# Patient Record
Sex: MALE | Race: BLACK OR AFRICAN AMERICAN | NOT HISPANIC OR LATINO | Employment: OTHER | URBAN - METROPOLITAN AREA
[De-identification: names, ages, dates, MRNs, and addresses within clinical notes are randomized per-mention and may not be internally consistent; named-entity substitution may affect disease eponyms.]

---

## 2017-12-10 ENCOUNTER — HOSPITAL ENCOUNTER (INPATIENT)
Facility: HOSPITAL | Age: 47
LOS: 10 days | Discharge: HOME | DRG: 392 | End: 2017-12-20
Attending: SURGERY | Admitting: SURGERY
Payer: MEDICAID

## 2017-12-10 DIAGNOSIS — K57.20 DIVERTICULITIS OF LARGE INTESTINE WITH PERFORATION WITHOUT ABSCESS OR BLEEDING: Primary | ICD-10-CM

## 2017-12-10 DIAGNOSIS — E66.9 OBESITY (BMI 30-39.9): ICD-10-CM

## 2017-12-10 DIAGNOSIS — K57.92 DIVERTICULITIS: ICD-10-CM

## 2017-12-10 DIAGNOSIS — N17.9 ACUTE KIDNEY INJURY: ICD-10-CM

## 2017-12-10 DIAGNOSIS — K57.20 DIVERTICULITIS OF LARGE INTESTINE WITH ABSCESS WITHOUT BLEEDING: ICD-10-CM

## 2017-12-10 DIAGNOSIS — R65.10 SIRS (SYSTEMIC INFLAMMATORY RESPONSE SYNDROME): ICD-10-CM

## 2017-12-10 DIAGNOSIS — K57.92 DIVERTICULITIS OF INTESTINE, UNSPECIFIED BLEEDING STATUS, UNSPECIFIED COMPLICATION STATUS, UNSPECIFIED PART OF INTESTINAL TRACT: ICD-10-CM

## 2017-12-10 PROBLEM — I10 ESSENTIAL HYPERTENSION: Status: ACTIVE | Noted: 2017-12-10

## 2017-12-10 LAB
ALBUMIN SERPL BCP-MCNC: 2.8 G/DL
ALP SERPL-CCNC: 60 U/L
ALT SERPL W/O P-5'-P-CCNC: 12 U/L
ANION GAP SERPL CALC-SCNC: 11 MMOL/L
ANISOCYTOSIS BLD QL SMEAR: SLIGHT
AST SERPL-CCNC: 14 U/L
BASOPHILS # BLD AUTO: 0.01 K/UL
BASOPHILS NFR BLD: 0 %
BILIRUB SERPL-MCNC: 0.4 MG/DL
BUN SERPL-MCNC: 21 MG/DL
CALCIUM SERPL-MCNC: 8 MG/DL
CHLORIDE SERPL-SCNC: 109 MMOL/L
CO2 SERPL-SCNC: 20 MMOL/L
CREAT SERPL-MCNC: 1.2 MG/DL
DIFFERENTIAL METHOD: ABNORMAL
EOSINOPHIL # BLD AUTO: 0 K/UL
EOSINOPHIL NFR BLD: 0 %
ERYTHROCYTE [DISTWIDTH] IN BLOOD BY AUTOMATED COUNT: 14.2 %
EST. GFR  (AFRICAN AMERICAN): >60 ML/MIN/1.73 M^2
EST. GFR  (NON AFRICAN AMERICAN): >60 ML/MIN/1.73 M^2
GLUCOSE SERPL-MCNC: 120 MG/DL
HCT VFR BLD AUTO: 39.6 %
HGB BLD-MCNC: 13.2 G/DL
LYMPHOCYTES # BLD AUTO: 0.9 K/UL
LYMPHOCYTES NFR BLD: 3.8 %
MAGNESIUM SERPL-MCNC: 1.5 MG/DL
MCH RBC QN AUTO: 28.4 PG
MCHC RBC AUTO-ENTMCNC: 33.3 G/DL
MCV RBC AUTO: 85 FL
MONOCYTES # BLD AUTO: 0.5 K/UL
MONOCYTES NFR BLD: 2 %
NEUTROPHILS # BLD AUTO: 22 K/UL
NEUTROPHILS NFR BLD: 94.2 %
PHOSPHATE SERPL-MCNC: 2.1 MG/DL
PLATELET # BLD AUTO: 204 K/UL
PLATELET BLD QL SMEAR: ABNORMAL
PMV BLD AUTO: 11.4 FL
POCT GLUCOSE: 112 MG/DL (ref 70–110)
POTASSIUM SERPL-SCNC: 4.1 MMOL/L
PROT SERPL-MCNC: 7 G/DL
RBC # BLD AUTO: 4.65 M/UL
SODIUM SERPL-SCNC: 140 MMOL/L
WBC # BLD AUTO: 23.54 K/UL

## 2017-12-10 PROCEDURE — 80053 COMPREHEN METABOLIC PANEL: CPT

## 2017-12-10 PROCEDURE — 63600175 PHARM REV CODE 636 W HCPCS: Performed by: STUDENT IN AN ORGANIZED HEALTH CARE EDUCATION/TRAINING PROGRAM

## 2017-12-10 PROCEDURE — 85025 COMPLETE CBC W/AUTO DIFF WBC: CPT

## 2017-12-10 PROCEDURE — 36415 COLL VENOUS BLD VENIPUNCTURE: CPT

## 2017-12-10 PROCEDURE — 11000001 HC ACUTE MED/SURG PRIVATE ROOM

## 2017-12-10 PROCEDURE — 25000003 PHARM REV CODE 250: Performed by: STUDENT IN AN ORGANIZED HEALTH CARE EDUCATION/TRAINING PROGRAM

## 2017-12-10 PROCEDURE — 84100 ASSAY OF PHOSPHORUS: CPT

## 2017-12-10 PROCEDURE — 94761 N-INVAS EAR/PLS OXIMETRY MLT: CPT

## 2017-12-10 PROCEDURE — 83735 ASSAY OF MAGNESIUM: CPT

## 2017-12-10 RX ORDER — DIPHENHYDRAMINE HYDROCHLORIDE 50 MG/ML
25 INJECTION INTRAMUSCULAR; INTRAVENOUS EVERY 4 HOURS PRN
Status: DISCONTINUED | OUTPATIENT
Start: 2017-12-10 | End: 2017-12-20 | Stop reason: HOSPADM

## 2017-12-10 RX ORDER — ACETAMINOPHEN 325 MG/1
650 TABLET ORAL EVERY 4 HOURS PRN
Status: DISCONTINUED | OUTPATIENT
Start: 2017-12-10 | End: 2017-12-20 | Stop reason: HOSPADM

## 2017-12-10 RX ORDER — SODIUM CHLORIDE 0.9 % (FLUSH) 0.9 %
3 SYRINGE (ML) INJECTION
Status: DISCONTINUED | OUTPATIENT
Start: 2017-12-10 | End: 2017-12-20 | Stop reason: HOSPADM

## 2017-12-10 RX ORDER — SODIUM CHLORIDE, SODIUM LACTATE, POTASSIUM CHLORIDE, CALCIUM CHLORIDE 600; 310; 30; 20 MG/100ML; MG/100ML; MG/100ML; MG/100ML
INJECTION, SOLUTION INTRAVENOUS CONTINUOUS
Status: DISCONTINUED | OUTPATIENT
Start: 2017-12-10 | End: 2017-12-13

## 2017-12-10 RX ORDER — TRAMADOL HYDROCHLORIDE 50 MG/1
100 TABLET ORAL EVERY 6 HOURS PRN
Status: DISCONTINUED | OUTPATIENT
Start: 2017-12-10 | End: 2017-12-20 | Stop reason: HOSPADM

## 2017-12-10 RX ORDER — ONDANSETRON 8 MG/1
8 TABLET, ORALLY DISINTEGRATING ORAL EVERY 8 HOURS PRN
Status: DISCONTINUED | OUTPATIENT
Start: 2017-12-10 | End: 2017-12-20 | Stop reason: HOSPADM

## 2017-12-10 RX ORDER — ENOXAPARIN SODIUM 100 MG/ML
40 INJECTION SUBCUTANEOUS EVERY 24 HOURS
Status: DISCONTINUED | OUTPATIENT
Start: 2017-12-11 | End: 2017-12-20 | Stop reason: HOSPADM

## 2017-12-10 RX ORDER — RAMELTEON 8 MG/1
8 TABLET ORAL NIGHTLY PRN
Status: DISCONTINUED | OUTPATIENT
Start: 2017-12-10 | End: 2017-12-20 | Stop reason: HOSPADM

## 2017-12-10 RX ORDER — ACETAMINOPHEN 325 MG/1
650 TABLET ORAL EVERY 8 HOURS PRN
Status: DISCONTINUED | OUTPATIENT
Start: 2017-12-10 | End: 2017-12-20 | Stop reason: HOSPADM

## 2017-12-10 RX ADMIN — SODIUM CHLORIDE, SODIUM LACTATE, POTASSIUM CHLORIDE, AND CALCIUM CHLORIDE 1000 ML: 600; 310; 30; 20 INJECTION, SOLUTION INTRAVENOUS at 09:12

## 2017-12-10 RX ADMIN — PIPERACILLIN SODIUM AND TAZOBACTAM SODIUM 4.5 G: 4; .5 INJECTION, POWDER, LYOPHILIZED, FOR SOLUTION INTRAVENOUS at 10:12

## 2017-12-10 RX ADMIN — RAMELTEON 8 MG: 8 TABLET, FILM COATED ORAL at 09:12

## 2017-12-10 RX ADMIN — ACETAMINOPHEN 650 MG: 325 TABLET ORAL at 09:12

## 2017-12-11 PROBLEM — K57.20 DIVERTICULITIS OF LARGE INTESTINE WITH PERFORATION WITHOUT ABSCESS OR BLEEDING: Status: ACTIVE | Noted: 2017-12-11

## 2017-12-11 LAB
ALBUMIN SERPL BCP-MCNC: 2.4 G/DL
ALP SERPL-CCNC: 55 U/L
ALT SERPL W/O P-5'-P-CCNC: 10 U/L
ANION GAP SERPL CALC-SCNC: 9 MMOL/L
ANISOCYTOSIS BLD QL SMEAR: SLIGHT
AST SERPL-CCNC: 10 U/L
BASOPHILS # BLD AUTO: ABNORMAL K/UL
BASOPHILS NFR BLD: 0 %
BILIRUB SERPL-MCNC: 0.3 MG/DL
BUN SERPL-MCNC: 22 MG/DL
CALCIUM SERPL-MCNC: 7.9 MG/DL
CHLORIDE SERPL-SCNC: 109 MMOL/L
CO2 SERPL-SCNC: 23 MMOL/L
CREAT SERPL-MCNC: 1.3 MG/DL
DIFFERENTIAL METHOD: ABNORMAL
EOSINOPHIL # BLD AUTO: ABNORMAL K/UL
EOSINOPHIL NFR BLD: 0 %
ERYTHROCYTE [DISTWIDTH] IN BLOOD BY AUTOMATED COUNT: 14.4 %
EST. GFR  (AFRICAN AMERICAN): >60 ML/MIN/1.73 M^2
EST. GFR  (NON AFRICAN AMERICAN): >60 ML/MIN/1.73 M^2
GLUCOSE SERPL-MCNC: 108 MG/DL
HCT VFR BLD AUTO: 34.4 %
HGB BLD-MCNC: 11.2 G/DL
HYPOCHROMIA BLD QL SMEAR: ABNORMAL
INR PPP: 1.4
LYMPHOCYTES # BLD AUTO: ABNORMAL K/UL
LYMPHOCYTES NFR BLD: 3 %
MAGNESIUM SERPL-MCNC: 1.5 MG/DL
MCH RBC QN AUTO: 27.8 PG
MCHC RBC AUTO-ENTMCNC: 32.6 G/DL
MCV RBC AUTO: 85 FL
MONOCYTES # BLD AUTO: ABNORMAL K/UL
MONOCYTES NFR BLD: 2 %
NEUTROPHILS NFR BLD: 80 %
NEUTS BAND NFR BLD MANUAL: 15 %
OVALOCYTES BLD QL SMEAR: ABNORMAL
PHOSPHATE SERPL-MCNC: 2 MG/DL
PLATELET # BLD AUTO: 181 K/UL
PLATELET BLD QL SMEAR: ABNORMAL
PMV BLD AUTO: 11.4 FL
POCT GLUCOSE: 102 MG/DL (ref 70–110)
POCT GLUCOSE: 107 MG/DL (ref 70–110)
POCT GLUCOSE: 107 MG/DL (ref 70–110)
POCT GLUCOSE: 113 MG/DL (ref 70–110)
POIKILOCYTOSIS BLD QL SMEAR: SLIGHT
POLYCHROMASIA BLD QL SMEAR: ABNORMAL
POTASSIUM SERPL-SCNC: 3.9 MMOL/L
PROT SERPL-MCNC: 6.2 G/DL
PROTHROMBIN TIME: 14.5 SEC
RBC # BLD AUTO: 4.03 M/UL
SODIUM SERPL-SCNC: 141 MMOL/L
WBC # BLD AUTO: 20.13 K/UL

## 2017-12-11 PROCEDURE — 94761 N-INVAS EAR/PLS OXIMETRY MLT: CPT

## 2017-12-11 PROCEDURE — 80053 COMPREHEN METABOLIC PANEL: CPT

## 2017-12-11 PROCEDURE — 83735 ASSAY OF MAGNESIUM: CPT

## 2017-12-11 PROCEDURE — 25000003 PHARM REV CODE 250: Performed by: STUDENT IN AN ORGANIZED HEALTH CARE EDUCATION/TRAINING PROGRAM

## 2017-12-11 PROCEDURE — 85027 COMPLETE CBC AUTOMATED: CPT

## 2017-12-11 PROCEDURE — 85610 PROTHROMBIN TIME: CPT

## 2017-12-11 PROCEDURE — 11000001 HC ACUTE MED/SURG PRIVATE ROOM

## 2017-12-11 PROCEDURE — 85007 BL SMEAR W/DIFF WBC COUNT: CPT

## 2017-12-11 PROCEDURE — 84100 ASSAY OF PHOSPHORUS: CPT

## 2017-12-11 PROCEDURE — 63600175 PHARM REV CODE 636 W HCPCS: Performed by: STUDENT IN AN ORGANIZED HEALTH CARE EDUCATION/TRAINING PROGRAM

## 2017-12-11 PROCEDURE — 36415 COLL VENOUS BLD VENIPUNCTURE: CPT

## 2017-12-11 RX ORDER — ONDANSETRON 2 MG/ML
4 INJECTION INTRAMUSCULAR; INTRAVENOUS EVERY 6 HOURS PRN
Status: DISCONTINUED | OUTPATIENT
Start: 2017-12-11 | End: 2017-12-20 | Stop reason: HOSPADM

## 2017-12-11 RX ORDER — NAPROXEN SODIUM 220 MG
220 TABLET ORAL
Status: ON HOLD | COMMUNITY
End: 2017-12-28 | Stop reason: HOSPADM

## 2017-12-11 RX ORDER — ENALAPRIL MALEATE 10 MG/1
10 TABLET ORAL DAILY
COMMUNITY

## 2017-12-11 RX ADMIN — RAMELTEON 8 MG: 8 TABLET, FILM COATED ORAL at 10:12

## 2017-12-11 RX ADMIN — ENOXAPARIN SODIUM 40 MG: 100 INJECTION SUBCUTANEOUS at 05:12

## 2017-12-11 RX ADMIN — PIPERACILLIN SODIUM AND TAZOBACTAM SODIUM 4.5 G: 4; .5 INJECTION, POWDER, LYOPHILIZED, FOR SOLUTION INTRAVENOUS at 09:12

## 2017-12-11 RX ADMIN — PIPERACILLIN SODIUM AND TAZOBACTAM SODIUM 4.5 G: 4; .5 INJECTION, POWDER, LYOPHILIZED, FOR SOLUTION INTRAVENOUS at 05:12

## 2017-12-11 RX ADMIN — SODIUM CHLORIDE, SODIUM LACTATE, POTASSIUM CHLORIDE, AND CALCIUM CHLORIDE: 600; 310; 30; 20 INJECTION, SOLUTION INTRAVENOUS at 03:12

## 2017-12-11 RX ADMIN — ONDANSETRON 4 MG: 2 INJECTION INTRAMUSCULAR; INTRAVENOUS at 08:12

## 2017-12-11 RX ADMIN — DIPHENHYDRAMINE HYDROCHLORIDE 25 MG: 50 INJECTION, SOLUTION INTRAMUSCULAR; INTRAVENOUS at 02:12

## 2017-12-11 RX ADMIN — ONDANSETRON 8 MG: 8 TABLET, ORALLY DISINTEGRATING ORAL at 02:12

## 2017-12-11 RX ADMIN — PIPERACILLIN SODIUM AND TAZOBACTAM SODIUM 4.5 G: 4; .5 INJECTION, POWDER, LYOPHILIZED, FOR SOLUTION INTRAVENOUS at 02:12

## 2017-12-11 NOTE — H&P
"Surgery H&P     CC:  Diverticulitis     HPI: 47y obese male previously admitted to [facility] for diverticulitis transferred to [facility] for surgical evaluation. Pt states abd pain began Friday with assoc nausea, no diarrhea or BPR. Pain located in lower abdomen, has since improved since initial onset. Pt denies any sick contacts or new foods. States that was treated as outpatient with PO abx for similar problem in October 2017. Prior to October, patient cannot recall if had similar problem. No hx of colonoscopy. Denies hx of colon cancer or colon polyps in family. No recent weight loss. Denies fevers/chills. No dysuria or hematuria. Does report urine has been "dark" since Friday     ROS: 12 pt ROS conducted and negative other than above     PMHx: HTN, diverticulitis   PSHx: none   Social: denies T/A/D   Allergies: NKDA     Temp:  [100.8 °F (38.2 °C)] 100.8 °F (38.2 °C)  Pulse:  [118] 118  Resp:  [19] 19  SpO2:  [97 %] 97 %  BP: (130)/(87) 130/87      Physical Exam:   NAD   Obese male   RR  Non labored breathing   abd obese mild distension TTP LLQ no guarding. No rebound. Not peritoneal     Labs:   Recent Results (from the past 336 hour(s))   CBC auto differential    Collection Time: 12/10/17  8:19 PM   Result Value Ref Range    WBC 23.54 (H) 3.90 - 12.70 K/uL    Hemoglobin 13.2 (L) 14.0 - 18.0 g/dL    Hematocrit 39.6 (L) 40.0 - 54.0 %    Platelets 204 150 - 350 K/uL     BMP  Lab Results   Component Value Date     12/10/2017    K 4.1 12/10/2017     12/10/2017    CO2 20 (L) 12/10/2017    BUN 21 (H) 12/10/2017    CREATININE 1.2 12/10/2017    CALCIUM 8.0 (L) 12/10/2017    ANIONGAP 11 12/10/2017    ESTGFRAFRICA >60 12/10/2017    EGFRNONAA >60 12/10/2017     Lab Results   Component Value Date    ALT 12 12/10/2017    AST 14 12/10/2017    ALKPHOS 60 12/10/2017    BILITOT 0.4 12/10/2017     Imaging:    CT (outisde films) - **own interpretation as read was not sent w images** - diverticulitis of sigmoid " colon and associated stranding. Small area of free area adjacent to colon.       A/P: 47yM Diverticulitis, obesity, JAZZY   -admit to surgery   - bowel rest - NPO, okay ice chips, hard candy, sips w meds   - zosyn   - repeat labs in AM   - serial abdominal exams, no acute surgical intervention   - long discussion with patient and patients family present in room regarding diverticulitis and treatment options. All questions answered. Patient will required outpatient colonoscopy in 4-6 weeks after diverticulitis flare       Stephanie Rodriguez MD  9:08 PM  [employer] General Surgery PGY-IV

## 2017-12-11 NOTE — PROGRESS NOTES
Surgery Progress Note:     Overnight: no increase abd pain. No flatus/bm. No nausea.     Temp:  [99.1 °F (37.3 °C)-100.8 °F (38.2 °C)] 99.1 °F (37.3 °C)  Pulse:  [101-118] 101  Resp:  [18-20] 18  SpO2:  [97 %-98 %] 98 %  BP: (130-139)/(75-87) 139/79      Intake/Output Summary (Last 24 hours) at 17 0755  Last data filed at 17 0600   Gross per 24 hour   Intake             1575 ml   Output              200 ml   Net             1375 ml     Physical Exam:   NAD  AAO   abd soft mild distension. TTP LLQ no rebound. No guarding.     Labs:   Recent Results (from the past 336 hour(s))   CBC auto differential    Collection Time: 17  4:51 AM   Result Value Ref Range    WBC 20.13 (H) 3.90 - 12.70 K/uL    Hemoglobin 11.2 (L) 14.0 - 18.0 g/dL    Hematocrit 34.4 (L) 40.0 - 54.0 %    Platelets 181 150 - 350 K/uL   CBC auto differential    Collection Time: 12/10/17  8:19 PM   Result Value Ref Range    WBC 23.54 (H) 3.90 - 12.70 K/uL    Hemoglobin 13.2 (L) 14.0 - 18.0 g/dL    Hematocrit 39.6 (L) 40.0 - 54.0 %    Platelets 204 150 - 350 K/uL     BMP  Lab Results   Component Value Date     2017    K 3.9 2017     2017    CO2 23 2017    BUN 22 (H) 2017    CREATININE 1.3 2017    CALCIUM 7.9 (L) 2017    ANIONGAP 9 2017    ESTGFRAFRICA >60 2017    EGFRNONAA >60 2017     Lab Results   Component Value Date    ALT 10 2017    AST 10 2017    ALKPHOS 55 2017    BILITOT 0.3 2017     M.5  Phos: 2.0    A/P: 47yM diverticulitis, JAZZY   - keep npo, ivf  - iv abx   - serial abd exams   - cont hydration for JAZZY, improving     Stephanie Brito-MD Toño  7:55 AM  [employer] General Surgery PGY-IV

## 2017-12-11 NOTE — PLAN OF CARE
Pt lives with spouse and is independent with all ADLs. Pt voices no discharge needs at this time.    MICHELL Mcgrath notified of above and will follow.       12/11/17 1535   Discharge Assessment   Assessment Type Discharge Planning Assessment   Confirmed/corrected address and phone number on facesheet? Yes   Assessment information obtained from? Patient   Expected Length of Stay (days) 2   Communicated expected length of stay with patient/caregiver yes   Prior to hospitilization cognitive status: Alert/Oriented   Prior to hospitalization functional status: Independent   Current cognitive status: Alert/Oriented   Current Functional Status: Independent   Facility Arrived From: [facility]   Lives With spouse   Able to Return to Prior Arrangements yes   Is patient able to care for self after discharge? Yes   Who are your caregiver(s) and their phone number(s)? Rachel (spouse) [phone]   Patient's perception of discharge disposition home or selfcare   Readmission Within The Last 30 Days no previous admission in last 30 days   Patient currently being followed by outpatient case management? No   Patient currently receives any other outside agency services? No   Equipment Currently Used at Home none   Do you have any problems affording any of your prescribed medications? No   Is the patient taking medications as prescribed? yes   Does the patient have transportation home? Yes  (spouse)   Transportation Available car;family or friend will provide   Does the patient receive services at the [facility]? No   Discharge Plan A Home   Discharge Plan B Home;Home Health   Patient/Family In Agreement With Plan yes

## 2017-12-11 NOTE — PROGRESS NOTES
.Pharmacy New Medication Education    Patient accepted medication education.    Pharmacy educated patient on name and purpose of medications and possible side effects, using the teach-back method.     D/C Current Inpatient Medication Orders   D/C acetaminophen tablet 650 mg   D/C acetaminophen tablet 650 mg   D/C diphenhydrAMINE injection 25 mg   D/C enoxaparin injection 40 mg   D/C lactated ringers infusion   D/C ondansetron disintegrating tablet 8 mg   D/C piperacillin-tazobactam 4.5 g in dextrose 5 % 100 mL IVPB (ready to mix system)   D/C ramelteon tablet 8 mg   D/C sodium chloride 0.9% flush 3 mL   D/C traMADol tablet 100 mg       Learners of pharmacy medication education included:  Patient    Patient +/- learner response:  Verbalized Understanding, Teachback

## 2017-12-12 LAB
ALBUMIN SERPL BCP-MCNC: 2.4 G/DL
ALP SERPL-CCNC: 61 U/L
ALT SERPL W/O P-5'-P-CCNC: 9 U/L
ANION GAP SERPL CALC-SCNC: 8 MMOL/L
AST SERPL-CCNC: 12 U/L
BASOPHILS NFR BLD: 0 %
BILIRUB SERPL-MCNC: 0.3 MG/DL
BUN SERPL-MCNC: 17 MG/DL
CALCIUM SERPL-MCNC: 8.3 MG/DL
CHLORIDE SERPL-SCNC: 110 MMOL/L
CO2 SERPL-SCNC: 23 MMOL/L
CREAT SERPL-MCNC: 0.9 MG/DL
DIFFERENTIAL METHOD: ABNORMAL
EOSINOPHIL NFR BLD: 0 %
ERYTHROCYTE [DISTWIDTH] IN BLOOD BY AUTOMATED COUNT: 14.4 %
EST. GFR  (AFRICAN AMERICAN): >60 ML/MIN/1.73 M^2
EST. GFR  (NON AFRICAN AMERICAN): >60 ML/MIN/1.73 M^2
GLUCOSE SERPL-MCNC: 115 MG/DL
HCT VFR BLD AUTO: 35.3 %
HGB BLD-MCNC: 11.7 G/DL
LYMPHOCYTES NFR BLD: 5 %
MAGNESIUM SERPL-MCNC: 2 MG/DL
MCH RBC QN AUTO: 28.3 PG
MCHC RBC AUTO-ENTMCNC: 33.1 G/DL
MCV RBC AUTO: 85 FL
MONOCYTES NFR BLD: 3 %
NEUTROPHILS NFR BLD: 92 %
PHOSPHATE SERPL-MCNC: 2.1 MG/DL
PLATELET # BLD AUTO: 214 K/UL
PLATELET BLD QL SMEAR: ABNORMAL
PMV BLD AUTO: 11.4 FL
POCT GLUCOSE: 103 MG/DL (ref 70–110)
POCT GLUCOSE: 113 MG/DL (ref 70–110)
POCT GLUCOSE: 152 MG/DL (ref 70–110)
POTASSIUM SERPL-SCNC: 3.5 MMOL/L
PROT SERPL-MCNC: 6.5 G/DL
RBC # BLD AUTO: 4.14 M/UL
SODIUM SERPL-SCNC: 141 MMOL/L
WBC # BLD AUTO: 22.17 K/UL

## 2017-12-12 PROCEDURE — 80053 COMPREHEN METABOLIC PANEL: CPT

## 2017-12-12 PROCEDURE — 25000003 PHARM REV CODE 250: Performed by: STUDENT IN AN ORGANIZED HEALTH CARE EDUCATION/TRAINING PROGRAM

## 2017-12-12 PROCEDURE — 63600175 PHARM REV CODE 636 W HCPCS: Performed by: STUDENT IN AN ORGANIZED HEALTH CARE EDUCATION/TRAINING PROGRAM

## 2017-12-12 PROCEDURE — S0028 INJECTION, FAMOTIDINE, 20 MG: HCPCS | Performed by: STUDENT IN AN ORGANIZED HEALTH CARE EDUCATION/TRAINING PROGRAM

## 2017-12-12 PROCEDURE — 36415 COLL VENOUS BLD VENIPUNCTURE: CPT

## 2017-12-12 PROCEDURE — 84100 ASSAY OF PHOSPHORUS: CPT

## 2017-12-12 PROCEDURE — 11000001 HC ACUTE MED/SURG PRIVATE ROOM

## 2017-12-12 PROCEDURE — 83735 ASSAY OF MAGNESIUM: CPT

## 2017-12-12 PROCEDURE — 97802 MEDICAL NUTRITION INDIV IN: CPT

## 2017-12-12 PROCEDURE — 94761 N-INVAS EAR/PLS OXIMETRY MLT: CPT

## 2017-12-12 PROCEDURE — 85027 COMPLETE CBC AUTOMATED: CPT

## 2017-12-12 PROCEDURE — 85007 BL SMEAR W/DIFF WBC COUNT: CPT

## 2017-12-12 RX ORDER — FAMOTIDINE 10 MG/ML
20 INJECTION INTRAVENOUS 2 TIMES DAILY
Status: DISCONTINUED | OUTPATIENT
Start: 2017-12-12 | End: 2017-12-12

## 2017-12-12 RX ORDER — FAMOTIDINE 10 MG/ML
20 INJECTION INTRAVENOUS 2 TIMES DAILY
Status: DISCONTINUED | OUTPATIENT
Start: 2017-12-12 | End: 2017-12-20 | Stop reason: HOSPADM

## 2017-12-12 RX ADMIN — PIPERACILLIN SODIUM AND TAZOBACTAM SODIUM 4.5 G: 4; .5 INJECTION, POWDER, LYOPHILIZED, FOR SOLUTION INTRAVENOUS at 06:12

## 2017-12-12 RX ADMIN — POTASSIUM PHOSPHATE, MONOBASIC AND POTASSIUM PHOSPHATE, DIBASIC 15 MMOL: 224; 236 INJECTION, SOLUTION, CONCENTRATE INTRAVENOUS at 01:12

## 2017-12-12 RX ADMIN — ONDANSETRON 8 MG: 8 TABLET, ORALLY DISINTEGRATING ORAL at 06:12

## 2017-12-12 RX ADMIN — ENOXAPARIN SODIUM 40 MG: 100 INJECTION SUBCUTANEOUS at 06:12

## 2017-12-12 RX ADMIN — RAMELTEON 8 MG: 8 TABLET, FILM COATED ORAL at 09:12

## 2017-12-12 RX ADMIN — TRAMADOL HYDROCHLORIDE 100 MG: 50 TABLET, COATED ORAL at 06:12

## 2017-12-12 RX ADMIN — FAMOTIDINE 20 MG: 10 INJECTION, SOLUTION INTRAVENOUS at 09:12

## 2017-12-12 RX ADMIN — PIPERACILLIN SODIUM AND TAZOBACTAM SODIUM 4.5 G: 4; .5 INJECTION, POWDER, LYOPHILIZED, FOR SOLUTION INTRAVENOUS at 03:12

## 2017-12-12 RX ADMIN — FAMOTIDINE 20 MG: 10 INJECTION, SOLUTION INTRAVENOUS at 01:12

## 2017-12-12 NOTE — PLAN OF CARE
Problem: Patient Care Overview  Goal: Plan of Care Review  Outcome: Ongoing (interventions implemented as appropriate)  Recommendation/Intervention:   1. Initiate Clear Liquid diet when medically acceptable.     Goals:   Diet will be started within 24 hours  Nutrition Goal Status: new

## 2017-12-12 NOTE — ASSESSMENT & PLAN NOTE
Related to (etiology):   dx    Signs and Symptoms (as evidenced by):   NPO/nausea/vomiting    Interventions/Recommendations (treatment strategy):  See recs    Nutrition Diagnosis Status:   Improving

## 2017-12-12 NOTE — PROGRESS NOTES
"[facility]  Adult Nutrition  Progress Note    SUMMARY     Recommendations    Recommendation/Intervention:   1. Initiate Clear Liquid diet when medically acceptable.     Goals:   Diet will be started within 24 hours  Nutrition Goal Status: new     Continuum of Care Plan  Referral to Outpatient Services:  (d/c needs to be determined)    Reason for Assessment  Reason for Assessment: identified at risk by screening criteria (dysphagia)  Diagnosis:  (diverticulitis)  Relevent Medical History: DM. HTN      General Information Comments: Pt NPO. Pt with N/V. Noted possible NG placement if emesis continues.    Nutrition Prescription Ordered  Current Diet Order: NPO     Evaluation of Received Nutrients/Fluid Intake  % Intake of Estimated Energy Needs: Other: NPO  % Meal Intake: NPO     Nutrition Risk Screen  Nutrition Risk Screen: dysphagia or difficulty swallowing    Nutrition/Diet History  Food Preferences: no Orthodoxy or culturla food prefs identified  Factors Affecting Nutritional Intake: NPO    Labs/Tests/Procedures/Meds  Pertinent Labs Reviewed: reviewed  Pertinent Labs Comments: Glu 115H, Ca 8.3L, Phos 2.1L, Alb 2.4L  Pertinent Medications Reviewed: reviewed     Physical Findings  Overall Physical Appearance: obese  Tubes:  (none)  Oral/Mouth Cavity: WDL  Skin:  (Ramon 22-intact)    Anthropometrics  Temp: 97.8 °F (36.6 °C)  Height: 5' 8" (172.7 cm)  Weight Method: Bed Scale  Weight: 128 kg (282 lb 3 oz)  Ideal Body Weight (IBW), Male: 154 lb  % Ideal Body Weight, Male (lb): 183.24 lb  BMI (Calculated): 43  BMI Grade: greater than 40 - morbid obesity     Estimated/Assessed Needs  Weight Used For Calorie Calculations: 70 kg (154 lb 5.2 oz)   Energy Calorie Requirements (kcal): 1954-0642  Energy Need Method: Kcal/kg (30-35 kcal/kg)  RMR ([place]-St. Jeor Equation): 1559.5  Weight Used For Protein Calculations: 70 kg (154 lb 5.2 oz)  Protein Requirements: 70-84g (1.0-1.2g/kg)  Fluid Need Method: RDA " Method  RDA Method (mL): 2100    Assessment and Plan    * Diverticulitis of large intestine with perforation without abscess or bleeding    Related to (etiology):   dx    Signs and Symptoms (as evidenced by):   NPO/nausea/vomiting    Interventions/Recommendations (treatment strategy):  See recs    Nutrition Diagnosis Status:   New            Monitor and Evaluation  Food and Nutrient Intake: energy intake  Food and Nutrient Adminstration: diet order  Physical Activity and Function: nutrition-related ADLs and IADLs  Anthropometric Measurements: weight  Biochemical Data, Medical Tests and Procedures: electrolyte and renal panel  Nutrition-Focused Physical Findings: overall appearance    Nutrition Risk  Level of Risk:  (2xweekly)    Nutrition Follow-Up  RD Follow-up?: Yes

## 2017-12-12 NOTE — PROGRESS NOTES
NEUROENDOCRINE TUMOR SERVICE  Progress Note    No acute events.  Bilious emesis x2 last night.  No flatus or BM yet.    Temp:  [97.8 °F (36.6 °C)-99.7 °F (37.6 °C)] 97.8 °F (36.6 °C)  Pulse:  [] 93  Resp:  [18-20] 20  SpO2:  [95 %-96 %] 95 %  BP: (125-158)/(77-95) 137/91    I/O last 3 completed shifts:  In: 3172.9 [I.V.:2872.9; IV Piggyback:300]  Out: 875 [Urine:875]    EXAM  Awake & alert, NAD  Non-labored breathing on room air  Abd soft, distended, TTP in RLQ. No signs of generalized peritonitis    LABS  WBC 22  H/H 11/35      ASSESSMENT/PLAN  47 year old male with complicated diverticulitis, JAZZY  -Continue NPO, IVF while having vomiting and no bowel function.   -Plan to place NG tube if vomiting persists  -JAZZY - Cr improving, continue to hydrate and follow labs  -WBC count rising again - will consider repeat CT tomorrow       Anat Burks MD  [employer] General Surgery PGY-2

## 2017-12-12 NOTE — PROGRESS NOTES
.Pharmacy New Medication Education    Patient accepted medication education.    Pharmacy educated patient on name and purpose of medications and possible side effects, using the teach-back method.     Pepcid  ondansetron      Learners of pharmacy medication education included:  Patient    Patient +/- learner response:  Verbalized Understanding, Teachback

## 2017-12-13 LAB
ALBUMIN SERPL BCP-MCNC: 2.2 G/DL
ALP SERPL-CCNC: 56 U/L
ALT SERPL W/O P-5'-P-CCNC: 9 U/L
ANION GAP SERPL CALC-SCNC: 8 MMOL/L
AST SERPL-CCNC: 12 U/L
BASOPHILS # BLD AUTO: 0.04 K/UL
BASOPHILS NFR BLD: 0.2 %
BILIRUB SERPL-MCNC: 0.4 MG/DL
BUN SERPL-MCNC: 15 MG/DL
CALCIUM SERPL-MCNC: 8.3 MG/DL
CHLORIDE SERPL-SCNC: 108 MMOL/L
CO2 SERPL-SCNC: 24 MMOL/L
CREAT SERPL-MCNC: 1 MG/DL
DIFFERENTIAL METHOD: ABNORMAL
EOSINOPHIL # BLD AUTO: 0.1 K/UL
EOSINOPHIL NFR BLD: 0.6 %
ERYTHROCYTE [DISTWIDTH] IN BLOOD BY AUTOMATED COUNT: 14.6 %
EST. GFR  (AFRICAN AMERICAN): >60 ML/MIN/1.73 M^2
EST. GFR  (NON AFRICAN AMERICAN): >60 ML/MIN/1.73 M^2
GLUCOSE SERPL-MCNC: 111 MG/DL
HCT VFR BLD AUTO: 36.9 %
HGB BLD-MCNC: 12 G/DL
LYMPHOCYTES # BLD AUTO: 1.6 K/UL
LYMPHOCYTES NFR BLD: 9.4 %
MAGNESIUM SERPL-MCNC: 2.2 MG/DL
MCH RBC QN AUTO: 27.9 PG
MCHC RBC AUTO-ENTMCNC: 32.5 G/DL
MCV RBC AUTO: 86 FL
MONOCYTES # BLD AUTO: 1.3 K/UL
MONOCYTES NFR BLD: 7.3 %
NEUTROPHILS # BLD AUTO: 14.2 K/UL
NEUTROPHILS NFR BLD: 81.8 %
PHOSPHATE SERPL-MCNC: 2.8 MG/DL
PLATELET # BLD AUTO: 221 K/UL
PMV BLD AUTO: 10.8 FL
POTASSIUM SERPL-SCNC: 3.7 MMOL/L
PROT SERPL-MCNC: 6.3 G/DL
RBC # BLD AUTO: 4.3 M/UL
SODIUM SERPL-SCNC: 140 MMOL/L
WBC # BLD AUTO: 17.4 K/UL

## 2017-12-13 PROCEDURE — S0028 INJECTION, FAMOTIDINE, 20 MG: HCPCS | Performed by: STUDENT IN AN ORGANIZED HEALTH CARE EDUCATION/TRAINING PROGRAM

## 2017-12-13 PROCEDURE — 25000003 PHARM REV CODE 250: Performed by: STUDENT IN AN ORGANIZED HEALTH CARE EDUCATION/TRAINING PROGRAM

## 2017-12-13 PROCEDURE — 83735 ASSAY OF MAGNESIUM: CPT

## 2017-12-13 PROCEDURE — 63600175 PHARM REV CODE 636 W HCPCS: Performed by: STUDENT IN AN ORGANIZED HEALTH CARE EDUCATION/TRAINING PROGRAM

## 2017-12-13 PROCEDURE — 80053 COMPREHEN METABOLIC PANEL: CPT

## 2017-12-13 PROCEDURE — 84100 ASSAY OF PHOSPHORUS: CPT

## 2017-12-13 PROCEDURE — 25500020 PHARM REV CODE 255: Performed by: SURGERY

## 2017-12-13 PROCEDURE — 94761 N-INVAS EAR/PLS OXIMETRY MLT: CPT

## 2017-12-13 PROCEDURE — 85025 COMPLETE CBC W/AUTO DIFF WBC: CPT

## 2017-12-13 PROCEDURE — 36415 COLL VENOUS BLD VENIPUNCTURE: CPT

## 2017-12-13 PROCEDURE — 11000001 HC ACUTE MED/SURG PRIVATE ROOM

## 2017-12-13 RX ORDER — DEXTROSE MONOHYDRATE, SODIUM CHLORIDE, AND POTASSIUM CHLORIDE 50; 1.49; 4.5 G/1000ML; G/1000ML; G/1000ML
INJECTION, SOLUTION INTRAVENOUS CONTINUOUS
Status: DISCONTINUED | OUTPATIENT
Start: 2017-12-13 | End: 2017-12-15

## 2017-12-13 RX ADMIN — PIPERACILLIN SODIUM AND TAZOBACTAM SODIUM 4.5 G: 4; .5 INJECTION, POWDER, LYOPHILIZED, FOR SOLUTION INTRAVENOUS at 11:12

## 2017-12-13 RX ADMIN — DEXTROSE MONOHYDRATE, SODIUM CHLORIDE, AND POTASSIUM CHLORIDE: 50; 4.5; 1.49 INJECTION, SOLUTION INTRAVENOUS at 11:12

## 2017-12-13 RX ADMIN — ACETAMINOPHEN 650 MG: 325 TABLET ORAL at 01:12

## 2017-12-13 RX ADMIN — IOHEXOL 100 ML: 350 INJECTION, SOLUTION INTRAVENOUS at 12:12

## 2017-12-13 RX ADMIN — PIPERACILLIN SODIUM AND TAZOBACTAM SODIUM 4.5 G: 4; .5 INJECTION, POWDER, LYOPHILIZED, FOR SOLUTION INTRAVENOUS at 05:12

## 2017-12-13 RX ADMIN — PIPERACILLIN SODIUM AND TAZOBACTAM SODIUM 4.5 G: 4; .5 INJECTION, POWDER, LYOPHILIZED, FOR SOLUTION INTRAVENOUS at 08:12

## 2017-12-13 RX ADMIN — RAMELTEON 8 MG: 8 TABLET, FILM COATED ORAL at 08:12

## 2017-12-13 RX ADMIN — IOHEXOL 30 ML: 350 INJECTION, SOLUTION INTRAVENOUS at 10:12

## 2017-12-13 RX ADMIN — FAMOTIDINE 20 MG: 10 INJECTION, SOLUTION INTRAVENOUS at 08:12

## 2017-12-13 RX ADMIN — PIPERACILLIN SODIUM AND TAZOBACTAM SODIUM 4.5 G: 4; .5 INJECTION, POWDER, LYOPHILIZED, FOR SOLUTION INTRAVENOUS at 01:12

## 2017-12-13 NOTE — PROGRESS NOTES
GENERAL SURGERY  Progress Note    No acute events. Patient says LLQ improved slightly. No further emesis. No flatus or BM yet.    Temp:  [97.8 °F (36.6 °C)-99.5 °F (37.5 °C)] 99.2 °F (37.3 °C)  Pulse:  [88-94] 88  Resp:  [18-20] 18  SpO2:  [94 %-97 %] 96 %  BP: (134-153)/(77-91) 153/88    I/O last 3 completed shifts:  In: 100 [IV Piggyback:100]  Out: 725 [Urine:725]    EXAM  Awake & alert, NAD  Non-labored breathing on room air  Abd soft, distended, still TTP in LLQ but improved    LABS  WBC 17 (improved)  H/H 12/36    Cr 1.0    ASSESSMENT/PLAN  47 year old male with complicated diverticulitis, JAZZY (resolved)  -Repeat CT abdomen/pelvis today with IV and po contrast to evaluate status of diverticulitis  -Continue NPO and IVF hydration until CT scan  -Further plans pending CT results      Anat Burks MD  [employer] General Surgery PGY-2

## 2017-12-13 NOTE — PLAN OF CARE
Problem: Patient Care Overview  Goal: Plan of Care Review  Outcome: Ongoing (interventions implemented as appropriate)  Pt aaox4. No distress noted. No bm this shift. Safety maintained. Bed in lowest locked position. Will continue to monitor.

## 2017-12-13 NOTE — PLAN OF CARE
Problem: Patient Care Overview  Goal: Plan of Care Review  Outcome: Ongoing (interventions implemented as appropriate)  Pt is AAO x3, Vitals stable. Pt denies pain and nausea. Pt instructed not to drink water or have ice chips after midnight for IR procedure tomorrow. Dr. Burks states that physicians will come talk to pt about CT results and procedure. Call bell in reach, bed in lowest position and wife at bedside. Will continue to monitor.

## 2017-12-13 NOTE — PLAN OF CARE
Problem: Patient Care Overview  Goal: Plan of Care Review  Pt on RA SPO2 96%.  No apparent distress.  Will continue to monitor.

## 2017-12-13 NOTE — PROGRESS NOTES
.Pharmacy New Medication Education    Patient accepted medication education.    Pharmacy educated patient on name and purpose of medications and possible side effects, using the teach-back method.     pepcid    Learners of pharmacy medication education included:  Patient    Patient +/- learner response:  Verbalized Understanding, Teachback

## 2017-12-13 NOTE — NURSING
IR RN called floor RN Dieter to alert of drain placement for tomorrow morning. Rn notified to hold lovenox and make sure NPO at 0000.

## 2017-12-14 LAB
ALBUMIN SERPL BCP-MCNC: 2.3 G/DL
ALP SERPL-CCNC: 94 U/L
ALT SERPL W/O P-5'-P-CCNC: 9 U/L
ANION GAP SERPL CALC-SCNC: 8 MMOL/L
ANISOCYTOSIS BLD QL SMEAR: SLIGHT
AST SERPL-CCNC: 13 U/L
BASOPHILS # BLD AUTO: 0.05 K/UL
BASOPHILS NFR BLD: 0.3 %
BILIRUB SERPL-MCNC: 0.4 MG/DL
BUN SERPL-MCNC: 13 MG/DL
CALCIUM SERPL-MCNC: 8.3 MG/DL
CHLORIDE SERPL-SCNC: 105 MMOL/L
CO2 SERPL-SCNC: 27 MMOL/L
CREAT SERPL-MCNC: 0.9 MG/DL
DIFFERENTIAL METHOD: ABNORMAL
EOSINOPHIL # BLD AUTO: 0.2 K/UL
EOSINOPHIL NFR BLD: 1.3 %
ERYTHROCYTE [DISTWIDTH] IN BLOOD BY AUTOMATED COUNT: 14.6 %
EST. GFR  (AFRICAN AMERICAN): >60 ML/MIN/1.73 M^2
EST. GFR  (NON AFRICAN AMERICAN): >60 ML/MIN/1.73 M^2
GLUCOSE SERPL-MCNC: 132 MG/DL
GRAM STN SPEC: NORMAL
GRAM STN SPEC: NORMAL
HCT VFR BLD AUTO: 36.1 %
HGB BLD-MCNC: 11.8 G/DL
HYPOCHROMIA BLD QL SMEAR: ABNORMAL
LYMPHOCYTES # BLD AUTO: 1.4 K/UL
LYMPHOCYTES NFR BLD: 9 %
MAGNESIUM SERPL-MCNC: 2.1 MG/DL
MCH RBC QN AUTO: 28 PG
MCHC RBC AUTO-ENTMCNC: 32.7 G/DL
MCV RBC AUTO: 86 FL
MONOCYTES # BLD AUTO: 1.4 K/UL
MONOCYTES NFR BLD: 9.2 %
NEUTROPHILS # BLD AUTO: 11.5 K/UL
NEUTROPHILS NFR BLD: 80.2 %
OVALOCYTES BLD QL SMEAR: ABNORMAL
PHOSPHATE SERPL-MCNC: 2.9 MG/DL
PLATELET # BLD AUTO: 254 K/UL
PLATELET BLD QL SMEAR: ABNORMAL
PMV BLD AUTO: 10.5 FL
POIKILOCYTOSIS BLD QL SMEAR: SLIGHT
POLYCHROMASIA BLD QL SMEAR: ABNORMAL
POTASSIUM SERPL-SCNC: 3.4 MMOL/L
PROT SERPL-MCNC: 6.5 G/DL
RBC # BLD AUTO: 4.22 M/UL
SODIUM SERPL-SCNC: 140 MMOL/L
TARGETS BLD QL SMEAR: ABNORMAL
WBC # BLD AUTO: 15.21 K/UL

## 2017-12-14 PROCEDURE — 84100 ASSAY OF PHOSPHORUS: CPT

## 2017-12-14 PROCEDURE — 25000003 PHARM REV CODE 250: Performed by: STUDENT IN AN ORGANIZED HEALTH CARE EDUCATION/TRAINING PROGRAM

## 2017-12-14 PROCEDURE — 11000001 HC ACUTE MED/SURG PRIVATE ROOM

## 2017-12-14 PROCEDURE — 36415 COLL VENOUS BLD VENIPUNCTURE: CPT

## 2017-12-14 PROCEDURE — 87116 MYCOBACTERIA CULTURE: CPT

## 2017-12-14 PROCEDURE — 80053 COMPREHEN METABOLIC PANEL: CPT

## 2017-12-14 PROCEDURE — 87102 FUNGUS ISOLATION CULTURE: CPT

## 2017-12-14 PROCEDURE — 63600175 PHARM REV CODE 636 W HCPCS: Performed by: RADIOLOGY

## 2017-12-14 PROCEDURE — 87205 SMEAR GRAM STAIN: CPT

## 2017-12-14 PROCEDURE — S0028 INJECTION, FAMOTIDINE, 20 MG: HCPCS | Performed by: STUDENT IN AN ORGANIZED HEALTH CARE EDUCATION/TRAINING PROGRAM

## 2017-12-14 PROCEDURE — 94761 N-INVAS EAR/PLS OXIMETRY MLT: CPT

## 2017-12-14 PROCEDURE — 87077 CULTURE AEROBIC IDENTIFY: CPT

## 2017-12-14 PROCEDURE — 0W9G30Z DRAINAGE OF PERITONEAL CAVITY WITH DRAINAGE DEVICE, PERCUTANEOUS APPROACH: ICD-10-PCS | Performed by: RADIOLOGY

## 2017-12-14 PROCEDURE — 83735 ASSAY OF MAGNESIUM: CPT

## 2017-12-14 PROCEDURE — 63600175 PHARM REV CODE 636 W HCPCS: Performed by: STUDENT IN AN ORGANIZED HEALTH CARE EDUCATION/TRAINING PROGRAM

## 2017-12-14 PROCEDURE — 87070 CULTURE OTHR SPECIMN AEROBIC: CPT

## 2017-12-14 PROCEDURE — 87075 CULTR BACTERIA EXCEPT BLOOD: CPT

## 2017-12-14 PROCEDURE — 63600175 PHARM REV CODE 636 W HCPCS: Performed by: FAMILY MEDICINE

## 2017-12-14 PROCEDURE — 87186 SC STD MICRODIL/AGAR DIL: CPT

## 2017-12-14 PROCEDURE — 25000003 PHARM REV CODE 250: Performed by: FAMILY MEDICINE

## 2017-12-14 RX ORDER — FENTANYL CITRATE 50 UG/ML
INJECTION, SOLUTION INTRAMUSCULAR; INTRAVENOUS CODE/TRAUMA/SEDATION MEDICATION
Status: COMPLETED | OUTPATIENT
Start: 2017-12-14 | End: 2017-12-14

## 2017-12-14 RX ORDER — POTASSIUM CHLORIDE 7.45 MG/ML
10 INJECTION INTRAVENOUS
Status: COMPLETED | OUTPATIENT
Start: 2017-12-14 | End: 2017-12-14

## 2017-12-14 RX ORDER — ENALAPRIL MALEATE 5 MG/1
10 TABLET ORAL DAILY
Status: DISCONTINUED | OUTPATIENT
Start: 2017-12-14 | End: 2017-12-20 | Stop reason: HOSPADM

## 2017-12-14 RX ORDER — MIDAZOLAM HYDROCHLORIDE 1 MG/ML
INJECTION INTRAMUSCULAR; INTRAVENOUS CODE/TRAUMA/SEDATION MEDICATION
Status: COMPLETED | OUTPATIENT
Start: 2017-12-14 | End: 2017-12-14

## 2017-12-14 RX ADMIN — FENTANYL CITRATE 50 MCG: 50 INJECTION, SOLUTION INTRAMUSCULAR; INTRAVENOUS at 03:12

## 2017-12-14 RX ADMIN — POTASSIUM CHLORIDE 10 MEQ: 10 INJECTION, SOLUTION INTRAVENOUS at 10:12

## 2017-12-14 RX ADMIN — POTASSIUM CHLORIDE 10 MEQ: 10 INJECTION, SOLUTION INTRAVENOUS at 12:12

## 2017-12-14 RX ADMIN — PIPERACILLIN SODIUM AND TAZOBACTAM SODIUM 4.5 G: 4; .5 INJECTION, POWDER, LYOPHILIZED, FOR SOLUTION INTRAVENOUS at 05:12

## 2017-12-14 RX ADMIN — POTASSIUM CHLORIDE 10 MEQ: 10 INJECTION, SOLUTION INTRAVENOUS at 01:12

## 2017-12-14 RX ADMIN — MIDAZOLAM HYDROCHLORIDE 1 MG: 1 INJECTION, SOLUTION INTRAMUSCULAR; INTRAVENOUS at 03:12

## 2017-12-14 RX ADMIN — POTASSIUM CHLORIDE 10 MEQ: 10 INJECTION, SOLUTION INTRAVENOUS at 09:12

## 2017-12-14 RX ADMIN — PIPERACILLIN SODIUM AND TAZOBACTAM SODIUM 4.5 G: 4; .5 INJECTION, POWDER, LYOPHILIZED, FOR SOLUTION INTRAVENOUS at 10:12

## 2017-12-14 RX ADMIN — TRAMADOL HYDROCHLORIDE 100 MG: 50 TABLET, COATED ORAL at 08:12

## 2017-12-14 RX ADMIN — ONDANSETRON 4 MG: 2 INJECTION INTRAMUSCULAR; INTRAVENOUS at 09:12

## 2017-12-14 RX ADMIN — ENOXAPARIN SODIUM 40 MG: 100 INJECTION SUBCUTANEOUS at 05:12

## 2017-12-14 RX ADMIN — ACETAMINOPHEN 650 MG: 325 TABLET ORAL at 12:12

## 2017-12-14 RX ADMIN — RAMELTEON 8 MG: 8 TABLET, FILM COATED ORAL at 10:12

## 2017-12-14 RX ADMIN — PIPERACILLIN SODIUM AND TAZOBACTAM SODIUM 4.5 G: 4; .5 INJECTION, POWDER, LYOPHILIZED, FOR SOLUTION INTRAVENOUS at 08:12

## 2017-12-14 RX ADMIN — DEXTROSE MONOHYDRATE, SODIUM CHLORIDE, AND POTASSIUM CHLORIDE: 50; 4.5; 1.49 INJECTION, SOLUTION INTRAVENOUS at 06:12

## 2017-12-14 RX ADMIN — ENALAPRIL MALEATE 10 MG: 5 TABLET ORAL at 01:12

## 2017-12-14 RX ADMIN — DIPHENHYDRAMINE HYDROCHLORIDE 25 MG: 50 INJECTION, SOLUTION INTRAMUSCULAR; INTRAVENOUS at 01:12

## 2017-12-14 RX ADMIN — FAMOTIDINE 20 MG: 10 INJECTION, SOLUTION INTRAVENOUS at 08:12

## 2017-12-14 NOTE — ASSESSMENT & PLAN NOTE
Related to (etiology):   Excessuve kcal intake/Non-compliance with ADA diet    Signs and Symptoms (as evidenced by):   BMI 43    Interventions/Recommendations (treatment strategy):  Provided DM education with handouts and f/u resources.  Encouraged compliance to promote weight loss  Encouraged increasing physical activity    Nutrition Diagnosis Status:   Continues

## 2017-12-14 NOTE — PROCEDURES
Radiology Post-Procedure Note    Pre Op Diagnosis: left lower quadrant abscess    Post Op Diagnosis: left lower quadrant abscess    Procedure:left lower abscess drainage    Procedure performed by:  Evelyn    Written Informed Consent Obtained: Yes    Specimen Removed:  cc purulent material    Estimated Blood Loss: Minimal    Findings: CT was used for localization of abnormal fluid collection. A needle was inserted into the fluid collection and purulent fluid was aspirated.  A wire was inserted into the collection and the tract was dilated.  A 8.0 Palestinian all-purpose drainage catheter was inserted and a pigtail loop of the distal end was formed.  The catheter was sutured into place and approximately  120 mL fluid was removed.     A specimen was sent to the lab for further analysis and culture.    The patient tolerated procedure well and there were no complications. Please see procedure report under Imaging for further details.    .Carlito VAZQUEZ M.D. personally reviewed and agree with the above dictated note.

## 2017-12-14 NOTE — PROGRESS NOTES
.Pharmacy New Medication Education    Patient accepted medication education.    Pharmacy educated patient on name and purpose of medications and possible side effects, using the teach-back method.     KCL    Learners of pharmacy medication education included:  Patient    Patient +/- learner response:  Verbalized Understanding, Teachback

## 2017-12-14 NOTE — PROGRESS NOTES
GENERAL SURGERY  Progress Note    No acute events. Patient with complaints of LLQ pain this morning. No further emesis. No flatus or BM overnight.      Temp:  [99 °F (37.2 °C)-100.1 °F (37.8 °C)] 99.8 °F (37.7 °C)  Pulse:  [81-92] 81  Resp:  [18-19] 19  SpO2:  [95 %-97 %] 97 %  BP: (132-158)/(79-90) 142/80    I/O last 3 completed shifts:  In: 2525 [I.V.:2325; IV Piggyback:200]  Out: 1875 [Urine:1875]    EXAM  Awake & alert, NAD  Non-labored breathing on room air  Abd soft, distended, still TTP in LLQ but improved    LABS  CMP  Sodium   Date Value Ref Range Status   12/14/2017 140 136 - 145 mmol/L Final     Potassium   Date Value Ref Range Status   12/14/2017 3.4 (L) 3.5 - 5.1 mmol/L Final     Chloride   Date Value Ref Range Status   12/14/2017 105 95 - 110 mmol/L Final     CO2   Date Value Ref Range Status   12/14/2017 27 23 - 29 mmol/L Final     Glucose   Date Value Ref Range Status   12/14/2017 132 (H) 70 - 110 mg/dL Final     BUN, Bld   Date Value Ref Range Status   12/14/2017 13 6 - 20 mg/dL Final     Creatinine   Date Value Ref Range Status   12/14/2017 0.9 0.5 - 1.4 mg/dL Final     Calcium   Date Value Ref Range Status   12/14/2017 8.3 (L) 8.7 - 10.5 mg/dL Final     Total Protein   Date Value Ref Range Status   12/14/2017 6.5 6.0 - 8.4 g/dL Final     Albumin   Date Value Ref Range Status   12/14/2017 2.3 (L) 3.5 - 5.2 g/dL Final     Total Bilirubin   Date Value Ref Range Status   12/14/2017 0.4 0.1 - 1.0 mg/dL Final     Comment:     For infants and newborns, interpretation of results should be based  on gestational age, weight and in agreement with clinical  observations.  Premature Infant recommended reference ranges:  Up to 24 hours.............<8.0 mg/dL  Up to 48 hours............<12.0 mg/dL  3-5 days..................<15.0 mg/dL  6-29 days.................<15.0 mg/dL       Alkaline Phosphatase   Date Value Ref Range Status   12/14/2017 94 55 - 135 U/L Final     AST   Date Value Ref Range Status    12/14/2017 13 10 - 40 U/L Final     ALT   Date Value Ref Range Status   12/14/2017 9 (L) 10 - 44 U/L Final     Anion Gap   Date Value Ref Range Status   12/14/2017 8 8 - 16 mmol/L Final     eGFR if    Date Value Ref Range Status   12/14/2017 >60 >60 mL/min/1.73 m^2 Final     eGFR if non    Date Value Ref Range Status   12/14/2017 >60 >60 mL/min/1.73 m^2 Final     Comment:     Calculation used to obtain the estimated glomerular filtration  rate (eGFR) is the CKD-EPI equation.        Imaging Results          CT Abdomen Pelvis With Contrast (Final result)  Result time 12/13/17 13:38:05    Final result by Elzbieta Medina MD (12/13/17 13:38:05)                 Impression:     There are CT findings of acute diverticulitis with free fluid seen throughout the abdomen and small regions of intra-abdominal free air.  At this point no discrete, rim-enhancing fluid collection is noted.    There dilated loops of small bowel suggesting evolving small bowel obstruction.  There is a small amount of extraluminal contrast seen adjacent to the descending colon in the left lower quadrant abdomen possibly representing the perforation point.    There low-attenuation lesions in the kidneys likely representing cysts but they cannot be technically classified as such on the study.  Nonemergent renal ultrasound is recommended for further evaluation.      Electronically signed by: ELZBIETA MEDINA MD  Date:     12/13/17  Time:    13:38              Narrative:    CT abdomen and pelvis with contrast    There is a tiny bit of what may represent extraluminal contrast versus contrast within diverticula seen adjacent to the colon in the left lower quadrant of the abdomen.  Otherwise all contrast is intraluminal.    There is fluid seen adjacent to the descending colon with a small amount of air seen within the fluid.  No discrete, rim-enhancing collection is seen.    Dilated loops of small bowel are seen within the  left upper quadrant of the abdomen.  There are regions of fluid interspersed within loops of bowel throughout the abdomen.    There is right basilar consolidation and small bilateral pleural effusions.  The liver, spleen, adrenal glands, pancreas are unremarkable.  There are low attenuation lesions within the kidneys are not able to be classified as cysts on this postcontrast study..  The osseous structures are unremarkable.                                ASSESSMENT/PLAN  47 year old male with complicated diverticulitis, JAZZY (resolved)  -IR drainage of intraabdominal abscess this morning  -Will replete K this morning    Johnny Culver MD  [employer] [facility] PGY-2

## 2017-12-14 NOTE — CONSULTS
Inpatient Radiology Pre-procedure Note    History of Present Illness:  Mario Castro Sr. is a 47 y.o. male who presents for CT Guided Drainage   .  Admission H&P reviewed.  Past Medical History:   Diagnosis Date    Diabetes mellitus     Hypertension      History reviewed. No pertinent surgical history.    Review of Systems:   As documented in primary team H&P    Home Meds:   Prior to Admission medications    Medication Sig Start Date End Date Taking? Authorizing Provider   enalapril (VASOTEC) 10 MG tablet Take 10 mg by mouth once daily.   Yes Historical Provider, MD   naproxen sodium (ANAPROX) 220 MG tablet Take 220 mg by mouth every 12 (twelve) hours.   Yes Historical Provider, MD     Scheduled Meds:    enalapril  10 mg Oral Daily    enoxaparin  40 mg Subcutaneous Daily    famotidine (PF)  20 mg Intravenous BID    piperacillin-tazobactam 4.5 g in dextrose 5 % 100 mL IVPB (ready to mix system)  4.5 g Intravenous Q8H     Continuous Infusions:    dextrose 5 % and 0.45 % NaCl with KCl 20 mEq 125 mL/hr at 12/14/17 0615     PRN Meds:acetaminophen, acetaminophen, diphenhydrAMINE, ondansetron, ondansetron, ramelteon, sodium chloride 0.9%, traMADol  Anticoagulants/Antiplatelets: no anticoagulation    Allergies: Review of patient's allergies indicates:  No Known Allergies  Sedation Hx: have not been any systemic reactions    Labs:    Recent Labs  Lab 12/11/17  0450   INR 1.4*       Recent Labs  Lab 12/14/17  0433   WBC 15.21*   HGB 11.8*   HCT 36.1*   MCV 86         Recent Labs  Lab 12/14/17  0433   *      K 3.4*      CO2 27   BUN 13   CREATININE 0.9   CALCIUM 8.3*   MG 2.1   ALT 9*   AST 13   ALBUMIN 2.3*   BILITOT 0.4         Vitals:  Temp: 98.9 °F (37.2 °C) (12/14/17 1315)  Pulse: 88 (12/14/17 1158)  Resp: 18 (12/14/17 1158)  BP: (!) 170/84 (12/14/17 1218)  SpO2: 98 % (12/14/17 1139)     Physical Exam:  ASA: 2  Mallampati: 2    General: no acute distress  Mental Status: alert and oriented  to person, place and time  HEENT: normocephalic, atraumatic  Chest: unlabored breathing  Heart: regular heart rate  Abdomen: nondistended  Extremity: moves all extremities    Plan: Attempted CT drainage of abdominal collection    Sedation Plan: moderate    .Carlito VAZQUEZ M.D. personally reviewed and agree with the above dictated note.

## 2017-12-14 NOTE — PLAN OF CARE
Problem: Patient Care Overview  Goal: Plan of Care Review  Plan of care discussed with patient and spouse. Pt is to remain NPO for IR procedure. Pt voiced understanding. IV fluids and antibiotics continued. Pt tolerating well. Pt given PRN sleep aid per request. Denies pain this shift. Spouse at bedside at all times and is attentive to care. Side rails up x3, call light within reach. Pt encouraged to voice needs by use of call light.

## 2017-12-14 NOTE — PROGRESS NOTES
[facility]  Adult Nutrition  Consult Note    SUMMARY     Recommendations    Recommendation/Intervention:   1. If unable to advance po in 24 hrs rec PPN: Clinimex or custom of  2.75/10 @ 100 ml/hr + IVFE Daily   - To provide: 1580 kcal, 66 g pro, 2400 ml fluid; GIR: 1.3  2. Advance diet as able to 2000 ADA  - add GI soft restriction with abd discomfort  3. RD to monitor     Goals: Diet will be started within 24 hours  Nutrition Goal Status: goal not met  Communication of RD Recs: reviewed with RN (sticky note)    Continuum of Care Plan    Referral to Outpatient Services:  (d/c needs to be determined)    Reason for Assessment    Reason for Assessment: RD follow-up  Diagnosis:  (diverticulitis)  Relevent Medical History: DM. HTN       General Information Comments: Patient remains NPO for IR procedure. Patient reports last po intake on 4/8.  Denies issues with chewing/swallowingn/v. Patient stated he would like information on DM. Provided DM education to wife with handouts and f/u resources.        Nutrition Prescription Ordered    Current Diet Order: NPO      Evaluation of Received Nutrients/Fluid Intake    Other Calories (kcal): 510 (dextrose with IVF)      Energy Calories Required: not meeting needs   Protein Required: not meeting needs   IV Fluid (mL): 3000      I/O: 2425/1300       Fluid Required: meeting needs  Comments: LBM: 12/10     Nutrition Risk Screen     Nutrition Risk Screen: dysphagia or difficulty swallowing    Nutrition/Diet History    Food Preferences: no Caodaism or culturla food prefs identified   Factors Affecting Nutritional Intake: NPO     Labs/Tests/Procedures/Meds     Pertinent Labs Reviewed: reviewed  Pertinent Labs Comments: WBC elevated; Phos  2.1  Pertinent Medications Reviewed: reviewed     Physical Findings    Overall Physical Appearance: obese  Tubes:  (none)  Oral/Mouth Cavity: WDL  Skin:  (Ramon 22-intact)    Anthropometrics    Temp: (!) 101.6 °F (38.7 °C)    "  Height: 5' 8" (172.7 cm)  Weight Method: Bed Scale  Weight: 128.8 kg (283 lb 15.2 oz)  Ideal Body Weight (IBW), Male: 154 lb     % Ideal Body Weight, Male (lb): 183.24 lb     BMI (Calculated): 43  BMI Grade: greater than 40 - morbid obesity     Estimated/Assessed Needs    Weight Used For Calorie Calculations: 70 kg (154 lb 5.2 oz)      Energy Calorie Requirements (kcal): 1685-2128 kcal  Energy Need Method: Hunt-St Jeor     RMR (Hunt-St. Jeor Equation): 1559.5      Weight Used For Protein Calculations: 70 kg (154 lb 5.2 oz)  Protein Requirements: 70-84g (1.0-1.2g/kg)     Fluid Need Method: RDA Method      RDA Method (mL): 1900         CHO Requirement: 225g     Assessment and Plan    * Diverticulitis of large intestine with perforation without abscess or bleeding    Related to (etiology):   dx    Signs and Symptoms (as evidenced by):   NPO/nausea/vomiting    Interventions/Recommendations (treatment strategy):  See recs    Nutrition Diagnosis Status:   New              Monitor and Evaluation    Food and Nutrient Intake: energy intake  Food and Nutrient Adminstration: diet order     Physical Activity and Function: nutrition-related ADLs and IADLs  Anthropometric Measurements: weight  Biochemical Data, Medical Tests and Procedures: electrolyte and renal panel  Nutrition-Focused Physical Findings: overall appearance    Nutrition Risk    Level of Risk:  (2xweekly)    Nutrition Follow-Up    RD Follow-up?: Yes        "

## 2017-12-15 LAB
ALBUMIN SERPL BCP-MCNC: 2.3 G/DL
ALP SERPL-CCNC: 67 U/L
ALT SERPL W/O P-5'-P-CCNC: 8 U/L
ANION GAP SERPL CALC-SCNC: 7 MMOL/L
ANISOCYTOSIS BLD QL SMEAR: SLIGHT
AST SERPL-CCNC: 11 U/L
BASOPHILS # BLD AUTO: ABNORMAL K/UL
BASOPHILS NFR BLD: 0 %
BILIRUB SERPL-MCNC: 0.3 MG/DL
BUN SERPL-MCNC: 12 MG/DL
CALCIUM SERPL-MCNC: 8.2 MG/DL
CHLORIDE SERPL-SCNC: 103 MMOL/L
CO2 SERPL-SCNC: 29 MMOL/L
CREAT SERPL-MCNC: 0.9 MG/DL
DIFFERENTIAL METHOD: ABNORMAL
EOSINOPHIL # BLD AUTO: ABNORMAL K/UL
EOSINOPHIL NFR BLD: 3 %
ERYTHROCYTE [DISTWIDTH] IN BLOOD BY AUTOMATED COUNT: 14.2 %
EST. GFR  (AFRICAN AMERICAN): >60 ML/MIN/1.73 M^2
EST. GFR  (NON AFRICAN AMERICAN): >60 ML/MIN/1.73 M^2
GLUCOSE SERPL-MCNC: 138 MG/DL
HCT VFR BLD AUTO: 38 %
HGB BLD-MCNC: 12.4 G/DL
LYMPHOCYTES # BLD AUTO: ABNORMAL K/UL
LYMPHOCYTES NFR BLD: 20 %
MAGNESIUM SERPL-MCNC: 2.1 MG/DL
MCH RBC QN AUTO: 27.8 PG
MCHC RBC AUTO-ENTMCNC: 32.6 G/DL
MCV RBC AUTO: 85 FL
MONOCYTES # BLD AUTO: ABNORMAL K/UL
MONOCYTES NFR BLD: 4 %
MYELOCYTES NFR BLD MANUAL: 1 %
NEUTROPHILS NFR BLD: 66 %
NEUTS BAND NFR BLD MANUAL: 6 %
PHOSPHATE SERPL-MCNC: 2.9 MG/DL
PLATELET # BLD AUTO: 290 K/UL
PLATELET BLD QL SMEAR: ABNORMAL
PMV BLD AUTO: 10.3 FL
POCT GLUCOSE: 107 MG/DL (ref 70–110)
POCT GLUCOSE: 111 MG/DL (ref 70–110)
POCT GLUCOSE: 117 MG/DL (ref 70–110)
POTASSIUM SERPL-SCNC: 3.8 MMOL/L
PROT SERPL-MCNC: 6.5 G/DL
RBC # BLD AUTO: 4.46 M/UL
SODIUM SERPL-SCNC: 139 MMOL/L
WBC # BLD AUTO: 18.76 K/UL

## 2017-12-15 PROCEDURE — 25000003 PHARM REV CODE 250: Performed by: FAMILY MEDICINE

## 2017-12-15 PROCEDURE — 80053 COMPREHEN METABOLIC PANEL: CPT

## 2017-12-15 PROCEDURE — 25000003 PHARM REV CODE 250: Performed by: STUDENT IN AN ORGANIZED HEALTH CARE EDUCATION/TRAINING PROGRAM

## 2017-12-15 PROCEDURE — 85027 COMPLETE CBC AUTOMATED: CPT

## 2017-12-15 PROCEDURE — 84100 ASSAY OF PHOSPHORUS: CPT

## 2017-12-15 PROCEDURE — 85007 BL SMEAR W/DIFF WBC COUNT: CPT

## 2017-12-15 PROCEDURE — 94761 N-INVAS EAR/PLS OXIMETRY MLT: CPT

## 2017-12-15 PROCEDURE — S0028 INJECTION, FAMOTIDINE, 20 MG: HCPCS | Performed by: STUDENT IN AN ORGANIZED HEALTH CARE EDUCATION/TRAINING PROGRAM

## 2017-12-15 PROCEDURE — 83735 ASSAY OF MAGNESIUM: CPT

## 2017-12-15 PROCEDURE — 11000001 HC ACUTE MED/SURG PRIVATE ROOM

## 2017-12-15 PROCEDURE — 63600175 PHARM REV CODE 636 W HCPCS: Performed by: STUDENT IN AN ORGANIZED HEALTH CARE EDUCATION/TRAINING PROGRAM

## 2017-12-15 PROCEDURE — 36415 COLL VENOUS BLD VENIPUNCTURE: CPT

## 2017-12-15 RX ORDER — AMOXICILLIN 250 MG
1 CAPSULE ORAL 2 TIMES DAILY
Status: DISCONTINUED | OUTPATIENT
Start: 2017-12-15 | End: 2017-12-18

## 2017-12-15 RX ADMIN — ONDANSETRON 8 MG: 8 TABLET, ORALLY DISINTEGRATING ORAL at 04:12

## 2017-12-15 RX ADMIN — FAMOTIDINE 20 MG: 10 INJECTION, SOLUTION INTRAVENOUS at 10:12

## 2017-12-15 RX ADMIN — ENOXAPARIN SODIUM 40 MG: 100 INJECTION SUBCUTANEOUS at 07:12

## 2017-12-15 RX ADMIN — STANDARDIZED SENNA CONCENTRATE AND DOCUSATE SODIUM 1 TABLET: 8.6; 5 TABLET, FILM COATED ORAL at 08:12

## 2017-12-15 RX ADMIN — DEXTROSE MONOHYDRATE, SODIUM CHLORIDE, AND POTASSIUM CHLORIDE: 50; 4.5; 1.49 INJECTION, SOLUTION INTRAVENOUS at 04:12

## 2017-12-15 RX ADMIN — PIPERACILLIN SODIUM AND TAZOBACTAM SODIUM 4.5 G: 4; .5 INJECTION, POWDER, LYOPHILIZED, FOR SOLUTION INTRAVENOUS at 04:12

## 2017-12-15 RX ADMIN — PIPERACILLIN SODIUM AND TAZOBACTAM SODIUM 4.5 G: 4; .5 INJECTION, POWDER, LYOPHILIZED, FOR SOLUTION INTRAVENOUS at 11:12

## 2017-12-15 RX ADMIN — FAMOTIDINE 20 MG: 10 INJECTION, SOLUTION INTRAVENOUS at 08:12

## 2017-12-15 RX ADMIN — STANDARDIZED SENNA CONCENTRATE AND DOCUSATE SODIUM 1 TABLET: 8.6; 5 TABLET, FILM COATED ORAL at 10:12

## 2017-12-15 RX ADMIN — TRAMADOL HYDROCHLORIDE 100 MG: 50 TABLET, COATED ORAL at 06:12

## 2017-12-15 RX ADMIN — PIPERACILLIN SODIUM AND TAZOBACTAM SODIUM 4.5 G: 4; .5 INJECTION, POWDER, LYOPHILIZED, FOR SOLUTION INTRAVENOUS at 08:12

## 2017-12-15 RX ADMIN — ENALAPRIL MALEATE 10 MG: 5 TABLET ORAL at 10:12

## 2017-12-15 NOTE — PROGRESS NOTES
GENERAL SURGERY  Progress Note    No acute events.  S/p IR drainage of intra-abdominal fluid collection yesterday. Patient feeling much better since drainage, less abdominal pain.  Tolerating some po but decreased appetite  Passing flatus, no BM  Ambulating in halls    Temp:  [98.4 °F (36.9 °C)-101.6 °F (38.7 °C)] 98.8 °F (37.1 °C)  Pulse:  [74-98] 84  Resp:  [15-18] 18  SpO2:  [97 %-100 %] 97 %  BP: (122-170)/() 122/72    I/O last 3 completed shifts:  In: 2625 [I.V.:2325; IV Piggyback:300]  Out: 2125 [Urine:1725; Drains:400]    EXAM  Awake & alert, NAD  Non-labored breathing on room air  Abd distended but soft, less TTP in LLQ  IR drain on L side with seropurulent drainage, 400 cc since placement    LABS  WBC 18  H/H 12/38      ASSESSMENT/PLAN  47 year old male with perforated diverticulitis, s/p IR drainage of fluid collection  -Slowly advance diet as tolerated  -Nutrition consult  -Continue abx  -Continue IR drain  -Bowel regimen, encourage ambulation      Anat Burks MD  [employer] General Surgery PGY-2

## 2017-12-15 NOTE — CONSULTS
Consult received for prolonged NPO. Full assessment was completed yesterday (see nutrition note for full details). Will continue to follow.    Recommendation/Intervention:   1. If unable to advance po in 24 hrs rec PPN: Clinimex or custom of  2.75/10 @ 100 ml/hr + IVFE Daily   - To provide: 1580 kcal, 66 g pro, 2400 ml fluid; GIR: 1.3  2. Advance diet as able to 2000 ADA  - add GI soft restriction with abd discomfort  3. RD to monitor

## 2017-12-16 LAB
ALBUMIN SERPL BCP-MCNC: 2.3 G/DL
ALP SERPL-CCNC: 53 U/L
ALT SERPL W/O P-5'-P-CCNC: 11 U/L
ANION GAP SERPL CALC-SCNC: 8 MMOL/L
AST SERPL-CCNC: 15 U/L
BASOPHILS NFR BLD: 0 %
BILIRUB SERPL-MCNC: 0.3 MG/DL
BUN SERPL-MCNC: 11 MG/DL
CALCIUM SERPL-MCNC: 8.4 MG/DL
CHLORIDE SERPL-SCNC: 102 MMOL/L
CO2 SERPL-SCNC: 26 MMOL/L
CREAT SERPL-MCNC: 0.9 MG/DL
DIFFERENTIAL METHOD: ABNORMAL
EOSINOPHIL NFR BLD: 3 %
ERYTHROCYTE [DISTWIDTH] IN BLOOD BY AUTOMATED COUNT: 14.6 %
EST. GFR  (AFRICAN AMERICAN): >60 ML/MIN/1.73 M^2
EST. GFR  (NON AFRICAN AMERICAN): >60 ML/MIN/1.73 M^2
GLUCOSE SERPL-MCNC: 91 MG/DL
HCT VFR BLD AUTO: 40 %
HGB BLD-MCNC: 12.9 G/DL
LYMPHOCYTES NFR BLD: 22 %
MAGNESIUM SERPL-MCNC: 2.2 MG/DL
MCH RBC QN AUTO: 28.2 PG
MCHC RBC AUTO-ENTMCNC: 32.3 G/DL
MCV RBC AUTO: 87 FL
MONOCYTES NFR BLD: 2 %
MYELOCYTES NFR BLD MANUAL: 2 %
NEUTROPHILS NFR BLD: 66 %
NEUTS BAND NFR BLD MANUAL: 5 %
PHOSPHATE SERPL-MCNC: 3.4 MG/DL
PLATELET # BLD AUTO: 337 K/UL
PMV BLD AUTO: 11.1 FL
POTASSIUM SERPL-SCNC: 4.3 MMOL/L
PROT SERPL-MCNC: 6.6 G/DL
RBC # BLD AUTO: 4.58 M/UL
SODIUM SERPL-SCNC: 136 MMOL/L
WBC # BLD AUTO: 19.61 K/UL

## 2017-12-16 PROCEDURE — 84100 ASSAY OF PHOSPHORUS: CPT

## 2017-12-16 PROCEDURE — 80053 COMPREHEN METABOLIC PANEL: CPT

## 2017-12-16 PROCEDURE — 83735 ASSAY OF MAGNESIUM: CPT

## 2017-12-16 PROCEDURE — 36415 COLL VENOUS BLD VENIPUNCTURE: CPT

## 2017-12-16 PROCEDURE — 63600175 PHARM REV CODE 636 W HCPCS: Performed by: STUDENT IN AN ORGANIZED HEALTH CARE EDUCATION/TRAINING PROGRAM

## 2017-12-16 PROCEDURE — 25000003 PHARM REV CODE 250: Performed by: FAMILY MEDICINE

## 2017-12-16 PROCEDURE — 85027 COMPLETE CBC AUTOMATED: CPT

## 2017-12-16 PROCEDURE — 85007 BL SMEAR W/DIFF WBC COUNT: CPT

## 2017-12-16 PROCEDURE — 25000003 PHARM REV CODE 250: Performed by: STUDENT IN AN ORGANIZED HEALTH CARE EDUCATION/TRAINING PROGRAM

## 2017-12-16 PROCEDURE — 11000001 HC ACUTE MED/SURG PRIVATE ROOM

## 2017-12-16 PROCEDURE — S0028 INJECTION, FAMOTIDINE, 20 MG: HCPCS | Performed by: STUDENT IN AN ORGANIZED HEALTH CARE EDUCATION/TRAINING PROGRAM

## 2017-12-16 PROCEDURE — 94761 N-INVAS EAR/PLS OXIMETRY MLT: CPT

## 2017-12-16 RX ORDER — FUROSEMIDE 10 MG/ML
20 INJECTION INTRAMUSCULAR; INTRAVENOUS ONCE
Status: COMPLETED | OUTPATIENT
Start: 2017-12-16 | End: 2017-12-16

## 2017-12-16 RX ADMIN — FUROSEMIDE 20 MG: 10 INJECTION, SOLUTION INTRAMUSCULAR; INTRAVENOUS at 06:12

## 2017-12-16 RX ADMIN — PIPERACILLIN SODIUM AND TAZOBACTAM SODIUM 4.5 G: 4; .5 INJECTION, POWDER, LYOPHILIZED, FOR SOLUTION INTRAVENOUS at 04:12

## 2017-12-16 RX ADMIN — ENOXAPARIN SODIUM 40 MG: 100 INJECTION SUBCUTANEOUS at 04:12

## 2017-12-16 RX ADMIN — PIPERACILLIN SODIUM AND TAZOBACTAM SODIUM 4.5 G: 4; .5 INJECTION, POWDER, LYOPHILIZED, FOR SOLUTION INTRAVENOUS at 09:12

## 2017-12-16 RX ADMIN — ENALAPRIL MALEATE 10 MG: 5 TABLET ORAL at 09:12

## 2017-12-16 RX ADMIN — STANDARDIZED SENNA CONCENTRATE AND DOCUSATE SODIUM 1 TABLET: 8.6; 5 TABLET, FILM COATED ORAL at 09:12

## 2017-12-16 RX ADMIN — FAMOTIDINE 20 MG: 10 INJECTION, SOLUTION INTRAVENOUS at 09:12

## 2017-12-16 RX ADMIN — ONDANSETRON 4 MG: 2 INJECTION INTRAMUSCULAR; INTRAVENOUS at 04:12

## 2017-12-16 RX ADMIN — TRAMADOL HYDROCHLORIDE 100 MG: 50 TABLET, COATED ORAL at 05:12

## 2017-12-16 NOTE — PROGRESS NOTES
GENERAL SURGERY  Progress Note    No acute events. Overall drain improved since drain placement.   Tolerating liquids, no nausea/vomiting.  Minimal bowel function, small amount of flatus.  Ambulating.    Temp:  [98 °F (36.7 °C)-98.8 °F (37.1 °C)] 98 °F (36.7 °C)  Pulse:  [80-86] 86  Resp:  [16-18] 17  SpO2:  [95 %-97 %] 97 %  BP: (129-142)/(75-82) 142/75    I/O last 3 completed shifts:  In: 500 [IV Piggyback:500]  Out: 2040 [Urine:1825; Drains:215]    EXAM  Awake & alert, NAD  Non-labored breathing on room air  Abd soft, NT, distended  IR drain with seropurulent fluid, 65 cc in 24h    LABS  CBC pending  Electrolytes ok    ASSESSMENT/PLAN  47 year old male with perforated diverticulitis, s/p IR drainage of fluid collection  -Slowly advance diet as tolerated  -Continue IV abx, will plan to transition to po prior to discharge  -Continue IR drain  -Bowel regimen, encourage ambulation  -F/u CBC      Anat Burks MD  [employer] General Surgery PGY-2

## 2017-12-16 NOTE — PLAN OF CARE
Problem: Patient Care Overview  Goal: Plan of Care Review  Outcome: Ongoing (interventions implemented as appropriate)  Pt in NAD. V/s stable. AAOx4. Scheduled med given per MAR. PIV saline locked accessed for antibiotics dressing CDI. Pt denies pain and n/v. Accordion drain to LUQ draining tan/yellow fluid transparent dressing in place CDI small blister located under the bandage. Wife at bedside. Pt ambulatory. Encouraged to call for assistance verbalized understanding. Safety maintained bed in low locked position, SR X2, and call bell in reach. Will continue to monitor.

## 2017-12-16 NOTE — PLAN OF CARE
Problem: Patient Care Overview  Goal: Plan of Care Review  Outcome: Ongoing (interventions implemented as appropriate)  Up ad talisha on unit   Not willing to try an advance in diet   Tolerating liquids well    Safety maintained    Instructed and demonstrated drain care   No complaints of pain this shift

## 2017-12-16 NOTE — PLAN OF CARE
Pt to IR today for left lower abscess drainage   Treatment to continue.      12/14/17 1626   Discharge Reassessment   Assessment Type Discharge Planning Reassessment   Provided patient/caregiver education on the expected discharge date and the discharge plan Yes   Do you have any problems affording any of your prescribed medications? TBD   Discharge Plan A Home with family   Discharge Plan B Home with family;Home Health   Patient choice form signed by patient/caregiver N/A   Can the patient answer the patient profile reliably? Yes, cognitively intact   How does the patient rate their overall health at the present time? Fair   Describe the patient's ability to walk at the present time. Minor restrictions or changes   How often would a person be available to care for the patient? Occasionally   Number of comorbid conditions (as recorded on the chart) Two   During the past month, has the patient often been bothered by feeling down, depressed or hopeless? No   During the past month, has the patient often been bothered by little interest or pleasure in doing things? No

## 2017-12-16 NOTE — PLAN OF CARE
Problem: Patient Care Overview  Goal: Plan of Care Review  Outcome: Ongoing (interventions implemented as appropriate)  AAOx3. Bed remains low, locked and call bell within reach. Patient verbalized understanding to call for any needs or assistance. IV antibiotics administered per Md orders. Patient ambulated around unit without complications. Diet advanced to adult regular diet, patient tolerated well. Will continue to monitor patient.

## 2017-12-17 LAB
ALBUMIN SERPL BCP-MCNC: 2.4 G/DL
ALP SERPL-CCNC: 54 U/L
ALT SERPL W/O P-5'-P-CCNC: 16 U/L
ANION GAP SERPL CALC-SCNC: 9 MMOL/L
ANISOCYTOSIS BLD QL SMEAR: SLIGHT
AST SERPL-CCNC: 19 U/L
BACTERIA SPEC AEROBE CULT: NORMAL
BASOPHILS # BLD AUTO: ABNORMAL K/UL
BASOPHILS NFR BLD: 0 %
BILIRUB SERPL-MCNC: 0.3 MG/DL
BUN SERPL-MCNC: 11 MG/DL
CALCIUM SERPL-MCNC: 8.5 MG/DL
CHLORIDE SERPL-SCNC: 100 MMOL/L
CO2 SERPL-SCNC: 29 MMOL/L
CREAT SERPL-MCNC: 1 MG/DL
DIFFERENTIAL METHOD: ABNORMAL
EOSINOPHIL # BLD AUTO: ABNORMAL K/UL
EOSINOPHIL NFR BLD: 0 %
ERYTHROCYTE [DISTWIDTH] IN BLOOD BY AUTOMATED COUNT: 14 %
EST. GFR  (AFRICAN AMERICAN): >60 ML/MIN/1.73 M^2
EST. GFR  (NON AFRICAN AMERICAN): >60 ML/MIN/1.73 M^2
GLUCOSE SERPL-MCNC: 101 MG/DL
HCT VFR BLD AUTO: 38.2 %
HGB BLD-MCNC: 12.5 G/DL
HYPOCHROMIA BLD QL SMEAR: ABNORMAL
LYMPHOCYTES # BLD AUTO: ABNORMAL K/UL
LYMPHOCYTES NFR BLD: 12 %
MAGNESIUM SERPL-MCNC: 2 MG/DL
MCH RBC QN AUTO: 27.9 PG
MCHC RBC AUTO-ENTMCNC: 32.7 G/DL
MCV RBC AUTO: 85 FL
MONOCYTES # BLD AUTO: ABNORMAL K/UL
MONOCYTES NFR BLD: 9 %
NEUTROPHILS NFR BLD: 75 %
NEUTS BAND NFR BLD MANUAL: 4 %
PHOSPHATE SERPL-MCNC: 3.5 MG/DL
PLATELET # BLD AUTO: 337 K/UL
PLATELET BLD QL SMEAR: ABNORMAL
PMV BLD AUTO: 10.4 FL
POTASSIUM SERPL-SCNC: 3.6 MMOL/L
PROT SERPL-MCNC: 6.5 G/DL
RBC # BLD AUTO: 4.48 M/UL
SODIUM SERPL-SCNC: 138 MMOL/L
WBC # BLD AUTO: 19.22 K/UL

## 2017-12-17 PROCEDURE — 84100 ASSAY OF PHOSPHORUS: CPT

## 2017-12-17 PROCEDURE — 85007 BL SMEAR W/DIFF WBC COUNT: CPT

## 2017-12-17 PROCEDURE — 25000003 PHARM REV CODE 250: Performed by: FAMILY MEDICINE

## 2017-12-17 PROCEDURE — 25000003 PHARM REV CODE 250: Performed by: STUDENT IN AN ORGANIZED HEALTH CARE EDUCATION/TRAINING PROGRAM

## 2017-12-17 PROCEDURE — 25500020 PHARM REV CODE 255: Performed by: SURGERY

## 2017-12-17 PROCEDURE — 83735 ASSAY OF MAGNESIUM: CPT

## 2017-12-17 PROCEDURE — 11000001 HC ACUTE MED/SURG PRIVATE ROOM

## 2017-12-17 PROCEDURE — S0028 INJECTION, FAMOTIDINE, 20 MG: HCPCS | Performed by: STUDENT IN AN ORGANIZED HEALTH CARE EDUCATION/TRAINING PROGRAM

## 2017-12-17 PROCEDURE — 85027 COMPLETE CBC AUTOMATED: CPT

## 2017-12-17 PROCEDURE — 63600175 PHARM REV CODE 636 W HCPCS: Performed by: STUDENT IN AN ORGANIZED HEALTH CARE EDUCATION/TRAINING PROGRAM

## 2017-12-17 PROCEDURE — 36415 COLL VENOUS BLD VENIPUNCTURE: CPT

## 2017-12-17 PROCEDURE — 94761 N-INVAS EAR/PLS OXIMETRY MLT: CPT

## 2017-12-17 PROCEDURE — 80053 COMPREHEN METABOLIC PANEL: CPT

## 2017-12-17 RX ORDER — POTASSIUM CHLORIDE 7.45 MG/ML
10 INJECTION INTRAVENOUS EVERY 4 HOURS
Status: COMPLETED | OUTPATIENT
Start: 2017-12-17 | End: 2017-12-17

## 2017-12-17 RX ADMIN — PIPERACILLIN SODIUM AND TAZOBACTAM SODIUM 4.5 G: 4; .5 INJECTION, POWDER, LYOPHILIZED, FOR SOLUTION INTRAVENOUS at 08:12

## 2017-12-17 RX ADMIN — ENOXAPARIN SODIUM 40 MG: 100 INJECTION SUBCUTANEOUS at 04:12

## 2017-12-17 RX ADMIN — ONDANSETRON 4 MG: 2 INJECTION INTRAMUSCULAR; INTRAVENOUS at 08:12

## 2017-12-17 RX ADMIN — PIPERACILLIN SODIUM AND TAZOBACTAM SODIUM 4.5 G: 4; .5 INJECTION, POWDER, LYOPHILIZED, FOR SOLUTION INTRAVENOUS at 01:12

## 2017-12-17 RX ADMIN — ENALAPRIL MALEATE 10 MG: 5 TABLET ORAL at 08:12

## 2017-12-17 RX ADMIN — PIPERACILLIN SODIUM AND TAZOBACTAM SODIUM 4.5 G: 4; .5 INJECTION, POWDER, LYOPHILIZED, FOR SOLUTION INTRAVENOUS at 04:12

## 2017-12-17 RX ADMIN — POTASSIUM CHLORIDE 10 MEQ: 10 INJECTION, SOLUTION INTRAVENOUS at 01:12

## 2017-12-17 RX ADMIN — POTASSIUM CHLORIDE 10 MEQ: 10 INJECTION, SOLUTION INTRAVENOUS at 10:12

## 2017-12-17 RX ADMIN — STANDARDIZED SENNA CONCENTRATE AND DOCUSATE SODIUM 1 TABLET: 8.6; 5 TABLET, FILM COATED ORAL at 08:12

## 2017-12-17 RX ADMIN — FAMOTIDINE 20 MG: 10 INJECTION, SOLUTION INTRAVENOUS at 08:12

## 2017-12-17 RX ADMIN — IOHEXOL 30 ML: 350 INJECTION, SOLUTION INTRAVENOUS at 10:12

## 2017-12-17 RX ADMIN — IOHEXOL 100 ML: 350 INJECTION, SOLUTION INTRAVENOUS at 01:12

## 2017-12-17 NOTE — PLAN OF CARE
Problem: Patient Care Overview  Goal: Plan of Care Review  Outcome: Revised  Patient is awake, alert, and oriented.  Patient ambulates independently in room.  Wife at bedside.  Denies pain.  Instructed on nothing by mouth at midnight for an IR procedure tomorrow.  Verbalized understanding.  TruClose Drain is intact and draining very small amount of yellow drainage.  Denies pain.  Patient continues to receive Zosyn.  Safety maintained.  Will continue to monitor.

## 2017-12-17 NOTE — PROGRESS NOTES
GENERAL SURGERY  Progress Note    No acute events. Pain improved overall.  Tolerated small amount of food this AM.  Passing flatus, no BM yet.  Ambulating.    Temp:  [96.1 °F (35.6 °C)-100.3 °F (37.9 °C)] 99 °F (37.2 °C)  Pulse:  [] 79  Resp:  [16-19] 17  SpO2:  [96 %-99 %] 99 %  BP: (140-159)/(82-88) 140/82    I/O last 3 completed shifts:  In: 200 [IV Piggyback:200]  Out: 3753 [Urine:3735; Drains:18]    EXAM  Awake & alert, NAD  Non-labored breathing on room air  Abd distended but soft, minimally TTP  IR drain with minimal output in 24h    LABS  WBC 19 (increasing over the past several days)  H/H 12/38    K 3.6  Cr 0.9    ASSESSMENT/PLAN  47 year old male with perforated diverticulitis, s/p IR drainage of fluid collection  -Repeat CT scan today due to IR drain having minimal output and patient with worsening leukocytosis over the past several days  -Continue IV Zosyn  -Will f/u CT scan and determine need for additional drainage procedure, possibly tomorrow AM      Anat Burks MD  [employer] General Surgery PGY-2      Wbc increasing  eatomg still dowm  No tenderness  Drain not thing out    Will epeat ct , may need drain repositioning

## 2017-12-17 NOTE — PLAN OF CARE
Problem: Patient Care Overview  Goal: Plan of Care Review  Pt received on RA , no respiratory distress noted . Will cont to monitor.

## 2017-12-18 LAB
ALBUMIN SERPL BCP-MCNC: 2.5 G/DL
ALP SERPL-CCNC: 52 U/L
ALT SERPL W/O P-5'-P-CCNC: 25 U/L
ANION GAP SERPL CALC-SCNC: 10 MMOL/L
AST SERPL-CCNC: 25 U/L
BACTERIA SPEC ANAEROBE CULT: NORMAL
BASOPHILS # BLD AUTO: 0.04 K/UL
BASOPHILS NFR BLD: 0.2 %
BILIRUB SERPL-MCNC: 0.4 MG/DL
BUN SERPL-MCNC: 10 MG/DL
CALCIUM SERPL-MCNC: 8.4 MG/DL
CHLORIDE SERPL-SCNC: 101 MMOL/L
CO2 SERPL-SCNC: 27 MMOL/L
CREAT SERPL-MCNC: 1 MG/DL
DIFFERENTIAL METHOD: ABNORMAL
EOSINOPHIL # BLD AUTO: 0.1 K/UL
EOSINOPHIL NFR BLD: 0.5 %
ERYTHROCYTE [DISTWIDTH] IN BLOOD BY AUTOMATED COUNT: 14.1 %
EST. GFR  (AFRICAN AMERICAN): >60 ML/MIN/1.73 M^2
EST. GFR  (NON AFRICAN AMERICAN): >60 ML/MIN/1.73 M^2
GLUCOSE SERPL-MCNC: 102 MG/DL
HCT VFR BLD AUTO: 38.5 %
HGB BLD-MCNC: 12.5 G/DL
LYMPHOCYTES # BLD AUTO: 1.4 K/UL
LYMPHOCYTES NFR BLD: 8.2 %
MAGNESIUM SERPL-MCNC: 1.9 MG/DL
MCH RBC QN AUTO: 27.8 PG
MCHC RBC AUTO-ENTMCNC: 32.5 G/DL
MCV RBC AUTO: 86 FL
MONOCYTES # BLD AUTO: 1.2 K/UL
MONOCYTES NFR BLD: 6.9 %
NEUTROPHILS # BLD AUTO: 13.3 K/UL
NEUTROPHILS NFR BLD: 80.2 %
PHOSPHATE SERPL-MCNC: 3.2 MG/DL
PLATELET # BLD AUTO: 376 K/UL
PMV BLD AUTO: 10.2 FL
POTASSIUM SERPL-SCNC: 4.2 MMOL/L
PROT SERPL-MCNC: 6.6 G/DL
RBC # BLD AUTO: 4.49 M/UL
SODIUM SERPL-SCNC: 138 MMOL/L
WBC # BLD AUTO: 16.63 K/UL

## 2017-12-18 PROCEDURE — S0028 INJECTION, FAMOTIDINE, 20 MG: HCPCS | Performed by: STUDENT IN AN ORGANIZED HEALTH CARE EDUCATION/TRAINING PROGRAM

## 2017-12-18 PROCEDURE — 63600175 PHARM REV CODE 636 W HCPCS: Performed by: STUDENT IN AN ORGANIZED HEALTH CARE EDUCATION/TRAINING PROGRAM

## 2017-12-18 PROCEDURE — 84100 ASSAY OF PHOSPHORUS: CPT

## 2017-12-18 PROCEDURE — 0W2GX0Z CHANGE DRAINAGE DEVICE IN PERITONEAL CAVITY, EXTERNAL APPROACH: ICD-10-PCS | Performed by: RADIOLOGY

## 2017-12-18 PROCEDURE — 94761 N-INVAS EAR/PLS OXIMETRY MLT: CPT

## 2017-12-18 PROCEDURE — 11000001 HC ACUTE MED/SURG PRIVATE ROOM

## 2017-12-18 PROCEDURE — 85025 COMPLETE CBC W/AUTO DIFF WBC: CPT

## 2017-12-18 PROCEDURE — 83735 ASSAY OF MAGNESIUM: CPT

## 2017-12-18 PROCEDURE — 25000003 PHARM REV CODE 250: Performed by: FAMILY MEDICINE

## 2017-12-18 PROCEDURE — 25000003 PHARM REV CODE 250: Performed by: STUDENT IN AN ORGANIZED HEALTH CARE EDUCATION/TRAINING PROGRAM

## 2017-12-18 PROCEDURE — 25000003 PHARM REV CODE 250: Performed by: RADIOLOGY

## 2017-12-18 PROCEDURE — S0030 INJECTION, METRONIDAZOLE: HCPCS | Performed by: STUDENT IN AN ORGANIZED HEALTH CARE EDUCATION/TRAINING PROGRAM

## 2017-12-18 PROCEDURE — 80053 COMPREHEN METABOLIC PANEL: CPT

## 2017-12-18 PROCEDURE — 36415 COLL VENOUS BLD VENIPUNCTURE: CPT

## 2017-12-18 PROCEDURE — 63600175 PHARM REV CODE 636 W HCPCS: Performed by: RADIOLOGY

## 2017-12-18 PROCEDURE — 97803 MED NUTRITION INDIV SUBSEQ: CPT

## 2017-12-18 RX ORDER — LIDOCAINE HYDROCHLORIDE 10 MG/ML
INJECTION INFILTRATION; PERINEURAL CODE/TRAUMA/SEDATION MEDICATION
Status: COMPLETED | OUTPATIENT
Start: 2017-12-18 | End: 2017-12-18

## 2017-12-18 RX ORDER — MIDAZOLAM HYDROCHLORIDE 1 MG/ML
INJECTION INTRAMUSCULAR; INTRAVENOUS CODE/TRAUMA/SEDATION MEDICATION
Status: COMPLETED | OUTPATIENT
Start: 2017-12-18 | End: 2017-12-18

## 2017-12-18 RX ORDER — METRONIDAZOLE 500 MG/100ML
500 INJECTION, SOLUTION INTRAVENOUS
Status: DISCONTINUED | OUTPATIENT
Start: 2017-12-18 | End: 2017-12-20 | Stop reason: HOSPADM

## 2017-12-18 RX ORDER — FENTANYL CITRATE 50 UG/ML
INJECTION, SOLUTION INTRAMUSCULAR; INTRAVENOUS CODE/TRAUMA/SEDATION MEDICATION
Status: COMPLETED | OUTPATIENT
Start: 2017-12-18 | End: 2017-12-18

## 2017-12-18 RX ADMIN — MIDAZOLAM HYDROCHLORIDE 0.5 MG: 1 INJECTION, SOLUTION INTRAMUSCULAR; INTRAVENOUS at 03:12

## 2017-12-18 RX ADMIN — FENTANYL CITRATE 25 MCG: 50 INJECTION, SOLUTION INTRAMUSCULAR; INTRAVENOUS at 03:12

## 2017-12-18 RX ADMIN — MIDAZOLAM HYDROCHLORIDE 1 MG: 1 INJECTION, SOLUTION INTRAMUSCULAR; INTRAVENOUS at 03:12

## 2017-12-18 RX ADMIN — LIDOCAINE HYDROCHLORIDE 5 ML: 10 INJECTION, SOLUTION INFILTRATION; PERINEURAL at 03:12

## 2017-12-18 RX ADMIN — METRONIDAZOLE 500 MG: 500 INJECTION, SOLUTION INTRAVENOUS at 05:12

## 2017-12-18 RX ADMIN — ENOXAPARIN SODIUM 40 MG: 100 INJECTION SUBCUTANEOUS at 06:12

## 2017-12-18 RX ADMIN — FAMOTIDINE 20 MG: 10 INJECTION, SOLUTION INTRAVENOUS at 08:12

## 2017-12-18 RX ADMIN — METRONIDAZOLE 500 MG: 500 INJECTION, SOLUTION INTRAVENOUS at 11:12

## 2017-12-18 RX ADMIN — PIPERACILLIN SODIUM AND TAZOBACTAM SODIUM 4.5 G: 4; .5 INJECTION, POWDER, LYOPHILIZED, FOR SOLUTION INTRAVENOUS at 06:12

## 2017-12-18 RX ADMIN — ENALAPRIL MALEATE 10 MG: 5 TABLET ORAL at 08:12

## 2017-12-18 RX ADMIN — PIPERACILLIN SODIUM AND TAZOBACTAM SODIUM 4.5 G: 4; .5 INJECTION, POWDER, LYOPHILIZED, FOR SOLUTION INTRAVENOUS at 08:12

## 2017-12-18 RX ADMIN — FENTANYL CITRATE 50 MCG: 50 INJECTION, SOLUTION INTRAMUSCULAR; INTRAVENOUS at 03:12

## 2017-12-18 RX ADMIN — PIPERACILLIN SODIUM AND TAZOBACTAM SODIUM 4.5 G: 4; .5 INJECTION, POWDER, LYOPHILIZED, FOR SOLUTION INTRAVENOUS at 01:12

## 2017-12-18 NOTE — CONSULTS
Inpatient Radiology Pre-procedure Note    History of Present Illness:  Mario Castro Sr. is a 47 y.o. male who presents for Possible reposition of percutaneous drain vs. Placement of new drains.  Admission H&P reviewed.  Past Medical History:   Diagnosis Date    Diabetes mellitus     Hypertension      History reviewed. No pertinent surgical history.    Review of Systems:   As documented in primary team H&P    Home Meds:   Prior to Admission medications    Medication Sig Start Date End Date Taking? Authorizing Provider   enalapril (VASOTEC) 10 MG tablet Take 10 mg by mouth once daily.   Yes Historical Provider, MD   naproxen sodium (ANAPROX) 220 MG tablet Take 220 mg by mouth every 12 (twelve) hours.   Yes Historical Provider, MD     Scheduled Meds:    enalapril  10 mg Oral Daily    enoxaparin  40 mg Subcutaneous Daily    famotidine (PF)  20 mg Intravenous BID    metronidazole  500 mg Intravenous Q8H    piperacillin-tazobactam 4.5 g in dextrose 5 % 100 mL IVPB (ready to mix system)  4.5 g Intravenous Q8H     Continuous Infusions:   PRN Meds:acetaminophen, acetaminophen, diphenhydrAMINE, ondansetron, ondansetron, ramelteon, sodium chloride 0.9%, traMADol  Anticoagulants/Antiplatelets: no anticoagulation    Allergies: Review of patient's allergies indicates:  No Known Allergies  Sedation Hx: have not been any systemic reactions    Labs:  No results for input(s): INR in the last 168 hours.    Invalid input(s):  PT,  PTT    Recent Labs  Lab 12/18/17  0403   WBC 16.63*   HGB 12.5*   HCT 38.5*   MCV 86   *      Recent Labs  Lab 12/18/17  0402         K 4.2      CO2 27   BUN 10   CREATININE 1.0   CALCIUM 8.4*   MG 1.9   ALT 25   AST 25   ALBUMIN 2.5*   BILITOT 0.4         Vitals:  Temp: 99.4 °F (37.4 °C) (12/18/17 0808)  Pulse: 101 (12/18/17 0808)  Resp: 19 (12/18/17 0808)  BP: 139/83 (12/18/17 0808)  SpO2: 97 % (12/18/17 1206)     Physical Exam:  ASA: 3  Mallampati: 2    General: no acute  distress  Mental Status: alert and oriented to person, place and time  HEENT: normocephalic, atraumatic  Chest: unlabored breathing  Heart: regular heart rate  Abdomen: nondistended  Extremity: moves all extremities    Plan: CT guided exchange of existing drain vs. Placement of new percutaneous drains.  Sedation Plan: Moderate.    Walker Means MD  Department of Radiology  Pager: [phone]

## 2017-12-18 NOTE — PROGRESS NOTES
"[facility]  Adult Nutrition  Progress Note    SUMMARY     Recommendations    Recommendation/Intervention:   1. When diet resumed, change diet to 2000 ADA.   2. Encourage good intake at meals.    Goals:   Pt will consume at least 50-75% intake at meals  Nutrition Goal Status: new  Communication of RD Recs: reviewed with RN (sticky note)    Continuum of Care Plan  Referral to Outpatient Services:  (pt to d/c on ADA diet)    Reason for Assessment  Reason for Assessment: RD follow-up  Diagnosis:  (diverticulitis)  Relevent Medical History: DM. HTN      General Information Comments: Pt NPO today for IR procedure. Wife at bedside reports pt with fair intake at meals. ADA diet education was provided last visit.    Nutrition Prescription Ordered  Current Diet Order: Regular  Nutrition Order Comments: NPO today for IR procedure  Oral Nutrition Supplement: Boost Plus     Evaluation of Received Nutrients/Fluid Intake  Energy Calories Required: not meeting needs  Protein Required: not meeting needs  Fluid Required: meeting needs  % Intake of Estimated Energy Needs: 25 - 50 %  % Meal Intake: 50%     Nutrition Risk Screen   Nutrition Risk Screen: dysphagia or difficulty swallowing    Nutrition/Diet History  Food Preferences: no Tenriism or culturla food prefs identified  Factors Affecting Nutritional Intake: decreased appetite     Labs/Tests/Procedures/Meds  Pertinent Labs Reviewed: reviewed  Pertinent Labs Comments: Ca 8.5L, Alb 2.4L  Pertinent Medications Reviewed: reviewed     Physical Findings  Overall Physical Appearance: obese  Tubes:  (none)  Oral/Mouth Cavity: WDL  Skin:  (Ramon 20-abd drain)    Anthropometrics  Temp: 99.4 °F (37.4 °C)  Height: 5' 8" (172.7 cm)  Weight Method: Bed Scale  Weight: 128.8 kg (283 lb 15.2 oz)  Ideal Body Weight (IBW), Male: 154 lb  % Ideal Body Weight, Male (lb): 184.39 lb  BMI (Calculated): 43.3  BMI Grade: greater than 40 - morbid obesity     Estimated/Assessed " Needs  Weight Used For Calorie Calculations: 70 kg (154 lb 5.2 oz)   Energy Calorie Requirements (kcal): 9866-9583 kcal  Energy Need Method: Kerr-St Jeor  RMR (Kerr-St. Jeor Equation): 1559.5  Weight Used For Protein Calculations: 70 kg (154 lb 5.2 oz)  Protein Requirements: 70-84g (1.0-1.2g/kg)  Fluid Need Method: RDA Method  RDA Method (mL): 1900  CHO Requirement: 225g     Assessment and Plan    * Diverticulitis of large intestine with perforation without abscess or bleeding    Related to (etiology):   dx    Signs and Symptoms (as evidenced by):   NPO/nausea/vomiting    Interventions/Recommendations (treatment strategy):  See recs    Nutrition Diagnosis Status:   Improving          Obesity (BMI 30-39.9)    Related to (etiology):   Excessuve kcal intake/Non-compliance with ADA diet    Signs and Symptoms (as evidenced by):   BMI 43    Interventions/Recommendations (treatment strategy):  Provided DM education with handouts and f/u resources.  Encouraged compliance to promote weight loss  Encouraged increasing physical activity    Nutrition Diagnosis Status:   Continues            Monitor and Evaluation  Food and Nutrient Intake: energy intake  Food and Nutrient Adminstration: diet order  Physical Activity and Function: nutrition-related ADLs and IADLs  Anthropometric Measurements: weight  Biochemical Data, Medical Tests and Procedures: electrolyte and renal panel  Nutrition-Focused Physical Findings: overall appearance    Nutrition Risk  Level of Risk:  (2xweekly)    Nutrition Follow-Up  RD Follow-up?: Yes

## 2017-12-18 NOTE — PROGRESS NOTES
Brief note;  Symptoms improved.  BM +  Persistent WBC elevation    Case d/W IR to deann;uate drain position, possivble upsize or reposition for diverticular abscess

## 2017-12-18 NOTE — PLAN OF CARE
Problem: Nutrition, Imbalanced: Inadequate Oral Intake (Adult)  Goal: Improved Oral Intake  Patient will demonstrate the desired outcomes by discharge/transition of care.  Outcome: Ongoing (interventions implemented as appropriate)  Recommendation/Intervention:   1. When diet resumed, change diet to 2000 ADA.   2. Encourage good intake at meals.    Goals:   Pt will consume at least 50-75% intake at meals  Nutrition Goal Status: new  Communication of RD Recs: reviewed with RN (sticky note)

## 2017-12-18 NOTE — PROGRESS NOTES
.Pharmacy New Medication Education    Patient accepted medication education.    Pharmacy educated patient on name and purpose of medications and possible side effects, using the teach-back method.     pericolace    Learners of pharmacy medication education included:  Patient    Patient +/- learner response:  Verbalized Understanding, Teachback

## 2017-12-18 NOTE — PROCEDURES
Radiology Post-Procedure Note    Pre Op Diagnosis: Diverticular Abscess  Post Op Diagnosis: Same    Procedure: CT guided upsize of existing drain    Procedure performed by: Walker Means MD    Written Informed Consent Obtained: Yes  Specimen Removed: NO  Estimated Blood Loss: Minimal    Findings:   The anterior abdominal collection could not be differentiated from bowel on non contrast CT, therefore drain was not placed. Dilute contrast was injected into the existing LLQ drain demonstrating communication with the deeper LLQ collections. The existing tube was removed over a wire and upsized to 10 Fr. Recommend flushing the tube with 10 cc sterile saline q 12 hrs. Recommend having the patient lie left side down to facilitate drainage.    Patient tolerated procedure well.    Walker Means MD  Department of Radiology  Pager: [phone]

## 2017-12-18 NOTE — PROGRESS NOTES
NEUROENDOCRINE TUMOR SERVICE  Progress Note    No acute events.  Clinically doing much better.  Pain improved, tolerating diet, having bowel movements.  NPO since midnight in preparation for IR procedure.    Temp:  [98.6 °F (37 °C)-99.8 °F (37.7 °C)] 99.4 °F (37.4 °C)  Pulse:  [] 101  Resp:  [17-19] 19  SpO2:  [95 %-99 %] 97 %  BP: (139-174)/(83-84) 139/83    I/O last 3 completed shifts:  In: 600 [I.V.:200; IV Piggyback:400]  Out: 4312 [Urine:4310; Drains:2]    EXAM  Awake & alert, NAD  Non-labored breathing on room air  Abd soft, NT, still mildly distended but improving  LLQ drain with minimal output    LABS  WBC 16  H/H 12/38      ASSESSMENT/PLAN  47 year old male with perforated diverticulitis, s/p IR drainage of fluid collection  -Clinically improving but still with undrained fluid collections on CT yesterday  -Will consult IR for possible repositioning of drain for more adequate drainage  -WBC count still elevated, continue IV abx and add Flagyl for anaerobic coverage      Anat Burks MD  [employer] General Surgery PGY-2

## 2017-12-19 LAB
ALBUMIN SERPL BCP-MCNC: 2.4 G/DL
ALP SERPL-CCNC: 50 U/L
ALT SERPL W/O P-5'-P-CCNC: 32 U/L
ANION GAP SERPL CALC-SCNC: 9 MMOL/L
AST SERPL-CCNC: 30 U/L
BASOPHILS # BLD AUTO: 0.02 K/UL
BASOPHILS NFR BLD: 0.1 %
BILIRUB SERPL-MCNC: 0.4 MG/DL
BUN SERPL-MCNC: 8 MG/DL
CALCIUM SERPL-MCNC: 8 MG/DL
CHLORIDE SERPL-SCNC: 103 MMOL/L
CO2 SERPL-SCNC: 25 MMOL/L
CREAT SERPL-MCNC: 1 MG/DL
DIFFERENTIAL METHOD: ABNORMAL
EOSINOPHIL # BLD AUTO: 0.1 K/UL
EOSINOPHIL NFR BLD: 0.6 %
ERYTHROCYTE [DISTWIDTH] IN BLOOD BY AUTOMATED COUNT: 13.9 %
EST. GFR  (AFRICAN AMERICAN): >60 ML/MIN/1.73 M^2
EST. GFR  (NON AFRICAN AMERICAN): >60 ML/MIN/1.73 M^2
GLUCOSE SERPL-MCNC: 113 MG/DL
HCT VFR BLD AUTO: 35.7 %
HGB BLD-MCNC: 11.7 G/DL
LYMPHOCYTES # BLD AUTO: 1.3 K/UL
LYMPHOCYTES NFR BLD: 9 %
MAGNESIUM SERPL-MCNC: 2 MG/DL
MCH RBC QN AUTO: 27.8 PG
MCHC RBC AUTO-ENTMCNC: 32.8 G/DL
MCV RBC AUTO: 85 FL
MONOCYTES # BLD AUTO: 1.2 K/UL
MONOCYTES NFR BLD: 8.4 %
NEUTROPHILS # BLD AUTO: 11.7 K/UL
NEUTROPHILS NFR BLD: 80.3 %
PHOSPHATE SERPL-MCNC: 2.9 MG/DL
PLATELET # BLD AUTO: 364 K/UL
PMV BLD AUTO: 10 FL
POTASSIUM SERPL-SCNC: 3.9 MMOL/L
PROT SERPL-MCNC: 6.3 G/DL
RBC # BLD AUTO: 4.21 M/UL
SODIUM SERPL-SCNC: 137 MMOL/L
WBC # BLD AUTO: 14.59 K/UL

## 2017-12-19 PROCEDURE — 97803 MED NUTRITION INDIV SUBSEQ: CPT

## 2017-12-19 PROCEDURE — 25000003 PHARM REV CODE 250: Performed by: STUDENT IN AN ORGANIZED HEALTH CARE EDUCATION/TRAINING PROGRAM

## 2017-12-19 PROCEDURE — 25000003 PHARM REV CODE 250: Performed by: FAMILY MEDICINE

## 2017-12-19 PROCEDURE — S0028 INJECTION, FAMOTIDINE, 20 MG: HCPCS | Performed by: STUDENT IN AN ORGANIZED HEALTH CARE EDUCATION/TRAINING PROGRAM

## 2017-12-19 PROCEDURE — 84100 ASSAY OF PHOSPHORUS: CPT

## 2017-12-19 PROCEDURE — 94761 N-INVAS EAR/PLS OXIMETRY MLT: CPT

## 2017-12-19 PROCEDURE — 80053 COMPREHEN METABOLIC PANEL: CPT

## 2017-12-19 PROCEDURE — 83735 ASSAY OF MAGNESIUM: CPT

## 2017-12-19 PROCEDURE — 85025 COMPLETE CBC W/AUTO DIFF WBC: CPT

## 2017-12-19 PROCEDURE — 63600175 PHARM REV CODE 636 W HCPCS: Performed by: STUDENT IN AN ORGANIZED HEALTH CARE EDUCATION/TRAINING PROGRAM

## 2017-12-19 PROCEDURE — 11000001 HC ACUTE MED/SURG PRIVATE ROOM

## 2017-12-19 PROCEDURE — S0030 INJECTION, METRONIDAZOLE: HCPCS | Performed by: STUDENT IN AN ORGANIZED HEALTH CARE EDUCATION/TRAINING PROGRAM

## 2017-12-19 PROCEDURE — 36415 COLL VENOUS BLD VENIPUNCTURE: CPT

## 2017-12-19 RX ORDER — L. ACIDOPHILUS/L.BULGARICUS 100MM CELL
1 GRANULES IN PACKET (EA) ORAL 2 TIMES DAILY
Status: DISCONTINUED | OUTPATIENT
Start: 2017-12-19 | End: 2017-12-20 | Stop reason: HOSPADM

## 2017-12-19 RX ADMIN — PIPERACILLIN SODIUM AND TAZOBACTAM SODIUM 4.5 G: 4; .5 INJECTION, POWDER, LYOPHILIZED, FOR SOLUTION INTRAVENOUS at 01:12

## 2017-12-19 RX ADMIN — METRONIDAZOLE 500 MG: 500 INJECTION, SOLUTION INTRAVENOUS at 08:12

## 2017-12-19 RX ADMIN — PIPERACILLIN SODIUM AND TAZOBACTAM SODIUM 4.5 G: 4; .5 INJECTION, POWDER, LYOPHILIZED, FOR SOLUTION INTRAVENOUS at 05:12

## 2017-12-19 RX ADMIN — PIPERACILLIN SODIUM AND TAZOBACTAM SODIUM 4.5 G: 4; .5 INJECTION, POWDER, LYOPHILIZED, FOR SOLUTION INTRAVENOUS at 10:12

## 2017-12-19 RX ADMIN — FAMOTIDINE 20 MG: 10 INJECTION, SOLUTION INTRAVENOUS at 09:12

## 2017-12-19 RX ADMIN — METRONIDAZOLE 500 MG: 500 INJECTION, SOLUTION INTRAVENOUS at 11:12

## 2017-12-19 RX ADMIN — TRAMADOL HYDROCHLORIDE 100 MG: 50 TABLET, COATED ORAL at 05:12

## 2017-12-19 RX ADMIN — ENOXAPARIN SODIUM 40 MG: 100 INJECTION SUBCUTANEOUS at 05:12

## 2017-12-19 RX ADMIN — LACTOBACILLUS ACIDOPHILUS / LACTOBACILLUS BULGARICUS 1 EACH: 100 MILLION CFU STRENGTH GRANULES at 09:12

## 2017-12-19 RX ADMIN — METRONIDAZOLE 500 MG: 500 INJECTION, SOLUTION INTRAVENOUS at 03:12

## 2017-12-19 RX ADMIN — FAMOTIDINE 20 MG: 10 INJECTION, SOLUTION INTRAVENOUS at 08:12

## 2017-12-19 RX ADMIN — ENALAPRIL MALEATE 10 MG: 5 TABLET ORAL at 08:12

## 2017-12-19 NOTE — PROGRESS NOTES
.Pharmacy New Medication Education    Patient accepted medication education.    Pharmacy educated patient on name and purpose of medications and possible side effects, using the teach-back method.     probiotic    Learners of pharmacy medication education included:  Patient    Patient +/- learner response:  Verbalized Understanding, Teachback

## 2017-12-19 NOTE — CONSULTS
Food & Nutrition  Education    Diet Education: diverticulitis  Time Spent: 15 minutes  Learners: pt & wife      Nutrition Education provided with handouts: low fiber    Comments: Discussed low fiber diet with pt. Also discussing avoiding foods with nuts or seeds. Discussed with pt that he can slowly add fiber back into his diet. Pt & wife voiced understanding.       All questions and concerns answered. Dietitian's contact information provided.       Follow-Up: 12/24/17    Please Re-consult as needed        Thanks!

## 2017-12-19 NOTE — PLAN OF CARE
Problem: Patient Care Overview  Goal: Plan of Care Review  Outcome: Ongoing (interventions implemented as appropriate)  Pt is AAOx3. No complaints of nausea or vomiting. Pt states having diarrhea, but unable to observe. Pt complains of slight pain to the truclose drain site. Low fiber/residue diet. Ambulated with assist. Safety precautions maintained. Tolerated all medications well.

## 2017-12-19 NOTE — NURSING TRANSFER
Returned to unit   Care resumed  New evan close drain to left abd   Drainage sero sang to drain system  No complaints of pain

## 2017-12-19 NOTE — PROGRESS NOTES
Surgery Progress Note:     Overnight: IR drain upsized with improved drainage. Pt tolerating low residue diet. No n/v. Having BM. Improved abdominal pain.     Temp:  [98.6 °F (37 °C)-99.9 °F (37.7 °C)] 98.6 °F (37 °C)  Pulse:  [] 88  Resp:  [17-24] 18  SpO2:  [97 %-100 %] 97 %  BP: (133-153)/(74-85) 133/85      Intake/Output Summary (Last 24 hours) at 12/19/17 0713  Last data filed at 12/19/17 0649   Gross per 24 hour   Intake              500 ml   Output             1695 ml   Net            -1195 ml     Uop: 1675 cc   Drains: 20 cc serous fluid     Physical Exam:   NAD   RR   Non labored breathing   abd soft obese. Distended. Not tender.  OSMANY in place       Labs:   Recent Results (from the past 336 hour(s))   CBC auto differential    Collection Time: 12/19/17  6:07 AM   Result Value Ref Range    WBC 14.59 (H) 3.90 - 12.70 K/uL    Hemoglobin 11.7 (L) 14.0 - 18.0 g/dL    Hematocrit 35.7 (L) 40.0 - 54.0 %    Platelets 364 (H) 150 - 350 K/uL   CBC auto differential    Collection Time: 12/18/17  4:03 AM   Result Value Ref Range    WBC 16.63 (H) 3.90 - 12.70 K/uL    Hemoglobin 12.5 (L) 14.0 - 18.0 g/dL    Hematocrit 38.5 (L) 40.0 - 54.0 %    Platelets 376 (H) 150 - 350 K/uL   CBC auto differential    Collection Time: 12/17/17  4:11 AM   Result Value Ref Range    WBC 19.22 (H) 3.90 - 12.70 K/uL    Hemoglobin 12.5 (L) 14.0 - 18.0 g/dL    Hematocrit 38.2 (L) 40.0 - 54.0 %    Platelets 337 150 - 350 K/uL     BMP  Lab Results   Component Value Date     12/18/2017    K 4.2 12/18/2017     12/18/2017    CO2 27 12/18/2017    BUN 10 12/18/2017    CREATININE 1.0 12/18/2017    CALCIUM 8.4 (L) 12/18/2017    ANIONGAP 10 12/18/2017    ESTGFRAFRICA >60 12/18/2017    EGFRNONAA >60 12/18/2017     Lab Results   Component Value Date    ALT 25 12/18/2017    AST 25 12/18/2017    ALKPHOS 52 (L) 12/18/2017    BILITOT 0.4 12/18/2017     Results:   Microbiology Results (last 7 days)     Procedure Component Value Units Date/Time     Fungus culture [[id number]] Collected:  12/14/17 1600    Order Status:  Completed Specimen:  Abscess from Abdomen Updated:  12/18/17 0939     Fungus (Mycology) Culture Culture in progress    Culture, Anaerobic [[id number]] Collected:  12/14/17 1600    Order Status:  Completed Specimen:  Abscess from Abdomen Updated:  12/18/17 0852     Anaerobic Culture No anaerobes isolated    Aerobic culture [[id number]]  (Susceptibility) Collected:  12/14/17 1600    Order Status:  Completed Specimen:  Abscess from Abdomen Updated:  12/17/17 1452     Aerobic Bacterial Culture --     ESCHERICHIA COLI  Few      AFB Culture & Smear [[id number]] Collected:  12/14/17 1600    Order Status:  Completed Specimen:  Abscess from Abdomen Updated:  12/15/17 2127     AFB Culture & Smear Culture in progress     AFB CULTURE STAIN No acid fast bacilli seen.    Gram stain [[id number]] Collected:  12/14/17 1600    Order Status:  Completed Specimen:  Abscess from Abdomen Updated:  12/14/17 2325     Gram Stain Result Few WBC's      No organisms seen        A/P: 47yF complicated diverticulitis with abscess s/p IR drainage and drain upsizing   -low residue diet. Nutrition to educate on acute diverticulitis diet   - ambulate  - drain care teaching   - cont abx   - IS use     Stephanie Rodriguez MD  7:13 AM  [employer] General Surgery PGY-IV

## 2017-12-19 NOTE — PLAN OF CARE
Problem: Patient Care Overview  Goal: Plan of Care Review  Outcome: Ongoing (interventions implemented as appropriate)  New evan close drain draining only small volume of pink to clear fluid  Demonstration of irrigation give to pt and wife  Output negative value of -2.5ml following irrigation   Dressing to left abd insertion site dry and intact   Pt report he has had diarrhea episode with bm of today  Reported 3 episodes   Low residue diet initiated this pm  Appetite fair   Safety maintained

## 2017-12-20 ENCOUNTER — TELEPHONE (OUTPATIENT)
Dept: PHARMACY | Facility: CLINIC | Age: 47
End: 2017-12-20

## 2017-12-20 VITALS
RESPIRATION RATE: 18 BRPM | DIASTOLIC BLOOD PRESSURE: 83 MMHG | HEART RATE: 89 BPM | HEIGHT: 68 IN | WEIGHT: 283.94 LBS | BODY MASS INDEX: 43.03 KG/M2 | TEMPERATURE: 100 F | SYSTOLIC BLOOD PRESSURE: 140 MMHG | OXYGEN SATURATION: 97 %

## 2017-12-20 PROBLEM — I10 ESSENTIAL HYPERTENSION: Status: RESOLVED | Noted: 2017-12-10 | Resolved: 2017-12-20

## 2017-12-20 PROBLEM — N17.9 ACUTE KIDNEY INJURY: Status: RESOLVED | Noted: 2017-12-10 | Resolved: 2017-12-20

## 2017-12-20 PROBLEM — R65.10 SIRS (SYSTEMIC INFLAMMATORY RESPONSE SYNDROME): Status: RESOLVED | Noted: 2017-12-10 | Resolved: 2017-12-20

## 2017-12-20 LAB
ALBUMIN SERPL BCP-MCNC: 2.5 G/DL
ALP SERPL-CCNC: 55 U/L
ALT SERPL W/O P-5'-P-CCNC: 32 U/L
ANION GAP SERPL CALC-SCNC: 10 MMOL/L
AST SERPL-CCNC: 25 U/L
BASOPHILS # BLD AUTO: 0.03 K/UL
BASOPHILS NFR BLD: 0.2 %
BILIRUB SERPL-MCNC: 0.4 MG/DL
BUN SERPL-MCNC: 8 MG/DL
CALCIUM SERPL-MCNC: 8.3 MG/DL
CHLORIDE SERPL-SCNC: 101 MMOL/L
CO2 SERPL-SCNC: 25 MMOL/L
CREAT SERPL-MCNC: 1 MG/DL
DIFFERENTIAL METHOD: ABNORMAL
EOSINOPHIL # BLD AUTO: 0.1 K/UL
EOSINOPHIL NFR BLD: 0.5 %
ERYTHROCYTE [DISTWIDTH] IN BLOOD BY AUTOMATED COUNT: 14 %
EST. GFR  (AFRICAN AMERICAN): >60 ML/MIN/1.73 M^2
EST. GFR  (NON AFRICAN AMERICAN): >60 ML/MIN/1.73 M^2
GLUCOSE SERPL-MCNC: 90 MG/DL
HCT VFR BLD AUTO: 36.3 %
HGB BLD-MCNC: 11.7 G/DL
LYMPHOCYTES # BLD AUTO: 1.5 K/UL
LYMPHOCYTES NFR BLD: 10.5 %
MAGNESIUM SERPL-MCNC: 1.9 MG/DL
MCH RBC QN AUTO: 27.7 PG
MCHC RBC AUTO-ENTMCNC: 32.2 G/DL
MCV RBC AUTO: 86 FL
MONOCYTES # BLD AUTO: 1.2 K/UL
MONOCYTES NFR BLD: 8.4 %
NEUTROPHILS # BLD AUTO: 11.5 K/UL
NEUTROPHILS NFR BLD: 79.2 %
PHOSPHATE SERPL-MCNC: 2.9 MG/DL
PLATELET # BLD AUTO: 389 K/UL
PMV BLD AUTO: 10.2 FL
POTASSIUM SERPL-SCNC: 4.4 MMOL/L
PROT SERPL-MCNC: 6.6 G/DL
RBC # BLD AUTO: 4.23 M/UL
SODIUM SERPL-SCNC: 136 MMOL/L
WBC # BLD AUTO: 14.56 K/UL

## 2017-12-20 PROCEDURE — 85025 COMPLETE CBC W/AUTO DIFF WBC: CPT

## 2017-12-20 PROCEDURE — 84100 ASSAY OF PHOSPHORUS: CPT

## 2017-12-20 PROCEDURE — 80053 COMPREHEN METABOLIC PANEL: CPT

## 2017-12-20 PROCEDURE — 25000003 PHARM REV CODE 250: Performed by: STUDENT IN AN ORGANIZED HEALTH CARE EDUCATION/TRAINING PROGRAM

## 2017-12-20 PROCEDURE — 63600175 PHARM REV CODE 636 W HCPCS: Performed by: STUDENT IN AN ORGANIZED HEALTH CARE EDUCATION/TRAINING PROGRAM

## 2017-12-20 PROCEDURE — 25000003 PHARM REV CODE 250: Performed by: FAMILY MEDICINE

## 2017-12-20 PROCEDURE — S0028 INJECTION, FAMOTIDINE, 20 MG: HCPCS | Performed by: STUDENT IN AN ORGANIZED HEALTH CARE EDUCATION/TRAINING PROGRAM

## 2017-12-20 PROCEDURE — 36415 COLL VENOUS BLD VENIPUNCTURE: CPT

## 2017-12-20 PROCEDURE — 83735 ASSAY OF MAGNESIUM: CPT

## 2017-12-20 PROCEDURE — 94761 N-INVAS EAR/PLS OXIMETRY MLT: CPT

## 2017-12-20 PROCEDURE — S0030 INJECTION, METRONIDAZOLE: HCPCS | Performed by: STUDENT IN AN ORGANIZED HEALTH CARE EDUCATION/TRAINING PROGRAM

## 2017-12-20 RX ORDER — AMOXICILLIN AND CLAVULANATE POTASSIUM 875; 125 MG/1; MG/1
1 TABLET, FILM COATED ORAL EVERY 12 HOURS
Qty: 20 TABLET | Refills: 0 | Status: ON HOLD | OUTPATIENT
Start: 2017-12-20 | End: 2017-12-28 | Stop reason: HOSPADM

## 2017-12-20 RX ORDER — TRAMADOL HYDROCHLORIDE 50 MG/1
100 TABLET ORAL EVERY 6 HOURS PRN
Qty: 30 TABLET | Refills: 0 | Status: SHIPPED | OUTPATIENT
Start: 2017-12-20 | End: 2017-12-30

## 2017-12-20 RX ORDER — ONDANSETRON 8 MG/1
8 TABLET, ORALLY DISINTEGRATING ORAL EVERY 6 HOURS PRN
Qty: 15 TABLET | Refills: 0 | Status: SHIPPED | OUTPATIENT
Start: 2017-12-20 | End: 2018-02-05

## 2017-12-20 RX ORDER — L. ACIDOPHILUS/L.BULGARICUS 100MM CELL
1 GRANULES IN PACKET (EA) ORAL 2 TIMES DAILY
Qty: 30 PACKET | COMMUNITY
Start: 2017-12-20 | End: 2018-02-05

## 2017-12-20 RX ADMIN — PIPERACILLIN SODIUM AND TAZOBACTAM SODIUM 4.5 G: 4; .5 INJECTION, POWDER, LYOPHILIZED, FOR SOLUTION INTRAVENOUS at 01:12

## 2017-12-20 RX ADMIN — METRONIDAZOLE 500 MG: 500 INJECTION, SOLUTION INTRAVENOUS at 08:12

## 2017-12-20 RX ADMIN — FAMOTIDINE 20 MG: 10 INJECTION, SOLUTION INTRAVENOUS at 08:12

## 2017-12-20 RX ADMIN — ENALAPRIL MALEATE 10 MG: 5 TABLET ORAL at 08:12

## 2017-12-20 RX ADMIN — LACTOBACILLUS ACIDOPHILUS / LACTOBACILLUS BULGARICUS 1 EACH: 100 MILLION CFU STRENGTH GRANULES at 09:12

## 2017-12-20 NOTE — PROGRESS NOTES
Surgery Progress Note:     Overnight: remains afebrile. Drain in place. Ambulating halls. tyler diet. Met with nutrition yesterday     Temp:  [97.4 °F (36.3 °C)-99.5 °F (37.5 °C)] 98.8 °F (37.1 °C)  Pulse:  [84-90] 90  Resp:  [14-18] 18  SpO2:  [97 %-100 %] 98 %  BP: (126-141)/(70-87) 129/76      Intake/Output Summary (Last 24 hours) at 12/20/17 0649  Last data filed at 12/20/17 0535   Gross per 24 hour   Intake              200 ml   Output             1100 ml   Net             -900 ml     Uop: 100 cc    Drains: serous output. About 25 in drain     Physical Exam:   NAD   abd soft less distended   Drain in place     Labs:   Recent Results (from the past 336 hour(s))   CBC auto differential    Collection Time: 12/20/17  4:45 AM   Result Value Ref Range    WBC 14.56 (H) 3.90 - 12.70 K/uL    Hemoglobin 11.7 (L) 14.0 - 18.0 g/dL    Hematocrit 36.3 (L) 40.0 - 54.0 %    Platelets 389 (H) 150 - 350 K/uL   CBC auto differential    Collection Time: 12/19/17  6:07 AM   Result Value Ref Range    WBC 14.59 (H) 3.90 - 12.70 K/uL    Hemoglobin 11.7 (L) 14.0 - 18.0 g/dL    Hematocrit 35.7 (L) 40.0 - 54.0 %    Platelets 364 (H) 150 - 350 K/uL   CBC auto differential    Collection Time: 12/18/17  4:03 AM   Result Value Ref Range    WBC 16.63 (H) 3.90 - 12.70 K/uL    Hemoglobin 12.5 (L) 14.0 - 18.0 g/dL    Hematocrit 38.5 (L) 40.0 - 54.0 %    Platelets 376 (H) 150 - 350 K/uL     BMP  Lab Results   Component Value Date     12/19/2017    K 3.9 12/19/2017     12/19/2017    CO2 25 12/19/2017    BUN 8 12/19/2017    CREATININE 1.0 12/19/2017    CALCIUM 8.0 (L) 12/19/2017    ANIONGAP 9 12/19/2017    ESTGFRAFRICA >60 12/19/2017    EGFRNONAA >60 12/19/2017     Lab Results   Component Value Date    ALT 32 12/19/2017    AST 30 12/19/2017    ALKPHOS 50 (L) 12/19/2017    BILITOT 0.4 12/19/2017     Results:   Microbiology Results (last 7 days)     Procedure Component Value Units Date/Time    Fungus culture [[id number]] Collected:  12/14/17  1600    Order Status:  Completed Specimen:  Abscess from Abdomen Updated:  12/18/17 0939     Fungus (Mycology) Culture Culture in progress    Culture, Anaerobic [[id number]] Collected:  12/14/17 1600    Order Status:  Completed Specimen:  Abscess from Abdomen Updated:  12/18/17 0852     Anaerobic Culture No anaerobes isolated    Aerobic culture [[id number]]  (Susceptibility) Collected:  12/14/17 1600    Order Status:  Completed Specimen:  Abscess from Abdomen Updated:  12/17/17 1452     Aerobic Bacterial Culture --     ESCHERICHIA COLI  Few      AFB Culture & Smear [[id number]] Collected:  12/14/17 1600    Order Status:  Completed Specimen:  Abscess from Abdomen Updated:  12/15/17 2127     AFB Culture & Smear Culture in progress     AFB CULTURE STAIN No acid fast bacilli seen.    Gram stain [[id number]] Collected:  12/14/17 1600    Order Status:  Completed Specimen:  Abscess from Abdomen Updated:  12/14/17 2325     Gram Stain Result Few WBC's      No organisms seen        A/P: 47yF complicated diverticulitis with abscess s/p IR drainage and drain upsizing   -low residue diet. Educated on diet   - ambulate  - drain care teaching   - transition to PO abx   - IS use   - home today with drain     Stephanie Rodriguez MD  6:48 AM  [employer] General Surgery PGY-IV

## 2017-12-20 NOTE — PLAN OF CARE
Problem: Patient Care Overview  Goal: Plan of Care Review  Outcome: Ongoing (interventions implemented as appropriate)  Pt is AAOx3. No complaints of nausea, vomiting, or diarrhea. Pt complains of discomfort to the truclose drain site. Low fiber/residue diet. Truclose drain is draining. Safety precautions maintained. Tolerated all medications well.

## 2017-12-20 NOTE — PROGRESS NOTES
Drain care teaching given. Pt and wife both present. Both verbalized understanding and used return demonstration of teaching.

## 2017-12-20 NOTE — PLAN OF CARE
Pt to d/c home today, wife at bedside. MD and Bedside RN have educated patient on drain care. Pt has no HH or DME needs at this time.      Future Appointments  Date Time Provider Department Center   12/28/2017 12:15 PM Annette Mathias MD [facility]        12/20/17 0951   Discharge Reassessment   Assessment Type Final Discharge Note   Provided patient/caregiver education on the expected discharge date and the discharge plan Yes   Do you have any problems affording any of your prescribed medications? TBD   Discharge Plan A Home;Home with family   Discharge Plan B Home with family   Patient choice form signed by patient/caregiver Yes   Can the patient answer the patient profile reliably? Yes, cognitively intact   How does the patient rate their overall health at the present time? Good   Describe the patient's ability to walk at the present time. Minor restrictions or changes   How often would a person be available to care for the patient? Whenever needed   Number of comorbid conditions (as recorded on the chart) Two   During the past month, has the patient often been bothered by feeling down, depressed or hopeless? Yes   During the past month, has the patient often been bothered by little interest or pleasure in doing things? Yes

## 2017-12-20 NOTE — DISCHARGE SUMMARY
[facility]  General Surgery  Discharge Summary      Patient Name: Mario Castro Sr.  MRN: [id number]  Admission Date: 12/10/2017  Hospital Length of Stay: 10 days  Discharge Date and Time:  12/20/2017 3:32 PM  Attending Physician: No att. providers found   Discharging Provider: Leo Rodriguez MD  Primary Care Provider: JANEL Sierra MD       Hospital Course:  47yM transferred from OSH with complicated diverticulitis with mircoperforation and leukocytosis and associated  JAZZY. This was patients second episode and first hospitalization for diverticulitis. No hx of colonoscopy, no FHx colon cancer colon polyps. Patient admitted and placed on broad spectrum IV abx and bowel rest. Patient continued with persistent leukocytosis, repeat CT scan performed, demonstrating fluid collection. IR consulted and drain place, during hospitalization drain required upsizing which improved the drainage. Cx - E.coli. He remained afebrile, tyler diet, improved abd exam. Discharged home with drain in place and on PO abx. He is to follow up in one week w Dr. Bryant for drain eval. Will need c-scope in 4-6 weeks.       Consults:   Consults         Status Ordering Provider     Inpatient consult to Interventional Radiology  Once     Provider:  (Not yet assigned)    Completed SHRUTHI LOERA     Inpatient consult to Interventional Radiology  Once     Provider:  (Not yet assigned)    Completed SHRUTHI LOERA     Inpatient consult to Registered Dietitian/Nutritionist  Once     Provider:  (Not yet assigned)    Completed SHRUTHI LOERA     Inpatient consult to Registered Dietitian/Nutritionist  Once     Provider:  (Not yet assigned)    LEO Garcia          Final Active Diagnoses:    Diagnosis Date Noted POA    PRINCIPAL PROBLEM:  Diverticulitis of large intestine with perforation without abscess or bleeding [K57.20] 12/11/2017 No    Diverticulitis of intestine [K57.92] 12/10/2017  Yes    Obesity (BMI 30-39.9) [E66.9] 12/10/2017 Yes      Problems Resolved During this Admission:    Diagnosis Date Noted Date Resolved POA    Acute kidney injury [N17.9] 12/10/2017 12/20/2017 Yes    Essential hypertension [I10] 12/10/2017 12/20/2017 Yes    SIRS (systemic inflammatory response syndrome) [R65.10] 12/10/2017 12/20/2017 Yes      Discharged Condition: stable    Disposition: Home or Self Care    Follow Up:  Follow-up Information     Annette Mathias MD On 12/28/2017.    Specialty:  General Surgery  Why:  @12:15pm  Contact information:  [address]  [address]  [place] [address]  [phone]                 Patient Instructions:     Diet general   Order Comments: Low residue diet     Activity as tolerated     Call MD for:  temperature >100.4     Call MD for:  persistent nausea and vomiting or diarrhea     Call MD for:  severe uncontrolled pain     Call MD for:  redness, tenderness, or signs of infection (pain, swelling, redness, odor or green/yellow discharge around incision site)     No dressing needed       Medications:  Reconciled Home Medications:   Discharge Medication List as of 12/20/2017  9:45 AM      START taking these medications    Details   amoxicillin-clavulanate 875-125mg (AUGMENTIN) 875-125 mg per tablet Take 1 tablet by mouth every 12 (twelve) hours., Starting Wed 12/20/2017, Until Sat 12/30/2017, Print      lactobacillus acidophilus & bulgar (LACTINEX) 100 million cell packet Take 1 packet (1 each total) by mouth 2 (two) times daily., Starting Wed 12/20/2017, OTC      ondansetron (ZOFRAN-ODT) 8 MG TbDL Take 1 tablet (8 mg total) by mouth every 6 (six) hours as needed (nausea)., Starting Wed 12/20/2017, Print      traMADol (ULTRAM) 50 mg tablet Take 2 tablets (100 mg total) by mouth every 6 (six) hours as needed., Starting Wed 12/20/2017, Until Sat 12/30/2017, Print         CONTINUE these medications which have NOT CHANGED    Details   enalapril (VASOTEC) 10 MG tablet Take  10 mg by mouth once daily., Historical Med      naproxen sodium (ANAPROX) 220 MG tablet Take 220 mg by mouth every 12 (twelve) hours., Historical Med             Stephanie Rodriguez MD  General Surgery  [facility]

## 2017-12-20 NOTE — TELEPHONE ENCOUNTER
Patient referred by retail pharmacy for assistgance with Zofran,Ultram and Augmentin. He is  approved in our [employer] Card plan for 30 days. But provide proof of Medicaid denial to continue in program enrollement.

## 2017-12-21 ENCOUNTER — NURSE TRIAGE (OUTPATIENT)
Dept: ADMINISTRATIVE | Facility: CLINIC | Age: 47
End: 2017-12-21

## 2017-12-22 ENCOUNTER — HOSPITAL ENCOUNTER (INPATIENT)
Facility: HOSPITAL | Age: 47
LOS: 6 days | Discharge: HOME | DRG: 872 | End: 2017-12-28
Attending: EMERGENCY MEDICINE | Admitting: SURGERY
Payer: MEDICAID

## 2017-12-22 ENCOUNTER — NURSE TRIAGE (OUTPATIENT)
Dept: ADMINISTRATIVE | Facility: CLINIC | Age: 47
End: 2017-12-22

## 2017-12-22 DIAGNOSIS — K57.00 DIVERTICULITIS OF SMALL INTESTINE WITH PERFORATION AND ABSCESS WITHOUT BLEEDING: ICD-10-CM

## 2017-12-22 DIAGNOSIS — R78.81 BACTEREMIA: ICD-10-CM

## 2017-12-22 DIAGNOSIS — K57.92 DIVERTICULITIS: ICD-10-CM

## 2017-12-22 DIAGNOSIS — K56.7 ILEUS: ICD-10-CM

## 2017-12-22 DIAGNOSIS — K57.20 DIVERTICULITIS OF LARGE INTESTINE WITH PERFORATION AND ABSCESS WITHOUT BLEEDING: ICD-10-CM

## 2017-12-22 DIAGNOSIS — K57.92 DIVERTICULITIS OF INTESTINE, UNSPECIFIED BLEEDING STATUS, UNSPECIFIED COMPLICATION STATUS, UNSPECIFIED PART OF INTESTINAL TRACT: Primary | ICD-10-CM

## 2017-12-22 LAB
ALBUMIN SERPL BCP-MCNC: 2.7 G/DL
ALP SERPL-CCNC: 56 U/L
ALT SERPL W/O P-5'-P-CCNC: 33 U/L
ANION GAP SERPL CALC-SCNC: 11 MMOL/L
AST SERPL-CCNC: 24 U/L
BASOPHILS # BLD AUTO: 0.02 K/UL
BASOPHILS NFR BLD: 0.1 %
BILIRUB SERPL-MCNC: 0.4 MG/DL
BUN SERPL-MCNC: 8 MG/DL
CALCIUM SERPL-MCNC: 8.5 MG/DL
CHLORIDE SERPL-SCNC: 100 MMOL/L
CO2 SERPL-SCNC: 24 MMOL/L
CREAT SERPL-MCNC: 1.1 MG/DL
DIFFERENTIAL METHOD: ABNORMAL
EOSINOPHIL # BLD AUTO: 0 K/UL
EOSINOPHIL NFR BLD: 0.1 %
ERYTHROCYTE [DISTWIDTH] IN BLOOD BY AUTOMATED COUNT: 14.3 %
EST. GFR  (AFRICAN AMERICAN): >60 ML/MIN/1.73 M^2
EST. GFR  (NON AFRICAN AMERICAN): >60 ML/MIN/1.73 M^2
GLUCOSE SERPL-MCNC: 99 MG/DL
HCT VFR BLD AUTO: 39.9 %
HGB BLD-MCNC: 12.9 G/DL
LACTATE SERPL-SCNC: 1 MMOL/L
LIPASE SERPL-CCNC: 167 U/L
LYMPHOCYTES # BLD AUTO: 1.3 K/UL
LYMPHOCYTES NFR BLD: 6.5 %
MCH RBC QN AUTO: 27.6 PG
MCHC RBC AUTO-ENTMCNC: 32.3 G/DL
MCV RBC AUTO: 85 FL
MONOCYTES # BLD AUTO: 2 K/UL
MONOCYTES NFR BLD: 9.7 %
NEUTROPHILS # BLD AUTO: 17.1 K/UL
NEUTROPHILS NFR BLD: 83.6 %
PLATELET # BLD AUTO: 414 K/UL
PLATELET BLD QL SMEAR: ABNORMAL
PMV BLD AUTO: 11 FL
POCT GLUCOSE: 104 MG/DL (ref 70–110)
POTASSIUM SERPL-SCNC: 4.7 MMOL/L
PROT SERPL-MCNC: 7.4 G/DL
RBC # BLD AUTO: 4.68 M/UL
SODIUM SERPL-SCNC: 135 MMOL/L
WBC # BLD AUTO: 20.43 K/UL

## 2017-12-22 PROCEDURE — 63600175 PHARM REV CODE 636 W HCPCS: Performed by: PHYSICIAN ASSISTANT

## 2017-12-22 PROCEDURE — 83605 ASSAY OF LACTIC ACID: CPT

## 2017-12-22 PROCEDURE — 87040 BLOOD CULTURE FOR BACTERIA: CPT

## 2017-12-22 PROCEDURE — 99285 EMERGENCY DEPT VISIT HI MDM: CPT | Mod: 25

## 2017-12-22 PROCEDURE — 83690 ASSAY OF LIPASE: CPT

## 2017-12-22 PROCEDURE — 25000003 PHARM REV CODE 250: Performed by: STUDENT IN AN ORGANIZED HEALTH CARE EDUCATION/TRAINING PROGRAM

## 2017-12-22 PROCEDURE — 96375 TX/PRO/DX INJ NEW DRUG ADDON: CPT

## 2017-12-22 PROCEDURE — 80053 COMPREHEN METABOLIC PANEL: CPT

## 2017-12-22 PROCEDURE — 96366 THER/PROPH/DIAG IV INF ADDON: CPT

## 2017-12-22 PROCEDURE — 25000003 PHARM REV CODE 250: Performed by: PHYSICIAN ASSISTANT

## 2017-12-22 PROCEDURE — 11000001 HC ACUTE MED/SURG PRIVATE ROOM

## 2017-12-22 PROCEDURE — 25500020 PHARM REV CODE 255: Performed by: SURGERY

## 2017-12-22 PROCEDURE — 96365 THER/PROPH/DIAG IV INF INIT: CPT

## 2017-12-22 PROCEDURE — 85025 COMPLETE CBC W/AUTO DIFF WBC: CPT

## 2017-12-22 PROCEDURE — 82962 GLUCOSE BLOOD TEST: CPT

## 2017-12-22 PROCEDURE — 96361 HYDRATE IV INFUSION ADD-ON: CPT

## 2017-12-22 RX ORDER — DIPHENHYDRAMINE HYDROCHLORIDE 50 MG/ML
25 INJECTION INTRAMUSCULAR; INTRAVENOUS EVERY 4 HOURS PRN
Status: DISCONTINUED | OUTPATIENT
Start: 2017-12-22 | End: 2017-12-28 | Stop reason: HOSPADM

## 2017-12-22 RX ORDER — TRAMADOL HYDROCHLORIDE 50 MG/1
50 TABLET ORAL EVERY 6 HOURS PRN
Status: DISCONTINUED | OUTPATIENT
Start: 2017-12-22 | End: 2017-12-28 | Stop reason: HOSPADM

## 2017-12-22 RX ORDER — SODIUM CHLORIDE, SODIUM LACTATE, POTASSIUM CHLORIDE, CALCIUM CHLORIDE 600; 310; 30; 20 MG/100ML; MG/100ML; MG/100ML; MG/100ML
INJECTION, SOLUTION INTRAVENOUS CONTINUOUS
Status: DISCONTINUED | OUTPATIENT
Start: 2017-12-22 | End: 2017-12-24

## 2017-12-22 RX ORDER — ACETAMINOPHEN 325 MG/1
650 TABLET ORAL EVERY 8 HOURS PRN
Status: DISCONTINUED | OUTPATIENT
Start: 2017-12-22 | End: 2017-12-28 | Stop reason: HOSPADM

## 2017-12-22 RX ORDER — ONDANSETRON 2 MG/ML
4 INJECTION INTRAMUSCULAR; INTRAVENOUS EVERY 6 HOURS PRN
Status: DISCONTINUED | OUTPATIENT
Start: 2017-12-22 | End: 2017-12-28 | Stop reason: HOSPADM

## 2017-12-22 RX ORDER — ONDANSETRON 2 MG/ML
4 INJECTION INTRAMUSCULAR; INTRAVENOUS
Status: COMPLETED | OUTPATIENT
Start: 2017-12-22 | End: 2017-12-22

## 2017-12-22 RX ORDER — ENOXAPARIN SODIUM 100 MG/ML
40 INJECTION SUBCUTANEOUS EVERY 24 HOURS
Status: DISCONTINUED | OUTPATIENT
Start: 2017-12-23 | End: 2017-12-28 | Stop reason: HOSPADM

## 2017-12-22 RX ORDER — SODIUM CHLORIDE 0.9 % (FLUSH) 0.9 %
3 SYRINGE (ML) INJECTION
Status: DISCONTINUED | OUTPATIENT
Start: 2017-12-22 | End: 2017-12-28 | Stop reason: HOSPADM

## 2017-12-22 RX ORDER — ONDANSETRON 8 MG/1
8 TABLET, ORALLY DISINTEGRATING ORAL EVERY 8 HOURS PRN
Status: DISCONTINUED | OUTPATIENT
Start: 2017-12-22 | End: 2017-12-28 | Stop reason: HOSPADM

## 2017-12-22 RX ADMIN — SODIUM CHLORIDE 1000 ML: 0.9 INJECTION, SOLUTION INTRAVENOUS at 01:12

## 2017-12-22 RX ADMIN — SODIUM CHLORIDE, SODIUM LACTATE, POTASSIUM CHLORIDE, AND CALCIUM CHLORIDE: .6; .31; .03; .02 INJECTION, SOLUTION INTRAVENOUS at 06:12

## 2017-12-22 RX ADMIN — IOHEXOL 100 ML: 350 INJECTION, SOLUTION INTRAVENOUS at 04:12

## 2017-12-22 RX ADMIN — PIPERACILLIN SODIUM AND TAZOBACTAM SODIUM 4.5 G: 4; .5 INJECTION, POWDER, LYOPHILIZED, FOR SOLUTION INTRAVENOUS at 03:12

## 2017-12-22 RX ADMIN — IOHEXOL 30 ML: 350 INJECTION, SOLUTION INTRAVENOUS at 03:12

## 2017-12-22 RX ADMIN — ONDANSETRON 4 MG: 2 INJECTION INTRAMUSCULAR; INTRAVENOUS at 01:12

## 2017-12-22 RX ADMIN — ACETAMINOPHEN 650 MG: 325 TABLET ORAL at 07:12

## 2017-12-22 NOTE — ED NOTES
Patient presents to ed with c/o fever and nausea following bowel surgery on Monday. Pt had surgical drain placed following bowel perforation, pt denies any changes in drainage characteristics. Denies abdominal pain at this time except with deep palpation. Reports continuous nausea.

## 2017-12-22 NOTE — TELEPHONE ENCOUNTER
Called pt, instructed pt to proceed to ER for immediate eval, per Dr. Mcmanus. Pt and wife verbalize understanding.

## 2017-12-22 NOTE — ED NOTES
Bed: [facility]  Expected date:   Expected time:   Means of arrival:   Comments:  Pt still in rm

## 2017-12-22 NOTE — TELEPHONE ENCOUNTER
"    Reason for Disposition   Fever > 100.5 F (38.1 C)    Answer Assessment - Initial Assessment Questions  1. SYMPTOM: "What's the main symptom you're concerned about?" (e.g., pain, fever, vomiting)      fever  2. ONSET: "When did ________  start?"      today  3. SURGERY: "What surgery was performed?"      Drain placement  4. DATE of SURGERY: "When was surgery performed?"       12/18/17  5. ANESTHESIA: " What type of anesthesia did you have?" (e.g., general, spinal, epidural, local)      -  6. PAIN: "Is there any pain?" If so, ask: "How bad is it?"  (Scale 1-10; or mild, moderate, severe)      no  7. FEVER: "Do you have a fever?" If so, ask: "What is your temperature, how was it measured, and when did it start?"      101.2  8. VOMITING: "Is there any vomiting?" If yes, ask: "How many times?"      Vomited x 1  9. BLEEDING: "Is there any bleeding?" If so, ask: "How much?" and "Where?"      no  10. OTHER SYMPTOMS: "Do you have any other symptoms?" (e.g., drainage from wound, painful urination, constipation)        no    Protocols used: ST POST-OP SYMPTOMS AND [id number]      "

## 2017-12-22 NOTE — ED PROVIDER NOTES
Encounter Date: 12/22/2017       History     Chief Complaint   Patient presents with    Fever     Pt discharged from Hospital on 12/20/17 after having Diverticulitis, started having fever this morning 101.2, pt vomited x 1     Afebrile 47-year-old male with DM and HTN and recent hospital admission presents to the ED with complaints of fever.  He was discharged 2 days ago after being admitted to this hospital for complicated diverticulitis with microperforation and abscess requiring IR intervention with drain placed.  He states upon discharge he was doing well but that yesterday he began with some chills and generalized body aches.  He states that this has persisted and that this morning he had fever with MAXIMUM TEMPERATURE of 101.  He does report one episode of vomiting last night and then he has multiple malodorous belches today.  He states that he continues with loose stool and denies any blood in either emesis or stool.  He does report some generalized abdominal discomfort.  He has not tried any medications for the symptoms.  He did take his recommended dose of Augmentin this morning shows that the ability to recent aerobic culture growing out Escherichia coli.      The history is provided by the patient and the spouse.     Review of patient's allergies indicates:  No Known Allergies  Past Medical History:   Diagnosis Date    Diabetes mellitus     Hypertension      History reviewed. No pertinent surgical history.  History reviewed. No pertinent family history.  Social History   Substance Use Topics    Smoking status: Never Smoker    Smokeless tobacco: Never Used    Alcohol use No     Review of Systems   Constitutional: Positive for chills and fever.   HENT: Negative for sore throat.    Respiratory: Negative for shortness of breath.    Cardiovascular: Negative for chest pain.   Gastrointestinal: Positive for abdominal distention, abdominal pain, diarrhea, nausea and vomiting. Negative for blood in stool and  constipation.        Drain in place in the left abdomen with moderate purulent drainage   Genitourinary: Negative for dysuria.   Musculoskeletal: Positive for arthralgias (body aches). Negative for back pain.   Skin: Negative for rash.   Neurological: Positive for weakness (generalized).   Hematological: Does not bruise/bleed easily.       Physical Exam     Initial Vitals [12/22/17 1241]   BP Pulse Resp Temp SpO2   121/65 (!) 127 20 99.5 °F (37.5 °C) 98 %      MAP       83.67         Physical Exam    Nursing note and vitals reviewed.  Constitutional: He appears well-developed and well-nourished. He is diaphoretic. He is cooperative. He appears ill. He appears distressed.   HENT:   Head: Normocephalic and atraumatic.   Eyes: Conjunctivae and lids are normal.   Neck: Trachea normal and normal range of motion. Neck supple. No stridor present.   Cardiovascular: Normal rate and regular rhythm.   Pulmonary/Chest: Breath sounds normal. No respiratory distress. He has no wheezes. He has no rhonchi.   Abdominal: Normal appearance. He exhibits distension. Bowel sounds are decreased. There is generalized tenderness. There is guarding. There is no rigidity.   Drain in place to the left abdomen; with no TTPR erythema of incision point.  Bag reveals moderate purulent drainage.   Musculoskeletal:   Fair range of motion of all examination.   Neurological: He is alert and oriented to person, place, and time. GCS eye subscore is 4. GCS verbal subscore is 5. GCS motor subscore is 6.   Skin: Skin is warm and intact. No rash noted.   Psychiatric: He has a normal mood and affect. His speech is normal and behavior is normal. Thought content normal.         ED Course   Procedures  Labs Reviewed   CBC W/ AUTO DIFFERENTIAL - Abnormal; Notable for the following:        Result Value    WBC 20.43 (*)     Hemoglobin 12.9 (*)     Hematocrit 39.9 (*)     Platelets 414 (*)     All other components within normal limits   COMPREHENSIVE METABOLIC  PANEL - Abnormal; Notable for the following:     Sodium 135 (*)     Calcium 8.5 (*)     Albumin 2.7 (*)     All other components within normal limits   LIPASE - Abnormal; Notable for the following:     Lipase 167 (*)     All other components within normal limits   CULTURE, BLOOD   CULTURE, BLOOD   LACTIC ACID, PLASMA   URINALYSIS   POCT GLUCOSE   POCT GLUCOSE MONITORING CONTINUOUS         Imaging Results          CT Abdomen Pelvis With Contrast (In process)                X-Ray Abdomen Flat And Erect (Final result)  Result time 12/22/17 13:48:25    Final result by Paulette Nava MD (12/22/17 13:48:25)                 Impression:        Pigtail catheter in the left mid abdomen, which has been upsized since prior CT examination. Several prominent loops of small bowel in the left midabdomen with air-fluid levels on the upright radiograph. Findings consistent with localized ileus in light of known intra-abdominal infection. No free air identified on today's study. Abscesses/fluid collections are not well assessed with radiography.    Visualized lungs are clear.Osseous structures appear intact. Advanced degenerative change of the right hip.      Electronically signed by: PAULETTE NAVA MD  Date:     12/22/17  Time:    13:48              Narrative:    XR Abdomen    12/22/17 13:07:55    Accession# [id number]    CLINICAL INDICATION: 47 year old M with Abdominal Pain     TECHNIQUE: Supine and upright radiographs of the abdomen.    COMPARISON:  CT 12/17/2017    FINDINGS                                   Medical Decision Making:   Initial Assessment:   Ill appearing 47-year-old male with recent admission returns to the ED for evaluation of continued GI complaints and fever.  States that he was feeling somewhat better since discharge from the hospital but that he returned to a generalized fatigue, chills, body aches and fever with MAXIMUM TEMPERATURE of 101 this morning.  Did report 1 episode of nonbloody emesis and continues  with nonbloody loose stool.  He is currently on Augmentin and has a percutaneous drain to the abdomen.  Differential Diagnosis:   Diverticulitis simple versus complicated, abscess, bacteremia, sepsis, electrolyte abnormality  Clinical Tests:   Lab Tests: Ordered and Reviewed  Radiological Study: Ordered and Reviewed  Medical Tests: Ordered and Reviewed  ED Management:  Labs including lactic and blood cultures and this patient with complicated history of diverticulitis with abscess reported fever with noted tachycardia on vitals here.  No signs of hypotension or organ failure and will not implement sepsis protocol.  CBC reveals white count increased to 20.  Chemistry with no significant abnormalities.  Lipase is elevated at 167.  Lactic is normal at 1.0.  Patient continues to be afebrile.  IV fluids ordered for tachycardia.  Blood cultures are pending.  Flat and erect of the abdomen reveals findings consistent with ileus and pigtail catheter noted.  Discussed these findings with Dr. Mcmanus with recommendations for CT of abdomen and pelvis with oral and IV contrast and admission.  I will start empiric antibiotics with Zosyn.  Patient continues to remain stable throughout ED course with ports of mild abdominal discomfort.  Findings and treatment plan were discussed with patient and family member and they are amenable to this.                    ED Course as of Dec 22 1644   Fri Dec 22, 2017   1526 Attestation.  The patient was seen and evaluated by me independently of the mid-level.  The patient resents the emergency department with fever and abdominal pain.  He was recently discharged from the hospital with perforated diverticulitis and abscess formation.  He was discharged with a drain that is draining the abscess.  The patient has had a fever and increased in the drainage of exudate from the abscess.  The patient has an ileus on the x-ray, his white count is 20,000.  Lipase is 167.  Dr. Monte, general surgery  was consulted and will evaluate and admit the patient for IV antibiotics.  [ST]      ED Course User Index  [ST] Hemalatha Dang MD     Clinical Impression:   The primary encounter diagnosis was Ileus. Diagnoses of Diverticulitis, Bacteremia, and Diverticulitis of intestine, unspecified bleeding status, unspecified complication status, unspecified part of intestinal tract were also pertinent to this visit.                           SAWYER Bose  12/22/17 4485

## 2017-12-23 LAB
ALBUMIN SERPL BCP-MCNC: 2.3 G/DL
ALP SERPL-CCNC: 50 U/L
ALT SERPL W/O P-5'-P-CCNC: 23 U/L
ANION GAP SERPL CALC-SCNC: 10 MMOL/L
AST SERPL-CCNC: 13 U/L
BASOPHILS # BLD AUTO: 0.03 K/UL
BASOPHILS NFR BLD: 0.2 %
BILIRUB SERPL-MCNC: 0.3 MG/DL
BUN SERPL-MCNC: 6 MG/DL
CALCIUM SERPL-MCNC: 8.1 MG/DL
CHLORIDE SERPL-SCNC: 103 MMOL/L
CO2 SERPL-SCNC: 23 MMOL/L
CREAT SERPL-MCNC: 0.8 MG/DL
DIFFERENTIAL METHOD: ABNORMAL
EOSINOPHIL # BLD AUTO: 0.1 K/UL
EOSINOPHIL NFR BLD: 0.4 %
ERYTHROCYTE [DISTWIDTH] IN BLOOD BY AUTOMATED COUNT: 14 %
EST. GFR  (AFRICAN AMERICAN): >60 ML/MIN/1.73 M^2
EST. GFR  (NON AFRICAN AMERICAN): >60 ML/MIN/1.73 M^2
GLUCOSE SERPL-MCNC: 83 MG/DL
HCT VFR BLD AUTO: 33.4 %
HGB BLD-MCNC: 10.8 G/DL
LYMPHOCYTES # BLD AUTO: 1.6 K/UL
LYMPHOCYTES NFR BLD: 10.9 %
MAGNESIUM SERPL-MCNC: 1.8 MG/DL
MCH RBC QN AUTO: 27.6 PG
MCHC RBC AUTO-ENTMCNC: 32.3 G/DL
MCV RBC AUTO: 85 FL
MONOCYTES # BLD AUTO: 1.5 K/UL
MONOCYTES NFR BLD: 10.4 %
NEUTROPHILS # BLD AUTO: 11 K/UL
NEUTROPHILS NFR BLD: 77.5 %
PHOSPHATE SERPL-MCNC: 2.9 MG/DL
PLATELET # BLD AUTO: 377 K/UL
PMV BLD AUTO: 10.3 FL
POTASSIUM SERPL-SCNC: 4 MMOL/L
PROT SERPL-MCNC: 6.1 G/DL
RBC # BLD AUTO: 3.92 M/UL
SODIUM SERPL-SCNC: 136 MMOL/L
WBC # BLD AUTO: 14.17 K/UL

## 2017-12-23 PROCEDURE — 11000001 HC ACUTE MED/SURG PRIVATE ROOM

## 2017-12-23 PROCEDURE — 87077 CULTURE AEROBIC IDENTIFY: CPT

## 2017-12-23 PROCEDURE — C9113 INJ PANTOPRAZOLE SODIUM, VIA: HCPCS | Performed by: STUDENT IN AN ORGANIZED HEALTH CARE EDUCATION/TRAINING PROGRAM

## 2017-12-23 PROCEDURE — 87075 CULTR BACTERIA EXCEPT BLOOD: CPT

## 2017-12-23 PROCEDURE — 25000003 PHARM REV CODE 250: Performed by: STUDENT IN AN ORGANIZED HEALTH CARE EDUCATION/TRAINING PROGRAM

## 2017-12-23 PROCEDURE — 94761 N-INVAS EAR/PLS OXIMETRY MLT: CPT

## 2017-12-23 PROCEDURE — 87116 MYCOBACTERIA CULTURE: CPT | Mod: 59

## 2017-12-23 PROCEDURE — 94799 UNLISTED PULMONARY SVC/PX: CPT

## 2017-12-23 PROCEDURE — 87076 CULTURE ANAEROBE IDENT EACH: CPT | Mod: 59

## 2017-12-23 PROCEDURE — 84100 ASSAY OF PHOSPHORUS: CPT

## 2017-12-23 PROCEDURE — 63600175 PHARM REV CODE 636 W HCPCS: Performed by: STUDENT IN AN ORGANIZED HEALTH CARE EDUCATION/TRAINING PROGRAM

## 2017-12-23 PROCEDURE — 87070 CULTURE OTHR SPECIMN AEROBIC: CPT

## 2017-12-23 PROCEDURE — 85025 COMPLETE CBC W/AUTO DIFF WBC: CPT

## 2017-12-23 PROCEDURE — 80053 COMPREHEN METABOLIC PANEL: CPT

## 2017-12-23 PROCEDURE — 83735 ASSAY OF MAGNESIUM: CPT

## 2017-12-23 PROCEDURE — 87186 SC STD MICRODIL/AGAR DIL: CPT | Mod: 59

## 2017-12-23 PROCEDURE — 36415 COLL VENOUS BLD VENIPUNCTURE: CPT

## 2017-12-23 PROCEDURE — 0W9G30Z DRAINAGE OF PERITONEAL CAVITY WITH DRAINAGE DEVICE, PERCUTANEOUS APPROACH: ICD-10-PCS | Performed by: RADIOLOGY

## 2017-12-23 PROCEDURE — 87015 SPECIMEN INFECT AGNT CONCNTJ: CPT

## 2017-12-23 PROCEDURE — 87205 SMEAR GRAM STAIN: CPT

## 2017-12-23 PROCEDURE — 63600175 PHARM REV CODE 636 W HCPCS: Performed by: RADIOLOGY

## 2017-12-23 PROCEDURE — 25500020 PHARM REV CODE 255: Performed by: SURGERY

## 2017-12-23 RX ORDER — AMOXICILLIN 250 MG
1 CAPSULE ORAL 2 TIMES DAILY
Status: DISCONTINUED | OUTPATIENT
Start: 2017-12-23 | End: 2017-12-28 | Stop reason: HOSPADM

## 2017-12-23 RX ORDER — PANTOPRAZOLE SODIUM 40 MG/10ML
40 INJECTION, POWDER, LYOPHILIZED, FOR SOLUTION INTRAVENOUS ONCE
Status: COMPLETED | OUTPATIENT
Start: 2017-12-23 | End: 2017-12-23

## 2017-12-23 RX ORDER — MIDAZOLAM HYDROCHLORIDE 1 MG/ML
INJECTION INTRAMUSCULAR; INTRAVENOUS CODE/TRAUMA/SEDATION MEDICATION
Status: COMPLETED | OUTPATIENT
Start: 2017-12-23 | End: 2017-12-23

## 2017-12-23 RX ORDER — PANTOPRAZOLE SODIUM 40 MG/10ML
40 INJECTION, POWDER, LYOPHILIZED, FOR SOLUTION INTRAVENOUS DAILY
Status: DISCONTINUED | OUTPATIENT
Start: 2017-12-23 | End: 2017-12-28 | Stop reason: HOSPADM

## 2017-12-23 RX ORDER — FENTANYL CITRATE 50 UG/ML
INJECTION, SOLUTION INTRAMUSCULAR; INTRAVENOUS CODE/TRAUMA/SEDATION MEDICATION
Status: COMPLETED | OUTPATIENT
Start: 2017-12-23 | End: 2017-12-23

## 2017-12-23 RX ADMIN — MIDAZOLAM HYDROCHLORIDE 2 MG: 1 INJECTION, SOLUTION INTRAMUSCULAR; INTRAVENOUS at 05:12

## 2017-12-23 RX ADMIN — PIPERACILLIN SODIUM AND TAZOBACTAM SODIUM 4.5 G: 4; .5 INJECTION, POWDER, LYOPHILIZED, FOR SOLUTION INTRAVENOUS at 07:12

## 2017-12-23 RX ADMIN — SODIUM CHLORIDE, SODIUM LACTATE, POTASSIUM CHLORIDE, AND CALCIUM CHLORIDE: .6; .31; .03; .02 INJECTION, SOLUTION INTRAVENOUS at 03:12

## 2017-12-23 RX ADMIN — SODIUM CHLORIDE, SODIUM LACTATE, POTASSIUM CHLORIDE, AND CALCIUM CHLORIDE: .6; .31; .03; .02 INJECTION, SOLUTION INTRAVENOUS at 11:12

## 2017-12-23 RX ADMIN — FENTANYL CITRATE 100 MCG: 50 INJECTION, SOLUTION INTRAMUSCULAR; INTRAVENOUS at 05:12

## 2017-12-23 RX ADMIN — IOHEXOL 75 ML: 350 INJECTION, SOLUTION INTRAVENOUS at 05:12

## 2017-12-23 RX ADMIN — TRAMADOL HYDROCHLORIDE 50 MG: 50 TABLET, COATED ORAL at 07:12

## 2017-12-23 RX ADMIN — PANTOPRAZOLE SODIUM 40 MG: 40 INJECTION, POWDER, FOR SOLUTION INTRAVENOUS at 09:12

## 2017-12-23 RX ADMIN — PIPERACILLIN SODIUM AND TAZOBACTAM SODIUM 4.5 G: 4; .5 INJECTION, POWDER, LYOPHILIZED, FOR SOLUTION INTRAVENOUS at 11:12

## 2017-12-23 RX ADMIN — PANTOPRAZOLE SODIUM 40 MG: 40 INJECTION, POWDER, FOR SOLUTION INTRAVENOUS at 03:12

## 2017-12-23 NOTE — PLAN OF CARE
Problem: Patient Care Overview  Goal: Plan of Care Review  Plan of care discussed with pt and spouse. IV fluids maintained at ordered rate. Pt c/o indigestion and presenting with malodorous belching. Order for Protonix IV obtained. Accordion style drain care performed and 150mls of tan colored malodorous fluid disposed. Pt denies pain or nausea. Safety maintained. Bed rails up x3. Pt encouraged to alert staff of needs.

## 2017-12-23 NOTE — PROCEDURES
Radiology Post Procedure Note:     Procedure: CT guided abscess drainage    (s): Evelyn    Blood Loss: Minimal    Specimen: 120 cc purulent material    Findings:   Patient came to IR and under imaging guidance had the midline abdominal collection accessed and an 8 F APDL placed. 120 cc purulent material drained.     Carlito VAZQUEZ M.D. personally reviewed and agree with the above dictated note.

## 2017-12-23 NOTE — PROGRESS NOTES
Surgery Progress Note:     Overnight: afebrile since admit. Improved nausea. Continues w purulent drainage from drain. +flatus no BM     Temp:  [98.9 °F (37.2 °C)-100.2 °F (37.9 °C)] 99.2 °F (37.3 °C)  Pulse:  [101-127] 101  Resp:  [18-20] 19  SpO2:  [97 %-98 %] 97 %  BP: (121-158)/(65-79) 158/74      Intake/Output Summary (Last 24 hours) at 12/23/17 0947  Last data filed at 12/23/17 0305   Gross per 24 hour   Intake            112.5 ml   Output              350 ml   Net           -237.5 ml     Drains: 350 cc purulent     Physical Exam:   NAD   abd soft obese NT some distension   OSMANY in place purulent drainage     Labs:   Recent Results (from the past 336 hour(s))   CBC auto differential    Collection Time: 12/23/17  5:32 AM   Result Value Ref Range    WBC 14.17 (H) 3.90 - 12.70 K/uL    Hemoglobin 10.8 (L) 14.0 - 18.0 g/dL    Hematocrit 33.4 (L) 40.0 - 54.0 %    Platelets 377 (H) 150 - 350 K/uL   CBC W/ AUTO DIFFERENTIAL    Collection Time: 12/22/17  1:33 PM   Result Value Ref Range    WBC 20.43 (H) 3.90 - 12.70 K/uL    Hemoglobin 12.9 (L) 14.0 - 18.0 g/dL    Hematocrit 39.9 (L) 40.0 - 54.0 %    Platelets 414 (H) 150 - 350 K/uL   CBC auto differential    Collection Time: 12/20/17  4:45 AM   Result Value Ref Range    WBC 14.56 (H) 3.90 - 12.70 K/uL    Hemoglobin 11.7 (L) 14.0 - 18.0 g/dL    Hematocrit 36.3 (L) 40.0 - 54.0 %    Platelets 389 (H) 150 - 350 K/uL     BMP  Lab Results   Component Value Date     12/23/2017    K 4.0 12/23/2017     12/23/2017    CO2 23 12/23/2017    BUN 6 12/23/2017    CREATININE 0.8 12/23/2017    CALCIUM 8.1 (L) 12/23/2017    ANIONGAP 10 12/23/2017    ESTGFRAFRICA >60 12/23/2017    EGFRNONAA >60 12/23/2017     Lab Results   Component Value Date    ALT 23 12/23/2017    AST 13 12/23/2017    ALKPHOS 50 (L) 12/23/2017    BILITOT 0.3 12/23/2017       A/P: complicated diverticulitis w continued abscess   IR drainage   IV abx   Ambulate   IS USE   NPO   SCDs     Stephanie Brito-Toño,  MD  9:47 AM  [employer] Surgery PGY-IV

## 2017-12-23 NOTE — H&P
Surgery H&P    HPI: 47yM known to the surgical service, complicated diverticulitis with abscess and drain placement. Recently discharged home with drain in place and PO abx. Returns to ED with fevers @ home, some nausea and increased drain output. No dysuria/pneumaturia. Having liquid BM, no formed stools. No increased abd pain     ROS: negative other than above     Past Medical History:   Diagnosis Date    Hypertension        Social History     Social History    Marital status:      Spouse name: N/A    Number of children: N/A    Years of education: N/A     Occupational History    Not on file.     Social History Main Topics    Smoking status: Never Smoker    Smokeless tobacco: Never Used    Alcohol use No    Drug use: No    Sexual activity: Yes     Other Topics Concern    Not on file     Social History Narrative    No narrative on file           Temp:  [98.9 °F (37.2 °C)-100.2 °F (37.9 °C)] 99.2 °F (37.3 °C)  Pulse:  [101-127] 101  Resp:  [18-20] 19  SpO2:  [97 %-98 %] 97 %  BP: (121-158)/(65-79) 158/74      Physical Exam:   NAD   AAO  RR   Non labored breathing   abd soft obese NT. Drain purulent drainage     Labs:   Recent Results (from the past 336 hour(s))   CBC auto differential    Collection Time: 12/23/17  5:32 AM   Result Value Ref Range    WBC 14.17 (H) 3.90 - 12.70 K/uL    Hemoglobin 10.8 (L) 14.0 - 18.0 g/dL    Hematocrit 33.4 (L) 40.0 - 54.0 %    Platelets 377 (H) 150 - 350 K/uL   CBC W/ AUTO DIFFERENTIAL    Collection Time: 12/22/17  1:33 PM   Result Value Ref Range    WBC 20.43 (H) 3.90 - 12.70 K/uL    Hemoglobin 12.9 (L) 14.0 - 18.0 g/dL    Hematocrit 39.9 (L) 40.0 - 54.0 %    Platelets 414 (H) 150 - 350 K/uL   CBC auto differential    Collection Time: 12/20/17  4:45 AM   Result Value Ref Range    WBC 14.56 (H) 3.90 - 12.70 K/uL    Hemoglobin 11.7 (L) 14.0 - 18.0 g/dL    Hematocrit 36.3 (L) 40.0 - 54.0 %    Platelets 389 (H) 150 - 350 K/uL     BMP  Lab Results   Component Value Date      12/23/2017    K 4.0 12/23/2017     12/23/2017    CO2 23 12/23/2017    BUN 6 12/23/2017    CREATININE 0.8 12/23/2017    CALCIUM 8.1 (L) 12/23/2017    ANIONGAP 10 12/23/2017    ESTGFRAFRICA >60 12/23/2017    EGFRNONAA >60 12/23/2017     Lab Results   Component Value Date    ALT 23 12/23/2017    AST 13 12/23/2017    ALKPHOS 50 (L) 12/23/2017    BILITOT 0.3 12/23/2017       Imaging:    CT abd pelvis: 1.  Multifocal abscesses visualized within the left midabdomen and left lower quadrant with significant inflammatory changes seen throughout the region.  Difficult to ascertain whether not these collections are confluent.  Largest collection within the midline lower abdomen has a bilobed configuration and appears enlarged from recent exam 12/17/17.    2.  Dilatation of proximal small bowel loops within the left upper quadrant could represent partial developing small bowel obstruction.  Similar findings were seen on recent CT exam.    A/P: 47yM complicated diverticulitis w abscess   Admit to surgery   IVF  NPO   IV abx   Antiemetics   Consult IR for additional drain placement       Stephanie Rodriguez MD  9:37 AM  [employer] General Surgery PGY-IV

## 2017-12-24 PROBLEM — A41.9: Status: ACTIVE | Noted: 2017-12-24

## 2017-12-24 PROBLEM — K65.8: Status: ACTIVE | Noted: 2017-12-24

## 2017-12-24 LAB
ANION GAP SERPL CALC-SCNC: 10 MMOL/L
BASOPHILS # BLD AUTO: 0.05 K/UL
BASOPHILS NFR BLD: 0.4 %
BUN SERPL-MCNC: 6 MG/DL
CALCIUM SERPL-MCNC: 8 MG/DL
CHLORIDE SERPL-SCNC: 101 MMOL/L
CO2 SERPL-SCNC: 23 MMOL/L
CREAT SERPL-MCNC: 0.8 MG/DL
DIFFERENTIAL METHOD: ABNORMAL
EOSINOPHIL # BLD AUTO: 0.1 K/UL
EOSINOPHIL NFR BLD: 0.6 %
ERYTHROCYTE [DISTWIDTH] IN BLOOD BY AUTOMATED COUNT: 14.1 %
EST. GFR  (AFRICAN AMERICAN): >60 ML/MIN/1.73 M^2
EST. GFR  (NON AFRICAN AMERICAN): >60 ML/MIN/1.73 M^2
GLUCOSE SERPL-MCNC: 92 MG/DL
GRAM STN SPEC: NORMAL
HCT VFR BLD AUTO: 36 %
HGB BLD-MCNC: 11.7 G/DL
LYMPHOCYTES # BLD AUTO: 1.5 K/UL
LYMPHOCYTES NFR BLD: 11.7 %
MAGNESIUM SERPL-MCNC: 1.7 MG/DL
MCH RBC QN AUTO: 27.8 PG
MCHC RBC AUTO-ENTMCNC: 32.5 G/DL
MCV RBC AUTO: 86 FL
MONOCYTES # BLD AUTO: 1.5 K/UL
MONOCYTES NFR BLD: 11.3 %
NEUTROPHILS # BLD AUTO: 9.6 K/UL
NEUTROPHILS NFR BLD: 75.5 %
PHOSPHATE SERPL-MCNC: 3.5 MG/DL
PLATELET # BLD AUTO: 397 K/UL
PMV BLD AUTO: 10.4 FL
POTASSIUM SERPL-SCNC: 4.3 MMOL/L
RBC # BLD AUTO: 4.21 M/UL
SODIUM SERPL-SCNC: 134 MMOL/L
WBC # BLD AUTO: 12.79 K/UL

## 2017-12-24 PROCEDURE — 25000003 PHARM REV CODE 250: Performed by: STUDENT IN AN ORGANIZED HEALTH CARE EDUCATION/TRAINING PROGRAM

## 2017-12-24 PROCEDURE — 80048 BASIC METABOLIC PNL TOTAL CA: CPT

## 2017-12-24 PROCEDURE — 84100 ASSAY OF PHOSPHORUS: CPT

## 2017-12-24 PROCEDURE — 11000001 HC ACUTE MED/SURG PRIVATE ROOM

## 2017-12-24 PROCEDURE — 36415 COLL VENOUS BLD VENIPUNCTURE: CPT

## 2017-12-24 PROCEDURE — 94761 N-INVAS EAR/PLS OXIMETRY MLT: CPT

## 2017-12-24 PROCEDURE — 85025 COMPLETE CBC W/AUTO DIFF WBC: CPT

## 2017-12-24 PROCEDURE — 83735 ASSAY OF MAGNESIUM: CPT

## 2017-12-24 PROCEDURE — C9113 INJ PANTOPRAZOLE SODIUM, VIA: HCPCS | Performed by: STUDENT IN AN ORGANIZED HEALTH CARE EDUCATION/TRAINING PROGRAM

## 2017-12-24 PROCEDURE — 63600175 PHARM REV CODE 636 W HCPCS: Performed by: STUDENT IN AN ORGANIZED HEALTH CARE EDUCATION/TRAINING PROGRAM

## 2017-12-24 RX ORDER — FLUCONAZOLE 2 MG/ML
400 INJECTION, SOLUTION INTRAVENOUS
Status: DISCONTINUED | OUTPATIENT
Start: 2017-12-24 | End: 2017-12-28 | Stop reason: HOSPADM

## 2017-12-24 RX ORDER — ENALAPRIL MALEATE 5 MG/1
10 TABLET ORAL DAILY
Status: DISCONTINUED | OUTPATIENT
Start: 2017-12-24 | End: 2017-12-28 | Stop reason: HOSPADM

## 2017-12-24 RX ORDER — FLUCONAZOLE 2 MG/ML
200 INJECTION, SOLUTION INTRAVENOUS
Status: DISCONTINUED | OUTPATIENT
Start: 2017-12-24 | End: 2017-12-24

## 2017-12-24 RX ORDER — DEXTROSE MONOHYDRATE, SODIUM CHLORIDE, AND POTASSIUM CHLORIDE 50; 1.49; 4.5 G/1000ML; G/1000ML; G/1000ML
INJECTION, SOLUTION INTRAVENOUS CONTINUOUS
Status: DISCONTINUED | OUTPATIENT
Start: 2017-12-24 | End: 2017-12-25

## 2017-12-24 RX ADMIN — ENOXAPARIN SODIUM 40 MG: 100 INJECTION SUBCUTANEOUS at 04:12

## 2017-12-24 RX ADMIN — STANDARDIZED SENNA CONCENTRATE AND DOCUSATE SODIUM 1 TABLET: 8.6; 5 TABLET, FILM COATED ORAL at 08:12

## 2017-12-24 RX ADMIN — PIPERACILLIN SODIUM AND TAZOBACTAM SODIUM 4.5 G: 4; .5 INJECTION, POWDER, LYOPHILIZED, FOR SOLUTION INTRAVENOUS at 03:12

## 2017-12-24 RX ADMIN — PANTOPRAZOLE SODIUM 40 MG: 40 INJECTION, POWDER, FOR SOLUTION INTRAVENOUS at 08:12

## 2017-12-24 RX ADMIN — ONDANSETRON 4 MG: 2 INJECTION INTRAMUSCULAR; INTRAVENOUS at 01:12

## 2017-12-24 RX ADMIN — DEXTROSE MONOHYDRATE, SODIUM CHLORIDE, AND POTASSIUM CHLORIDE: 50; 4.5; 1.49 INJECTION, SOLUTION INTRAVENOUS at 10:12

## 2017-12-24 RX ADMIN — PIPERACILLIN SODIUM AND TAZOBACTAM SODIUM 4.5 G: 4; .5 INJECTION, POWDER, LYOPHILIZED, FOR SOLUTION INTRAVENOUS at 12:12

## 2017-12-24 RX ADMIN — FLUCONAZOLE 400 MG: 2 INJECTION INTRAVENOUS at 10:12

## 2017-12-24 RX ADMIN — ENALAPRIL MALEATE 10 MG: 5 TABLET ORAL at 04:12

## 2017-12-24 RX ADMIN — TRAMADOL HYDROCHLORIDE 50 MG: 50 TABLET, COATED ORAL at 04:12

## 2017-12-24 RX ADMIN — PIPERACILLIN SODIUM AND TAZOBACTAM SODIUM 4.5 G: 4; .5 INJECTION, POWDER, LYOPHILIZED, FOR SOLUTION INTRAVENOUS at 07:12

## 2017-12-24 RX ADMIN — TRAMADOL HYDROCHLORIDE 50 MG: 50 TABLET, COATED ORAL at 10:12

## 2017-12-24 NOTE — PLAN OF CARE
Chief Complaint   Patient presents with    Fever       Pt discharged from Hospital on 12/20/17 after having Diverticulitis, started having fever this morning 101.2, pt vomited x 1     Pt independent with ADLs, no HH/HME, lives with wife who will provide transportation on discharge    Pt last admit to [facility] 12/10-12/20/17, diveticulitis, readmit with fever       12/24/17 1712   Discharge Assessment   Assessment Type Discharge Planning Assessment   Confirmed/corrected address and phone number on facesheet? Yes   Assessment information obtained from? Patient;Caregiver  (pt and wife, Stephenie)   Expected Length of Stay (days) 3   Communicated expected length of stay with patient/caregiver yes   Prior to hospitilization cognitive status: Alert/Oriented   Prior to hospitalization functional status: Independent   Current cognitive status: Alert/Oriented   Current Functional Status: Independent   Facility Arrived From: (home)   Lives With spouse   Able to Return to Prior Arrangements yes   Is patient able to care for self after discharge? Yes   Who are your caregiver(s) and their phone number(s)? WIfe: Stephenie Castro [phone]   Patient's perception of discharge disposition home or selfcare   Readmission Within The Last 30 Days previous discharge plan unsuccessful   If yes, [facility]   Patient currently being followed by outpatient case management? No   Patient currently receives any other outside agency services? No   Equipment Currently Used at Home none   Do you have any problems affording any of your prescribed medications? No   Is the patient taking medications as prescribed? yes   Does the patient have transportation home? Yes   Transportation Available family or friend will provide;car   Dialysis Name and Scheduled days N/A   Does the patient receive services at the [facility]? No   Discharge Plan A Home   Discharge Plan B Home with family   Patient/Family In Agreement With Plan yes      Mili Oropeza RN Transitional Navigator  [phone]

## 2017-12-24 NOTE — PLAN OF CARE
Problem: Patient Care Overview  Goal: Plan of Care Review  Outcome: Ongoing (interventions implemented as appropriate)  Pt is AAOx3 with complaints of abd pain with relief from tramadol. Pt advanced to low fiber/ dental soft diet. Pt receiving IV fluids and antibx. Pt with two truclose drains putting out tan drainage.

## 2017-12-24 NOTE — PLAN OF CARE
12/24/17 1710   Readmission Questionnaire   At the time of your discharge, did someone talk to you about what your health problems were? Yes   At the time of discharge, did someone talk to you about what to watch out for regarding worsening of your health problem? Yes   At the time of discharge, did someone talk to you about what to do if you experienced worsening of your health problem? Yes   At the time of discharge, did someone talk to you about which medication to take when you left the hospital and which ones to stop taking? Yes   At the time of discharge, did someone talk to you about when and where to follow up with a doctor after you left the hospital? Yes   What do you believe caused you to be sick enough to be re-admitted? dirverticulitis   How often do you need to have someone help you when you read instructions, pamphlets, or other written material from your doctor or pharmacy? Never   Do you have problems taking your medications as prescribed? No   Do you have any problems affording any of  your prescribed medications? No   Do you have problems obtaining/receiving your medications? No   Does the patient have transportation to healthcare appointments? Yes   Lives With spouse   Does the patient have family/friends to help with healtcare needs after discharge? yes   Who are your caregiver(s) and their phone number(s)? Wife Stephenie Castro  [phone]   Does your caregiver provide all the help you need? Yes   Are you currently feeling confused? No   Are you currently having problems thinking? No   Are you currently having memory problems? No   Have you felt down, depressed, or hopeless? 0   Have you felt little interest or pleasure in doing things? 0   In the last 7 days, my sleep quality was: good     Mili Oropeza RN Transitional Navigator  [phone]

## 2017-12-24 NOTE — PROGRESS NOTES
Surgery Progress Note:     Overnight: IR drain placed yesterday, pt tolerated procedure well. Reports improved abd discomfort since drain placement. On clears, tyler. No BM. No flatus. No n/v. Afebrile. Voiding well. Ambulating in room.     Temp:  [98.7 °F (37.1 °C)-99.3 °F (37.4 °C)] 99.3 °F (37.4 °C)  Pulse:  [] 103  Resp:  [17-20] 20  SpO2:  [96 %-99 %] 97 %  BP: (121-137)/(72-78) 123/73      Intake/Output Summary (Last 24 hours) at 12/24/17 0843  Last data filed at 12/24/17 0500   Gross per 24 hour   Intake             1250 ml   Output              885 ml   Net              365 ml     Drains:  cc , midline 275 cc purulent     Physical Exam:   abd soft obese mild distension not tender drains inplace     Labs:   Recent Results (from the past 336 hour(s))   CBC auto differential    Collection Time: 12/24/17  3:06 AM   Result Value Ref Range    WBC 12.79 (H) 3.90 - 12.70 K/uL    Hemoglobin 11.7 (L) 14.0 - 18.0 g/dL    Hematocrit 36.0 (L) 40.0 - 54.0 %    Platelets 397 (H) 150 - 350 K/uL   CBC auto differential    Collection Time: 12/23/17  5:32 AM   Result Value Ref Range    WBC 14.17 (H) 3.90 - 12.70 K/uL    Hemoglobin 10.8 (L) 14.0 - 18.0 g/dL    Hematocrit 33.4 (L) 40.0 - 54.0 %    Platelets 377 (H) 150 - 350 K/uL   CBC W/ AUTO DIFFERENTIAL    Collection Time: 12/22/17  1:33 PM   Result Value Ref Range    WBC 20.43 (H) 3.90 - 12.70 K/uL    Hemoglobin 12.9 (L) 14.0 - 18.0 g/dL    Hematocrit 39.9 (L) 40.0 - 54.0 %    Platelets 414 (H) 150 - 350 K/uL     BMP  Lab Results   Component Value Date     (L) 12/24/2017    K 4.3 12/24/2017     12/24/2017    CO2 23 12/24/2017    BUN 6 12/24/2017    CREATININE 0.8 12/24/2017    CALCIUM 8.0 (L) 12/24/2017    ANIONGAP 10 12/24/2017    ESTGFRAFRICA >60 12/24/2017    EGFRNONAA >60 12/24/2017     Lab Results   Component Value Date    ALT 23 12/23/2017    AST 13 12/23/2017    ALKPHOS 50 (L) 12/23/2017    BILITOT 0.3 12/23/2017         Results:   Microbiology  Results (last 7 days)     Procedure Component Value Units Date/Time    Gram stain [[id number]] Collected:  12/23/17 1745    Order Status:  Completed Specimen:  Abscess from Abdomen Updated:  12/24/17 0441     Gram Stain Result Many WBC's      Many Gram positive cocci      Few Gram negative rods      Rare Gram positive rods      Rare yeast    AFB Culture & Smear [[id number]] Collected:  12/23/17 1745    Order Status:  Sent Specimen:  Abscess from Abdomen Updated:  12/23/17 2347    Aerobic culture [[id number]] Collected:  12/23/17 1745    Order Status:  Sent Specimen:  Abscess from Abdomen Updated:  12/23/17 2347    AFB Culture & Smear [[id number]] Collected:  12/23/17 1745    Order Status:  Sent Specimen:  Abscess from Abdomen Updated:  12/23/17 2347    Culture, Anaerobic [[id number]] Collected:  12/23/17 1745    Order Status:  Sent Specimen:  Abscess from Abdomen Updated:  12/23/17 2347    Blood culture #2 **CANNOT BE ORDERED STAT** [[id number]] Collected:  12/22/17 1436    Order Status:  Completed Specimen:  Blood from Peripheral, Hand, Right Updated:  12/23/17 2012     Blood Culture, Routine No Growth to date     Blood Culture, Routine No Growth to date    Blood culture #1 **CANNOT BE ORDERED STAT** [[id number]] Collected:  12/22/17 1333    Order Status:  Completed Specimen:  Blood from Peripheral, Antecubital, Right Updated:  12/23/17 1812     Blood Culture, Routine No Growth to date     Blood Culture, Routine No Growth to date          A/P: complicated diverticulitis with abscess s/p IR drain x 2  - adv diet as tolerated. To low residue diet   - colace   - cont abx, add antifungal for rare yeast on GS   - cont IVF   - ambulate   - cont drains   -lovenox       Stephanie Rodriguez MD  8:43 AM  [employer] PGY-IV

## 2017-12-25 LAB
ANION GAP SERPL CALC-SCNC: 6 MMOL/L
BASOPHILS # BLD AUTO: 0.06 K/UL
BASOPHILS NFR BLD: 0.7 %
BUN SERPL-MCNC: 5 MG/DL
CALCIUM SERPL-MCNC: 8 MG/DL
CHLORIDE SERPL-SCNC: 102 MMOL/L
CO2 SERPL-SCNC: 27 MMOL/L
CREAT SERPL-MCNC: 0.9 MG/DL
DIFFERENTIAL METHOD: ABNORMAL
EOSINOPHIL # BLD AUTO: 0.1 K/UL
EOSINOPHIL NFR BLD: 1 %
ERYTHROCYTE [DISTWIDTH] IN BLOOD BY AUTOMATED COUNT: 13.8 %
EST. GFR  (AFRICAN AMERICAN): >60 ML/MIN/1.73 M^2
EST. GFR  (NON AFRICAN AMERICAN): >60 ML/MIN/1.73 M^2
GLUCOSE SERPL-MCNC: 126 MG/DL
HCT VFR BLD AUTO: 34 %
HGB BLD-MCNC: 10.8 G/DL
LYMPHOCYTES # BLD AUTO: 1.4 K/UL
LYMPHOCYTES NFR BLD: 15.7 %
MAGNESIUM SERPL-MCNC: 1.7 MG/DL
MCH RBC QN AUTO: 27.4 PG
MCHC RBC AUTO-ENTMCNC: 31.8 G/DL
MCV RBC AUTO: 86 FL
MONOCYTES # BLD AUTO: 1.1 K/UL
MONOCYTES NFR BLD: 12.2 %
NEUTROPHILS # BLD AUTO: 6.3 K/UL
NEUTROPHILS NFR BLD: 69.5 %
PHOSPHATE SERPL-MCNC: 3.1 MG/DL
PLATELET # BLD AUTO: 399 K/UL
PMV BLD AUTO: 10.2 FL
POTASSIUM SERPL-SCNC: 4.6 MMOL/L
RBC # BLD AUTO: 3.94 M/UL
SODIUM SERPL-SCNC: 135 MMOL/L
WBC # BLD AUTO: 9.04 K/UL

## 2017-12-25 PROCEDURE — 25000003 PHARM REV CODE 250: Performed by: STUDENT IN AN ORGANIZED HEALTH CARE EDUCATION/TRAINING PROGRAM

## 2017-12-25 PROCEDURE — 84100 ASSAY OF PHOSPHORUS: CPT

## 2017-12-25 PROCEDURE — 80048 BASIC METABOLIC PNL TOTAL CA: CPT

## 2017-12-25 PROCEDURE — 85025 COMPLETE CBC W/AUTO DIFF WBC: CPT

## 2017-12-25 PROCEDURE — 63600175 PHARM REV CODE 636 W HCPCS: Performed by: STUDENT IN AN ORGANIZED HEALTH CARE EDUCATION/TRAINING PROGRAM

## 2017-12-25 PROCEDURE — 94761 N-INVAS EAR/PLS OXIMETRY MLT: CPT

## 2017-12-25 PROCEDURE — 36415 COLL VENOUS BLD VENIPUNCTURE: CPT

## 2017-12-25 PROCEDURE — C9113 INJ PANTOPRAZOLE SODIUM, VIA: HCPCS | Performed by: STUDENT IN AN ORGANIZED HEALTH CARE EDUCATION/TRAINING PROGRAM

## 2017-12-25 PROCEDURE — 11000001 HC ACUTE MED/SURG PRIVATE ROOM

## 2017-12-25 PROCEDURE — 83735 ASSAY OF MAGNESIUM: CPT

## 2017-12-25 RX ORDER — L. ACIDOPHILUS/L.BULGARICUS 100MM CELL
1 GRANULES IN PACKET (EA) ORAL 2 TIMES DAILY
Status: DISCONTINUED | OUTPATIENT
Start: 2017-12-25 | End: 2017-12-28 | Stop reason: HOSPADM

## 2017-12-25 RX ADMIN — STANDARDIZED SENNA CONCENTRATE AND DOCUSATE SODIUM 1 TABLET: 8.6; 5 TABLET, FILM COATED ORAL at 08:12

## 2017-12-25 RX ADMIN — LACTOBACILLUS ACIDOPHILUS / LACTOBACILLUS BULGARICUS 1 EACH: 100 MILLION CFU STRENGTH GRANULES at 08:12

## 2017-12-25 RX ADMIN — FLUCONAZOLE 400 MG: 2 INJECTION INTRAVENOUS at 09:12

## 2017-12-25 RX ADMIN — PIPERACILLIN SODIUM AND TAZOBACTAM SODIUM 4.5 G: 4; .5 INJECTION, POWDER, LYOPHILIZED, FOR SOLUTION INTRAVENOUS at 11:12

## 2017-12-25 RX ADMIN — PIPERACILLIN SODIUM AND TAZOBACTAM SODIUM 4.5 G: 4; .5 INJECTION, POWDER, LYOPHILIZED, FOR SOLUTION INTRAVENOUS at 07:12

## 2017-12-25 RX ADMIN — PANTOPRAZOLE SODIUM 40 MG: 40 INJECTION, POWDER, FOR SOLUTION INTRAVENOUS at 08:12

## 2017-12-25 RX ADMIN — PIPERACILLIN SODIUM AND TAZOBACTAM SODIUM 4.5 G: 4; .5 INJECTION, POWDER, LYOPHILIZED, FOR SOLUTION INTRAVENOUS at 03:12

## 2017-12-25 RX ADMIN — ENALAPRIL MALEATE 10 MG: 5 TABLET ORAL at 08:12

## 2017-12-25 RX ADMIN — ENOXAPARIN SODIUM 40 MG: 100 INJECTION SUBCUTANEOUS at 05:12

## 2017-12-25 NOTE — PROGRESS NOTES
Surgery Progress Note:     Overnight: afebrile. Ambulating in halls. tyler soft diet. + BM/flatus. No n/v. Drains in place.     Temp:  [98.6 °F (37 °C)-99 °F (37.2 °C)] 98.8 °F (37.1 °C)  Pulse:  [] 96  Resp:  [18-20] 18  SpO2:  [95 %-97 %] 95 %  BP: (117-150)/(69-88) 117/69      Intake/Output Summary (Last 24 hours) at 17 0805  Last data filed at 17 0730   Gross per 24 hour   Intake             1580 ml   Output             2100 ml   Net             -520 ml     Uop: 1500 cc  Drains: L 25 cc midline  125 cc purulent     Physical Exam:   NAD   abd soft mild distended. Not tender. Drains in place     Labs:   Recent Results (from the past 336 hour(s))   CBC auto differential    Collection Time: 17  3:11 AM   Result Value Ref Range    WBC 9.04 3.90 - 12.70 K/uL    Hemoglobin 10.8 (L) 14.0 - 18.0 g/dL    Hematocrit 34.0 (L) 40.0 - 54.0 %    Platelets 399 (H) 150 - 350 K/uL   CBC auto differential    Collection Time: 17  3:06 AM   Result Value Ref Range    WBC 12.79 (H) 3.90 - 12.70 K/uL    Hemoglobin 11.7 (L) 14.0 - 18.0 g/dL    Hematocrit 36.0 (L) 40.0 - 54.0 %    Platelets 397 (H) 150 - 350 K/uL   CBC auto differential    Collection Time: 17  5:32 AM   Result Value Ref Range    WBC 14.17 (H) 3.90 - 12.70 K/uL    Hemoglobin 10.8 (L) 14.0 - 18.0 g/dL    Hematocrit 33.4 (L) 40.0 - 54.0 %    Platelets 377 (H) 150 - 350 K/uL     BMP  Lab Results   Component Value Date     (L) 2017    K 4.6 2017     2017    CO2 27 2017    BUN 5 (L) 2017    CREATININE 0.9 2017    CALCIUM 8.0 (L) 2017    ANIONGAP 6 (L) 2017    ESTGFRAFRICA >60 2017    EGFRNONAA >60 2017     Lab Results   Component Value Date    ALT 23 2017    AST 13 2017    ALKPHOS 50 (L) 2017    BILITOT 0.3 2017     M.7  Phos: 3.1     Results:   Microbiology Results (last 7 days)     Procedure Component Value Units Date/Time    Blood culture #2  **CANNOT BE ORDERED STAT** [[id number]] Collected:  12/22/17 1436    Order Status:  Completed Specimen:  Blood from Peripheral, Hand, Right Updated:  12/24/17 2012     Blood Culture, Routine No Growth to date     Blood Culture, Routine No Growth to date     Blood Culture, Routine No Growth to date    Blood culture #1 **CANNOT BE ORDERED STAT** [[id number]] Collected:  12/22/17 1333    Order Status:  Completed Specimen:  Blood from Peripheral, Antecubital, Right Updated:  12/24/17 1812     Blood Culture, Routine No Growth to date     Blood Culture, Routine No Growth to date     Blood Culture, Routine No Growth to date    Gram stain [[id number]] Collected:  12/23/17 1745    Order Status:  Completed Specimen:  Abscess from Abdomen Updated:  12/24/17 0441     Gram Stain Result Many WBC's      Many Gram positive cocci      Few Gram negative rods      Rare Gram positive rods      Rare yeast    AFB Culture & Smear [[id number]] Collected:  12/23/17 1745    Order Status:  Sent Specimen:  Abscess from Abdomen Updated:  12/23/17 2347    Aerobic culture [[id number]] Collected:  12/23/17 1745    Order Status:  Sent Specimen:  Abscess from Abdomen Updated:  12/23/17 2347    AFB Culture & Smear [[id number]] Collected:  12/23/17 1745    Order Status:  Sent Specimen:  Abscess from Abdomen Updated:  12/23/17 2347    Culture, Anaerobic [[id number]] Collected:  12/23/17 1745    Order Status:  Sent Specimen:  Abscess from Abdomen Updated:  12/23/17 2347          A/P: complicated diverticulitis with abscess s/p IR drain x 2  - low residue diet, bowel regimen   - cont zosyn and diflucan, awaiting speciation from Cx.   - cont drains   - ambulate, loveonx, SCDs   - pain control   - add probiotics        Setphanie Rodriguez MD  8:05 AM  [employer] Surgery PGY-IV

## 2017-12-26 LAB
ANION GAP SERPL CALC-SCNC: 10 MMOL/L
BASOPHILS # BLD AUTO: 0.09 K/UL
BASOPHILS NFR BLD: 1.1 %
BILIRUB UR QL STRIP: NEGATIVE
BUN SERPL-MCNC: 4 MG/DL
CALCIUM SERPL-MCNC: 8.2 MG/DL
CHLORIDE SERPL-SCNC: 104 MMOL/L
CLARITY UR: CLEAR
CO2 SERPL-SCNC: 24 MMOL/L
COLOR UR: YELLOW
CREAT SERPL-MCNC: 0.9 MG/DL
DIFFERENTIAL METHOD: ABNORMAL
EOSINOPHIL # BLD AUTO: 0.2 K/UL
EOSINOPHIL NFR BLD: 1.8 %
ERYTHROCYTE [DISTWIDTH] IN BLOOD BY AUTOMATED COUNT: 13.9 %
EST. GFR  (AFRICAN AMERICAN): >60 ML/MIN/1.73 M^2
EST. GFR  (NON AFRICAN AMERICAN): >60 ML/MIN/1.73 M^2
GLUCOSE SERPL-MCNC: 80 MG/DL
GLUCOSE UR QL STRIP: NEGATIVE
HCT VFR BLD AUTO: 35.3 %
HGB BLD-MCNC: 11.4 G/DL
HGB UR QL STRIP: ABNORMAL
KETONES UR QL STRIP: ABNORMAL
LEUKOCYTE ESTERASE UR QL STRIP: NEGATIVE
LYMPHOCYTES # BLD AUTO: 1.6 K/UL
LYMPHOCYTES NFR BLD: 18.8 %
MAGNESIUM SERPL-MCNC: 1.7 MG/DL
MCH RBC QN AUTO: 27.9 PG
MCHC RBC AUTO-ENTMCNC: 32.3 G/DL
MCV RBC AUTO: 86 FL
MONOCYTES # BLD AUTO: 1.2 K/UL
MONOCYTES NFR BLD: 14.1 %
NEUTROPHILS # BLD AUTO: 5.2 K/UL
NEUTROPHILS NFR BLD: 63 %
NITRITE UR QL STRIP: NEGATIVE
PH UR STRIP: 6 [PH] (ref 5–8)
PHOSPHATE SERPL-MCNC: 3.5 MG/DL
PLATELET # BLD AUTO: 345 K/UL
PMV BLD AUTO: 10.2 FL
POTASSIUM SERPL-SCNC: 4.6 MMOL/L
PROT UR QL STRIP: NEGATIVE
RBC # BLD AUTO: 4.09 M/UL
SODIUM SERPL-SCNC: 138 MMOL/L
SP GR UR STRIP: 1.02 (ref 1–1.03)
URN SPEC COLLECT METH UR: ABNORMAL
UROBILINOGEN UR STRIP-ACNC: NEGATIVE EU/DL
WBC # BLD AUTO: 8.24 K/UL

## 2017-12-26 PROCEDURE — 83735 ASSAY OF MAGNESIUM: CPT

## 2017-12-26 PROCEDURE — C9113 INJ PANTOPRAZOLE SODIUM, VIA: HCPCS | Performed by: STUDENT IN AN ORGANIZED HEALTH CARE EDUCATION/TRAINING PROGRAM

## 2017-12-26 PROCEDURE — 81003 URINALYSIS AUTO W/O SCOPE: CPT

## 2017-12-26 PROCEDURE — 25000003 PHARM REV CODE 250: Performed by: STUDENT IN AN ORGANIZED HEALTH CARE EDUCATION/TRAINING PROGRAM

## 2017-12-26 PROCEDURE — 94761 N-INVAS EAR/PLS OXIMETRY MLT: CPT

## 2017-12-26 PROCEDURE — 63600175 PHARM REV CODE 636 W HCPCS: Performed by: STUDENT IN AN ORGANIZED HEALTH CARE EDUCATION/TRAINING PROGRAM

## 2017-12-26 PROCEDURE — 11000001 HC ACUTE MED/SURG PRIVATE ROOM

## 2017-12-26 PROCEDURE — 85025 COMPLETE CBC W/AUTO DIFF WBC: CPT

## 2017-12-26 PROCEDURE — 84100 ASSAY OF PHOSPHORUS: CPT

## 2017-12-26 PROCEDURE — 36415 COLL VENOUS BLD VENIPUNCTURE: CPT

## 2017-12-26 PROCEDURE — 80048 BASIC METABOLIC PNL TOTAL CA: CPT

## 2017-12-26 RX ADMIN — PANTOPRAZOLE SODIUM 40 MG: 40 INJECTION, POWDER, FOR SOLUTION INTRAVENOUS at 09:12

## 2017-12-26 RX ADMIN — LACTOBACILLUS ACIDOPHILUS / LACTOBACILLUS BULGARICUS 1 EACH: 100 MILLION CFU STRENGTH GRANULES at 09:12

## 2017-12-26 RX ADMIN — FLUCONAZOLE 400 MG: 2 INJECTION INTRAVENOUS at 09:12

## 2017-12-26 RX ADMIN — PIPERACILLIN SODIUM AND TAZOBACTAM SODIUM 4.5 G: 4; .5 INJECTION, POWDER, LYOPHILIZED, FOR SOLUTION INTRAVENOUS at 03:12

## 2017-12-26 RX ADMIN — LACTOBACILLUS ACIDOPHILUS / LACTOBACILLUS BULGARICUS 1 EACH: 100 MILLION CFU STRENGTH GRANULES at 08:12

## 2017-12-26 RX ADMIN — PIPERACILLIN SODIUM AND TAZOBACTAM SODIUM 4.5 G: 4; .5 INJECTION, POWDER, LYOPHILIZED, FOR SOLUTION INTRAVENOUS at 08:12

## 2017-12-26 RX ADMIN — ENOXAPARIN SODIUM 40 MG: 100 INJECTION SUBCUTANEOUS at 05:12

## 2017-12-26 RX ADMIN — PIPERACILLIN SODIUM AND TAZOBACTAM SODIUM 4.5 G: 4; .5 INJECTION, POWDER, LYOPHILIZED, FOR SOLUTION INTRAVENOUS at 01:12

## 2017-12-26 RX ADMIN — ENALAPRIL MALEATE 10 MG: 5 TABLET ORAL at 09:12

## 2017-12-26 NOTE — PROGRESS NOTES
GENERAL SURGERY  Progress Note    No acute events.  Occasionally feels bloated but relieved with passing flatus.  Tolerating diet, no nausea/vomiting. Having regular BMs.  Ambulating without issue.  No fevers.    Temp:  [97.5 °F (36.4 °C)-99 °F (37.2 °C)] 97.6 °F (36.4 °C)  Pulse:  [88-98] 98  Resp:  [18-20] 20  SpO2:  [97 %-99 %] 98 %  BP: (117-156)/(72-87) 156/87    I/O last 3 completed shifts:  In: 980 [P.O.:480; IV Piggyback:500]  Out: 2780 [Urine:2600; Drains:180]    EXAM  Awake & alert, NAD  Non-labored breathing on room air  Abd soft, NT/ND  IR accordion drains in place x2 - L with 30 cc output in 24h, midline with 150 cc output in 24h, both tan milky fluid    LABS  WBC 8  H/H 11/35    All electrolytes ok    Blood cx NGTD  Intra-abdominal fluid cx with gram positive cocci, gram positive rods, gram negative rods, yeast; speciation still pending    ASSESSMENT/PLAN  47 year old male with complicated diverticulitis s/p IR drainage of abscesses x2  -Continue low residue diet  -Bowel regimen  -Awaiting speciation from culture for antibiotic selection, continue Zosyn and Diflucan for now  -Continue Lovenox for DVT ppx  -IS, ambulate      Anat Burks MD  [facility] General Surgery PGY-2

## 2017-12-26 NOTE — NURSING
Pt has ambulated in the sandoval. No c/o pain. Stated that he feels better. VSS. In NAD . Will continue to monitor.

## 2017-12-26 NOTE — PLAN OF CARE
Problem: Patient Care Overview  Goal: Plan of Care Review  Pt continued on IV antibiotics. Appears to be tolerating well. Midline and left side abdominal drains to bulb suction. Fluid is watery, tan, and has a foul smell. Pt denies pain or nausea. Resting in chair throughout night. Pt's wife at bedside attentive to needs. Pt encouraged to use call light.

## 2017-12-27 LAB
ANION GAP SERPL CALC-SCNC: 9 MMOL/L
BACTERIA BLD CULT: NORMAL
BACTERIA BLD CULT: NORMAL
BACTERIA SPEC AEROBE CULT: NORMAL
BASOPHILS # BLD AUTO: 0.06 K/UL
BASOPHILS NFR BLD: 0.5 %
BUN SERPL-MCNC: 4 MG/DL
CALCIUM SERPL-MCNC: 8.3 MG/DL
CHLORIDE SERPL-SCNC: 104 MMOL/L
CO2 SERPL-SCNC: 24 MMOL/L
CREAT SERPL-MCNC: 0.9 MG/DL
DIFFERENTIAL METHOD: ABNORMAL
EOSINOPHIL # BLD AUTO: 0.2 K/UL
EOSINOPHIL NFR BLD: 1.8 %
ERYTHROCYTE [DISTWIDTH] IN BLOOD BY AUTOMATED COUNT: 13.8 %
EST. GFR  (AFRICAN AMERICAN): >60 ML/MIN/1.73 M^2
EST. GFR  (NON AFRICAN AMERICAN): >60 ML/MIN/1.73 M^2
GLUCOSE SERPL-MCNC: 84 MG/DL
HCT VFR BLD AUTO: 34.2 %
HGB BLD-MCNC: 10.9 G/DL
LYMPHOCYTES # BLD AUTO: 1.8 K/UL
LYMPHOCYTES NFR BLD: 15.9 %
MAGNESIUM SERPL-MCNC: 1.6 MG/DL
MCH RBC QN AUTO: 27.3 PG
MCHC RBC AUTO-ENTMCNC: 31.9 G/DL
MCV RBC AUTO: 86 FL
MONOCYTES # BLD AUTO: 1.3 K/UL
MONOCYTES NFR BLD: 11.4 %
NEUTROPHILS # BLD AUTO: 8.1 K/UL
NEUTROPHILS NFR BLD: 69.7 %
PHOSPHATE SERPL-MCNC: 3.3 MG/DL
PLATELET # BLD AUTO: 385 K/UL
PMV BLD AUTO: 10.3 FL
POTASSIUM SERPL-SCNC: 3.9 MMOL/L
RBC # BLD AUTO: 3.99 M/UL
SODIUM SERPL-SCNC: 137 MMOL/L
WBC # BLD AUTO: 11.54 K/UL

## 2017-12-27 PROCEDURE — 94761 N-INVAS EAR/PLS OXIMETRY MLT: CPT

## 2017-12-27 PROCEDURE — 84100 ASSAY OF PHOSPHORUS: CPT

## 2017-12-27 PROCEDURE — 25000003 PHARM REV CODE 250: Performed by: STUDENT IN AN ORGANIZED HEALTH CARE EDUCATION/TRAINING PROGRAM

## 2017-12-27 PROCEDURE — 63600175 PHARM REV CODE 636 W HCPCS: Performed by: STUDENT IN AN ORGANIZED HEALTH CARE EDUCATION/TRAINING PROGRAM

## 2017-12-27 PROCEDURE — 85025 COMPLETE CBC W/AUTO DIFF WBC: CPT

## 2017-12-27 PROCEDURE — 83735 ASSAY OF MAGNESIUM: CPT

## 2017-12-27 PROCEDURE — 80048 BASIC METABOLIC PNL TOTAL CA: CPT

## 2017-12-27 PROCEDURE — C9113 INJ PANTOPRAZOLE SODIUM, VIA: HCPCS | Performed by: STUDENT IN AN ORGANIZED HEALTH CARE EDUCATION/TRAINING PROGRAM

## 2017-12-27 PROCEDURE — 11000001 HC ACUTE MED/SURG PRIVATE ROOM

## 2017-12-27 PROCEDURE — 36415 COLL VENOUS BLD VENIPUNCTURE: CPT

## 2017-12-27 RX ADMIN — STANDARDIZED SENNA CONCENTRATE AND DOCUSATE SODIUM 1 TABLET: 8.6; 5 TABLET, FILM COATED ORAL at 08:12

## 2017-12-27 RX ADMIN — LACTOBACILLUS ACIDOPHILUS / LACTOBACILLUS BULGARICUS 1 EACH: 100 MILLION CFU STRENGTH GRANULES at 08:12

## 2017-12-27 RX ADMIN — PIPERACILLIN SODIUM AND TAZOBACTAM SODIUM 4.5 G: 4; .5 INJECTION, POWDER, LYOPHILIZED, FOR SOLUTION INTRAVENOUS at 08:12

## 2017-12-27 RX ADMIN — PIPERACILLIN SODIUM AND TAZOBACTAM SODIUM 4.5 G: 4; .5 INJECTION, POWDER, LYOPHILIZED, FOR SOLUTION INTRAVENOUS at 11:12

## 2017-12-27 RX ADMIN — LACTOBACILLUS ACIDOPHILUS / LACTOBACILLUS BULGARICUS 1 EACH: 100 MILLION CFU STRENGTH GRANULES at 09:12

## 2017-12-27 RX ADMIN — PANTOPRAZOLE SODIUM 40 MG: 40 INJECTION, POWDER, FOR SOLUTION INTRAVENOUS at 09:12

## 2017-12-27 RX ADMIN — ENALAPRIL MALEATE 10 MG: 5 TABLET ORAL at 09:12

## 2017-12-27 RX ADMIN — PIPERACILLIN SODIUM AND TAZOBACTAM SODIUM 4.5 G: 4; .5 INJECTION, POWDER, LYOPHILIZED, FOR SOLUTION INTRAVENOUS at 03:12

## 2017-12-27 RX ADMIN — FLUCONAZOLE 400 MG: 2 INJECTION INTRAVENOUS at 09:12

## 2017-12-27 RX ADMIN — ENOXAPARIN SODIUM 40 MG: 100 INJECTION SUBCUTANEOUS at 06:12

## 2017-12-27 NOTE — PLAN OF CARE
Problem: Patient Care Overview  Goal: Plan of Care Review  Outcome: Ongoing (interventions implemented as appropriate)  Pt on RA with sats of 98%. Will continue to monitor.

## 2017-12-27 NOTE — PROGRESS NOTES
.Pharmacy New Medication Education    Patient accepted medication education.    Pharmacy educated patient on name and purpose of medications and possible side effects, using the teach-back method.     D/C Current Inpatient Medication Orders   D/C acetaminophen tablet 650 mg   D/C diphenhydrAMINE injection 25 mg   D/C enalapril tablet 10 mg   D/C enoxaparin injection 40 mg   D/C fluconazole (DIFLUCAN) IVPB 400 mg 200 mL   D/C influenza (FLUZONE,FLUARIX QUADRIVALENT) vaccine 0.5 mL   D/C lactobacillus acidophilus & bulgar 100 million cell packet 1 each   D/C ondansetron disintegrating tablet 8 mg   D/C ondansetron injection 4 mg   D/C pantoprazole injection 40 mg   D/C piperacillin-tazobactam 4.5 g in sodium chloride 0.9% 100 mL IVPB (ready to mix system)   D/C pneumoc 13-glenys conj-dip cr(PF) 0.5 mL   D/C senna-docusate 8.6-50 mg per tablet 1 tablet   D/C sodium chloride 0.9% flush 3 mL   D/C traMADol tablet 50 mg       Learners of pharmacy medication education included:  Patient    Patient +/- learner response:  Verbalized Understanding, Teachback

## 2017-12-27 NOTE — PLAN OF CARE
Problem: Patient Care Overview  Goal: Plan of Care Review  Outcome: Ongoing (interventions implemented as appropriate)  Patient is AAOx3, NAD noted, VSS.  IV antibiotics infusing per MAR. Accuchecks being monitored.  Tolerating diet well.  Active bowel sounds and bowel movement this morning.  Two accordion drains in place.  No complaints of pain or nausea.  Safety has been maintained.  Will continue to monitor.

## 2017-12-27 NOTE — PROGRESS NOTES
GENERAL SURGERY  Progress Note    No acute events.  Continues to feel better overall.  Decreased appetite last night due to feeling bloated.    Temp:  [97.6 °F (36.4 °C)-98.7 °F (37.1 °C)] 98.7 °F (37.1 °C)  Pulse:  [] 87  Resp:  [18-20] 18  SpO2:  [95 %-98 %] 98 %  BP: (118-141)/(68-88) 118/68    I/O last 3 completed shifts:  In: 1185 [P.O.:485; IV Piggyback:700]  Out: 3540 [Urine:3250; Drains:290]    EXAM  Awake & alert, NAD  Non-labored breathing on room air  Abd soft, ND. Mildly tender at midline drain insertion site.  IR accordion drains x2 - L with 70 mL out in 24 h, midline with 150 mL out in 24h - both light tan drainage    LABS  WBC 11 (up from yesterday)  H/H 10/34    Electrolytes all ok    Drainage culture from 12/23 growing gram negative rods, speciation pending     ASSESSMENT/PLAN  47 year old male with complicated diverticulitis s/p IR drainage of abscesses x2  -Continue low residue diet  -Awaiting speciation from culture for antibiotic selection, continue Zosyn and Diflucan for now - Gram negative rods from culture growing thus far  -Continue Lovenox for DVT ppx  -IS, ambulate      Anat Burks MD  [facility] Surgery PGY-2  \    Stable  Feeling better  Ambulating  sdrains noted  Labs stable    Continue present care

## 2017-12-27 NOTE — PLAN OF CARE
Problem: Patient Care Overview  Goal: Plan of Care Review  Outcome: Ongoing (interventions implemented as appropriate)  Pt AAOx4, wife at bedside throughout shift. Pt denies pain, n/v/d, and SOB. IV antibiotics administered per MAR to PIV. Diet tolerated well. Pt ambulating to restroom and around room without assistance. Drains x2 intact and draining appropriately. Safety maintained, will cont to monitor.

## 2017-12-28 VITALS
TEMPERATURE: 98 F | WEIGHT: 264.75 LBS | HEART RATE: 98 BPM | OXYGEN SATURATION: 99 % | BODY MASS INDEX: 39.21 KG/M2 | SYSTOLIC BLOOD PRESSURE: 142 MMHG | RESPIRATION RATE: 18 BRPM | HEIGHT: 69 IN | DIASTOLIC BLOOD PRESSURE: 82 MMHG

## 2017-12-28 LAB
ANION GAP SERPL CALC-SCNC: 8 MMOL/L
BACTERIA SPEC ANAEROBE CULT: NORMAL
BASOPHILS # BLD AUTO: 0.08 K/UL
BASOPHILS NFR BLD: 1 %
BUN SERPL-MCNC: 5 MG/DL
CALCIUM SERPL-MCNC: 8.4 MG/DL
CHLORIDE SERPL-SCNC: 104 MMOL/L
CO2 SERPL-SCNC: 27 MMOL/L
CREAT SERPL-MCNC: 1 MG/DL
DIFFERENTIAL METHOD: ABNORMAL
EOSINOPHIL # BLD AUTO: 0.2 K/UL
EOSINOPHIL NFR BLD: 2.8 %
ERYTHROCYTE [DISTWIDTH] IN BLOOD BY AUTOMATED COUNT: 13.9 %
EST. GFR  (AFRICAN AMERICAN): >60 ML/MIN/1.73 M^2
EST. GFR  (NON AFRICAN AMERICAN): >60 ML/MIN/1.73 M^2
GLUCOSE SERPL-MCNC: 91 MG/DL
HCT VFR BLD AUTO: 33.6 %
HGB BLD-MCNC: 10.7 G/DL
LYMPHOCYTES # BLD AUTO: 1.5 K/UL
LYMPHOCYTES NFR BLD: 18.7 %
MAGNESIUM SERPL-MCNC: 1.5 MG/DL
MCH RBC QN AUTO: 27.4 PG
MCHC RBC AUTO-ENTMCNC: 31.8 G/DL
MCV RBC AUTO: 86 FL
MONOCYTES # BLD AUTO: 1.1 K/UL
MONOCYTES NFR BLD: 14.3 %
NEUTROPHILS # BLD AUTO: 4.9 K/UL
NEUTROPHILS NFR BLD: 62.2 %
PHOSPHATE SERPL-MCNC: 3.3 MG/DL
PLATELET # BLD AUTO: 353 K/UL
PMV BLD AUTO: 10.2 FL
POTASSIUM SERPL-SCNC: 3.9 MMOL/L
RBC # BLD AUTO: 3.9 M/UL
SODIUM SERPL-SCNC: 139 MMOL/L
WBC # BLD AUTO: 7.95 K/UL

## 2017-12-28 PROCEDURE — 63600175 PHARM REV CODE 636 W HCPCS: Performed by: FAMILY MEDICINE

## 2017-12-28 PROCEDURE — 80048 BASIC METABOLIC PNL TOTAL CA: CPT

## 2017-12-28 PROCEDURE — C9113 INJ PANTOPRAZOLE SODIUM, VIA: HCPCS | Performed by: STUDENT IN AN ORGANIZED HEALTH CARE EDUCATION/TRAINING PROGRAM

## 2017-12-28 PROCEDURE — 63600175 PHARM REV CODE 636 W HCPCS: Performed by: STUDENT IN AN ORGANIZED HEALTH CARE EDUCATION/TRAINING PROGRAM

## 2017-12-28 PROCEDURE — 83735 ASSAY OF MAGNESIUM: CPT

## 2017-12-28 PROCEDURE — 94761 N-INVAS EAR/PLS OXIMETRY MLT: CPT

## 2017-12-28 PROCEDURE — 02HV33Z INSERTION OF INFUSION DEVICE INTO SUPERIOR VENA CAVA, PERCUTANEOUS APPROACH: ICD-10-PCS | Performed by: SURGERY

## 2017-12-28 PROCEDURE — 85025 COMPLETE CBC W/AUTO DIFF WBC: CPT

## 2017-12-28 PROCEDURE — 36415 COLL VENOUS BLD VENIPUNCTURE: CPT

## 2017-12-28 PROCEDURE — 25000003 PHARM REV CODE 250: Performed by: STUDENT IN AN ORGANIZED HEALTH CARE EDUCATION/TRAINING PROGRAM

## 2017-12-28 PROCEDURE — 84100 ASSAY OF PHOSPHORUS: CPT

## 2017-12-28 RX ORDER — FLUCONAZOLE 200 MG/1
400 TABLET ORAL DAILY
Qty: 20 TABLET | Refills: 0 | Status: SHIPPED | OUTPATIENT
Start: 2017-12-28 | End: 2018-01-07

## 2017-12-28 RX ORDER — TRAMADOL HYDROCHLORIDE 50 MG/1
50 TABLET ORAL EVERY 6 HOURS PRN
Qty: 30 TABLET | Refills: 0 | Status: SHIPPED | OUTPATIENT
Start: 2017-12-28 | End: 2018-01-07

## 2017-12-28 RX ORDER — AMOXICILLIN 250 MG
1 CAPSULE ORAL 2 TIMES DAILY
COMMUNITY
Start: 2017-12-28 | End: 2018-05-17

## 2017-12-28 RX ADMIN — ERTAPENEM SODIUM 1 G: 1 INJECTION, POWDER, LYOPHILIZED, FOR SOLUTION INTRAMUSCULAR; INTRAVENOUS at 11:12

## 2017-12-28 RX ADMIN — LACTOBACILLUS ACIDOPHILUS / LACTOBACILLUS BULGARICUS 1 EACH: 100 MILLION CFU STRENGTH GRANULES at 09:12

## 2017-12-28 RX ADMIN — PIPERACILLIN SODIUM AND TAZOBACTAM SODIUM 4.5 G: 4; .5 INJECTION, POWDER, LYOPHILIZED, FOR SOLUTION INTRAVENOUS at 03:12

## 2017-12-28 RX ADMIN — FLUCONAZOLE 400 MG: 2 INJECTION INTRAVENOUS at 09:12

## 2017-12-28 RX ADMIN — TRAMADOL HYDROCHLORIDE 50 MG: 50 TABLET, COATED ORAL at 03:12

## 2017-12-28 RX ADMIN — PANTOPRAZOLE SODIUM 40 MG: 40 INJECTION, POWDER, FOR SOLUTION INTRAVENOUS at 09:12

## 2017-12-28 RX ADMIN — ENALAPRIL MALEATE 10 MG: 5 TABLET ORAL at 09:12

## 2017-12-28 RX ADMIN — ENOXAPARIN SODIUM 40 MG: 100 INJECTION SUBCUTANEOUS at 04:12

## 2017-12-28 NOTE — PHYSICIAN QUERY
PT Name: Mario Castro Sr.  MR #: [id number]     Physician Query Form - Documentation Clarification      CDS: Ashley Lentz RN     Contact information:  [email or web address]    Phone: [phone]    This form is a permanent document in the medical record.     Query Date: December 28, 2017    By submitting this query, we are merely seeking further clarification of documentation. Please utilize your independent clinical judgment when addressing the question(s) below.    The Medical record reflects the following:    Supporting Clinical Findings Location in Medical Record      Sepsis, polymicrobial     Microbiology: Wound Culture from IR drainage: positive for E. Coli susceptible to Envanz; yeast growth on gram stain         DS 12/28/17     DS 12/28/17                                                                                      Doctor, Please specify diagnosis or diagnoses associated with above clinical findings.    Provider Use Only          [ x ] Sepsis due to both E-coli and yeast       [  ] Sepsis due to E-coli      [  ] Sepsis due to yeast      [  ] Other ______________                                                                                                             [  ] Clinically undetermined

## 2017-12-28 NOTE — PROCEDURES
"Mario Castro Sr. is a 47 y.o. male patient.    Temp: 97.9 °F (36.6 °C) (12/28/17 1603)  Pulse: 98 (12/28/17 1603)  Resp: 18 (12/28/17 1603)  BP: (!) 142/82 (12/28/17 1603)  SpO2: 99 % (12/28/17 1610)  Weight: 120.1 kg (264 lb 12.4 oz) (12/27/17 2215)  Height: 5' 9" (175.3 cm) (12/22/17 1823)    PICC  Date/Time: 12/28/2017 5:12 PM  Performed by: TORRES TIAN  Consent Done: Yes  Time out: Immediately prior to procedure a time out was called to verify the correct patient, procedure, equipment, support staff and site/side marked as required  Indications: vascular access and med administration  Anesthesia: local infiltration  Local anesthetic: lidocaine 1% without epinephrine  Anesthetic Total (mL): 5  Preparation: skin prepped with ChloraPrep  Skin prep agent dried: skin prep agent completely dried prior to procedure  Sterile barriers: all five maximum sterile barriers used - cap, mask, sterile gown, sterile gloves, and large sterile sheet  Hand hygiene: hand hygiene performed prior to central venous catheter insertion  Location details: right brachial  Catheter type: double lumen  Catheter size: 5 Fr  Catheter Length: 44cm    Ultrasound guidance: yes  Vessel Caliber: medium and patent, compressibility normal  Needle advanced into vessel with real time Ultrasound guidance.  Guidewire confirmed in vessel.  Sterile sheath used.  Number of attempts: 1  Post-procedure: blood return through all ports, chlorhexidine patch and sterile dressing applied  Estimated blood loss (mL): 1    Assessment: placement verified by x-ray, no pneumothorax on x-ray, tip termination and successful placement  Complications: none  Comments: PLEASE WAIT FOR MD REVIEW AND RAD REPORT BEFORE USING PICC LINE. PT TOLERATED WELL        Torres Tian  12/28/2017  "

## 2017-12-28 NOTE — PLAN OF CARE
Problem: Patient Care Overview  Goal: Plan of Care Review  Outcome: Ongoing (interventions implemented as appropriate)  Pt on RA with sats of 97%. Will continue to monitor.

## 2017-12-28 NOTE — DISCHARGE SUMMARY
[facility]  General Surgery  Discharge Summary      Patient Name: Mario Castro Sr.  MRN: [id number]  Admission Date: 12/22/2017  Hospital Length of Stay: 6 days  Discharge Date and Time:  12/28/2017 11:33 AM  Attending Physician: SVEN Mcmanus MD   Discharging Provider: Annette Mathias MD  Primary Care Provider: JANEL Sierra MD     HPI: 47 year old male known to the surgical service with complicated diverticulitis with abscess s/p IR drain placement on recent hospital admission. Discharged home with drain in place and po abx on 12/20/17. Returned to ED with fevers at home, mild nausea and increased drain output. His WBC count was 20. Patient was admitted for suspected worsening peritoneal abscess.    * No surgery found *     Hospital Course: Patient admitted to the hospital on 12/22/17.  He was started on IV Zosyn, which his previous cultures showed susceptibility to.  He underwent CT of the abdomen/pelvis which showed progression of abscesses since previous scan. Patient was taken for placement of a 2nd IR drain on 12/23/17, which he tolerated without issue.  120 cc of purulent material was drained from a midline collection with drain left in place.  Patient was started on clear liquids following the procedure.  Over the next several days, he continued to improve.  He was slowly advanced to a low residue diet, which he tolerated without issue.  He was having normal bowel function.  He had been afebrile and his WBC count had normalized.  He was deemed appropriate for discharge home on HD #6.  Because the patient had failed po antibiotic therapy following his previous admission, the decision was made to discharge him with a course of IV Invanz, which his cultures showed sensitivity to.  He also had yeast grow on the Gram stain, so he was discharged with a course of Diflucan as well. He will go home with both IR drains in place and will follow up with Dr. Bryant in 1 week for  possible drain removal..      Consults:   Consults         Status Ordering Provider     Inpatient consult to Interventional Radiology  Once     Provider:  (Not yet assigned)    Completed LEO JAIME     Inpatient consult to Social Work  Once     Provider:  (Not yet assigned)    Acknowledged SHRUTHI LOERA          Significant Diagnostic Studies: Microbiology: Wound Culture from IR drainage: positive for E. Coli susceptible to Envanz; yeast growth on gram stain    Pending Diagnostic Studies:     None        Final Active Diagnoses:    Diagnosis Date Noted POA    PRINCIPAL PROBLEM:  Diverticulitis [K57.92] 12/22/2017 Yes    Fungal Peritonitis  [K65.8] 12/24/2017 Yes    Sepsis, polymicrobial [A41.9] 12/24/2017 Yes    Diverticulitis of large intestine with perforation and abscess without bleeding [K57.20] 12/11/2017 Yes    Diverticulitis of intestine [K57.92] 12/10/2017 Yes    Obesity (BMI 30-39.9) [E66.9] 12/10/2017 Yes      Problems Resolved During this Admission:    Diagnosis Date Noted Date Resolved POA      Discharged Condition: good    Disposition: Home or Self Care    Follow Up:  Follow-up Information     Casper Lee MD.    Specialty:  Cardiology  Contact information:  [address]  [address]  [place] [address]  [phone]             Annette Mathias MD. Schedule an appointment as soon as possible for a visit on 1/4/2018.    Specialty:  General Surgery  Why:  make appointment for drain removal  Contact information:  [address]  [address]  [facility] [address]  [phone]                 Patient Instructions:     Ambulatory referral to Home Health   Referral Priority: Routine Referral Type: Home Health   Referral Reason: Specialty Services Required    Requested Specialty: Home Health Services    Number of Visits Requested: 1      Diet low fiber/residue     Activity as tolerated     Notify your health care provider if you experience any of the following:   temperature >100.4     Notify your health care provider if you experience any of the following:  persistent nausea and vomiting or diarrhea     Notify your health care provider if you experience any of the following:  severe uncontrolled pain     Notify your health care provider if you experience any of the following:  redness, tenderness, or signs of infection (pain, swelling, redness, odor or green/yellow discharge around incision site)     Notify your health care provider if you experience any of the following:  difficulty breathing or increased cough     Notify your health care provider if you experience any of the following:  severe persistent headache     Notify your health care provider if you experience any of the following:  worsening rash     Notify your health care provider if you experience any of the following:  persistent dizziness, light-headedness, or visual disturbances     Notify your health care provider if you experience any of the following:  increased confusion or weakness     Notify your health care provider if you experience any of the following:       Medications:  Reconciled Home Medications:   Current Discharge Medication List      START taking these medications    Details   ERTAPENEM SODIUM (ERTAPENEM, INVANZ, 1 G/100 ML NS, READY TO MIX,) Inject 100 mLs (1 g total) into the vein once daily.  Qty: 10 each, Refills: 0      fluconazole (DIFLUCAN) 200 MG Tab Take 2 tablets (400 mg total) by mouth once daily.  Qty: 20 tablet, Refills: 0      senna-docusate 8.6-50 mg (PERICOLACE) 8.6-50 mg per tablet Take 1 tablet by mouth 2 (two) times daily.      !! traMADol (ULTRAM) 50 mg tablet Take 1 tablet (50 mg total) by mouth every 6 (six) hours as needed.  Qty: 30 tablet, Refills: 0       !! - Potential duplicate medications found. Please discuss with provider.      CONTINUE these medications which have NOT CHANGED    Details   enalapril (VASOTEC) 10 MG tablet Take 10 mg by mouth once daily.       lactobacillus acidophilus & bulgar (LACTINEX) 100 million cell packet Take 1 packet (1 each total) by mouth 2 (two) times daily.  Qty: 30 packet      ondansetron (ZOFRAN-ODT) 8 MG TbDL Take 1 tablet (8 mg total) by mouth every 6 (six) hours as needed (nausea).  Qty: 15 tablet, Refills: 0      !! traMADol (ULTRAM) 50 mg tablet Take 2 tablets (100 mg total) by mouth every 6 (six) hours as needed.  Qty: 30 tablet, Refills: 0       !! - Potential duplicate medications found. Please discuss with provider.      STOP taking these medications       amoxicillin-clavulanate 875-125mg (AUGMENTIN) 875-125 mg per tablet Comments:   Reason for Stopping:         naproxen sodium (ANAPROX) 220 MG tablet Comments:   Reason for Stopping:               Anat Burks MD  General Surgery  [facility]

## 2017-12-28 NOTE — PLAN OF CARE
Problem: Patient Care Overview  Goal: Plan of Care Review  Outcome: Ongoing (interventions implemented as appropriate)  Pt AAOx4, wife at bedside throughout shift. Pt denies pain, n/v/d, and SOB. Both accordion drains intact and draining appropriately. Pt ambulating around room and to bathroom without difficulties. Safety maintained, will cont to monitor.

## 2017-12-28 NOTE — PROGRESS NOTES
GENERAL SURGERY  Progress Note    No acute events overnight.  Patient with improving abdominal pain this morning.  Drains intact and draining.  Patient ambulating and tolerating PO without difficulty.      Temp:  [98.3 °F (36.8 °C)-99.2 °F (37.3 °C)] 98.3 °F (36.8 °C)  Pulse:  [80-89] 88  Resp:  [18] 18  SpO2:  [96 %-100 %] 97 %  BP: (118-141)/(68-87) 133/86    I/O last 3 completed shifts:  In: 2255 [P.O.:1555; IV Piggyback:700]  Out: 3420 [Urine:3125; Drains:295]    EXAM  Awake & alert, NAD  Non-labored breathing on room air  Abd soft, ND. Mildly tender at midline drain insertion site.  IR accordion drains x2 - L with 45 mL out in 24 h, midline with 150 mL out in 24h - both light tan drainage    LABS  Lab Results   Component Value Date    WBC 7.95 12/28/2017    HGB 10.7 (L) 12/28/2017    HCT 33.6 (L) 12/28/2017    MCV 86 12/28/2017     (H) 12/28/2017     CMP  Sodium   Date Value Ref Range Status   12/28/2017 139 136 - 145 mmol/L Final     Potassium   Date Value Ref Range Status   12/28/2017 3.9 3.5 - 5.1 mmol/L Final     Chloride   Date Value Ref Range Status   12/28/2017 104 95 - 110 mmol/L Final     CO2   Date Value Ref Range Status   12/28/2017 27 23 - 29 mmol/L Final     Glucose   Date Value Ref Range Status   12/28/2017 91 70 - 110 mg/dL Final     BUN, Bld   Date Value Ref Range Status   12/28/2017 5 (L) 6 - 20 mg/dL Final     Creatinine   Date Value Ref Range Status   12/28/2017 1.0 0.5 - 1.4 mg/dL Final     Calcium   Date Value Ref Range Status   12/28/2017 8.4 (L) 8.7 - 10.5 mg/dL Final     Total Protein   Date Value Ref Range Status   12/23/2017 6.1 6.0 - 8.4 g/dL Final     Albumin   Date Value Ref Range Status   12/23/2017 2.3 (L) 3.5 - 5.2 g/dL Final     Total Bilirubin   Date Value Ref Range Status   12/23/2017 0.3 0.1 - 1.0 mg/dL Final     Comment:     For infants and newborns, interpretation of results should be based  on gestational age, weight and in agreement with  "clinical  observations.  Premature Infant recommended reference ranges:  Up to 24 hours.............<8.0 mg/dL  Up to 48 hours............<12.0 mg/dL  3-5 days..................<15.0 mg/dL  6-29 days.................<15.0 mg/dL       Alkaline Phosphatase   Date Value Ref Range Status   12/23/2017 50 (L) 55 - 135 U/L Final     AST   Date Value Ref Range Status   12/23/2017 13 10 - 40 U/L Final     ALT   Date Value Ref Range Status   12/23/2017 23 10 - 44 U/L Final     Anion Gap   Date Value Ref Range Status   12/28/2017 8 8 - 16 mmol/L Final     eGFR if    Date Value Ref Range Status   12/28/2017 >60 >60 mL/min/1.73 m^2 Final     eGFR if non    Date Value Ref Range Status   12/28/2017 >60 >60 mL/min/1.73 m^2 Final     Comment:     Calculation used to obtain the estimated glomerular filtration  rate (eGFR) is the CKD-EPI equation.          Susceptibility      Escherichia coli     CULTURE, AEROBIC  (SPECIFY SOURCE)     Amikacin <=16 "><=16  Sensitive     Amox/K Clav'ate <=8/4 "><=8/4  Sensitive     Amp/Sulbactam >16/8  Resistant     Ampicillin >16  Resistant     Cefazolin <=8 "><=8  Sensitive     Cefepime <=8 "><=8  Sensitive     Ceftriaxone <=8 "><=8  Sensitive     Ciprofloxacin >2  Resistant     Ertapenem <=2 "><=2  Sensitive     Gentamicin >8  Resistant     Meropenem <=4 "><=4  Sensitive     Piperacillin/Tazo <=16 "><=16  Sensitive     Tetracycline >8  Resistant     Tobramycin >8  Resistant     Trimeth/Sulfa >2/38  Resistant        ASSESSMENT/PLAN  47 year old male with complicated diverticulitis s/p IR drainage of abscesses x2  -Continue low residue diet  -E. Coli susceptibility resulted.  Will deescalate antibiotics.    -Continue Lovenox for DVT ppx  -IS, ambulate    Johnny Culver MD  [employer]  PGY-2    "

## 2017-12-29 NOTE — PROGRESS NOTES
Patient is AAOx3, NAD noted, no complaints of pain, nausea, vomiting, or SOB.  IV removed.  PICC line placed and xray verified.  [facility] came teach family about antibiotics and PICC care.  Both accordion drains will be maintained and patient will have follow up appointment to assess need for them.  Reviewed discharge instructions with patient.  Patient verbalized understanding.  Bedside delivery of medications done.  Safety has been maintained.  Will continue to monitor.

## 2018-01-02 ENCOUNTER — NURSE TRIAGE (OUTPATIENT)
Dept: ADMINISTRATIVE | Facility: CLINIC | Age: 48
End: 2018-01-02

## 2018-01-03 ENCOUNTER — NURSE TRIAGE (OUTPATIENT)
Dept: ADMINISTRATIVE | Facility: CLINIC | Age: 48
End: 2018-01-03

## 2018-01-03 NOTE — TELEPHONE ENCOUNTER
Reason for Disposition   [1] Incision looks infected (spreading redness, pain) AND [2] fever > 99.5 F (37.5 C)    Protocols used: ST POST-OP INCISION SYMPTOMS-A-AH    Pt reports that there is a large amount of drainage coming from around his drains that are in his abdomen. Denies foul swelling- reports slight increase in pain. Denies redness. Pt started with fever today as well. Advised that he needs to see MD within 4 hours. Pt to go to ER- please contact patient to advise

## 2018-01-04 ENCOUNTER — HOSPITAL ENCOUNTER (EMERGENCY)
Facility: HOSPITAL | Age: 48
Discharge: HOME | End: 2018-01-04
Attending: EMERGENCY MEDICINE
Payer: COMMERCIAL

## 2018-01-04 ENCOUNTER — OFFICE VISIT (OUTPATIENT)
Dept: NEUROLOGY | Facility: HOSPITAL | Age: 48
End: 2018-01-04
Attending: SURGERY
Payer: COMMERCIAL

## 2018-01-04 VITALS
BODY MASS INDEX: 37.77 KG/M2 | SYSTOLIC BLOOD PRESSURE: 126 MMHG | WEIGHT: 255 LBS | HEART RATE: 115 BPM | RESPIRATION RATE: 16 BRPM | HEIGHT: 69 IN | TEMPERATURE: 99 F | DIASTOLIC BLOOD PRESSURE: 85 MMHG | OXYGEN SATURATION: 96 %

## 2018-01-04 VITALS
SYSTOLIC BLOOD PRESSURE: 124 MMHG | HEIGHT: 69 IN | BODY MASS INDEX: 37.78 KG/M2 | DIASTOLIC BLOOD PRESSURE: 82 MMHG | WEIGHT: 255.06 LBS | TEMPERATURE: 101 F | HEART RATE: 114 BPM

## 2018-01-04 DIAGNOSIS — K57.40 DIVERTICULITIS OF BOTH LARGE AND SMALL INTESTINE WITH ABSCESS WITHOUT BLEEDING: Primary | ICD-10-CM

## 2018-01-04 DIAGNOSIS — K91.30: Primary | ICD-10-CM

## 2018-01-04 DIAGNOSIS — T81.43XA POSTPROCEDURAL INTRAABDOMINAL ABSCESS: ICD-10-CM

## 2018-01-04 LAB
ALBUMIN SERPL BCP-MCNC: 3 G/DL
ALP SERPL-CCNC: 60 U/L
ALT SERPL W/O P-5'-P-CCNC: 18 U/L
ANION GAP SERPL CALC-SCNC: 12 MMOL/L
AST SERPL-CCNC: 12 U/L
BACTERIA #/AREA URNS HPF: ABNORMAL /HPF
BASOPHILS # BLD AUTO: 0.07 K/UL
BASOPHILS NFR BLD: 0.6 %
BILIRUB SERPL-MCNC: 0.3 MG/DL
BILIRUB UR QL STRIP: NEGATIVE
BUN SERPL-MCNC: 6 MG/DL
CALCIUM SERPL-MCNC: 9.1 MG/DL
CHLORIDE SERPL-SCNC: 101 MMOL/L
CLARITY UR: CLEAR
CO2 SERPL-SCNC: 25 MMOL/L
COLOR UR: ABNORMAL
CREAT SERPL-MCNC: 0.8 MG/DL
DIFFERENTIAL METHOD: ABNORMAL
EOSINOPHIL # BLD AUTO: 0.3 K/UL
EOSINOPHIL NFR BLD: 2.7 %
ERYTHROCYTE [DISTWIDTH] IN BLOOD BY AUTOMATED COUNT: 13.8 %
EST. GFR  (AFRICAN AMERICAN): >60 ML/MIN/1.73 M^2
EST. GFR  (NON AFRICAN AMERICAN): >60 ML/MIN/1.73 M^2
GLUCOSE SERPL-MCNC: 95 MG/DL
GLUCOSE UR QL STRIP: NEGATIVE
HCT VFR BLD AUTO: 34 %
HGB BLD-MCNC: 10.7 G/DL
HGB UR QL STRIP: ABNORMAL
KETONES UR QL STRIP: ABNORMAL
LEUKOCYTE ESTERASE UR QL STRIP: NEGATIVE
LIPASE SERPL-CCNC: 59 U/L
LYMPHOCYTES # BLD AUTO: 1.9 K/UL
LYMPHOCYTES NFR BLD: 17 %
MCH RBC QN AUTO: 27.4 PG
MCHC RBC AUTO-ENTMCNC: 31.5 G/DL
MCV RBC AUTO: 87 FL
MICROSCOPIC COMMENT: ABNORMAL
MONOCYTES # BLD AUTO: 1.1 K/UL
MONOCYTES NFR BLD: 9.7 %
NEUTROPHILS # BLD AUTO: 7.7 K/UL
NEUTROPHILS NFR BLD: 69.7 %
NITRITE UR QL STRIP: NEGATIVE
PH UR STRIP: 6 [PH] (ref 5–8)
PLATELET # BLD AUTO: 265 K/UL
PMV BLD AUTO: 10.8 FL
POTASSIUM SERPL-SCNC: 4.3 MMOL/L
PROT SERPL-MCNC: 7.9 G/DL
PROT UR QL STRIP: ABNORMAL
RBC # BLD AUTO: 3.9 M/UL
RBC #/AREA URNS HPF: 2 /HPF (ref 0–4)
SODIUM SERPL-SCNC: 138 MMOL/L
SP GR UR STRIP: 1.02 (ref 1–1.03)
SQUAMOUS #/AREA URNS HPF: 1 /HPF
URN SPEC COLLECT METH UR: ABNORMAL
UROBILINOGEN UR STRIP-ACNC: NEGATIVE EU/DL
WBC # BLD AUTO: 11.08 K/UL
WBC #/AREA URNS HPF: 4 /HPF (ref 0–5)

## 2018-01-04 PROCEDURE — 99214 OFFICE O/P EST MOD 30 MIN: CPT | Mod: 25 | Performed by: SURGERY

## 2018-01-04 PROCEDURE — 85025 COMPLETE CBC W/AUTO DIFF WBC: CPT

## 2018-01-04 PROCEDURE — 81000 URINALYSIS NONAUTO W/SCOPE: CPT

## 2018-01-04 PROCEDURE — 80053 COMPREHEN METABOLIC PANEL: CPT

## 2018-01-04 PROCEDURE — 25500020 PHARM REV CODE 255: Performed by: EMERGENCY MEDICINE

## 2018-01-04 PROCEDURE — 83690 ASSAY OF LIPASE: CPT

## 2018-01-04 PROCEDURE — 99284 EMERGENCY DEPT VISIT MOD MDM: CPT | Mod: 27

## 2018-01-04 RX ADMIN — IOHEXOL 30 ML: 350 INJECTION, SOLUTION INTRAVENOUS at 05:01

## 2018-01-04 RX ADMIN — IOHEXOL 100 ML: 350 INJECTION, SOLUTION INTRAVENOUS at 06:01

## 2018-01-04 NOTE — ED NOTES
Pt awake alert in no acute distress, pt reports he is here from Dr. Bryant's office for follow up CT scan of abd, for recent abd surgery, pt has lower abd drain noted, pt denies abd pain, denies chest pain or sob, denies dysuria, pt reports intermittent fever today

## 2018-01-04 NOTE — PROGRESS NOTES
"[facility]OLAN[place]:  [facility]  A collaboration between [facility] and [facility]      PATIENT: Mario Castro Sr.  MRN: [id number]  DATE: 1/4/2018    Subjective:      Chief Complaint: Follow-up (follow up drain removal/per Marilee CM)  came back for f/u  Low grade fever'otherwise doing ok  Appetite has picked up  Voiding  Having BM  Not taking much pain meds  Vitals:   Vitals:    01/04/18 1411   BP: 124/82   Pulse: (!) 114   Temp: (!) 100.9 °F (38.3 °C)   TempSrc: Oral   Weight: 115.7 kg (255 lb 1.2 oz)   Height: 5' 9" (1.753 m)        Karnofsky Score:     Diagnosis: No diagnosis found.     Oncologic History:     Interval History:     Past Medical History:  Past Medical History:   Diagnosis Date    Hypertension        Past Surgical History:  No past surgical history on file.    Family History:  No family history on file.    Allergies:  Review of patient's allergies indicates:  No Known Allergies    Medications:  Current Outpatient Prescriptions   Medication Sig Dispense Refill    enalapril (VASOTEC) 10 MG tablet Take 10 mg by mouth once daily.      ERTAPENEM SODIUM (ERTAPENEM, INVANZ, 1 G/100 ML NS, READY TO MIX,) Inject 100 mLs (1 g total) into the vein once daily. 10 each 0    fluconazole (DIFLUCAN) 200 MG Tab Take 2 tablets (400 mg total) by mouth once daily. 20 tablet 0    ondansetron (ZOFRAN-ODT) 8 MG TbDL Take 1 tablet (8 mg total) by mouth every 6 (six) hours as needed (nausea). 15 tablet 0    senna-docusate 8.6-50 mg (PERICOLACE) 8.6-50 mg per tablet Take 1 tablet by mouth 2 (two) times daily.      traMADol (ULTRAM) 50 mg tablet Take 1 tablet (50 mg total) by mouth every 6 (six) hours as needed. 30 tablet 0    lactobacillus acidophilus & bulgar (LACTINEX) 100 million cell packet Take 1 packet (1 each total) by mouth 2 (two) times daily. 30 packet      No current facility-administered medications for this visit.  "       Review of Systems   Constitutional: Negative.    HENT: Negative.    Eyes: Negative.    Respiratory: Negative.    Cardiovascular: Negative.    Endocrine: Negative.    Genitourinary: Negative.    Musculoskeletal: Negative.    Allergic/Immunologic: Negative.    Neurological: Negative.    Hematological: Negative.    Psychiatric/Behavioral: Negative.       Objective:      Physical Exam   Constitutional: No distress.   HENT:   Left Ear: External ear normal.   Nose: Nose normal.   Mouth/Throat: Oropharynx is clear and moist.   Eyes: Right eye exhibits discharge. No scleral icterus.   Neck: Neck supple. No JVD present. No tracheal deviation present.   Cardiovascular: Normal rate and regular rhythm.    Pulmonary/Chest: Effort normal and breath sounds normal.   Abdominal: Soft. Bowel sounds are normal.   Drains ioted  Left drain removed  Drain output 10-20 ml purulent   Musculoskeletal: Normal range of motion.   Lymphadenopathy:     He has no cervical adenopathy.   Neurological: He is alert.   Skin: He is not diaphoretic.      Assessment:       No diagnosis found.    Laboratory Data:   Results for DENICE STOREY GURVINDER SOLIS (MRN [id number]) as of 1/4/2018 15:07   Ref. Range 12/27/2017 06:07 12/28/2017 06:04 12/28/2017 17:05   WBC Latest Ref Range: 3.90 - 12.70 K/uL 11.54 7.95    RBC Latest Ref Range: 4.60 - 6.20 M/uL 3.99 (L) 3.90 (L)    Hemoglobin Latest Ref Range: 14.0 - 18.0 g/dL 10.9 (L) 10.7 (L)    Hematocrit Latest Ref Range: 40.0 - 54.0 % 34.2 (L) 33.6 (L)    MCV Latest Ref Range: 82 - 98 fL 86 86    MCH Latest Ref Range: 27.0 - 31.0 pg 27.3 27.4    MCHC Latest Ref Range: 32.0 - 36.0 g/dL 31.9 (L) 31.8 (L)    RDW Latest Ref Range: 11.5 - 14.5 % 13.8 13.9    Platelets Latest Ref Range: 150 - 350 K/uL 385 (H) 353 (H)    MPV Latest Ref Range: 9.2 - 12.9 fL 10.3 10.2    Gran% Latest Ref Range: 38.0 - 73.0 % 69.7 62.2    Gran # Latest Ref Range: 1.8 - 7.7 K/uL 8.1 (H) 4.9    Lymph% Latest Ref Range: 18.0 - 48.0 % 15.9 (L) 18.7     Lymph # Latest Ref Range: 1.0 - 4.8 K/uL 1.8 1.5    Mono% Latest Ref Range: 4.0 - 15.0 % 11.4 14.3    Mono # Latest Ref Range: 0.3 - 1.0 K/uL 1.3 (H) 1.1 (H)    Eosinophil% Latest Ref Range: 0.0 - 8.0 % 1.8 2.8    Eos # Latest Ref Range: 0.0 - 0.5 K/uL 0.2 0.2    Basophil% Latest Ref Range: 0.0 - 1.9 % 0.5 1.0    Baso # Latest Ref Range: 0.00 - 0.20 K/uL 0.06 0.08    Sodium Latest Ref Range: 136 - 145 mmol/L 137 139    Potassium Latest Ref Range: 3.5 - 5.1 mmol/L 3.9 3.9    Chloride Latest Ref Range: 95 - 110 mmol/L 104 104    CO2 Latest Ref Range: 23 - 29 mmol/L 24 27    Anion Gap Latest Ref Range: 8 - 16 mmol/L 9 8    BUN, Bld Latest Ref Range: 6 - 20 mg/dL 4 (L) 5 (L)    Creatinine Latest Ref Range: 0.5 - 1.4 mg/dL 0.9 1.0    eGFR if non African American Latest Ref Range: >60 mL/min/1.73 m^2 >60 >60    eGFR if  Latest Ref Range: >60 mL/min/1.73 m^2 >60 >60    Glucose Latest Ref Range: 70 - 110 mg/dL 84 91    Calcium Latest Ref Range: 8.7 - 10.5 mg/dL 8.3 (L) 8.4 (L)    Phosphorus Latest Ref Range: 2.7 - 4.5 mg/dL 3.3 3.3    Magnesium Latest Ref Range: 1.6 - 2.6 mg/dL 1.6 1.5 (L)    XR CHEST 1 VIEW Unknown   Rpt   Differential Method Unknown Automated Automated      CT Drainage dated 12/18/2017    Indication:  Abscess    Operators:   Dr. Rivas    Medications:  IV conscious sedation was administered under the supervision of the attending radiologist by an independent nurse.  All vital signs were monitored throughout the procedure.  Sedation time 20  minutes.    Technique:  The risks, benefits, alternatives to the procedure were explained in detail to the patient.  Written informed consent was obtained.  The risks of moderate sedation were explained in detail and accepted.     The patient was placed in the supine position on the CT table and preliminary CT images were obtained.  An appropriate puncture site was chosen.  Next, the anterior abdominal wall was prepped and draped in the usual  sterile fashion.  Under direct CT fluoroscopic guidance an 18-gauge needle was advanced into the targeted collection.  Purulent material was aspirated and sent for laboratory analysis. A 0.035 inch wire was placed through the needle and the needle was exchanged for a 8 [place] all-purpose drainage catheter.  Catheter was attached to gravity drainage and secured with 0 silk suture. A sterile dressing was applied.    There were no immediate complications.     Findings:   Preliminary CT imaging demonstrates a hypoattenuating collection correlating with the recent CT examination which was targeted for drainage.     Conclusion:  Successful CT-guided drainage of a abdominal collection.  Purulent material aspirated and sent for laboratory analysis.     Plan:   Drainage catheter should be flushed with 10 cc normal saline 2-3 times daily without aspiration.       Impression: diverticulitis  Plan:       Will send him to ER for evlautuon  Will need repeat Ct abd    Continue IV abx and diflucan    Need to go to ER    Informed the wife about the plan.  I removed one drain as it was not putting out any fluid.  Continue care of the other drain.  Once the CT scan is done I informed them I will call them and updated the plan.                  PRINCE Bryant MD, FACS   Associate Professor of Surgery, [facility]   Neuroendocrine Surgery, Hepatic/Pancreatic & General Surgery   [address]   [place] [address]   ph. [phone]; [phone]   fax. [phone]

## 2018-01-05 ENCOUNTER — TELEPHONE (OUTPATIENT)
Dept: NEUROLOGY | Facility: HOSPITAL | Age: 48
End: 2018-01-05

## 2018-01-05 NOTE — ED PROVIDER NOTES
Encounter Date: 1/4/2018       History     Chief Complaint   Patient presents with    Post-op Problem     sent from Dr. Bryant's office for repeat CT scan of his abdomen     afebrile 47-year-old male that is known to me with past medical history of hypertension presents to the ED for evaluation of known abdominal drain. He was seen in this emergency room earlier this month where e had complications from perforated diverticulitis complicated by abscess  He is followed by Dr. Ricci and currently has a drain in place to the abdomen.  He did have second drain that was in place removed today. At time of office visit he was found to be febrile at 100.9.  He was instructed to go to the ED for contrast and further evaluation.  He states that he is doing well and has moderate improvement in symptoms.  Is complaining of mi dl irritation at the drain site. He denies any fever, chills,vomiting, diarrhea or change in symptoms. He currently is receiving IV antibiotics through PICC line and reports no complaints with this medicine      The history is provided by the patient.     Review of patient's allergies indicates:  No Known Allergies  Past Medical History:   Diagnosis Date    Hypertension      History reviewed. No pertinent surgical history.  History reviewed. No pertinent family history.  Social History   Substance Use Topics    Smoking status: Never Smoker    Smokeless tobacco: Never Used    Alcohol use No     Review of Systems   Constitutional: Positive for fever (in office visit today). Negative for appetite change and chills.   HENT: Negative for congestion and sore throat.    Respiratory: Negative for cough and shortness of breath.    Cardiovascular: Negative for chest pain.   Gastrointestinal: Positive for abdominal distention and abdominal pain. Negative for constipation, diarrhea, nausea and vomiting.   Genitourinary: Negative for dysuria and hematuria.   Musculoskeletal: Negative for arthralgias, back  pain and myalgias.   Skin: Negative for rash.   Neurological: Negative for weakness and headaches.   Hematological: Does not bruise/bleed easily.       Physical Exam     Initial Vitals [01/04/18 1529]   BP Pulse Resp Temp SpO2   115/74 (!) 121 16 98.8 °F (37.1 °C) 98 %      MAP       87.67         Physical Exam    Nursing note and vitals reviewed.  Constitutional: Vital signs are normal. He appears well-developed and well-nourished. He is cooperative.  Non-toxic appearance. He does not appear ill. No distress.   HENT:   Head: Normocephalic and atraumatic.   Eyes: Conjunctivae and lids are normal.   Neck: Trachea normal and normal range of motion. Neck supple. No stridor present. No tracheal deviation present.   Cardiovascular: Normal rate and regular rhythm.   Pulmonary/Chest: No respiratory distress.   Abdominal: Soft. Normal appearance. There is no tenderness.   mild abdominal distention; anterior drain in place with moderate purulent drainage noted and drained bag.  He does have some mild erythema and skin breakdown at site of drain with minimal pain at the site.   Neurological: He is alert and oriented to person, place, and time. GCS eye subscore is 4. GCS verbal subscore is 5. GCS motor subscore is 6.   Skin: Skin is warm, dry and intact. No rash noted.   Psychiatric: He has a normal mood and affect. His speech is normal and behavior is normal. Thought content normal.         ED Course   Procedures  Labs Reviewed   CBC W/ AUTO DIFFERENTIAL - Abnormal; Notable for the following:        Result Value    RBC 3.90 (*)     Hemoglobin 10.7 (*)     Hematocrit 34.0 (*)     MCHC 31.5 (*)     Mono # 1.1 (*)     Lymph% 17.0 (*)     All other components within normal limits   COMPREHENSIVE METABOLIC PANEL - Abnormal; Notable for the following:     Albumin 3.0 (*)     All other components within normal limits   URINALYSIS - Abnormal; Notable for the following:     Protein, UA Trace (*)     Ketones, UA Trace (*)     Occult  Blood UA 1+ (*)     All other components within normal limits   URINALYSIS MICROSCOPIC - Abnormal; Notable for the following:     Bacteria, UA Few (*)     All other components within normal limits   LIPASE     Imaging Results          CT Abdomen Pelvis With Contrast (Final result)  Result time 01/04/18 18:57:06    Final result by Michaela Lopez MD (01/04/18 18:57:06)                 Impression:      1.  Persistent small bowel obstruction involving proximal small bowel loops of the left upper quadrant.    2.  Significant interval reduction in size previously visualized large bilobed abscess within the left lower quadrant status post percutaneous drainage catheter placement.    3.  Small persistent abscess seen within the left lateral mid abdomen status post drainage catheter removal.    4.  Additional findings as detailed above.      Electronically signed by: MICHAELA LOPEZ MD  Date:     01/04/18  Time:    18:57              Narrative:    Clinical indication: 47-year-old male with abdominal pain.    Comparison: CT 12/22/17.    Technique: Transaxial images were obtained through the abdomen and pelvis in 5 mm increments.  P.o. contrast and 100 cc of Omnipaque 350 IV contrast were administered.      Findings:  The visualized portion of the heart is unremarkable.  Minimal atelectasis is seen at the right lung base.    Liver is enlarged.  There is no intra-or extrahepatic biliary ductal dilatation.  The gallbladder is unremarkable.  The stomach, pancreas, spleen, and adrenal glands are unremarkable.    Kidneys are functioning.  No evidence of hydronephrosis.  Bilateral renal cysts are visualized.  Urinary bladder and prostate are unremarkable.      Appendix is visualized and is unremarkable.  There is persistent abnormal dilatation of proximal small bowel loops within the left upper quadrant consistent with small bowel obstruction.  Contrast does not pass distally and distal small bowel loops are decompressed.       Previously visualized left lateral percutaneous drainage catheter has been removed.  There is small remaining and fluid collection consistent with abscess measuring 4.8 x 1.9 cm (axial series 2, image 52).  There has been interval placement of anterior percutaneous drainage catheter within previously visualized large bilobed abscess within the left lower quadrant.  This is significantly decreased in size and currently measures approximately 6.9 x 1.6 cm (coronal image 26).    Aorta tapers normally.     No acute osseous abnormality identified.  Subcutaneous soft tissue stranding and edema is visualized involving the left lateral abdominal wall.  No evidence of focal fluid collections or abscess in this region.                                      Medical Decision Making:   Initial Assessment:   47-year-old male presents to the ED for evaluation of postop complication. Patient is known to me and appears much improved from last ED visit. He was sent from surgeon's office for evaluation of drain and known intra-abdominal abscess. He complains of mild pain at the drain site however denies any other symptoms at this time.ode exam reveals patient that is well-appearing and nontoxic in appearance. Abdomen soft with mi dl distension. Mild abdominal distention; anterior drain in place with moderate purulent drainage noted and drained bag.  He does have some mild erythema and skin breakdown at site of drain with minimal pain at the site.  Differential Diagnosis:   Intra-abdominal abscess stable versus worsening, diverticulitis, small bowel obstruction, UTI  Clinical Tests:   Lab Tests: Ordered and Reviewed  Radiological Study: Ordered and Reviewed  ED Management:  We will repeat labs as per recommendations by surgeon to obtain CT with oral and IV contrast. Patient remains afebrile throughout ED course.  Labs with no significant abnormalities; normal leukocytes. CT does reveal persistent small bowel obstruction; patient is  having normal bowels and tolerating by mouth and no vomiting throughout ED course. Significant reduction  to the left lower quadrant process. There does continue to be small persistent abscess to the left lateral abdomen. discussed these findings with Dr. Ricci on the surgeon and he feels no findings warranting admission at this time and that current treatment regimen of IV antibiotics at home and close follow-up is sufficient.  Patient was given strict return precautions and states that he is in understanding and agreement with the plan.                   ED Course      Clinical Impression:   The primary encounter diagnosis was Postoperative intestinal obstruction, unspecified whether partial or complete. A diagnosis of Postprocedural intraabdominal abscess was also pertinent to this visit.                           SAWYER Bose  01/05/18 [id number]

## 2018-01-08 ENCOUNTER — TELEPHONE (OUTPATIENT)
Dept: NEUROLOGY | Facility: HOSPITAL | Age: 48
End: 2018-01-08

## 2018-01-08 NOTE — TELEPHONE ENCOUNTER
----- Message from Aster Melendez sent at 1/8/2018  9:01 AM CST -----  Contact: Patient  JPB- Patient spoke to Dr Guillen Friday evening and he wanted him to see Dr Mcmanus tomorrow at 10 am. Please call the patient at [phone]

## 2018-01-08 NOTE — TELEPHONE ENCOUNTER
Follow up appt scheduled with pt on Tuesday, January 9, 2018 at 330pm.  Pt acknowledged understanding.

## 2018-01-09 ENCOUNTER — OFFICE VISIT (OUTPATIENT)
Dept: NEUROLOGY | Facility: HOSPITAL | Age: 48
End: 2018-01-09
Attending: SURGERY
Payer: COMMERCIAL

## 2018-01-09 VITALS
TEMPERATURE: 101 F | DIASTOLIC BLOOD PRESSURE: 82 MMHG | WEIGHT: 253.19 LBS | SYSTOLIC BLOOD PRESSURE: 123 MMHG | BODY MASS INDEX: 37.5 KG/M2 | HEART RATE: 117 BPM | HEIGHT: 69 IN

## 2018-01-09 DIAGNOSIS — E66.9 OBESITY (BMI 30-39.9): ICD-10-CM

## 2018-01-09 DIAGNOSIS — K57.20 DIVERTICULITIS OF LARGE INTESTINE WITH PERFORATION AND ABSCESS WITHOUT BLEEDING: Primary | ICD-10-CM

## 2018-01-09 PROCEDURE — 99214 OFFICE O/P EST MOD 30 MIN: CPT | Performed by: SURGERY

## 2018-01-09 NOTE — PROGRESS NOTES
Verbal order given per Dr. Mcmanus to [facility] to continue IV abx for 10 more days. Read back and verified by pharmacist.    ERTAPENEM SODIUM (ERTAPENEM, INVANZ, 1 G/100 ML NS, READY TO MIX,) Inject 100 mLs (1 g total) into the vein once daily.  Qty: 10 each, Refills: 0

## 2018-01-09 NOTE — PATIENT INSTRUCTIONS
Continue IV antibiotics x10 days, will send orders to [facility]  CT scan Thursday, arrive for 10:15 am, 1st floor outpatient diagnostic center   Return to clinic at 1:30pm to see Dr. Bryant and possibly remove drain

## 2018-01-09 NOTE — PROGRESS NOTES
"NOLANETS:  [facility]  A collaboration between [facility] and [facility]      PATIENT: Mario Castro Sr.  MRN: [id number]  DATE: 1/9/2018    Subjective:      Chief Complaint: Follow-up (pt was told to see Dr Mcmanus..per Dr Guillen)      Vitals:   Vitals:    01/09/18 1531   BP: 123/82   Pulse: (!) 117   Temp: (!) 100.6 °F (38.1 °C)   TempSrc: Oral   Weight: 114.8 kg (253 lb 3.2 oz)   Height: 5' 9" (1.753 m)        Karnofsky Score:     Diagnosis:   1. Diverticulitis of large intestine with perforation and abscess without bleeding    2. Obesity (BMI 30-39.9)         Oncologic History: na    Interval History: recent hospitalization for diverticular abscess, still has drain, feww cc dasily, low grade temp, and still taking IV antibiotics. BM every other day.  Slow improvement. Had 1 drain removed already. No recent imaging    Past Medical History:  Past Medical History:   Diagnosis Date    Hypertension        Past Surgical History:  History reviewed. No pertinent surgical history.    Family History:  History reviewed. No pertinent family history.    Allergies:  Review of patient's allergies indicates:  No Known Allergies    Medications:  Current Outpatient Prescriptions   Medication Sig Dispense Refill    enalapril (VASOTEC) 10 MG tablet Take 10 mg by mouth once daily.      ondansetron (ZOFRAN-ODT) 8 MG TbDL Take 1 tablet (8 mg total) by mouth every 6 (six) hours as needed (nausea). 15 tablet 0    senna-docusate 8.6-50 mg (PERICOLACE) 8.6-50 mg per tablet Take 1 tablet by mouth 2 (two) times daily. (Patient taking differently: Take 1 tablet by mouth as needed. )      lactobacillus acidophilus & bulgar (LACTINEX) 100 million cell packet Take 1 packet (1 each total) by mouth 2 (two) times daily. 30 packet      No current facility-administered medications for this visit.        Review of Systems   Constitutional: Positive for " fatigue. Negative for appetite change, chills, fever and unexpected weight change.   HENT: Negative.  Negative for congestion, hearing loss and sore throat.    Eyes: Negative.  Negative for pain, discharge and itching.   Respiratory: Negative.  Negative for cough, chest tightness, shortness of breath and wheezing.    Cardiovascular: Negative.  Negative for chest pain, palpitations and leg swelling.   Gastrointestinal: Positive for abdominal pain, constipation and diarrhea. Negative for abdominal distention, blood in stool, nausea and vomiting.   Endocrine: Negative.  Negative for cold intolerance, heat intolerance and polydipsia.   Genitourinary: Negative.  Negative for difficulty urinating, dysuria and flank pain.   Musculoskeletal: Negative.  Negative for arthralgias, joint swelling and myalgias.   Skin: Negative.  Negative for color change, pallor and rash.   Allergic/Immunologic: Negative.    Neurological: Negative.  Negative for dizziness, syncope and light-headedness.   Hematological: Negative.  Negative for adenopathy. Does not bruise/bleed easily.   Psychiatric/Behavioral: Negative.  Negative for agitation, confusion, dysphoric mood, hallucinations, sleep disturbance and suicidal ideas. The patient is not nervous/anxious.    All other systems reviewed and are negative.     Objective:      Physical Exam   Constitutional: He is oriented to person, place, and time. He appears well-developed and well-nourished.   HENT:   Head: Normocephalic and atraumatic.   Mouth/Throat: Oropharynx is clear and moist. No oropharyngeal exudate.   Eyes: Conjunctivae and EOM are normal. Pupils are equal, round, and reactive to light. Right eye exhibits no discharge. Left eye exhibits no discharge. No scleral icterus.   Neck: Normal range of motion. Neck supple. No JVD present. No tracheal deviation present. No thyromegaly present.   Cardiovascular: Regular rhythm and intact distal pulses.  Exam reveals no gallop and no friction  rub.    No murmur heard.  Pulmonary/Chest: Effort normal and breath sounds normal. No respiratory distress. He has no wheezes. He has no rales.   Abdominal: Soft. Bowel sounds are normal. He exhibits no distension and no mass. There is tenderness (mild tenderness LLQ near drain site.). There is no rebound and no guarding. No hernia.   LLQ drain, small amount cloudy fluid.   Genitourinary: Penis normal.   Musculoskeletal: Normal range of motion. He exhibits no edema or tenderness.   Neurological: He is alert and oriented to person, place, and time. He has normal reflexes. No cranial nerve deficit.   Skin: Skin is warm and dry. No rash noted. He is not diaphoretic. No erythema. No pallor.   Psychiatric: He has a normal mood and affect. His behavior is normal. Thought content normal.      Assessment:       1. Diverticulitis of large intestine with perforation and abscess without bleeding    2. Obesity (BMI 30-39.9)        Laboratory Data:  Neuroendocrine Labs Latest Ref Rng & Units 1/9/2018 1/4/2018 1/4/2018 1/4/2018 1/3/2018 12/28/2017 12/27/2017   WBC 3.90 - 12.70 K/uL - 11.08 - - - 7.95 11.54   HGB 14.0 - 18.0 g/dL - 10.7(L) - - - 10.7(L) 10.9(L)   HCT 40.0 - 54.0 % - 34.0(L) - - - 33.6(L) 34.2(L)   PLATLETS 150 - 350 K/uL - 265 - - - 353(H) 385(H)   PT 9.0 - 12.5 sec - - - - - - -   INR 0.8 - 1.2 - - - - - - -   GLUCOSE 70 - 110 mg/dL - 95 - - - 91 84   BUN 6 - 20 mg/dL - 6 - - - 5(L) 4(L)   CREATININE 0.5 - 1.4 mg/dL - 0.8 - - - 1.0 0.9    - 145 mmol/L - 138 - - - 139 137   K 3.5 - 5.1 mmol/L - 4.3 - - - 3.9 3.9   CHLORIDE 95 - 110 mmol/L - 101 - - - 104 104   CO2 23 - 29 mmol/L - 25 - - - 27 24   CALCIUM 8.7 - 10.5 mg/dL - 9.1 - - - 8.4(L) 8.3(L)   PROTEIN, TOTAL 6.0 - 8.4 g/dL - 7.9 - - - - -   PHOSPHORUS 2.7 - 4.5 mg/dL - - - - - 3.3 3.3   ALBUMIN 3.5 - 5.2 g/dL - 3.0(L) - - - - -   TOTAL BILIRUBIN 0.1 - 1.0 mg/dL - 0.3 - - - - -   ALK PHOSPHATASE 55 - 135 U/L - 60 - - - - -   SGOT (AST) 10 - 40 U/L - 12  - - - - -   SGPT (ALT) 10 - 44 U/L - 18 - - - - -   MG 1.6 - 2.6 mg/dL - - - - - 1.5(L) 1.6   Weight - 253 lbs 3 oz - 255 lbs 255 lbs 1 oz 252 lbs - 264 lbs 12 oz         Impression: Diverticular abscess status post drainage , the current CT   Plan:           CT scan with oral and IV contrast abdomen and pelvis  Continue IV antibiotics until 1 week post drain removal  Return Thursday to see Dr. Guillen for possible drain removal .              SVEN Mcmanus MD, FACS  Professor of Surgery, [facility]  Neuroendocrine Surgery, Hepatic/Pancreatic & General Surgery  [address], [address]  [place]  [address]  ph. [phone]; [phone]  fax. [phone]

## 2018-01-11 ENCOUNTER — HOSPITAL ENCOUNTER (OUTPATIENT)
Dept: RADIOLOGY | Facility: HOSPITAL | Age: 48
Discharge: HOME | End: 2018-01-11
Attending: SURGERY
Payer: COMMERCIAL

## 2018-01-11 ENCOUNTER — OFFICE VISIT (OUTPATIENT)
Dept: NEUROLOGY | Facility: HOSPITAL | Age: 48
End: 2018-01-11
Attending: SURGERY
Payer: COMMERCIAL

## 2018-01-11 VITALS
TEMPERATURE: 99 F | BODY MASS INDEX: 37.44 KG/M2 | SYSTOLIC BLOOD PRESSURE: 127 MMHG | WEIGHT: 252.75 LBS | DIASTOLIC BLOOD PRESSURE: 92 MMHG | HEIGHT: 69 IN | HEART RATE: 111 BPM

## 2018-01-11 DIAGNOSIS — K57.20 DIVERTICULITIS OF LARGE INTESTINE WITH PERFORATION AND ABSCESS WITHOUT BLEEDING: ICD-10-CM

## 2018-01-11 DIAGNOSIS — K57.92 DIVERTICULITIS: Primary | ICD-10-CM

## 2018-01-11 DIAGNOSIS — E66.9 OBESITY (BMI 30-39.9): ICD-10-CM

## 2018-01-11 PROCEDURE — 74178 CT ABD&PLV WO CNTR FLWD CNTR: CPT | Mod: 26,,, | Performed by: RADIOLOGY

## 2018-01-11 PROCEDURE — 74178 CT ABD&PLV WO CNTR FLWD CNTR: CPT | Mod: TC

## 2018-01-11 PROCEDURE — 25500020 PHARM REV CODE 255: Performed by: SURGERY

## 2018-01-11 PROCEDURE — 99213 OFFICE O/P EST LOW 20 MIN: CPT | Mod: 25 | Performed by: SURGERY

## 2018-01-11 RX ADMIN — IOHEXOL 100 ML: 350 INJECTION, SOLUTION INTRAVENOUS at 11:01

## 2018-01-11 RX ADMIN — IOHEXOL 30 ML: 350 INJECTION, SOLUTION INTRAVENOUS at 10:01

## 2018-01-11 NOTE — PROGRESS NOTES
"NOLANETS:  [facility]  A collaboration between [facility] and [facility]      PATIENT: Mario Castro Sr.  MRN: [id number]  DATE: 1/11/2018    Subjective:      Chief Complaint: Follow-up (review test result of CT scan)  feeling good  Eating   Having normal BM    Vitals:   Vitals:    01/11/18 1344   BP: (!) 127/92   Pulse: (!) 111   Temp: 98.6 °F (37 °C)   TempSrc: Oral   Weight: 114.7 kg (252 lb 12.1 oz)   Height: 5' 9" (1.753 m)        Karnofsky Score:     Diagnosis: No diagnosis found.     Oncologic History:     Interval History:     Past Medical History:  Past Medical History:   Diagnosis Date    Hypertension        Past Surgical History:  No past surgical history on file.    Family History:  No family history on file.    Allergies:  Review of patient's allergies indicates:  No Known Allergies    Medications:  Current Outpatient Prescriptions   Medication Sig Dispense Refill    enalapril (VASOTEC) 10 MG tablet Take 10 mg by mouth once daily.      lactobacillus acidophilus & bulgar (LACTINEX) 100 million cell packet Take 1 packet (1 each total) by mouth 2 (two) times daily. 30 packet     ondansetron (ZOFRAN-ODT) 8 MG TbDL Take 1 tablet (8 mg total) by mouth every 6 (six) hours as needed (nausea). 15 tablet 0    senna-docusate 8.6-50 mg (PERICOLACE) 8.6-50 mg per tablet Take 1 tablet by mouth 2 (two) times daily. (Patient taking differently: Take 1 tablet by mouth as needed. )       No current facility-administered medications for this visit.        Review of Systems   Constitutional: Positive for activity change, appetite change, fatigue and unexpected weight change.   HENT: Negative.    Eyes: Negative.    Respiratory: Negative.    Cardiovascular: Negative.    Gastrointestinal: Positive for abdominal pain.   Endocrine: Negative.    Musculoskeletal: Negative.    Allergic/Immunologic: Negative.    Neurological: " Negative.    Hematological: Negative.    Psychiatric/Behavioral: Negative.       Objective:      Physical Exam   HENT:   Head: Normocephalic.   Right Ear: External ear normal.   Left Ear: External ear normal.   Nose: Nose normal.   Mouth/Throat: Oropharynx is clear and moist.   Eyes: Pupils are equal, round, and reactive to light. Left eye exhibits no discharge.   Neck: Neck supple.   Cardiovascular: Normal rate.    Pulmonary/Chest: Effort normal.   Abdominal: Soft.   sof drain removed dressing done   Musculoskeletal: Normal range of motion.   Neurological: He is alert.   Skin: He is not diaphoretic.      Assessment:       No diagnosis found.    Laboratory Data:   Results for DENICE STOREY SR. (MRN [id number]) as of 1/11/2018 14:00   Ref. Range 12/28/2017 17:05 1/4/2018 16:51 1/4/2018 16:56 1/4/2018 18:45 1/11/2018 11:57   WBC Latest Ref Range: 3.90 - 12.70 K/uL  11.08      RBC Latest Ref Range: 4.60 - 6.20 M/uL  3.90 (L)      Hemoglobin Latest Ref Range: 14.0 - 18.0 g/dL  10.7 (L)      Hematocrit Latest Ref Range: 40.0 - 54.0 %  34.0 (L)      MCV Latest Ref Range: 82 - 98 fL  87      MCH Latest Ref Range: 27.0 - 31.0 pg  27.4      MCHC Latest Ref Range: 32.0 - 36.0 g/dL  31.5 (L)      RDW Latest Ref Range: 11.5 - 14.5 %  13.8      Platelets Latest Ref Range: 150 - 350 K/uL  265      MPV Latest Ref Range: 9.2 - 12.9 fL  10.8      Gran% Latest Ref Range: 38.0 - 73.0 %  69.7      Gran # Latest Ref Range: 1.8 - 7.7 K/uL  7.7      Lymph% Latest Ref Range: 18.0 - 48.0 %  17.0 (L)      Lymph # Latest Ref Range: 1.0 - 4.8 K/uL  1.9      Mono% Latest Ref Range: 4.0 - 15.0 %  9.7      Mono # Latest Ref Range: 0.3 - 1.0 K/uL  1.1 (H)      Eosinophil% Latest Ref Range: 0.0 - 8.0 %  2.7      Eos # Latest Ref Range: 0.0 - 0.5 K/uL  0.3      Basophil% Latest Ref Range: 0.0 - 1.9 %  0.6      Baso # Latest Ref Range: 0.00 - 0.20 K/uL  0.07      Sodium Latest Ref Range: 136 - 145 mmol/L  138      Potassium Latest Ref Range: 3.5 -  5.1 mmol/L  4.3      Chloride Latest Ref Range: 95 - 110 mmol/L  101      CO2 Latest Ref Range: 23 - 29 mmol/L  25      Anion Gap Latest Ref Range: 8 - 16 mmol/L  12      BUN, Bld Latest Ref Range: 6 - 20 mg/dL  6      Creatinine Latest Ref Range: 0.5 - 1.4 mg/dL  0.8      eGFR if non African American Latest Ref Range: >60 mL/min/1.73 m^2  >60      eGFR if African American Latest Ref Range: >60 mL/min/1.73 m^2  >60      Glucose Latest Ref Range: 70 - 110 mg/dL  95      Calcium Latest Ref Range: 8.7 - 10.5 mg/dL  9.1      Alkaline Phosphatase Latest Ref Range: 55 - 135 U/L  60      Total Protein Latest Ref Range: 6.0 - 8.4 g/dL  7.9      Albumin Latest Ref Range: 3.5 - 5.2 g/dL  3.0 (L)      Total Bilirubin Latest Ref Range: 0.1 - 1.0 mg/dL  0.3      AST Latest Ref Range: 10 - 40 U/L  12      ALT Latest Ref Range: 10 - 44 U/L  18      Lipase Latest Ref Range: 4 - 60 U/L  59      Specimen UA Unknown   Urine, Clean Catch     Color, UA Latest Ref Range: Yellow, Straw, Archana    LT. RED     pH, UA Latest Ref Range: 5.0 - 8.0    6.0     Specific Gravity, UA Latest Ref Range: 1.005 - 1.030    1.020     Appearance, UA Latest Ref Range: Clear    Clear     Protein, UA Latest Ref Range: Negative    Trace (A)     Glucose, UA Latest Ref Range: Negative    Negative     Ketones, UA Latest Ref Range: Negative    Trace (A)     Occult Blood UA Latest Ref Range: Negative    1+ (A)     Nitrite, UA Latest Ref Range: Negative    Negative     Urobilinogen, UA Latest Ref Range: <2.0 EU/dL   Negative     Bilirubin (UA) Latest Ref Range: Negative    Negative     Leukocytes, UA Latest Ref Range: Negative    Negative     RBC, UA Latest Ref Range: 0 - 4 /hpf   2     WBC, UA Latest Ref Range: 0 - 5 /hpf   4     Bacteria, UA Latest Ref Range: None-Occ /hpf   Few (A)     Squam Epithel, UA Latest Units: /hpf   1     Microscopic Comment Unknown   SEE COMMENT     CT ABDOMEN PELVIS W WO CONTRAST Unknown     Rpt   CT ABDOMEN PELVIS WITH CONTRAST Unknown     Rpt    XR CHEST 1 VIEW Unknown Rpt       Differential Method Unknown  Automated      External Result Report     External Result Report   Narrative     CT abdomen and pelvis with and without contrast    The patient has a left anterior abdominal wall pigtail drain with no significant fluid seen surrounding the drain. There is sigmoid diverticulosis with evidence of stranding in the fat adjacent to the sigmoid colon.    There is no evidence of intra-abdominal free fluid or free air.    The liver, spleen, adrenal glands, and pancreas are unremarkable. There low-attenuation lesion seen bilaterally in the kidneys likely representing cysts.    There degenerative changes of the spine.   Impression      Status post drainage there is no significant fluid seen surrounding the patient's left lower quadrant pigtail drain.      Electronically signed by: TRACY BHAGAT MD  Date: 01/11/18            Impression: rtesolved abscess on IV ABX feeling good overall  Drain removed  Plan:       1. Continue abx as instructed  2. F/u 3 weeks  3. To remove PICC line once ABX are finsihed. If stable  F/u in 3 weeks. If there is fever or abdominal pain come earlier                  PRINCE Bryant MD, FACS   Associate Professor of Surgery, [facility]   Neuroendocrine Surgery, Hepatic/Pancreatic & General Surgery   [address], [address]   [place] [address]   ph. [phone]; [phone]   fax. [phone]

## 2018-01-18 LAB
EXT 24 HR UR METANEPHRINE: ABNORMAL
EXT 24 HR UR NORMETANEPHRINE: ABNORMAL
EXT 24 HR UR NORMETANEPHRINE: ABNORMAL
EXT 25 HYDROXY VIT D2: ABNORMAL
EXT 25 HYDROXY VIT D3: ABNORMAL
EXT 5 HIAA 24 HR URINE: ABNORMAL
EXT 5 HIAA BLOOD: ABNORMAL
EXT ACTH: ABNORMAL
EXT AFP: ABNORMAL
EXT ALBUMIN: 3.5 G/DL (ref 3.6–5.1)
EXT ALKALINE PHOSPHATASE: 52 U/L (ref 33–115)
EXT ALT: 21 U/L (ref 9–46)
EXT AMYLASE: ABNORMAL
EXT ANTI ISLET CELL AB: ABNORMAL
EXT ANTI PARIETAL CELL AB: ABNORMAL
EXT ANTI THYROID AB: ABNORMAL
EXT AST: 19 U/L (ref 10–40)
EXT BILIRUBIN DIRECT: ABNORMAL
EXT BILIRUBIN TOTAL: 0.3 MG/DL (ref 0.2–1.2)
EXT BK VIRUS DNA QN PCR: ABNORMAL
EXT BUN: 8 MG/DL (ref 7–25)
EXT C PEPTIDE: ABNORMAL
EXT CA 125: ABNORMAL
EXT CA 19-9: ABNORMAL
EXT CA 27-29: ABNORMAL
EXT CALCITONIN: ABNORMAL
EXT CALCIUM: 8.7 MG/DL (ref 8.6–10.3)
EXT CEA: ABNORMAL
EXT CHLORIDE: 105 MMOL/L (ref 98–110)
EXT CHOLESTEROL: ABNORMAL
EXT CHROMOGRANIN A: ABNORMAL
EXT CO2: 23 MMOL/L (ref 20–31)
EXT CREATININE UA: ABNORMAL
EXT CREATININE: 0.81 MG/DL (ref 0.6–1.35)
EXT CYCLOSPORONE LEVEL: ABNORMAL
EXT DOPAMINE: ABNORMAL
EXT EBV DNA BY PCR: ABNORMAL
EXT EPINEPHRINE: ABNORMAL
EXT FOLATE: ABNORMAL
EXT FREE T3: ABNORMAL
EXT FREE T4: ABNORMAL
EXT FSH: ABNORMAL
EXT GASTRIN RELEASING PEPTIDE: ABNORMAL
EXT GASTRIN RELEASING PEPTIDE: ABNORMAL
EXT GASTRIN: ABNORMAL
EXT GGT: ABNORMAL
EXT GHRELIN: ABNORMAL
EXT GLUCAGON: ABNORMAL
EXT GLUCOSE: 82 MG/DL (ref 65–99)
EXT GROWTH HORMONE: ABNORMAL
EXT HCV RNA QUANT PCR: ABNORMAL
EXT HDL: ABNORMAL
EXT HEMATOCRIT: 29.9 % (ref 38.5–45)
EXT HEMOGLOBIN A1C: ABNORMAL
EXT HEMOGLOBIN: 9.8 G/DL (ref 13.2–15.5)
EXT HISTAMINE 24 HR URINE: ABNORMAL
EXT HISTAMINE: ABNORMAL
EXT IGF-1: ABNORMAL
EXT IMMUNKNOW (NON-STIMULATED): ABNORMAL
EXT IMMUNKNOW (STIMULATED): ABNORMAL
EXT INR: ABNORMAL
EXT INSULIN: ABNORMAL
EXT LANREOTIDE LEVEL: ABNORMAL
EXT LDH, TOTAL: ABNORMAL
EXT LDL CHOLESTEROL: ABNORMAL
EXT LIPASE: ABNORMAL
EXT MAGNESIUM: ABNORMAL
EXT METANEPHRINE FREE PLASMA: ABNORMAL
EXT MOTILIN: ABNORMAL
EXT NEUROKININ A CAMB: ABNORMAL
EXT NEUROKININ A ISI: ABNORMAL
EXT NEUROTENSIN: ABNORMAL
EXT NOREPINEPHRINE: ABNORMAL
EXT NORMETANEPHRINE: ABNORMAL
EXT NSE: ABNORMAL
EXT OCTREOTIDE LEVEL: ABNORMAL
EXT PANCREASTATIN CAMB: ABNORMAL
EXT PANCREASTATIN ISI: ABNORMAL
EXT PANCREATIC POLYPEPTIDE: ABNORMAL
EXT PHOSPHORUS: ABNORMAL
EXT PLATELETS: 346 1000/UL (ref 140–400)
EXT POTASSIUM: 4.2 MMOL/L (ref 3.5–5.3)
EXT PROGRAF LEVEL: ABNORMAL
EXT PROLACTIN: ABNORMAL
EXT PROTEIN TOTAL: 6.7 G/DL (ref 6.1–8.1)
EXT PROTEIN UA: ABNORMAL
EXT PT: ABNORMAL
EXT PTH, INTACT: ABNORMAL
EXT PTT: ABNORMAL
EXT RAPAMUNE LEVEL: ABNORMAL
EXT SEROTONIN: ABNORMAL
EXT SODIUM: 139 MMOL/L (ref 135–146)
EXT SOMATOSTATIN: ABNORMAL
EXT SUBSTANCE P: ABNORMAL
EXT TRIGLYCERIDES: ABNORMAL
EXT TRYPTASE: ABNORMAL
EXT TSH: ABNORMAL
EXT URIC ACID: ABNORMAL
EXT URINE AMYLASE U/HR: ABNORMAL
EXT URINE AMYLASE U/L: ABNORMAL
EXT VASOACTIVE INTESTINAL POLYPEPTIDE: ABNORMAL
EXT VITAMIN B12: ABNORMAL
EXT VMA 24 HR URINE: ABNORMAL
EXT WBC: 10.8 1000/UL (ref 3.8–10.8)
FUNGUS SPEC CULT: NORMAL
NEURON SPECIFIC ENOLASE: ABNORMAL

## 2018-01-19 ENCOUNTER — TELEPHONE (OUTPATIENT)
Dept: NEUROLOGY | Facility: HOSPITAL | Age: 48
End: 2018-01-19

## 2018-01-19 NOTE — TELEPHONE ENCOUNTER
Received call from Anupam, pharmacist at [facility], inquiring about IV abx. Per Dr Mcmanus, as long as pt is afebrile and feeling OK, discontinue IV abx today, d/c line as well. Anupam verbalizes understanding.

## 2018-01-31 ENCOUNTER — NURSE TRIAGE (OUTPATIENT)
Dept: ADMINISTRATIVE | Facility: CLINIC | Age: 48
End: 2018-01-31

## 2018-02-01 NOTE — TELEPHONE ENCOUNTER
"  Reason for Disposition   [1] MODERATE pain (e.g., interferes with normal activities) AND [2] pain comes and goes (cramps) AND [3] present > 24 hours  (Exception: pain with Vomiting or Diarrhea - see that Guideline)    Answer Assessment - Initial Assessment Questions  1. LOCATION: "Where does it hurt?"       Left lower abdomen  2. RADIATION: "Does the pain shoot anywhere else?" (e.g., chest, back)      Possibly feels it if he presses under umbilicus on the left  3. ONSET: "When did the pain begin?" (Minutes, hours or days ago)       Woke this am with pain  4. SUDDEN: "Gradual or sudden onset?"      sudden  5. PATTERN "Does the pain come and go, or is it constant?"     - If constant: "Is it getting better, staying the same, or worsening?"       (Note: Constant means the pain never goes away completely; most serious pain is constant and it progresses)      - If intermittent: "How long does it last?" "Do you have pain now?"      (Note: Intermittent means the pain goes away completely between bouts)      intermittent  6. SEVERITY: "How bad is the pain?"  (e.g., Scale 1-10; mild, moderate, or severe)     - MILD (1-3): doesn't interfere with normal activities, abdomen soft and not tender to touch      - MODERATE (4-7): interferes with normal activities or awakens from sleep, tender to touch      - SEVERE (8-10): excruciating pain, doubled over, unable to do any normal activities        Up to 4   7. RECURRENT SYMPTOM: "Have you ever had this type of abdominal pain before?" If so, ask: "When was the last time?" and "What happened that time?"       Has felt it in past with exacerbation of diverticulitis- does get a little relief when he passes gas   8. CAUSE: "What do you think is causing the abdominal pain?"      Not sure if it is diet related as he has been eating fiber bars for 2 days and not sure if they have seeds/nuts  9. RELIEVING/AGGRAVATING FACTORS: "What makes it better or worse?" (e.g., movement, antacids, bowel " "movement)      Passing gas relieves it a little  10. OTHER SYMPTOMS: "Has there been any vomiting, diarrhea, constipation, or urine problems?"        None- afebrile- did have normal stool last pm    Protocols used: ST ABDOMINAL PAIN - MALE-A-AH    "

## 2018-02-05 ENCOUNTER — OFFICE VISIT (OUTPATIENT)
Dept: NEUROLOGY | Facility: HOSPITAL | Age: 48
End: 2018-02-05
Attending: SURGERY
Payer: COMMERCIAL

## 2018-02-05 VITALS
DIASTOLIC BLOOD PRESSURE: 85 MMHG | WEIGHT: 247.94 LBS | SYSTOLIC BLOOD PRESSURE: 126 MMHG | HEART RATE: 86 BPM | TEMPERATURE: 98 F | HEIGHT: 69 IN | BODY MASS INDEX: 36.72 KG/M2

## 2018-02-05 DIAGNOSIS — K57.92 DIVERTICULITIS: Primary | ICD-10-CM

## 2018-02-05 PROCEDURE — 99214 OFFICE O/P EST MOD 30 MIN: CPT | Performed by: SURGERY

## 2018-02-05 NOTE — PATIENT INSTRUCTIONS
Have CT scan done tomorrow if possible.  We will contact you with an appointment for the screening colonoscopy with Dr Juno Curry.  Letter given to excuse son from school.

## 2018-02-05 NOTE — PROGRESS NOTES
"NOLANETS:  [facility]  A collaboration between [facility] and [facility]      PATIENT: Mario Castro Sr.  MRN: [id number]  DATE: 2/5/2018    Subjective:      Chief Complaint: Follow-up (3 week follow up)  started to have left sided abdominal pain no fever , no nausea or vomiting  Not  Intense like last time  No diarrhea but has constipation    Vitals:   Vitals:    02/05/18 1129   BP: 126/85   Pulse: 86   Temp: 98.1 °F (36.7 °C)   TempSrc: Oral   Weight: 112.5 kg (247 lb 14.5 oz)   Height: 5' 9" (1.753 m)        Karnofsky Score:     Diagnosis: No diagnosis found.     Oncologic History:     Interval History:   Past Medical History:  Past Medical History:   Diagnosis Date    Hypertension        Past Surgical History:  No past surgical history on file.    Family History:  No family history on file.    Allergies:  Review of patient's allergies indicates:  No Known Allergies    Medications:  Current Outpatient Prescriptions   Medication Sig Dispense Refill    enalapril (VASOTEC) 10 MG tablet Take 10 mg by mouth once daily.      senna-docusate 8.6-50 mg (PERICOLACE) 8.6-50 mg per tablet Take 1 tablet by mouth 2 (two) times daily. (Patient taking differently: Take 1 tablet by mouth as needed. )       No current facility-administered medications for this visit.        Review of Systems   Constitutional: Negative.    HENT: Negative.    Eyes: Negative.    Respiratory: Negative.    Cardiovascular: Negative.    Gastrointestinal: Positive for anal bleeding.   Genitourinary: Negative.    Musculoskeletal: Positive for arthralgias and joint swelling.   Allergic/Immunologic: Negative.    Neurological: Negative.    Hematological: Negative.    Psychiatric/Behavioral: Negative.       Objective:      Physical Exam   Constitutional: No distress.   HENT:   Right Ear: External ear normal.   Left Ear: External ear normal.   Nose: Nose normal. "   Mouth/Throat: Oropharynx is clear and moist.   Eyes: Conjunctivae are normal. Pupils are equal, round, and reactive to light. Left eye exhibits no discharge. No scleral icterus.   Neck: Neck supple. No tracheal deviation present. No thyromegaly present.   Cardiovascular: Normal rate and regular rhythm.    Pulmonary/Chest: Breath sounds normal.   Abdominal: Soft. Bowel sounds are normal. He exhibits distension. There is no tenderness.   Musculoskeletal: Normal range of motion.   Neurological: He is alert.   Skin: He is not diaphoretic.      Assessment:       No diagnosis found.    Laboratory Data:   Recurrent diverticulitis with a history of complex diverticulitis with abscess requiring multiple drainages about 6 weeks back.  The last CAT scan done in the beginning of January showed resolution of inflammation and abscess.  He was taking a high-fiber diet at home and has been doing well until he started having symptoms of pain on the left side about few days back pain was not excruciating and worsening leg last time.  The pain was bothering and it continues to bother him    He needs CT scan to rule out recurrent inflammation.  Also needs a colonoscopy to evaluate the colon.  I had a brief conversation about the need for surgery was is following up.  Since he had a complicated attack last time and has a recurrence in the short time he may Have to consider having surgery.  Discussed with him for a long time they have an appointment to see a physician for his arthritis tomorrow at [facility].  He will the get the CT scan done tomorrow      Impression:   Plan:       1. Ct abd  2. Gastroenterology consult for scope  3.f/u after w/u                  PRINCE Bryant MD, FACS   Associate Professor of Surgery, [facility]   Neuroendocrine Surgery, Hepatic/Pancreatic & General Surgery   [address], [address]   [place] [address]   ph. [phone]; [phone]   fax. [phone]

## 2018-02-06 ENCOUNTER — HOSPITAL ENCOUNTER (OUTPATIENT)
Dept: RADIOLOGY | Facility: HOSPITAL | Age: 48
Discharge: HOME | End: 2018-02-06
Attending: SURGERY
Payer: COMMERCIAL

## 2018-02-06 PROCEDURE — 25500020 PHARM REV CODE 255: Performed by: SURGERY

## 2018-02-06 PROCEDURE — 74177 CT ABD & PELVIS W/CONTRAST: CPT | Mod: 26,,, | Performed by: RADIOLOGY

## 2018-02-06 PROCEDURE — 74177 CT ABD & PELVIS W/CONTRAST: CPT | Mod: TC

## 2018-02-06 RX ADMIN — IOHEXOL 30 ML: 350 INJECTION, SOLUTION INTRAVENOUS at 01:02

## 2018-02-06 RX ADMIN — IOHEXOL 100 ML: 350 INJECTION, SOLUTION INTRAVENOUS at 03:02

## 2018-02-07 ENCOUNTER — TELEPHONE (OUTPATIENT)
Dept: NEUROLOGY | Facility: HOSPITAL | Age: 48
End: 2018-02-07

## 2018-02-07 NOTE — TELEPHONE ENCOUNTER
----- Message from Aleja Gongora RN sent at 2/5/2018  3:59 PM CST -----  Regarding: screening colonoscopy with Dr Patricio Garces,  Can you please contact this patient and get him set up for a screening C-scope with Dr Curry.  It is a referral from Dr Bryant.  Not sure if he needs a clinic visit first or can be direct scheduled for the procedure. Just let me know.    Thanks,  Jacquelyn

## 2018-02-07 NOTE — TELEPHONE ENCOUNTER
----- Message from Juno Curry MD sent at 2/7/2018 11:24 AM CST -----  Regarding: RE: screening colonoscopy with Dr Curry  Clinic first     ----- Message -----  From: Mili Wilson LPN  Sent: 2/6/2018   3:38 PM  To: Juno Curry MD  Subject: FW: screening colonoscopy with Dr Curry         Do you want to see in clinic first?   ----- Message -----  From: Aleja Gongora, MAURICIO  Sent: 2/5/2018   3:59 PM  To: Mili Wilson LPN, Aleja Gongora, MAURICIO  Subject: screening colonoscopy with Dr Patricio Garces,  Can you please contact this patient and get him set up for a screening C-scope with Dr Curry.  It is a referral from Dr Bryant.  Not sure if he needs a clinic visit first or can be direct scheduled for the procedure. Just let me know.    ThanksJacquelyn

## 2018-02-07 NOTE — TELEPHONE ENCOUNTER
Clinic appt scheduled with pt on Monday, February 26, 2018 at 115pm.  Pt repeated information given correctly.

## 2018-02-09 ENCOUNTER — TELEPHONE (OUTPATIENT)
Dept: NEUROLOGY | Facility: HOSPITAL | Age: 48
End: 2018-02-09

## 2018-02-09 NOTE — TELEPHONE ENCOUNTER
----- Message from Archana De La Cruz sent at 2/9/2018 12:15 PM CST -----  Contact: Patient  JPB:  Patient is calling back to ask nurse about CT results as well as wondering about the paperwork.  He can be reached at [phone].  Thank you  abc

## 2018-02-09 NOTE — TELEPHONE ENCOUNTER
Returned pts call. Per Dr. Guillen, CT results show only inflammation, he can return to work when he is ready. Pt states he thinks he will be ready to return by 3/5/18. Will complete disability paperwork. If pt needs additional time,he can be assessed in clinic. Pt verbalizes understanding.

## 2018-02-09 NOTE — TELEPHONE ENCOUNTER
----- Message from Mili Wilson LPN sent at 2/8/2018  8:34 AM CST -----  Regarding: RE: screening colonoscopy with Dr Curry  Clinic appt on 2/26/18.  ----- Message -----  From: Aleja Gongora RN  Sent: 2/5/2018   3:59 PM  To: Mili Wilson LPN, Aleja Gongora, MAURICIO  Subject: screening colonoscopy with Dr Patricio Garces,  Can you please contact this patient and get him set up for a screening C-scope with Dr Curry.  It is a referral from Dr Bryant.  Not sure if he needs a clinic visit first or can be direct scheduled for the procedure. Just let me know.    Thanks,  Jacquelyn

## 2018-02-09 NOTE — TELEPHONE ENCOUNTER
----- Message from Archana De La Cruz sent at 2/9/2018  8:50 AM CST -----  Contact: Patient  MEAGAN:  Patient is returning a nurse call due to needing more employer information.  Mr. Castro can be reached at [phone].  Thank you  abc

## 2018-02-15 LAB
ACID FAST MOD KINY STN SPEC: NORMAL
MYCOBACTERIUM SPEC QL CULT: NORMAL

## 2018-02-25 LAB
ACID FAST MOD KINY STN SPEC: NORMAL
ACID FAST MOD KINY STN SPEC: NORMAL
MYCOBACTERIUM SPEC QL CULT: NORMAL
MYCOBACTERIUM SPEC QL CULT: NORMAL

## 2018-02-26 ENCOUNTER — OFFICE VISIT (OUTPATIENT)
Dept: NEUROLOGY | Facility: HOSPITAL | Age: 48
End: 2018-02-26
Attending: INTERNAL MEDICINE
Payer: COMMERCIAL

## 2018-02-26 VITALS
HEIGHT: 69 IN | DIASTOLIC BLOOD PRESSURE: 94 MMHG | HEART RATE: 70 BPM | SYSTOLIC BLOOD PRESSURE: 145 MMHG | WEIGHT: 246.69 LBS | TEMPERATURE: 98 F | BODY MASS INDEX: 36.54 KG/M2

## 2018-02-26 DIAGNOSIS — K57.92 DIVERTICULITIS: Primary | ICD-10-CM

## 2018-02-26 PROCEDURE — 99214 OFFICE O/P EST MOD 30 MIN: CPT | Performed by: INTERNAL MEDICINE

## 2018-02-26 RX ORDER — ACETAMINOPHEN 500 MG
500 TABLET ORAL EVERY 6 HOURS PRN
COMMUNITY
End: 2018-06-07

## 2018-02-26 RX ORDER — POLYETHYLENE GLYCOL 3350, SODIUM SULFATE, SODIUM CHLORIDE, POTASSIUM CHLORIDE, SODIUM ASCORBATE, AND ASCORBIC ACID 7.5-2.691G
2000 KIT ORAL ONCE
Qty: 1 BOTTLE | Refills: 0 | Status: SHIPPED | OUTPATIENT
Start: 2018-02-26 | End: 2018-02-26

## 2018-02-26 NOTE — PATIENT INSTRUCTIONS
MoviPrep Colonoscopy Prep    [facility] - [address], [place] [address]    [phone]  PATIENT NAME: Mario Castro_____PROCEDURE DAY/TIME:_Thrusday, March 8, 2018______    CLEAR LIQUID DIET (START THE DAY BEFORE PROCEDURE):Wednesday, March 7, 2018  Clear Liquid Diet means any liquid from the list below that is not red or purple in color:   Gatorade, Navneet-Aid, Lemonade (Yellow ONLY)-Gatorade is the preferred liquid   Tea (no milk or dairy)   Carbonated beverages (soft drink), regular or diet   Apple juice, white grape juice, white cranberry juice   Jell-O (orange, lemon, or lime flavors ONLY)   Clear, fat-free, beef or chicken broth   Bouillon, clear consommé   Snowball, Popsicles (NOT red or purple)  * No Solid Food or Alcohol   ITEMS TO BE PURCHASED FOR PREP (MoviPrep requires a prescription):         MoviPrep for your local pharmacy     BOWEL PREP INSTRUCTIONS THE DAY BEFORE THE EXAM: Wednesday, March 7, 2018   1. Drink only clear liquids (see the above diet) all day. Gatorade is the best liquid.   Drink an extra 8 ounces of clear liquid every hour from 2pm to 5pm.     2. At 5pm,   Each MoviPrep kit contains a disposable container, 2 pouches labeled A, and 2 pouches labeled B. You must complete the entire prep to ensure the most effective cleansing.  Step 1: Mix the first dose     Empty 1 pouch A and 1 pouch B into the disposable container   Add lukewarm drinking water to the top line of the container. Mix to dissolve  If preferred, mix solution ahead of time and refrigerate prior to drinking. The reconstituted solution should be used within 24 hours.  Some people also prefer to drink MoviPrep with a straw.  Step 2: Drink the first dose        The MoviPrep container is divided by 4 marks. Every 15 minutes, drink the solution down to the next kamair (approximately 8 oz), until the full liter is consumed   Drink 16 oz of the clear liquid of your choice. This is a necessary  step to ensure adequate hydration and an effective prep  3. At 6pm, start drinking the solution. You should drink an extra 16 ounces of any clear liquid with the solution to help the prep work.     THE DAY OF THE EXAM: Thursday, March 8, 2018        1.  Take your morning medications, if any, with a small sip of water.         2.  Beginning 6 hours before your procedure, empty one Pouch A and one Pouch B   into the disposable container then add water or Gatorade to the top line. Mix the solution and drink 8 ounces every 15 minutes until the solution is finished. Drink an extra 16 ounces of any clear liquid with the solution.              *If your procedure is scheduled for the early morning, you will need to get up in               the middle of the night to take this dose of preparation.         3.  Have nothing to eat or drink for 3 hours before the procedure.         3.  Bring someone to drive you home (you should not drive for 12 hrs after the exam)        4.  Report to Admitting, 1st floor hospital entrance 1hours before procedure time. The hospital will call you the week of your procedure and arrival time.  Note: If you are diabetic, do not take insulin or oral medications the morning of the procedure. Take only a half dose of insulin the day before your procedure. Do not take your diabetic pills the day before your procedure

## 2018-02-26 NOTE — PROGRESS NOTES
"[facility]    CC: diverticulitis    HPI 47 y.o. male with a history of severe, complicated, sigmoid diverticulitis associated with a LLQ abscess requiring IR guided drainage.  He now has recovered completely with recent CT demonstrating no further fluid collections.  He has no current complaints other than slightly altered bowel habits.  There is no family history of colon cancer and he has never had a colonoscopy.     Past records reviewed.    Collateral information obtained from wife.       Past Medical History  HTN    Past Surgical History  IR guided abscess drainage    Social History  No tobacco, illicits, ETOH    Family History  No family history of IBD or liver disease    Review of Systems  General ROS: negative for chills, fever or weight loss  Psychological ROS: negative for hallucination, depression or suicidal ideation  Ophthalmic ROS: negative for blurry vision, photophobia or eye pain  ENT ROS: negative for epistaxis, sore throat or rhinorrhea  Respiratory ROS: no cough, shortness of breath, or wheezing  Cardiovascular ROS: no chest pain or dyspnea on exertion  Gastrointestinal ROS: no abdominal pain, positive for alternating constipation and mild diarrhea, no black/ bloody stools  Genito-Urinary ROS: no dysuria, trouble voiding, or hematuria  Musculoskeletal ROS: negative for gait disturbance or muscular weakness  Neurological ROS: no syncope or seizures; no ataxia  Dermatological ROS: negative for pruritis, rash and jaundice    Physical Examination  BP (!) 145/94   Pulse 70   Temp 97.9 °F (36.6 °C) (Oral)   Ht 5' 9" (1.753 m)   Wt 111.9 kg (246 lb 11.1 oz)   BMI 36.43 kg/m²   General appearance: alert, cooperative, no distress  HENT: Normocephalic, atraumatic, neck symmetrical, no nasal discharge   Eyes: conjunctivae/corneas clear, PERRL, EOM's intact  Lungs: clear to auscultation bilaterally, no dullness to percussion bilaterally  Heart: regular rate and rhythm without rub; no " displacement of the PMI   Abdomen: soft, non-tender; bowel sounds normoactive; no organomegaly  Extremities: extremities symmetric; no clubbing, cyanosis, or edema  Integument: Skin color, texture, turgor normal; no rashes; hair distrubution normal  Neurologic: Alert and oriented X 3, normal strength, normal coordination and gait  Psychiatric: no pressured speech; normal affect; no evidence of impaired cognition     Labs:  Lab Results   Component Value Date    WBC 11.08 01/04/2018    HGB 10.7 (L) 01/04/2018    HCT 34.0 (L) 01/04/2018    MCV 87 01/04/2018     01/04/2018       Imaging: CT abdomen and pelvis  There is a small residual air pocket in the area where the pigtail percutaneous drain was previously located,   no fluid accumulation seen.  A fistulous tract with bowel cannot be excluded.  Several sigmoid and distal colon diverticula with mild adjacent inflammatory changes.  Kidney cysts.  End-stage degenerative change of the right hip joint    Assessment:   47 year old male with recent complicated diverticulitis now resolved and in need of colonoscopy to exclude other luminal abnormalities including neoplasm.  We will plan for colonoscopy on 3/8 with moviprep and will plan to use a pediatric colonoscope.     Plan:   1. Colonoscopy using PCF on 3/8 with moviprep.     Juno Curry MD   [address], [address]   [place] [address] [phone]

## 2018-02-28 ENCOUNTER — TELEPHONE (OUTPATIENT)
Dept: NEUROLOGY | Facility: HOSPITAL | Age: 48
End: 2018-02-28

## 2018-02-28 NOTE — TELEPHONE ENCOUNTER
----- Message from Mili Wilson LPN sent at 2/26/2018  3:19 PM CST -----  Coloonoscopy on 3/8/18.  CPT 59043, [id number].    Thanks.

## 2018-03-05 ENCOUNTER — TELEPHONE (OUTPATIENT)
Dept: NEUROLOGY | Facility: HOSPITAL | Age: 48
End: 2018-03-05

## 2018-03-05 NOTE — TELEPHONE ENCOUNTER
----- Message from Raegan Patel sent at 3/5/2018  9:06 AM CST -----  Contact: patient  MEAGAN- Patient called regarding paperwork for return back to work. Patient would like to speak to nurse regarding the status. Call back number [phone]

## 2018-03-06 ENCOUNTER — TELEPHONE (OUTPATIENT)
Dept: NEUROLOGY | Facility: HOSPITAL | Age: 48
End: 2018-03-06

## 2018-03-06 NOTE — TELEPHONE ENCOUNTER
Returned pts call. Pt just following up to ensure doctors note was sent to employer. Informed that note was faxed to [employer] to attn of Rhiannon Isaacs on 2/26. Pt verbalizes understanding.

## 2018-03-06 NOTE — TELEPHONE ENCOUNTER
----- Message from Raegan Patel sent at 3/5/2018  4:19 PM CST -----  Contact: patient  JPB- Patient was returning nurse's call call back number is [phone]

## 2018-03-08 ENCOUNTER — ANESTHESIA (OUTPATIENT)
Dept: ENDOSCOPY | Facility: HOSPITAL | Age: 48
End: 2018-03-08
Payer: COMMERCIAL

## 2018-03-08 ENCOUNTER — ANESTHESIA EVENT (OUTPATIENT)
Dept: ENDOSCOPY | Facility: HOSPITAL | Age: 48
End: 2018-03-08
Payer: COMMERCIAL

## 2018-03-08 ENCOUNTER — HOSPITAL ENCOUNTER (OUTPATIENT)
Facility: HOSPITAL | Age: 48
Discharge: HOME | End: 2018-03-08
Attending: INTERNAL MEDICINE | Admitting: INTERNAL MEDICINE
Payer: COMMERCIAL

## 2018-03-08 ENCOUNTER — SURGERY (OUTPATIENT)
Age: 48
End: 2018-03-08

## 2018-03-08 DIAGNOSIS — K57.92 DIVERTICULITIS OF INTESTINE, UNSPECIFIED BLEEDING STATUS, UNSPECIFIED COMPLICATION STATUS, UNSPECIFIED PART OF INTESTINAL TRACT: ICD-10-CM

## 2018-03-08 DIAGNOSIS — Z12.11 COLON CANCER SCREENING: ICD-10-CM

## 2018-03-08 DIAGNOSIS — K56.699 STENOSIS COLON: ICD-10-CM

## 2018-03-08 DIAGNOSIS — K57.92 DIVERTICULITIS: Primary | ICD-10-CM

## 2018-03-08 PROCEDURE — 63600175 PHARM REV CODE 636 W HCPCS: Performed by: NURSE ANESTHETIST, CERTIFIED REGISTERED

## 2018-03-08 PROCEDURE — 37000009 HC ANESTHESIA EA ADD 15 MINS: Performed by: INTERNAL MEDICINE

## 2018-03-08 PROCEDURE — 25000003 PHARM REV CODE 250: Performed by: INTERNAL MEDICINE

## 2018-03-08 PROCEDURE — 45378 DIAGNOSTIC COLONOSCOPY: CPT | Performed by: INTERNAL MEDICINE

## 2018-03-08 PROCEDURE — 37000008 HC ANESTHESIA 1ST 15 MINUTES: Performed by: INTERNAL MEDICINE

## 2018-03-08 RX ORDER — PROPOFOL 10 MG/ML
VIAL (ML) INTRAVENOUS CONTINUOUS PRN
Status: DISCONTINUED | OUTPATIENT
Start: 2018-03-08 | End: 2018-03-08

## 2018-03-08 RX ORDER — SODIUM CHLORIDE 9 MG/ML
INJECTION, SOLUTION INTRAVENOUS CONTINUOUS
Status: DISCONTINUED | OUTPATIENT
Start: 2018-03-08 | End: 2018-03-08 | Stop reason: HOSPADM

## 2018-03-08 RX ORDER — PROPOFOL 10 MG/ML
VIAL (ML) INTRAVENOUS
Status: DISCONTINUED | OUTPATIENT
Start: 2018-03-08 | End: 2018-03-08

## 2018-03-08 RX ORDER — LIDOCAINE HCL/PF 100 MG/5ML
SYRINGE (ML) INTRAVENOUS
Status: DISCONTINUED | OUTPATIENT
Start: 2018-03-08 | End: 2018-03-08

## 2018-03-08 RX ADMIN — PROPOFOL 40 MG: 10 INJECTION, EMULSION INTRAVENOUS at 10:03

## 2018-03-08 RX ADMIN — PROPOFOL 70 MG: 10 INJECTION, EMULSION INTRAVENOUS at 10:03

## 2018-03-08 RX ADMIN — PROPOFOL 20 MG: 10 INJECTION, EMULSION INTRAVENOUS at 10:03

## 2018-03-08 RX ADMIN — LIDOCAINE HYDROCHLORIDE 100 MG: 20 INJECTION, SOLUTION INTRAVENOUS at 10:03

## 2018-03-08 RX ADMIN — PROPOFOL 150 MCG/KG/MIN: 10 INJECTION, EMULSION INTRAVENOUS at 10:03

## 2018-03-08 RX ADMIN — PROPOFOL 10 MG: 10 INJECTION, EMULSION INTRAVENOUS at 10:03

## 2018-03-08 RX ADMIN — SODIUM CHLORIDE: 0.9 INJECTION, SOLUTION INTRAVENOUS at 09:03

## 2018-03-08 NOTE — TRANSFER OF CARE
"Anesthesia Transfer of Care Note    Patient: Mario Castro Sr.    Procedure(s) Performed: Procedure(s) (LRB):  COLONOSCOPY (N/A)    Patient location: GI    Anesthesia Type: MAC    Transport from OR: Transported from OR on room air with adequate spontaneous ventilation    Post pain: adequate analgesia    Post assessment: no apparent anesthetic complications and tolerated procedure well    Post vital signs: stable    Level of consciousness: awake, alert and oriented    Nausea/Vomiting: no nausea/vomiting    Complications: none          Last vitals:   Visit Vitals  BP (!) 141/80 (BP Location: Right arm, Patient Position: Lying)   Pulse 67   Temp 36.7 °C (98.1 °F) (Oral)   Resp 18   Ht 5' 9" (1.753 m)   Wt 106.1 kg (234 lb)   SpO2 100%   BMI 34.56 kg/m²     "

## 2018-03-08 NOTE — PLAN OF CARE
Discharge instructions reviewed with patient and his wife. Both verbalized understanding. No further questions or concerns at this time. Patient's vital signs stable with no signs/symptoms of distress or discomfort noted. Patient ready for discharge.

## 2018-03-08 NOTE — ANESTHESIA PREPROCEDURE EVALUATION
03/08/2018  Mario Castro Sr. is a 47 y.o., male.    Anesthesia Evaluation         Review of Systems  Anesthesia Hx:  No previous Anesthesia    Social:  Non-Smoker    Cardiovascular:   Hypertension   Hepatic/GI:   diverticulitis       Physical Exam  General:  Well nourished    Airway/Jaw/Neck:  Airway Findings: Mouth Opening: Normal Tongue: Normal  General Airway Assessment: Adult  Mallampati: II       Chest/Lungs:  Chest/Lungs Clear    Heart/Vascular:  Heart Findings: Normal       Mental Status:  Mental Status Findings:  Alert and Oriented, Cooperative         Anesthesia Plan  Type of Anesthesia, risks & benefits discussed:  Anesthesia Type:  MAC  Patient's Preference:   Intra-op Monitoring Plan: standard ASA monitors  Intra-op Monitoring Plan Comments:   Post Op Pain Control Plan: multimodal analgesia  Post Op Pain Control Plan Comments:   Induction:   IV  Beta Blocker:  Patient is not currently on a Beta-Blocker (No further documentation required).       Informed Consent: Patient understands risks and agrees with Anesthesia plan.  Questions answered. Anesthesia consent signed with patient.  ASA Score: 2     Day of Surgery Review of History & Physical:    H&P update referred to the provider.         Ready For Surgery From Anesthesia Perspective.

## 2018-03-08 NOTE — DISCHARGE INSTRUCTIONS
Discharge Summary/Instructions for after Colonoscopy with Biopsy/Polypectomy    Mario Castro Sr.  3/8/2018  Juno Curry MD    Restrictions on Activity:    - Do not drive car, or operate machinery, make critical decisions, or do activities that   require coordination or balance for 24 hours.  - The following day: return to full activity including work.  - For 3 days: No heavy lifting, straining or running.  - Diet: Eat and drink normally unless instructed otherwise.    Treatment for Common Side Effects:  - Mild abdominal pain and bloating or excessive gas: rest, eat lightly and use a heating pad.     Symptoms to watch for and report to your physician:  1. Severe abdominal pain.  2. Fever within 24 hours after a procedure.  3. A large amount of rectal bleeding. (A small amount of blood from the rectum is not serious, especially if hemorrhoids are present.)  4. Because air was put into your colon during the procedure, expelling large amount of air from your rectum is normal.  5. You may not have a bowel movement for 1-3 days because of the colonoscopy prep. This is normal.  6. Go directly to the emergency room if you notice any of the following:     Chills and/or fever over 101   Persistent vomiting   Severe abdominal pain, other than gas cramps   Severe chest pain   Black, tarry stools   Any bleeding - exceeding one tablespoon    If you have any questions or problems, please call your Physician:    Juno Curry MD      If a complication or emergency situation arises and you are unable to reach your Physician - GO TO THE EMERGENCY ROOM.

## 2018-03-08 NOTE — ANESTHESIA POSTPROCEDURE EVALUATION
"Anesthesia Post Evaluation    Patient: Mario Castro SrZurdo    Procedure(s) Performed: Procedure(s) (LRB):  COLONOSCOPY (N/A)    Final Anesthesia Type: MAC  Patient location during evaluation: GI PACU  Patient participation: Yes- Able to Participate  Level of consciousness: awake and alert and oriented  Post-procedure vital signs: reviewed and stable  Pain management: adequate  Airway patency: patent  PONV status at discharge: No PONV  Anesthetic complications: no      Cardiovascular status: blood pressure returned to baseline and hemodynamically stable  Respiratory status: unassisted  Hydration status: euvolemic  Follow-up not needed.        Visit Vitals  BP (!) 141/80 (BP Location: Right arm, Patient Position: Lying)   Pulse 67   Temp 36.7 °C (98.1 °F) (Oral)   Resp 18   Ht 5' 9" (1.753 m)   Wt 106.1 kg (234 lb)   SpO2 100%   BMI 34.56 kg/m²       Pain/Edie Score: Pain Assessment Performed: Yes (3/8/2018  9:47 AM)  Presence of Pain: denies (3/8/2018  9:47 AM)      "

## 2018-03-08 NOTE — PROVATION PATIENT INSTRUCTIONS
Discharge Summary/Instructions after an Endoscopic Procedure  Patient Name: Mario Castro  Patient MRN: [id number]  Patient YOB: 1970  Thursday, March 08, 2018  Juno Curry MD  RESTRICTIONS:  During your procedure today, you received medications for sedation.  These   medications may affect your judgment, balance and coordination.  Therefore,   for 24 hours, you have the following restrictions:   - DO NOT drive a car, operate machinery, make legal/financial decisions,   sign important papers or drink alcohol.    ACTIVITY:  The following day: return to full activity including work, except no heavy   lifting, straining or running for 3 days if polyps were removed.  DIET:  Eat and drink normally unless instructed otherwise.     TREATMENT FOR COMMON SIDE EFFECTS:  - Mild abdominal pain, nausea, belching, bloating or excessive gas:  rest,   eat lightly and use a heating pad.  - Sore Throat: treat with throat lozenges and/or gargle with warm salt   water.  - Because air was used during the procedure, expelling large amounts of air   from your rectum or belching is normal.  - If a bowel prep was taken, you may not have a bowel movement for 1-3 days.    This is normal.  SYMPTOMS TO WATCH FOR AND REPORT TO YOUR PHYSICIAN:  1. Abdominal pain or bloating, other than gas cramps.  2. Chest pain.  3. Back pain.  4. Signs of infection such as: chills or fever occurring within 24 hours   after the procedure.  5. Rectal bleeding, which would show as bright red, maroon, or black stools.   (A tablespoon of blood from the rectum is not serious, especially if   hemorrhoids are present.)  6. Vomiting.  7. Weakness or dizziness.  GO DIRECTLY TO THE NEAREST EMERGENCY ROOM IF YOU HAVE ANY OF THE FOLLOWING:      Difficulty breathing  Chills and/or fever over 101 F   Persistent vomiting and/or vomiting blood   Severe abdominal pain   Severe chest pain   Black, tarry stools   Bleeding- more than one tablespoon   Any other symptom  or condition that you feel may need urgent attention  Your doctor recommends these additional instructions:  If any biopsies were taken, your doctors clinic will contact you in 1 to 2   weeks with any results.  You are being discharged to home.   Resume your previous diet.   Your physician has recommended a repeat colonoscopy in 10 years for   screening purposes.  For questions, problems or results please call your physician - Juno Curry MD at Work:  [phone].  EMERGENCY PHONE NUMBER: [phone],  LAB RESULTS: [phone]  IF A COMPLICATION OR EMERGENCY SITUATION ARISES AND YOU ARE UNABLE TO REACH   YOUR PHYSICIAN - GO DIRECTLY TO THE EMERGENCY ROOM.  MD Juno Carpenter MD  3/8/2018 10:52:13 AM  This report has been verified and signed electronically.

## 2018-03-08 NOTE — H&P
[facility]    CC: diverticulitis    HPI 47 y.o. male with a history of severe, complicated, sigmoid diverticulitis associated with a LLQ abscess requiring IR guided drainage.  He now has recovered completely with recent CT demonstrating no further fluid collections.  He has no current complaints other than slightly altered bowel habits.  There is no family history of colon cancer and he has never had a colonoscopy.       Past Medical History:   Diagnosis Date    Hypertension          Review of Systems  General ROS: negative for chills, fever or weight loss  Cardiovascular ROS: no chest pain or dyspnea on exertion  Gastrointestinal ROS: no abdominal pain, change in bowel habits, or black/ bloody stools    Physical Examination  There were no vitals taken for this visit.  General appearance: alert, cooperative, no distress  HENT: Normocephalic, atraumatic, neck symmetrical, no nasal discharge   Lungs: clear to auscultation bilaterally, no dullness to percussion bilaterally  Heart: regular rate and rhythm without rub; no displacement of the PMI   Abdomen: soft, non-tender; bowel sounds normoactive; no organomegaly  Extremities: extremities symmetric; no clubbing, cyanosis, or edema  Neurologic: Alert and oriented X 3, normal strength, normal coordination and gait    Labs:    H/H 10.7/34  Platelets 265    Imaging:    CT abdomen and pelvis  There is a small residual air pocket in the area where the pigtail percutaneous drain was previously located,   no fluid accumulation seen.  A fistulous tract with bowel cannot be excluded.  Several sigmoid and distal colon diverticula with mild adjacent inflammatory changes.  Kidney cysts.  End-stage degenerative change of the right hip joint    Assessment:   47 year old male with recent complicated diverticulitis now resolved and in need of colonoscopy to exclude other luminal abnormalities including neoplasm.  Colonoscopy today with a pediatric colonoscope.    Plan:  -  colonoscopy today    Juno Curry MD   [address] [facility], [address]   [place] [address]   [phone]

## 2018-03-08 NOTE — PLAN OF CARE
PIV DISCONTINUED WITH CATHETER INTACT NO SIGNS/SYMPTOMS OF REDNESS OR SWELLING AT PIV INSERTIONS SITE. PRESSURE DRESSING APPLIED WITH GAUZE AND COBAN. INSTRUCTED PATIENT  AND HIS WIFE TO KEEP THE DRESSING ON FOR AT LEAST 20-30 MINUTES. BOTH VERBALIZED UNDERSTANDING.

## 2018-03-09 VITALS
HEIGHT: 69 IN | OXYGEN SATURATION: 100 % | TEMPERATURE: 98 F | RESPIRATION RATE: 15 BRPM | BODY MASS INDEX: 34.66 KG/M2 | SYSTOLIC BLOOD PRESSURE: 129 MMHG | DIASTOLIC BLOOD PRESSURE: 78 MMHG | HEART RATE: 69 BPM | WEIGHT: 234 LBS

## 2018-04-12 ENCOUNTER — HOSPITAL ENCOUNTER (INPATIENT)
Facility: HOSPITAL | Age: 48
LOS: 5 days | Discharge: HOME | DRG: 392 | End: 2018-04-17
Attending: SURGERY | Admitting: SURGERY
Payer: COMMERCIAL

## 2018-04-12 DIAGNOSIS — R10.32 LLQ PAIN: ICD-10-CM

## 2018-04-12 DIAGNOSIS — K57.92 DIVERTICULITIS OF INTESTINE, UNSPECIFIED BLEEDING STATUS, UNSPECIFIED COMPLICATION STATUS, UNSPECIFIED PART OF INTESTINAL TRACT: ICD-10-CM

## 2018-04-12 DIAGNOSIS — K57.20 DIVERTICULITIS OF LARGE INTESTINE WITH PERFORATION WITHOUT BLEEDING: ICD-10-CM

## 2018-04-12 DIAGNOSIS — K57.20 DIVERTICULITIS OF COLON WITH PERFORATION: Primary | ICD-10-CM

## 2018-04-12 PROCEDURE — 11000001 HC ACUTE MED/SURG PRIVATE ROOM

## 2018-04-12 RX ORDER — ACETAMINOPHEN 325 MG/1
650 TABLET ORAL EVERY 8 HOURS PRN
Status: DISCONTINUED | OUTPATIENT
Start: 2018-04-13 | End: 2018-04-17 | Stop reason: HOSPADM

## 2018-04-12 RX ORDER — DEXTROSE MONOHYDRATE, SODIUM CHLORIDE, AND POTASSIUM CHLORIDE 50; 1.49; 9 G/1000ML; G/1000ML; G/1000ML
INJECTION, SOLUTION INTRAVENOUS CONTINUOUS
Status: DISCONTINUED | OUTPATIENT
Start: 2018-04-13 | End: 2018-04-13

## 2018-04-12 RX ORDER — SODIUM CHLORIDE 0.9 % (FLUSH) 0.9 %
3 SYRINGE (ML) INJECTION
Status: DISCONTINUED | OUTPATIENT
Start: 2018-04-13 | End: 2018-04-17 | Stop reason: HOSPADM

## 2018-04-12 RX ORDER — DIPHENHYDRAMINE HYDROCHLORIDE 50 MG/ML
25 INJECTION INTRAMUSCULAR; INTRAVENOUS EVERY 4 HOURS PRN
Status: DISCONTINUED | OUTPATIENT
Start: 2018-04-13 | End: 2018-04-17 | Stop reason: HOSPADM

## 2018-04-12 RX ORDER — HYDROMORPHONE HYDROCHLORIDE 2 MG/ML
0.5 INJECTION, SOLUTION INTRAMUSCULAR; INTRAVENOUS; SUBCUTANEOUS EVERY 6 HOURS PRN
Status: DISCONTINUED | OUTPATIENT
Start: 2018-04-13 | End: 2018-04-14

## 2018-04-12 RX ORDER — ONDANSETRON 8 MG/1
8 TABLET, ORALLY DISINTEGRATING ORAL EVERY 8 HOURS PRN
Status: DISCONTINUED | OUTPATIENT
Start: 2018-04-13 | End: 2018-04-15

## 2018-04-13 LAB
ALBUMIN SERPL BCP-MCNC: 3.3 G/DL
ALP SERPL-CCNC: 54 U/L
ALT SERPL W/O P-5'-P-CCNC: 8 U/L
ANION GAP SERPL CALC-SCNC: 8 MMOL/L
AST SERPL-CCNC: 8 U/L
BASOPHILS # BLD AUTO: 0.03 K/UL
BASOPHILS NFR BLD: 0.2 %
BILIRUB SERPL-MCNC: 0.4 MG/DL
BUN SERPL-MCNC: 9 MG/DL
CALCIUM SERPL-MCNC: 8.8 MG/DL
CHLORIDE SERPL-SCNC: 103 MMOL/L
CO2 SERPL-SCNC: 26 MMOL/L
CREAT SERPL-MCNC: 0.9 MG/DL
DIFFERENTIAL METHOD: ABNORMAL
EOSINOPHIL # BLD AUTO: 0 K/UL
EOSINOPHIL NFR BLD: 0.2 %
ERYTHROCYTE [DISTWIDTH] IN BLOOD BY AUTOMATED COUNT: 14.4 %
EST. GFR  (AFRICAN AMERICAN): >60 ML/MIN/1.73 M^2
EST. GFR  (NON AFRICAN AMERICAN): >60 ML/MIN/1.73 M^2
GLUCOSE SERPL-MCNC: 122 MG/DL
HCT VFR BLD AUTO: 40.5 %
HGB BLD-MCNC: 13.3 G/DL
LYMPHOCYTES # BLD AUTO: 1.3 K/UL
LYMPHOCYTES NFR BLD: 10.1 %
MAGNESIUM SERPL-MCNC: 2 MG/DL
MCH RBC QN AUTO: 27.9 PG
MCHC RBC AUTO-ENTMCNC: 32.8 G/DL
MCV RBC AUTO: 85 FL
MONOCYTES # BLD AUTO: 1.3 K/UL
MONOCYTES NFR BLD: 9.9 %
NEUTROPHILS # BLD AUTO: 10.4 K/UL
NEUTROPHILS NFR BLD: 79.4 %
PHOSPHATE SERPL-MCNC: 3.5 MG/DL
PLATELET # BLD AUTO: 201 K/UL
PMV BLD AUTO: 11.2 FL
POTASSIUM SERPL-SCNC: 3.8 MMOL/L
PROT SERPL-MCNC: 6.8 G/DL
RBC # BLD AUTO: 4.77 M/UL
SODIUM SERPL-SCNC: 137 MMOL/L
WBC # BLD AUTO: 13.15 K/UL

## 2018-04-13 PROCEDURE — 63600175 PHARM REV CODE 636 W HCPCS: Performed by: SURGERY

## 2018-04-13 PROCEDURE — 85025 COMPLETE CBC W/AUTO DIFF WBC: CPT

## 2018-04-13 PROCEDURE — 25500020 PHARM REV CODE 255: Performed by: SURGERY

## 2018-04-13 PROCEDURE — S0030 INJECTION, METRONIDAZOLE: HCPCS | Performed by: SURGERY

## 2018-04-13 PROCEDURE — S0030 INJECTION, METRONIDAZOLE: HCPCS | Performed by: FAMILY MEDICINE

## 2018-04-13 PROCEDURE — C9113 INJ PANTOPRAZOLE SODIUM, VIA: HCPCS | Performed by: SURGERY

## 2018-04-13 PROCEDURE — 11000001 HC ACUTE MED/SURG PRIVATE ROOM

## 2018-04-13 PROCEDURE — 63600175 PHARM REV CODE 636 W HCPCS: Performed by: FAMILY MEDICINE

## 2018-04-13 PROCEDURE — 25000003 PHARM REV CODE 250: Performed by: SURGERY

## 2018-04-13 PROCEDURE — 84100 ASSAY OF PHOSPHORUS: CPT

## 2018-04-13 PROCEDURE — 83735 ASSAY OF MAGNESIUM: CPT

## 2018-04-13 PROCEDURE — 80053 COMPREHEN METABOLIC PANEL: CPT

## 2018-04-13 PROCEDURE — 99900035 HC TECH TIME PER 15 MIN (STAT)

## 2018-04-13 PROCEDURE — 94761 N-INVAS EAR/PLS OXIMETRY MLT: CPT

## 2018-04-13 PROCEDURE — 94799 UNLISTED PULMONARY SVC/PX: CPT

## 2018-04-13 PROCEDURE — 36415 COLL VENOUS BLD VENIPUNCTURE: CPT

## 2018-04-13 PROCEDURE — 25000003 PHARM REV CODE 250: Performed by: FAMILY MEDICINE

## 2018-04-13 RX ORDER — DEXTROSE, SODIUM CHLORIDE, SODIUM LACTATE, POTASSIUM CHLORIDE, AND CALCIUM CHLORIDE 5; .6; .31; .03; .02 G/100ML; G/100ML; G/100ML; G/100ML; G/100ML
INJECTION, SOLUTION INTRAVENOUS CONTINUOUS
Status: DISCONTINUED | OUTPATIENT
Start: 2018-04-13 | End: 2018-04-16

## 2018-04-13 RX ORDER — METOCLOPRAMIDE 10 MG/1
10 TABLET ORAL EVERY 6 HOURS
Status: DISCONTINUED | OUTPATIENT
Start: 2018-04-13 | End: 2018-04-13

## 2018-04-13 RX ORDER — METOCLOPRAMIDE 10 MG/1
10 TABLET ORAL EVERY 6 HOURS
Status: DISCONTINUED | OUTPATIENT
Start: 2018-04-13 | End: 2018-04-15

## 2018-04-13 RX ORDER — METOCLOPRAMIDE HYDROCHLORIDE 5 MG/ML
10 INJECTION INTRAMUSCULAR; INTRAVENOUS EVERY 6 HOURS
Status: DISCONTINUED | OUTPATIENT
Start: 2018-04-13 | End: 2018-04-13 | Stop reason: RX

## 2018-04-13 RX ORDER — METRONIDAZOLE 500 MG/100ML
500 INJECTION, SOLUTION INTRAVENOUS
Status: DISCONTINUED | OUTPATIENT
Start: 2018-04-13 | End: 2018-04-13

## 2018-04-13 RX ORDER — HYDRALAZINE HYDROCHLORIDE 20 MG/ML
10 INJECTION INTRAMUSCULAR; INTRAVENOUS EVERY 6 HOURS PRN
Status: DISCONTINUED | OUTPATIENT
Start: 2018-04-13 | End: 2018-04-17 | Stop reason: HOSPADM

## 2018-04-13 RX ORDER — PANTOPRAZOLE SODIUM 40 MG/10ML
40 INJECTION, POWDER, LYOPHILIZED, FOR SOLUTION INTRAVENOUS DAILY
Status: DISCONTINUED | OUTPATIENT
Start: 2018-04-13 | End: 2018-04-13

## 2018-04-13 RX ORDER — METRONIDAZOLE 500 MG/100ML
500 INJECTION, SOLUTION INTRAVENOUS
Status: DISCONTINUED | OUTPATIENT
Start: 2018-04-13 | End: 2018-04-15

## 2018-04-13 RX ORDER — BACLOFEN 10 MG/1
10 TABLET ORAL 2 TIMES DAILY
Status: DISCONTINUED | OUTPATIENT
Start: 2018-04-13 | End: 2018-04-15

## 2018-04-13 RX ADMIN — IOHEXOL 100 ML: 350 INJECTION, SOLUTION INTRAVENOUS at 02:04

## 2018-04-13 RX ADMIN — DEXTROSE, SODIUM CHLORIDE, SODIUM LACTATE, POTASSIUM CHLORIDE, AND CALCIUM CHLORIDE: 5; .6; .31; .03; .02 INJECTION, SOLUTION INTRAVENOUS at 12:04

## 2018-04-13 RX ADMIN — HYDROMORPHONE HYDROCHLORIDE 0.5 MG: 2 INJECTION INTRAMUSCULAR; INTRAVENOUS; SUBCUTANEOUS at 12:04

## 2018-04-13 RX ADMIN — DEXTROSE MONOHYDRATE, SODIUM CHLORIDE, AND POTASSIUM CHLORIDE: 50; 9; 1.49 INJECTION, SOLUTION INTRAVENOUS at 12:04

## 2018-04-13 RX ADMIN — DEXTROSE 40 MG: 50 INJECTION, SOLUTION INTRAVENOUS at 12:04

## 2018-04-13 RX ADMIN — METRONIDAZOLE 500 MG: 500 SOLUTION INTRAVENOUS at 08:04

## 2018-04-13 RX ADMIN — ONDANSETRON 8 MG: 8 TABLET, ORALLY DISINTEGRATING ORAL at 08:04

## 2018-04-13 RX ADMIN — BACLOFEN 10 MG: 10 TABLET ORAL at 12:04

## 2018-04-13 RX ADMIN — ONDANSETRON 8 MG: 8 TABLET, ORALLY DISINTEGRATING ORAL at 12:04

## 2018-04-13 RX ADMIN — PIPERACILLIN SODIUM AND TAZOBACTAM SODIUM 4.5 G: 4; .5 INJECTION, POWDER, FOR SOLUTION INTRAVENOUS at 04:04

## 2018-04-13 RX ADMIN — METOCLOPRAMIDE 10 MG: 10 TABLET ORAL at 12:04

## 2018-04-13 RX ADMIN — METOCLOPRAMIDE 10 MG: 10 TABLET ORAL at 11:04

## 2018-04-13 RX ADMIN — PIPERACILLIN SODIUM AND TAZOBACTAM SODIUM 4.5 G: 4; .5 INJECTION, POWDER, FOR SOLUTION INTRAVENOUS at 11:04

## 2018-04-13 RX ADMIN — METOCLOPRAMIDE 10 MG: 10 TABLET ORAL at 06:04

## 2018-04-13 RX ADMIN — ONDANSETRON 8 MG: 8 TABLET, ORALLY DISINTEGRATING ORAL at 11:04

## 2018-04-13 RX ADMIN — BACLOFEN 10 MG: 10 TABLET ORAL at 08:04

## 2018-04-13 RX ADMIN — HYDROMORPHONE HYDROCHLORIDE 0.5 MG: 2 INJECTION INTRAMUSCULAR; INTRAVENOUS; SUBCUTANEOUS at 08:04

## 2018-04-13 RX ADMIN — METRONIDAZOLE 500 MG: 500 SOLUTION INTRAVENOUS at 12:04

## 2018-04-13 NOTE — PLAN OF CARE
Problem: Patient Care Overview  Goal: Plan of Care Review  Outcome: Ongoing (interventions implemented as appropriate)  Patient is awake, alert, and oriented x 4. Patient remains NPO. Patient is up with assistance. Patient has continuous fluids infusing. Safety is maintained.

## 2018-04-13 NOTE — PLAN OF CARE
Problem: Patient Care Overview  Goal: Plan of Care Review  Outcome: Ongoing (interventions implemented as appropriate)  Pt's SpO2 97% on room air. No respiratory distress noted. Will continue to monitor SpO2.

## 2018-04-13 NOTE — PLAN OF CARE
TN met with patient, pt has no HH or DME needs prior.      Pt lives at [facility] with spouse, independent with adl's.        04/13/18 4052   Discharge Assessment   Assessment Type Discharge Planning Assessment   Confirmed/corrected address and phone number on facesheet? Yes   Assessment information obtained from? Patient   Communicated expected length of stay with patient/caregiver yes   Prior to hospitilization cognitive status: Alert/Oriented   Prior to hospitalization functional status: Independent   Current cognitive status: Alert/Oriented   Current Functional Status: Independent   Able to Return to Prior Arrangements yes   Is patient able to care for self after discharge? Yes   Who are your caregiver(s) and their phone number(s)? self care- spouse   Patient's perception of discharge disposition home or selfcare   Readmission Within The Last 30 Days no previous admission in last 30 days   Patient currently being followed by outpatient case management? No   Patient currently receives any other outside agency services? No   Equipment Currently Used at Home none   Do you have any problems affording any of your prescribed medications? No   Is the patient taking medications as prescribed? yes   Does the patient have transportation home? Yes   Transportation Available family or friend will provide   Discharge Plan A Home with family   Discharge Plan B Home with family;Home Health   Patient/Family In Agreement With Plan yes

## 2018-04-13 NOTE — H&P
[facility]  History & Physical    SUBJECTIVE:     Chief Complaint/Reason for Admission: Diverticulitis    History of Present Illness:  Patient is a 48 y.o. male with PMHx of Diverticulitus, HTN who presented with complaints of abdominal pain, N/V x 2 days. Prior history of diverticulitis and notes similar pain with previous episodes however not as severe. Denies fever and chills.     PTA Medications   Medication Sig    acetaminophen (TYLENOL) 500 MG tablet Take 500 mg by mouth every 6 (six) hours as needed for Pain.    enalapril (VASOTEC) 10 MG tablet Take 10 mg by mouth once daily.    senna-docusate 8.6-50 mg (PERICOLACE) 8.6-50 mg per tablet Take 1 tablet by mouth 2 (two) times daily. (Patient taking differently: Take 1 tablet by mouth as needed. )       Review of patient's allergies indicates:  No Known Allergies    Past Medical History:   Diagnosis Date    Arthritis           to right hip    Diverticulitis of intestine     Hypertension      Past Surgical History:   Procedure Laterality Date    COLONOSCOPY N/A 3/8/2018    Procedure: COLONOSCOPY;  Surgeon: Juno Curry MD;  Location: [facility];  Service: Endoscopy;  Laterality: N/A;     Family History   Problem Relation Age of Onset    Hypertension Mother     Heart disease Mother     No Known Problems Father      Social History   Substance Use Topics    Smoking status: Never Smoker    Smokeless tobacco: Never Used    Alcohol use No        Review of Systems:  Gen: No fever or chills, decreased appetite  Pulm: No chest pain  Abdomen: +abdominal pain. No N/V  Extrem: no swelling    OBJECTIVE:     Vital Signs (Most Recent):  Temp: 99.3 °F (37.4 °C) (04/13/18 0757)  Pulse: 87 (04/13/18 0757)  Resp: 19 (04/13/18 0757)  BP: (!) 155/99 (04/13/18 0757)  SpO2: 97 % (04/13/18 0759)    Physical Exam:  Gen: resting comfortably in bed. AAOx3  Pulm: No respiratory distress, moving air well  Abdomen: soft, non-distended, periumbilical TTP, no  guarding  Extrem: No edema    Laboratory:  WBC: 13.15  H/H: 13.3./40.5      ASSESSMENT/PLAN:   Patient is a 48 y.o. male with PMHx of Diverticulitus, HTN admitted for Diverticulitis.    Diverticulitis  -Patient febrile on admit, WBC 13.15, abdominal pain  -Pain control, IV abx, IVFs, IV protonix  -Reglan  -Clear liquid diet  -Monitor for symptomatic improvement    Naomi Odell MD  4/13/2018  [facility]      Pt well known to me    H/o comp diverticulits    Sudden abdominal pain    Physical done  labd and Ct reviewed    Will continue IV abx  Start oral  trat conservative    Need sigmoid resection

## 2018-04-14 PROBLEM — R10.32 LLQ PAIN: Status: ACTIVE | Noted: 2018-04-14

## 2018-04-14 LAB
ALBUMIN SERPL BCP-MCNC: 3.1 G/DL
ALP SERPL-CCNC: 63 U/L
ALT SERPL W/O P-5'-P-CCNC: 8 U/L
ANION GAP SERPL CALC-SCNC: 8 MMOL/L
AST SERPL-CCNC: 7 U/L
BASOPHILS # BLD AUTO: 0.02 K/UL
BASOPHILS NFR BLD: 0.2 %
BILIRUB SERPL-MCNC: 0.4 MG/DL
BUN SERPL-MCNC: 7 MG/DL
CALCIUM SERPL-MCNC: 8.9 MG/DL
CHLORIDE SERPL-SCNC: 102 MMOL/L
CO2 SERPL-SCNC: 26 MMOL/L
CREAT SERPL-MCNC: 0.9 MG/DL
DIFFERENTIAL METHOD: ABNORMAL
EOSINOPHIL # BLD AUTO: 0 K/UL
EOSINOPHIL NFR BLD: 0.4 %
ERYTHROCYTE [DISTWIDTH] IN BLOOD BY AUTOMATED COUNT: 14.3 %
EST. GFR  (AFRICAN AMERICAN): >60 ML/MIN/1.73 M^2
EST. GFR  (NON AFRICAN AMERICAN): >60 ML/MIN/1.73 M^2
GLUCOSE SERPL-MCNC: 139 MG/DL
HCT VFR BLD AUTO: 39.2 %
HGB BLD-MCNC: 12.8 G/DL
LYMPHOCYTES # BLD AUTO: 1.3 K/UL
LYMPHOCYTES NFR BLD: 15 %
MCH RBC QN AUTO: 27.7 PG
MCHC RBC AUTO-ENTMCNC: 32.7 G/DL
MCV RBC AUTO: 85 FL
MONOCYTES # BLD AUTO: 0.8 K/UL
MONOCYTES NFR BLD: 9.4 %
NEUTROPHILS # BLD AUTO: 6.6 K/UL
NEUTROPHILS NFR BLD: 74.8 %
PLATELET # BLD AUTO: 200 K/UL
PMV BLD AUTO: 11.4 FL
POTASSIUM SERPL-SCNC: 3.7 MMOL/L
PROT SERPL-MCNC: 6.9 G/DL
RBC # BLD AUTO: 4.62 M/UL
SODIUM SERPL-SCNC: 136 MMOL/L
WBC # BLD AUTO: 8.89 K/UL

## 2018-04-14 PROCEDURE — 94761 N-INVAS EAR/PLS OXIMETRY MLT: CPT

## 2018-04-14 PROCEDURE — S0030 INJECTION, METRONIDAZOLE: HCPCS | Performed by: SURGERY

## 2018-04-14 PROCEDURE — 36415 COLL VENOUS BLD VENIPUNCTURE: CPT

## 2018-04-14 PROCEDURE — 63600175 PHARM REV CODE 636 W HCPCS: Performed by: SURGERY

## 2018-04-14 PROCEDURE — 25000003 PHARM REV CODE 250: Performed by: SURGERY

## 2018-04-14 PROCEDURE — 80053 COMPREHEN METABOLIC PANEL: CPT

## 2018-04-14 PROCEDURE — C9113 INJ PANTOPRAZOLE SODIUM, VIA: HCPCS | Performed by: SURGERY

## 2018-04-14 PROCEDURE — 94799 UNLISTED PULMONARY SVC/PX: CPT

## 2018-04-14 PROCEDURE — 85025 COMPLETE CBC W/AUTO DIFF WBC: CPT

## 2018-04-14 PROCEDURE — 25000003 PHARM REV CODE 250: Performed by: FAMILY MEDICINE

## 2018-04-14 PROCEDURE — 63600175 PHARM REV CODE 636 W HCPCS: Performed by: FAMILY MEDICINE

## 2018-04-14 PROCEDURE — 11000001 HC ACUTE MED/SURG PRIVATE ROOM

## 2018-04-14 RX ORDER — BISACODYL 10 MG
10 SUPPOSITORY, RECTAL RECTAL DAILY
Status: DISCONTINUED | OUTPATIENT
Start: 2018-04-14 | End: 2018-04-17 | Stop reason: HOSPADM

## 2018-04-14 RX ORDER — KETOROLAC TROMETHAMINE 30 MG/ML
30 INJECTION, SOLUTION INTRAMUSCULAR; INTRAVENOUS EVERY 6 HOURS PRN
Status: DISCONTINUED | OUTPATIENT
Start: 2018-04-14 | End: 2018-04-17 | Stop reason: HOSPADM

## 2018-04-14 RX ORDER — KETOROLAC TROMETHAMINE 30 MG/ML
30 INJECTION, SOLUTION INTRAMUSCULAR; INTRAVENOUS EVERY 6 HOURS
Status: DISCONTINUED | OUTPATIENT
Start: 2018-04-14 | End: 2018-04-14

## 2018-04-14 RX ADMIN — METOCLOPRAMIDE 10 MG: 10 TABLET ORAL at 11:04

## 2018-04-14 RX ADMIN — BACLOFEN 10 MG: 10 TABLET ORAL at 08:04

## 2018-04-14 RX ADMIN — BISACODYL 10 MG: 10 SUPPOSITORY RECTAL at 01:04

## 2018-04-14 RX ADMIN — HYDROMORPHONE HYDROCHLORIDE 0.5 MG: 2 INJECTION INTRAMUSCULAR; INTRAVENOUS; SUBCUTANEOUS at 05:04

## 2018-04-14 RX ADMIN — ONDANSETRON 8 MG: 8 TABLET, ORALLY DISINTEGRATING ORAL at 05:04

## 2018-04-14 RX ADMIN — METOCLOPRAMIDE 10 MG: 10 TABLET ORAL at 05:04

## 2018-04-14 RX ADMIN — PIPERACILLIN SODIUM AND TAZOBACTAM SODIUM 4.5 G: 4; .5 INJECTION, POWDER, FOR SOLUTION INTRAVENOUS at 05:04

## 2018-04-14 RX ADMIN — METRONIDAZOLE 500 MG: 500 SOLUTION INTRAVENOUS at 05:04

## 2018-04-14 RX ADMIN — PIPERACILLIN SODIUM AND TAZOBACTAM SODIUM 4.5 G: 4; .5 INJECTION, POWDER, FOR SOLUTION INTRAVENOUS at 08:04

## 2018-04-14 RX ADMIN — METRONIDAZOLE 500 MG: 500 SOLUTION INTRAVENOUS at 09:04

## 2018-04-14 RX ADMIN — HYDROMORPHONE HYDROCHLORIDE 0.5 MG: 2 INJECTION INTRAMUSCULAR; INTRAVENOUS; SUBCUTANEOUS at 11:04

## 2018-04-14 RX ADMIN — DEXTROSE 40 MG: 50 INJECTION, SOLUTION INTRAVENOUS at 08:04

## 2018-04-14 RX ADMIN — KETOROLAC TROMETHAMINE 30 MG: 30 INJECTION, SOLUTION INTRAMUSCULAR at 08:04

## 2018-04-14 RX ADMIN — METRONIDAZOLE 500 MG: 500 SOLUTION INTRAVENOUS at 11:04

## 2018-04-14 NOTE — PROGRESS NOTES
NET/General Surgery Progress Note:    Overnight events: NAEON. Able to tolerate clear liquid diet. Still reports some periumbilical TTP. No flatus or BM.     Vitals:    04/14/18 0804 04/14/18 0849 04/14/18 1213 04/14/18 1244   BP: 124/77   127/77   BP Location:       Patient Position: Lying   Lying   Pulse: 70   65   Resp: 16   16   Temp: 98.5 °F (36.9 °C)   97.6 °F (36.4 °C)   TempSrc: Oral   Oral   SpO2:  98% 95%    Weight:       Height:           Exam  Gen: resting comfortably in bed. AAOx3  Pulm: No respiratory distress, moving air well  Abdomen: soft, some distension, periumbilical TTP  Extrem: SCD in place bilaterally    Labs:  WBC: 8.89  H/H: 12.8/39.2      Assessment/Plan: 47 yo M with PMHx of Diverticulitus, HTN admitted for Diverticulitis.  1. Afebrile. WBC now WNL13.15  2. Pain control, IV abx, IVFs, Dulcolax  3. Clear liquid diet  4. Monitor for symptomatic improvement     Naomi Odell MD  4/14/2018  [facility] HO-2

## 2018-04-14 NOTE — PLAN OF CARE
Problem: Patient Care Overview  Goal: Plan of Care Review  Outcome: Ongoing (interventions implemented as appropriate)  Patient is alert, awake, and oriented. Patient tolerates dilaudid better with zofran being given before the dilaudid.  Patient continues having fluids infusing. Safety is maintained.

## 2018-04-15 LAB
ALBUMIN SERPL BCP-MCNC: 3 G/DL
ALP SERPL-CCNC: 45 U/L
ALT SERPL W/O P-5'-P-CCNC: 9 U/L
ANION GAP SERPL CALC-SCNC: 10 MMOL/L
AST SERPL-CCNC: 7 U/L
BASOPHILS # BLD AUTO: 0.02 K/UL
BASOPHILS NFR BLD: 0.4 %
BILIRUB SERPL-MCNC: 0.4 MG/DL
BUN SERPL-MCNC: 7 MG/DL
CALCIUM SERPL-MCNC: 8.8 MG/DL
CHLORIDE SERPL-SCNC: 101 MMOL/L
CO2 SERPL-SCNC: 27 MMOL/L
CREAT SERPL-MCNC: 1 MG/DL
DIFFERENTIAL METHOD: ABNORMAL
EOSINOPHIL # BLD AUTO: 0.1 K/UL
EOSINOPHIL NFR BLD: 2.2 %
ERYTHROCYTE [DISTWIDTH] IN BLOOD BY AUTOMATED COUNT: 13.9 %
EST. GFR  (AFRICAN AMERICAN): >60 ML/MIN/1.73 M^2
EST. GFR  (NON AFRICAN AMERICAN): >60 ML/MIN/1.73 M^2
GLUCOSE SERPL-MCNC: 106 MG/DL
HCT VFR BLD AUTO: 37.6 %
HGB BLD-MCNC: 12.1 G/DL
LYMPHOCYTES # BLD AUTO: 1.1 K/UL
LYMPHOCYTES NFR BLD: 23.6 %
MCH RBC QN AUTO: 27.4 PG
MCHC RBC AUTO-ENTMCNC: 32.2 G/DL
MCV RBC AUTO: 85 FL
MONOCYTES # BLD AUTO: 0.6 K/UL
MONOCYTES NFR BLD: 12.3 %
NEUTROPHILS # BLD AUTO: 2.8 K/UL
NEUTROPHILS NFR BLD: 61.3 %
PLATELET # BLD AUTO: 225 K/UL
PMV BLD AUTO: 11.5 FL
POTASSIUM SERPL-SCNC: 3.8 MMOL/L
PROT SERPL-MCNC: 6.6 G/DL
RBC # BLD AUTO: 4.41 M/UL
SODIUM SERPL-SCNC: 138 MMOL/L
WBC # BLD AUTO: 4.62 K/UL

## 2018-04-15 PROCEDURE — S0030 INJECTION, METRONIDAZOLE: HCPCS | Performed by: SURGERY

## 2018-04-15 PROCEDURE — 99900035 HC TECH TIME PER 15 MIN (STAT)

## 2018-04-15 PROCEDURE — 85025 COMPLETE CBC W/AUTO DIFF WBC: CPT

## 2018-04-15 PROCEDURE — 94761 N-INVAS EAR/PLS OXIMETRY MLT: CPT

## 2018-04-15 PROCEDURE — 25000003 PHARM REV CODE 250: Performed by: SURGERY

## 2018-04-15 PROCEDURE — 25000003 PHARM REV CODE 250: Performed by: FAMILY MEDICINE

## 2018-04-15 PROCEDURE — 63600175 PHARM REV CODE 636 W HCPCS: Performed by: SURGERY

## 2018-04-15 PROCEDURE — 63600175 PHARM REV CODE 636 W HCPCS: Performed by: FAMILY MEDICINE

## 2018-04-15 PROCEDURE — 36415 COLL VENOUS BLD VENIPUNCTURE: CPT

## 2018-04-15 PROCEDURE — 80053 COMPREHEN METABOLIC PANEL: CPT

## 2018-04-15 PROCEDURE — C9113 INJ PANTOPRAZOLE SODIUM, VIA: HCPCS | Performed by: SURGERY

## 2018-04-15 PROCEDURE — 11000001 HC ACUTE MED/SURG PRIVATE ROOM

## 2018-04-15 RX ORDER — METOCLOPRAMIDE HYDROCHLORIDE 5 MG/5ML
10 SOLUTION ORAL
Status: DISCONTINUED | OUTPATIENT
Start: 2018-04-15 | End: 2018-04-17 | Stop reason: HOSPADM

## 2018-04-15 RX ORDER — FLUCONAZOLE 2 MG/ML
400 INJECTION, SOLUTION INTRAVENOUS
Status: DISCONTINUED | OUTPATIENT
Start: 2018-04-15 | End: 2018-04-17 | Stop reason: HOSPADM

## 2018-04-15 RX ORDER — LINEZOLID 2 MG/ML
600 INJECTION, SOLUTION INTRAVENOUS
Status: DISCONTINUED | OUTPATIENT
Start: 2018-04-15 | End: 2018-04-17 | Stop reason: HOSPADM

## 2018-04-15 RX ORDER — ONDANSETRON 2 MG/ML
4 INJECTION INTRAMUSCULAR; INTRAVENOUS EVERY 6 HOURS PRN
Status: DISCONTINUED | OUTPATIENT
Start: 2018-04-15 | End: 2018-04-17 | Stop reason: HOSPADM

## 2018-04-15 RX ORDER — METRONIDAZOLE 500 MG/100ML
500 INJECTION, SOLUTION INTRAVENOUS
Status: DISCONTINUED | OUTPATIENT
Start: 2018-04-15 | End: 2018-04-16

## 2018-04-15 RX ADMIN — METRONIDAZOLE 500 MG: 500 INJECTION, SOLUTION INTRAVENOUS at 04:04

## 2018-04-15 RX ADMIN — DEXTROSE 40 MG: 50 INJECTION, SOLUTION INTRAVENOUS at 10:04

## 2018-04-15 RX ADMIN — METOCLOPRAMIDE HYDROCHLORIDE 10 MG: 5 SOLUTION ORAL at 05:04

## 2018-04-15 RX ADMIN — ONDANSETRON 4 MG: 2 INJECTION INTRAMUSCULAR; INTRAVENOUS at 04:04

## 2018-04-15 RX ADMIN — METOCLOPRAMIDE HYDROCHLORIDE 10 MG: 5 SOLUTION ORAL at 09:04

## 2018-04-15 RX ADMIN — METRONIDAZOLE 500 MG: 500 INJECTION, SOLUTION INTRAVENOUS at 10:04

## 2018-04-15 RX ADMIN — PIPERACILLIN SODIUM AND TAZOBACTAM SODIUM 4.5 G: 4; .5 INJECTION, POWDER, FOR SOLUTION INTRAVENOUS at 05:04

## 2018-04-15 RX ADMIN — METRONIDAZOLE 500 MG: 500 SOLUTION INTRAVENOUS at 04:04

## 2018-04-15 RX ADMIN — METRONIDAZOLE 500 MG: 500 INJECTION, SOLUTION INTRAVENOUS at 11:04

## 2018-04-15 RX ADMIN — METOCLOPRAMIDE 10 MG: 10 TABLET ORAL at 12:04

## 2018-04-15 RX ADMIN — METOCLOPRAMIDE HYDROCHLORIDE 10 MG: 5 SOLUTION ORAL at 11:04

## 2018-04-15 RX ADMIN — PIPERACILLIN SODIUM AND TAZOBACTAM SODIUM 4.5 G: 4; .5 INJECTION, POWDER, FOR SOLUTION INTRAVENOUS at 10:04

## 2018-04-15 RX ADMIN — KETOROLAC TROMETHAMINE 30 MG: 30 INJECTION, SOLUTION INTRAMUSCULAR at 04:04

## 2018-04-15 RX ADMIN — LINEZOLID 600 MG: 600 INJECTION, SOLUTION INTRAVENOUS at 11:04

## 2018-04-15 RX ADMIN — ONDANSETRON 8 MG: 8 TABLET, ORALLY DISINTEGRATING ORAL at 10:04

## 2018-04-15 RX ADMIN — FLUCONAZOLE 400 MG: 2 INJECTION INTRAVENOUS at 01:04

## 2018-04-15 RX ADMIN — METOCLOPRAMIDE 10 MG: 10 TABLET ORAL at 06:04

## 2018-04-15 RX ADMIN — PIPERACILLIN SODIUM AND TAZOBACTAM SODIUM 4.5 G: 4; .5 INJECTION, POWDER, FOR SOLUTION INTRAVENOUS at 12:04

## 2018-04-15 RX ADMIN — DEXTROSE, SODIUM CHLORIDE, SODIUM LACTATE, POTASSIUM CHLORIDE, AND CALCIUM CHLORIDE: 5; .6; .31; .03; .02 INJECTION, SOLUTION INTRAVENOUS at 07:04

## 2018-04-15 RX ADMIN — BISACODYL 10 MG: 10 SUPPOSITORY RECTAL at 10:04

## 2018-04-15 NOTE — PROGRESS NOTES
NET/General Surgery Progress Note:    Overnight events: NAEON. Still tolerating clear liquid diet with no difficulty. No bowel movement or flatus since admit however patient says she has urge to defecate. Laxatives ordered yesterday. Denies worsening abdominal pain.     Vitals:    04/15/18 0423 04/15/18 0516 04/15/18 0734 04/15/18 0846   BP: (!) 140/87  (!) 135/95    BP Location: Right arm      Patient Position: Lying  Sitting    Pulse: 63  72 75   Resp: 16  18    Temp: 98.7 °F (37.1 °C)  98.7 °F (37.1 °C)    TempSrc: Oral  Oral    SpO2:  98%  98%   Weight: 105 kg (231 lb 7.7 oz)      Height:         Exam  Gen: resting comfortably in bed. AAOx3  Pulm: No respiratory distress, moving air well  Abdomen: soft, some distension, periumbilical TTP      Labs:  WBC: 4.62  H/H: 12.1/37.6       Assessment/Plan: 47 yo M with PMHx of Diverticulitus, HTN admitted for Diverticulitis.  1. Afebrile since admit. WBC WNL  2. Pain control, IV abx, IVFs, Dulcolax  3. Clear liquid diet.   4. Monitor for bowel movement and symptomatic improvement    Naomi Odell MD  4/15/2018  [facility] HO-2

## 2018-04-15 NOTE — PROGRESS NOTES
Pt vomited red popsicle that he ate this am. Pt given zofran PO this am. Dr. Odell notified and stated she would change zofran to IV.

## 2018-04-16 LAB
ALBUMIN SERPL BCP-MCNC: 3.1 G/DL
ALP SERPL-CCNC: 44 U/L
ALT SERPL W/O P-5'-P-CCNC: 8 U/L
ANION GAP SERPL CALC-SCNC: 9 MMOL/L
AST SERPL-CCNC: 10 U/L
BASOPHILS # BLD AUTO: 0.02 K/UL
BASOPHILS NFR BLD: 0.4 %
BILIRUB SERPL-MCNC: 0.3 MG/DL
BUN SERPL-MCNC: 6 MG/DL
CALCIUM SERPL-MCNC: 8.7 MG/DL
CHLORIDE SERPL-SCNC: 101 MMOL/L
CO2 SERPL-SCNC: 27 MMOL/L
CREAT SERPL-MCNC: 0.9 MG/DL
DIFFERENTIAL METHOD: ABNORMAL
EOSINOPHIL # BLD AUTO: 0.1 K/UL
EOSINOPHIL NFR BLD: 1.1 %
ERYTHROCYTE [DISTWIDTH] IN BLOOD BY AUTOMATED COUNT: 13.6 %
EST. GFR  (AFRICAN AMERICAN): >60 ML/MIN/1.73 M^2
EST. GFR  (NON AFRICAN AMERICAN): >60 ML/MIN/1.73 M^2
GLUCOSE SERPL-MCNC: 106 MG/DL
HCT VFR BLD AUTO: 36.7 %
HGB BLD-MCNC: 11.9 G/DL
LYMPHOCYTES # BLD AUTO: 1 K/UL
LYMPHOCYTES NFR BLD: 21.9 %
MCH RBC QN AUTO: 27.5 PG
MCHC RBC AUTO-ENTMCNC: 32.4 G/DL
MCV RBC AUTO: 85 FL
MONOCYTES # BLD AUTO: 0.5 K/UL
MONOCYTES NFR BLD: 11 %
NEUTROPHILS # BLD AUTO: 3 K/UL
NEUTROPHILS NFR BLD: 65.4 %
PLATELET # BLD AUTO: 246 K/UL
PMV BLD AUTO: 11.4 FL
POTASSIUM SERPL-SCNC: 3.2 MMOL/L
PROT SERPL-MCNC: 6.5 G/DL
RBC # BLD AUTO: 4.32 M/UL
SODIUM SERPL-SCNC: 137 MMOL/L
WBC # BLD AUTO: 4.65 K/UL

## 2018-04-16 PROCEDURE — 80053 COMPREHEN METABOLIC PANEL: CPT

## 2018-04-16 PROCEDURE — 63600175 PHARM REV CODE 636 W HCPCS: Performed by: SURGERY

## 2018-04-16 PROCEDURE — 94761 N-INVAS EAR/PLS OXIMETRY MLT: CPT

## 2018-04-16 PROCEDURE — 25000003 PHARM REV CODE 250: Performed by: SURGERY

## 2018-04-16 PROCEDURE — 36415 COLL VENOUS BLD VENIPUNCTURE: CPT

## 2018-04-16 PROCEDURE — S0030 INJECTION, METRONIDAZOLE: HCPCS | Performed by: SURGERY

## 2018-04-16 PROCEDURE — 85025 COMPLETE CBC W/AUTO DIFF WBC: CPT

## 2018-04-16 PROCEDURE — 25000003 PHARM REV CODE 250: Performed by: FAMILY MEDICINE

## 2018-04-16 PROCEDURE — C9113 INJ PANTOPRAZOLE SODIUM, VIA: HCPCS | Performed by: SURGERY

## 2018-04-16 PROCEDURE — 94799 UNLISTED PULMONARY SVC/PX: CPT

## 2018-04-16 PROCEDURE — 63600175 PHARM REV CODE 636 W HCPCS: Performed by: FAMILY MEDICINE

## 2018-04-16 PROCEDURE — 99900035 HC TECH TIME PER 15 MIN (STAT)

## 2018-04-16 PROCEDURE — 11000001 HC ACUTE MED/SURG PRIVATE ROOM

## 2018-04-16 RX ORDER — POTASSIUM CHLORIDE 20 MEQ/15ML
40 SOLUTION ORAL 2 TIMES DAILY
Status: COMPLETED | OUTPATIENT
Start: 2018-04-16 | End: 2018-04-16

## 2018-04-16 RX ORDER — POLYETHYLENE GLYCOL 3350 17 G/17G
17 POWDER, FOR SOLUTION ORAL DAILY
Status: DISCONTINUED | OUTPATIENT
Start: 2018-04-16 | End: 2018-04-17 | Stop reason: HOSPADM

## 2018-04-16 RX ORDER — POTASSIUM CHLORIDE 7.45 MG/ML
40 INJECTION INTRAVENOUS ONCE
Status: DISCONTINUED | OUTPATIENT
Start: 2018-04-16 | End: 2018-04-16

## 2018-04-16 RX ADMIN — POTASSIUM CHLORIDE 40 MEQ: 20 SOLUTION ORAL at 10:04

## 2018-04-16 RX ADMIN — METRONIDAZOLE 500 MG: 500 INJECTION, SOLUTION INTRAVENOUS at 05:04

## 2018-04-16 RX ADMIN — POTASSIUM CHLORIDE 40 MEQ: 20 SOLUTION ORAL at 01:04

## 2018-04-16 RX ADMIN — DEXTROSE 40 MG: 50 INJECTION, SOLUTION INTRAVENOUS at 08:04

## 2018-04-16 RX ADMIN — KETOROLAC TROMETHAMINE 30 MG: 30 INJECTION, SOLUTION INTRAMUSCULAR at 10:04

## 2018-04-16 RX ADMIN — PIPERACILLIN SODIUM AND TAZOBACTAM SODIUM 4.5 G: 4; .5 INJECTION, POWDER, FOR SOLUTION INTRAVENOUS at 06:04

## 2018-04-16 RX ADMIN — LINEZOLID 600 MG: 600 INJECTION, SOLUTION INTRAVENOUS at 12:04

## 2018-04-16 RX ADMIN — BISACODYL 10 MG: 10 SUPPOSITORY RECTAL at 08:04

## 2018-04-16 RX ADMIN — POLYETHYLENE GLYCOL 3350 17 G: 17 POWDER, FOR SOLUTION ORAL at 01:04

## 2018-04-16 RX ADMIN — METRONIDAZOLE 500 MG: 500 INJECTION, SOLUTION INTRAVENOUS at 11:04

## 2018-04-16 RX ADMIN — FLUCONAZOLE 400 MG: 2 INJECTION INTRAVENOUS at 01:04

## 2018-04-16 RX ADMIN — METOCLOPRAMIDE HYDROCHLORIDE 10 MG: 5 SOLUTION ORAL at 05:04

## 2018-04-16 RX ADMIN — DEXTROSE, SODIUM CHLORIDE, SODIUM LACTATE, POTASSIUM CHLORIDE, AND CALCIUM CHLORIDE: 5; .6; .31; .03; .02 INJECTION, SOLUTION INTRAVENOUS at 06:04

## 2018-04-16 RX ADMIN — PIPERACILLIN SODIUM AND TAZOBACTAM SODIUM 4.5 G: 4; .5 INJECTION, POWDER, FOR SOLUTION INTRAVENOUS at 10:04

## 2018-04-16 RX ADMIN — ONDANSETRON 4 MG: 2 INJECTION INTRAMUSCULAR; INTRAVENOUS at 02:04

## 2018-04-16 RX ADMIN — METOCLOPRAMIDE HYDROCHLORIDE 10 MG: 5 SOLUTION ORAL at 10:04

## 2018-04-16 RX ADMIN — PIPERACILLIN SODIUM AND TAZOBACTAM SODIUM 4.5 G: 4; .5 INJECTION, POWDER, FOR SOLUTION INTRAVENOUS at 01:04

## 2018-04-16 RX ADMIN — LINEZOLID 600 MG: 600 INJECTION, SOLUTION INTRAVENOUS at 11:04

## 2018-04-16 RX ADMIN — ONDANSETRON 4 MG: 2 INJECTION INTRAMUSCULAR; INTRAVENOUS at 08:04

## 2018-04-16 RX ADMIN — METOCLOPRAMIDE HYDROCHLORIDE 10 MG: 5 SOLUTION ORAL at 11:04

## 2018-04-16 NOTE — PLAN OF CARE
TN met with pt   for poss d/c to home tomorrow   ok to fax d/c summ to pcp   pt has transportation to home   dx:  Diverticulitis   will give pt info re:  Diverticulitsi Diet        04/16/18 9287   Discharge Reassessment   Assessment Type Discharge Planning Reassessment   Provided patient/caregiver education on the expected discharge date and the discharge plan Yes   Do you have any problems affording any of your prescribed medications? TBD   Discharge Plan A Home;Home with family   Discharge Plan B Home;Home with family;Home Health   Patient choice form signed by patient/caregiver N/A   Can the patient answer the patient profile reliably? Yes, cognitively intact   How does the patient rate their overall health at the present time? Good

## 2018-04-16 NOTE — PROGRESS NOTES
Surgery    S/ Some nausea this weekend that has resolved.  No fevers.  Passing some flatus.  Small BM just after suppository.  Would like to have some more solid food though appetite is still diminished.    O/  NAD  Belly soft, non-tender, minimal distention      WBC 4.6  K 3.2 - replacing    A/p  Diverticulitis - given nausea/vomiting, will need to observe one more day - if ok, may increase diet today and release tomorrow.  Afebrile  Has some vomiting  Feeling okay  Pain improved      Medicines reviewed, metronidazole DC'd for nausea and vomiting  Vitals noted  Heart stable  Lung stable  Abdomen soft no tenderness  Extremities symmetrical  No neurological deficit  Labs noted  Normal white cell count      On Zosyn and Diflucan and linezolid  Continue diet    If stable Will DC and plan for elective colon resection

## 2018-04-16 NOTE — PLAN OF CARE
Problem: Patient Care Overview  Goal: Plan of Care Review  Outcome: Ongoing (interventions implemented as appropriate)  Pt is AAOx3. No complaints of diarrhea. Pt complained of nausea and 1 episode of vomiting. No complaints of pain. Ambulated with assist. Clear liquid diet. IV fluids maintained. Safety precautions maintained. Tolerated all medications well.

## 2018-04-16 NOTE — PLAN OF CARE
Problem: Patient Care Overview  Goal: Plan of Care Review  Outcome: Ongoing (interventions implemented as appropriate)  Pt aaox3.  Pt c/o of intermittent pain and nausea with some relief from meds.  On clear liquid diet, tolerating well.  Up ad talisha to bathroom and chair.  VSS.

## 2018-04-16 NOTE — PLAN OF CARE
Problem: Patient Care Overview  Goal: Plan of Care Review  Outcome: Ongoing (interventions implemented as appropriate)  Pt on room air in no apparent distress.  IS tx. Given with good pt. Effort, pt. With good understanding and has been doing every hour while awake for 10 reps.  Will cont. To monitor.

## 2018-04-16 NOTE — PROGRESS NOTES
.Pharmacy New Medication Education    Patient accepted medication education.    Pharmacy educated patient on name and purpose of medications and possible side effects, using the teach-back method.     D/C acetaminophen tablet 650 mg   D/C bisacodyl suppository 10 mg   D/C dextrose 5 % in lactated ringers infusion   D/C diphenhydrAMINE injection 25 mg   D/C fluconazole (DIFLUCAN) IVPB 400 mg 200 mL   D/C hydrALAZINE injection 10 mg   D/C ketorolac injection 30 mg   D/C linezolid 600mg/300ml IVPB 600 mg   D/C metoclopramide HCl 5 mg/5 mL solution 10 mg   D/C metronidazole IVPB 500 mg   D/C ondansetron injection 4 mg   D/C pantoprazole 40 mg in dextrose 5 % 100 mL infusion (ready to mix system)   D/C piperacillin-tazobactam 4.5 g in dextrose 5 % 100 mL IVPB (ready to mix system)   D/C promethazine (PHENERGAN) 6.25 mg in dextrose 5 % 50 mL IVPB   D/C sodium chloride 0.9% flush 3 mL       Learners of pharmacy medication education included:  Patient    Patient +/- learner response:  Verbalized Understanding, Teachback

## 2018-04-17 VITALS
WEIGHT: 231.5 LBS | HEIGHT: 69 IN | HEART RATE: 60 BPM | BODY MASS INDEX: 34.29 KG/M2 | TEMPERATURE: 98 F | OXYGEN SATURATION: 99 % | SYSTOLIC BLOOD PRESSURE: 151 MMHG | DIASTOLIC BLOOD PRESSURE: 88 MMHG | RESPIRATION RATE: 16 BRPM

## 2018-04-17 LAB
ALBUMIN SERPL BCP-MCNC: 3 G/DL
ALP SERPL-CCNC: 41 U/L
ALT SERPL W/O P-5'-P-CCNC: 6 U/L
ANION GAP SERPL CALC-SCNC: 8 MMOL/L
AST SERPL-CCNC: 8 U/L
BASOPHILS # BLD AUTO: 0.05 K/UL
BASOPHILS NFR BLD: 1 %
BILIRUB SERPL-MCNC: 0.2 MG/DL
BUN SERPL-MCNC: 5 MG/DL
CALCIUM SERPL-MCNC: 8.6 MG/DL
CHLORIDE SERPL-SCNC: 104 MMOL/L
CO2 SERPL-SCNC: 27 MMOL/L
CREAT SERPL-MCNC: 0.9 MG/DL
DIFFERENTIAL METHOD: ABNORMAL
EOSINOPHIL # BLD AUTO: 0.1 K/UL
EOSINOPHIL NFR BLD: 2.5 %
ERYTHROCYTE [DISTWIDTH] IN BLOOD BY AUTOMATED COUNT: 13.6 %
EST. GFR  (AFRICAN AMERICAN): >60 ML/MIN/1.73 M^2
EST. GFR  (NON AFRICAN AMERICAN): >60 ML/MIN/1.73 M^2
GLUCOSE SERPL-MCNC: 94 MG/DL
HCT VFR BLD AUTO: 36.7 %
HGB BLD-MCNC: 11.9 G/DL
LYMPHOCYTES # BLD AUTO: 1.3 K/UL
LYMPHOCYTES NFR BLD: 24.7 %
MCH RBC QN AUTO: 27.4 PG
MCHC RBC AUTO-ENTMCNC: 32.4 G/DL
MCV RBC AUTO: 85 FL
MONOCYTES # BLD AUTO: 0.6 K/UL
MONOCYTES NFR BLD: 11.3 %
NEUTROPHILS # BLD AUTO: 3.1 K/UL
NEUTROPHILS NFR BLD: 60.5 %
PLATELET # BLD AUTO: 228 K/UL
PMV BLD AUTO: 10.7 FL
POTASSIUM SERPL-SCNC: 3.7 MMOL/L
PROT SERPL-MCNC: 6.4 G/DL
RBC # BLD AUTO: 4.34 M/UL
SODIUM SERPL-SCNC: 139 MMOL/L
WBC # BLD AUTO: 5.14 K/UL

## 2018-04-17 PROCEDURE — C9113 INJ PANTOPRAZOLE SODIUM, VIA: HCPCS | Performed by: SURGERY

## 2018-04-17 PROCEDURE — 85025 COMPLETE CBC W/AUTO DIFF WBC: CPT

## 2018-04-17 PROCEDURE — 36415 COLL VENOUS BLD VENIPUNCTURE: CPT

## 2018-04-17 PROCEDURE — 25000003 PHARM REV CODE 250: Performed by: SURGERY

## 2018-04-17 PROCEDURE — 63600175 PHARM REV CODE 636 W HCPCS: Performed by: SURGERY

## 2018-04-17 PROCEDURE — 80053 COMPREHEN METABOLIC PANEL: CPT

## 2018-04-17 PROCEDURE — 63600175 PHARM REV CODE 636 W HCPCS: Performed by: FAMILY MEDICINE

## 2018-04-17 PROCEDURE — 25000003 PHARM REV CODE 250: Performed by: FAMILY MEDICINE

## 2018-04-17 RX ORDER — TRAMADOL HYDROCHLORIDE 50 MG/1
50 TABLET ORAL EVERY 6 HOURS PRN
Qty: 30 TABLET | Refills: 0 | Status: SHIPPED | OUTPATIENT
Start: 2018-04-17 | End: 2018-04-27

## 2018-04-17 RX ORDER — METRONIDAZOLE 500 MG/1
500 TABLET ORAL EVERY 8 HOURS
Qty: 21 TABLET | Refills: 0 | Status: SHIPPED | OUTPATIENT
Start: 2018-04-17 | End: 2018-04-24

## 2018-04-17 RX ORDER — CIPROFLOXACIN 500 MG/1
500 TABLET ORAL EVERY 12 HOURS
Qty: 14 TABLET | Refills: 0 | Status: SHIPPED | OUTPATIENT
Start: 2018-04-17 | End: 2018-04-24

## 2018-04-17 RX ADMIN — POLYETHYLENE GLYCOL 3350 17 G: 17 POWDER, FOR SOLUTION ORAL at 08:04

## 2018-04-17 RX ADMIN — DEXTROSE 40 MG: 50 INJECTION, SOLUTION INTRAVENOUS at 08:04

## 2018-04-17 RX ADMIN — METOCLOPRAMIDE HYDROCHLORIDE 10 MG: 5 SOLUTION ORAL at 06:04

## 2018-04-17 RX ADMIN — PIPERACILLIN SODIUM AND TAZOBACTAM SODIUM 4.5 G: 4; .5 INJECTION, POWDER, FOR SOLUTION INTRAVENOUS at 03:04

## 2018-04-17 NOTE — PLAN OF CARE
pt d/c'd prior to TN's final interview   called and spoke with wife Stephenie   pt at home resting quietly --   was able to get post d/c medication, all d/c instructions and final meds detailed for pt prior to d/c   gave Ms. Casiano info re f/u apts     Follow-Up       Follow-up With  Details  Why  Contact Info   Annette Mathias MD  On 5/3/2018  12:15 pm   [address]  [address]  [place] [address]  [phone]   M. Juan Daniel Sierra MD  On 4/24/2018  8:00 am please bring your insurance   [id number]  [place] [address]  [phone]        TN will fax d/c summ to pcp at               04/17/18 8343   Final Note   Assessment Type Final Discharge Note   Discharge Disposition Home   What phone number can be called within the next 1-3 days to see how you are doing after discharge? [phone]   Hospital Follow Up  Appt(s) scheduled? Yes   Discharge plans and expectations educations in teach back method with documentation complete? Yes   Right Care Referral Info   Post Acute Recommendation No Care   Referral Type (no care   )

## 2018-04-17 NOTE — PROGRESS NOTES
Patient discharged per MD orders. Patient verbalized understanding of discharge instructions. PIV discontinued per MD orders. Catheter tip intact and pressure dressing applied. Awaiting transport to [facility] patient down to main lobby.

## 2018-04-17 NOTE — PLAN OF CARE
Problem: Patient Care Overview  Goal: Plan of Care Review  Outcome: Ongoing (interventions implemented as appropriate)  Pt awake and alert. IV antibioticus given per orders. Pt with one episode of vomiting when getting up from bathroom . Pt with two scant bowel movements but passing gas.  Pt with poor intake. Archana urine. Pt oob to chair and ambulating around hallways.

## 2018-04-17 NOTE — PLAN OF CARE
Problem: Patient Care Overview  Goal: Plan of Care Review  Outcome: Ongoing (interventions implemented as appropriate)   patient AAox4. Wife at bedside. patient with complaints of cramping to abd. Subsided by this AM. No n/v on this shift. Passing gas. Last bm on 4/16. abd tender and rounded but soft. Ambulating using urinal. Iv abx given as ordered. Plan of care discussed. Diet advanced from clears to regular per patient request this AM. Safety ensured. Will continue to monitor    Problem: Fall Risk (Adult)  Goal: Absence of Falls  Patient will demonstrate the desired outcomes by discharge/transition of care.   Outcome: Outcome(s) achieved Date Met: 04/17/18       Problem: Pain, Acute (Adult)  Goal: Identify Related Risk Factors and Signs and Symptoms  Related risk factors and signs and symptoms are identified upon initiation of Human Response Clinical Practice Guideline (CPG)   Outcome: Ongoing (interventions implemented as appropriate)

## 2018-04-17 NOTE — DISCHARGE SUMMARY
[facility]  General Surgery  Discharge Summary      Patient Name: Mario Castro Sr.  MRN: [id number]  Admission Date: 4/12/2018  Hospital Length of Stay: 5 days  Discharge Date and Time:  04/17/2018 12:16 PM  Attending Physician: No att. providers found   Discharging Provider: Matthew Flores MD  Primary Care Provider: JANEL Sierra MD     HPI: recurrent divertivulitis    * No surgery found *     Hospital Course:   The patient was admitted on 4/12 with findings of recurrent diverticulitis. He received conservative management with IV abx. He did not require drainage or surgery during this admission. By the time of discharge his pain had resolved, he was afebrile, his WBC had normalized, and he was tolerating a diet. He is being discharged home with oral abx and will be scheduled for elective colon resection as an outpatient.    Consults:   none    Significant Diagnostic Studies: as above    Pending Diagnostic Studies:     None        Final Active Diagnoses:    Diagnosis Date Noted POA    PRINCIPAL PROBLEM:  Diverticulitis of colon with perforation [K57.20] 04/12/2018 Yes    LLQ pain [R10.32] 04/14/2018 Yes      Problems Resolved During this Admission:    Diagnosis Date Noted Date Resolved POA      Discharged Condition: stable    Disposition: Home or Self Care    Follow Up:  Follow-up Information     Annette Mathias MD In 2 weeks.    Specialty:  General Surgery  Contact information:  [address]  [address] [place] [address] [phone]                 Patient Instructions:     Diet Adult Regular     Activity as tolerated       Medications:  Reconciled Home Medications:      Medication List      START taking these medications    ciprofloxacin HCl 500 MG tablet  Commonly known as:  CIPRO  Take 1 tablet (500 mg total) by mouth every 12 (twelve) hours.     metroNIDAZOLE 500 MG tablet  Commonly known as:  FLAGYL  Take 1 tablet (500 mg total) by mouth every 8 (eight) hours.     traMADol 50  mg tablet  Commonly known as:  ULTRAM  Take 1 tablet (50 mg total) by mouth every 6 (six) hours as needed for Pain.        CHANGE how you take these medications    senna-docusate 8.6-50 mg 8.6-50 mg per tablet  Commonly known as:  PERICOLACE  Take 1 tablet by mouth 2 (two) times daily.  What changed:  · when to take this  · reasons to take this        CONTINUE taking these medications    acetaminophen 500 MG tablet  Commonly known as:  TYLENOL  Take 500 mg by mouth every 6 (six) hours as needed for Pain.     enalapril 10 MG tablet  Commonly known as:  VASOTEC  Take 10 mg by mouth once daily.            Matthew Flores MD  General Surgery  [facility]

## 2018-04-17 NOTE — PROGRESS NOTES
.Pharmacy New Medication Education    Patient accepted medication education.    Pharmacy educated patient on name and purpose of medications and possible side effects, using the teach-back method.     Current Inpatient Medication Orders   acetaminophen tablet 650 mg   bisacodyl suppository 10 mg   diphenhydrAMINE injection 25 mg   fluconazole (DIFLUCAN) IVPB 400 mg 200 mL   hydrALAZINE injection 10 mg   ketorolac injection 30 mg   linezolid 600mg/300ml IVPB 600 mg   metoclopramide HCl 5 mg/5 mL solution 10 mg   ondansetron injection 4 mg   pantoprazole 40 mg in dextrose 5 % 100 mL infusion (ready to mix system)   piperacillin-tazobactam 4.5 g in dextrose 5 % 100 mL IVPB (ready to mix system)   polyethylene glycol packet 17 g   promethazine (PHENERGAN) 6.25 mg in dextrose 5 % 50 mL IVPB   sodium chloride 0.9% flush 3 mL       Learners of pharmacy medication education included:  Patient    Patient +/- learner response:  Verbalized Understanding, Teachback

## 2018-04-18 ENCOUNTER — PATIENT OUTREACH (OUTPATIENT)
Dept: ADMINISTRATIVE | Facility: CLINIC | Age: 48
End: 2018-04-18

## 2018-04-18 NOTE — PATIENT INSTRUCTIONS
Discharge Instructions for Diverticulitis  You have been diagnosed with diverticulitis. This is a condition in which small pouches form in your colon (large intestine) and become inflamed or infected. Follow the guidelines below for home care.  As you recover  Tips for recovery include:  · Eat a low-fiber diet. Your healthcare provider may advise a liquid diet. This gives your bowel a chance to rest so that it can recover.  · Foods to include: flake cereal, mashed potatoes, pancakes, waffles, pasta, white bread, rice, applesauce, bananas, eggs, fish, poultry, tofu, and well-cooked vegetables  · Take your medicines as directed. Do not stop taking the medicines, even if you feel better.  · Monitor your temperature and report any rising temperature to your healthcare provider.  · Take antibiotics exactly as directed. Do not miss any and keep taking them even if you feel better.   · Drink 6 to 8 glasses of water every day, unless directed otherwise.  · Use a heating pad or hot water bottle to reduce abdominal cramping or pain.  Preventing diverticulitis in the future  Tips for prevention include:  · Eat a high-fiber diet. Fiber adds bulk to the stool so that it passes through the large intestine more easily.  · Keep drinking 6 to 8 glasses of water every day, unless directed otherwise.  · Begin an exercise program. Ask your healthcare provider how to get started. You can benefit from simple activities such as walking or gardening.  · Treat diarrhea with a bland diet. Start with liquids only, then slowly add fiber over time.  · Watch for changes in your bowel movements (constipation to diarrhea).  · Avoid constipation with fiber and add a stool softener if needed.   · Get plenty of rest and sleep.  Follow-up care  Make a follow-up appointment as directed by our staff.  When to call your healthcare provider  Call your healthcare provider immediately if you have any of the following:  · Fever of 100.4°F (38.0°C) or  higher, or as directed by your healthcare provider  · Chills  · Severe cramps in the belly, most commonly the lower left side  · Tenderness in the belly, most commonly the lower left side  · Nausea and vomiting  · Bleeding from your rectum   Date Last Reviewed: 7/1/2016  © 3632-4274 [employer]. [address] [place] [address], [place] [address]. All rights reserved. This information is not intended as a substitute for professional medical care. Always follow your healthcare professional's instructions.

## 2018-05-03 ENCOUNTER — OFFICE VISIT (OUTPATIENT)
Dept: NEUROLOGY | Facility: HOSPITAL | Age: 48
End: 2018-05-03
Attending: SURGERY
Payer: COMMERCIAL

## 2018-05-03 VITALS
HEIGHT: 69 IN | BODY MASS INDEX: 32.11 KG/M2 | SYSTOLIC BLOOD PRESSURE: 118 MMHG | TEMPERATURE: 98 F | HEART RATE: 90 BPM | WEIGHT: 216.81 LBS | DIASTOLIC BLOOD PRESSURE: 82 MMHG

## 2018-05-03 DIAGNOSIS — K57.92 DIVERTICULITIS: Primary | ICD-10-CM

## 2018-05-03 PROCEDURE — 99213 OFFICE O/P EST LOW 20 MIN: CPT | Performed by: SURGERY

## 2018-05-03 RX ORDER — SODIUM CHLORIDE 9 MG/ML
INJECTION, SOLUTION INTRAVENOUS CONTINUOUS
Status: CANCELLED | OUTPATIENT
Start: 2018-05-03

## 2018-05-03 RX ORDER — METRONIDAZOLE 500 MG/100ML
500 INJECTION, SOLUTION INTRAVENOUS
Status: CANCELLED | OUTPATIENT
Start: 2018-05-03

## 2018-05-03 NOTE — PROGRESS NOTES
"[place]:  [facility]  A collaboration between [facility] and [facility]      PATIENT: Mario Castro Sr.  MRN: [id number]  DATE: 5/3/2018    Subjective:      Chief Complaint: Follow-up (follow up 2 weeks )   Mr. Castro is well known to the service as he was admitted with perforated diverticulitis in December was in the hospital for a long time undergoing percutaneous drainage.  And he was also on home antibiotics.  He was again admitted in January of this year.  Following discharge at the time I talked to him about the possibility of surgery as he has had a complicated course with diverticulitis.  Again he got admitted in April with perforated diverticulitis and which is managed with IV antibiotics.  Because of his multiple episodes of complicated diverticulitis recommendation is to have surgery on an elective setting.  Following discharge, he had a few days of GI upset now he is feeling okay  Eating well having normal bowel movements        Vitals:   Vitals:    05/03/18 1213   BP: 118/82   Pulse: 90   Temp: 98.4 °F (36.9 °C)   TempSrc: Oral   Weight: 98.4 kg (216 lb 13.2 oz)   Height: 5' 9" (1.753 m)        Karnofsky Score:     Diagnosis: No diagnosis found.     Oncologic History:     Interval History:     Past Medical History:  Past Medical History:   Diagnosis Date    Arthritis           to right hip    Diverticulitis of intestine     Hypertension        Past Surgical History:  Past Surgical History:   Procedure Laterality Date    COLONOSCOPY N/A 3/8/2018    Procedure: COLONOSCOPY;  Surgeon: Juno Curry MD;  Location: [facility];  Service: Endoscopy;  Laterality: N/A;       Family History:  Family History   Problem Relation Age of Onset    Hypertension Mother     Heart disease Mother     No Known Problems Father        Allergies:  Review of patient's allergies indicates:  No Known " Allergies    Medications:  Current Outpatient Prescriptions   Medication Sig Dispense Refill    acetaminophen (TYLENOL) 500 MG tablet Take 500 mg by mouth every 6 (six) hours as needed for Pain.      enalapril (VASOTEC) 10 MG tablet Take 10 mg by mouth once daily.      senna-docusate 8.6-50 mg (PERICOLACE) 8.6-50 mg per tablet Take 1 tablet by mouth 2 (two) times daily. (Patient taking differently: Take 1 tablet by mouth as needed. )       No current facility-administered medications for this visit.        Review of Systems   Constitutional: Positive for fatigue. Negative for appetite change, chills, diaphoresis, fever and unexpected weight change.   HENT: Negative for dental problem, drooling, ear discharge, facial swelling, hearing loss, nosebleeds, rhinorrhea, sinus pain, sneezing, tinnitus, trouble swallowing and voice change.    Eyes: Negative for photophobia, pain, redness, itching and visual disturbance.   Respiratory: Negative for cough, shortness of breath, wheezing and stridor.    Cardiovascular: Negative for chest pain, palpitations and leg swelling.   Gastrointestinal: Positive for abdominal distention, abdominal pain and diarrhea. Negative for blood in stool, nausea, rectal pain and vomiting.   Endocrine: Negative.    Musculoskeletal: Negative for arthralgias, back pain, gait problem and joint swelling.   Allergic/Immunologic: Negative.    Neurological: Negative for dizziness, syncope, facial asymmetry, speech difficulty and headaches.   Hematological: Negative.    Psychiatric/Behavioral: Negative.       Objective:      Physical Exam   Constitutional: He is oriented to person, place, and time. He appears well-developed and well-nourished. No distress.   HENT:   Head: Normocephalic and atraumatic.   Right Ear: External ear normal.   Left Ear: External ear normal.   Nose: Nose normal.   Mouth/Throat: Oropharynx is clear and moist.   Eyes: Conjunctivae and EOM are normal. Pupils are equal, round, and  reactive to light.   Neck: Normal range of motion. Neck supple. No JVD present. No tracheal deviation present. No thyromegaly present.   Cardiovascular: Normal rate and regular rhythm.    Pulmonary/Chest: Effort normal and breath sounds normal.   Abdominal: Soft. Bowel sounds are normal. He exhibits no distension and no mass. There is no guarding.   Musculoskeletal: Normal range of motion.   Lymphadenopathy:     He has no cervical adenopathy.   Neurological: He is alert and oriented to person, place, and time.   Skin: Skin is warm. He is not diaphoretic.      Assessment:       No diagnosis found.    Laboratory Data:    Results for DENICE STOREY SR. (MRN [id number]) as of 5/6/2018 15:02   Ref. Range 4/15/2018 04:55 4/16/2018 05:08 4/17/2018 06:12   WBC Latest Ref Range: 3.90 - 12.70 K/uL 4.62 4.65 5.14   RBC Latest Ref Range: 4.60 - 6.20 M/uL 4.41 (L) 4.32 (L) 4.34 (L)   Hemoglobin Latest Ref Range: 14.0 - 18.0 g/dL 12.1 (L) 11.9 (L) 11.9 (L)   Hematocrit Latest Ref Range: 40.0 - 54.0 % 37.6 (L) 36.7 (L) 36.7 (L)   MCV Latest Ref Range: 82 - 98 fL 85 85 85   MCH Latest Ref Range: 27.0 - 31.0 pg 27.4 27.5 27.4   MCHC Latest Ref Range: 32.0 - 36.0 g/dL 32.2 32.4 32.4   RDW Latest Ref Range: 11.5 - 14.5 % 13.9 13.6 13.6   Platelets Latest Ref Range: 150 - 350 K/uL 225 246 228   MPV Latest Ref Range: 9.2 - 12.9 fL 11.5 11.4 10.7   Gran% Latest Ref Range: 38.0 - 73.0 % 61.3 65.4 60.5   Gran # (ANC) Latest Ref Range: 1.8 - 7.7 K/uL 2.8 3.0 3.1   Lymph% Latest Ref Range: 18.0 - 48.0 % 23.6 21.9 24.7   Lymph # Latest Ref Range: 1.0 - 4.8 K/uL 1.1 1.0 1.3   Mono% Latest Ref Range: 4.0 - 15.0 % 12.3 11.0 11.3   Mono # Latest Ref Range: 0.3 - 1.0 K/uL 0.6 0.5 0.6   Eosinophil% Latest Ref Range: 0.0 - 8.0 % 2.2 1.1 2.5   Eos # Latest Ref Range: 0.0 - 0.5 K/uL 0.1 0.1 0.1   Basophil% Latest Ref Range: 0.0 - 1.9 % 0.4 0.4 1.0   Baso # Latest Ref Range: 0.00 - 0.20 K/uL 0.02 0.02 0.05   Sodium Latest Ref Range: 136 - 145 mmol/L 138  137 139   Potassium Latest Ref Range: 3.5 - 5.1 mmol/L 3.8 3.2 (L) 3.7   Chloride Latest Ref Range: 95 - 110 mmol/L 101 101 104   CO2 Latest Ref Range: 23 - 29 mmol/L 27 27 27   Anion Gap Latest Ref Range: 8 - 16 mmol/L 10 9 8   BUN, Bld Latest Ref Range: 6 - 20 mg/dL 7 6 5 (L)   Creatinine Latest Ref Range: 0.5 - 1.4 mg/dL 1.0 0.9 0.9   eGFR if non African American Latest Ref Range: >60 mL/min/1.73 m^2 >60 >60 >60   eGFR if  Latest Ref Range: >60 mL/min/1.73 m^2 >60 >60 >60   Glucose Latest Ref Range: 70 - 110 mg/dL 106 106 94   Calcium Latest Ref Range: 8.7 - 10.5 mg/dL 8.8 8.7 8.6 (L)   Alkaline Phosphatase Latest Ref Range: 55 - 135 U/L 45 (L) 44 (L) 41 (L)   Total Protein Latest Ref Range: 6.0 - 8.4 g/dL 6.6 6.5 6.4   Albumin Latest Ref Range: 3.5 - 5.2 g/dL 3.0 (L) 3.1 (L) 3.0 (L)   Total Bilirubin Latest Ref Range: 0.1 - 1.0 mg/dL 0.4 0.3 0.2   AST Latest Ref Range: 10 - 40 U/L 7 (L) 10 8 (L)   ALT Latest Ref Range: 10 - 44 U/L 9 (L) 8 (L) 6 (L)   Differential Method Unknown Automated Automated Automated       CT ABDOMEN PELVIS WITH CONTRAST    CLINICAL HISTORY:  LLQ pain, suspect diverticulitis; Left lower quadrant pain    TECHNIQUE:  Low dose axial CT images obtained throughout the region of the abdomen and pelvis following the administration of 100 cc Omnipaque 350 intravenous contrast.  Axial, sagittal and coronal reconstructions were performed.    COMPARISON:  Comparison made to multiple prior studies, the most recent 02/06/2018    FINDINGS:  Lung bases are clear and the visualized portions of the heart and mediastinum are unremarkable.    There is irregularly shaped rim enhancing collection of air and fluid in the mid abdomen most compatible with an abscess.  This measures on the order of 2.6 x 3.3 cm in maximal transverse dimension, over 5 cm in CC extent (sequence 4 image 55, sequence 601 image 26).  There is surrounding mesenteric stranding and edema.  Abrupt narrowing of the  traversing distal small bowel with proximal wall thickening and dilatation.  Multiple air-fluid levels.  Findings compatible with a small bowel obstruction.    There is an additional smaller tubular collection in the anterior aspect of the left lower quadrant extending superolaterally from the urinary bladder.  This may be in partial continuity with the larger midline collection.    Mild diffuse colonic diverticulosis.  Focal wall thickening in the sigmoid colon, may reflect sequela of diverticulitis.    Small volume of free intraperitoneal fluid extending along the right paracolic gutter and overlying the liver.    The liver, gallbladder, spleen, pancreas, and adrenal glands appear within normal limits.  Note is made of high density within the gallbladder, likely vicarious secretion of contrast.    Kidneys appear stable.  Small bilateral cysts. The ureters are decompressed. Urinary bladder unremarkable.      Aorta tapers normally throughout its visualized course.    Osseous structures exhibit mild degenerative changes. No fracture or focal osseous destructive lesion.   Impression       Reaccumulation of intra-abdominal abscess.  There are surrounding inflammatory changes, with a new associated small bowel obstruction.  Findings further detailed above.    This report was flagged in Epic as abnormal.      Electronically signed by: Gopal Chester MD  Date: 04/13/2018  Time: 15:29    Encounter     View Encounter          Si  Order Report      Order Details       Impression: recurrent attacks of complicated diverticulitis now quiet,    He was accompanied by his wife.  I strongly suggested that he should think about having surgery where we would proceed with laparoscopic left colon resection possible laparoscopic possible open.  The advantageous minimize the chance of any more complicated attack of diverticulitis.  Because he had a complicated course with abscess and drainage with resistant bugs I expect he may have a  lot of intra-abdominal adhesions.      I talked to him about surgery in the advantage of surgery versus conservative management.  He has failed conservative management as he was just discharged in January with a complicated course and again he came back in April with another episode of perforated diverticulitis .  I discussed briefly told him about the hospital stay and recovery time the risk of surgery.      Spent more than 40 minutes in examination discussion and counseling  Plan:         Need surgery    Information about the surgery the recovery time the risk and benefits  We'll see him in the clinic in 2 weeks' time so that the current episode completely  has resolved  We'll plan for lap possible open sigmoid colectomy              PRINCE Bryant MD, FACS   Associate Professor of Surgery, [facility]   Neuroendocrine Surgery, Hepatic/Pancreatic & General Surgery   [address]   [place] [address]   ph. [phone]; [phone]   fax. [phone]

## 2018-05-03 NOTE — PATIENT INSTRUCTIONS
Patient Instructions       Take a Hibiclens shower twice a day for 3 days prior to surgery, including the morning of surgery.    Gargle with Listerine twice a day for 2 days prior to surgery, including the morning of surgery.      5/21/2018   Appointment with Dr. Bryant   Pre [place] for Hospital Admission - Go to 1st floor of the hospital-admitting desk. You will do paper work, get blood drawn,  get x-rays and see Anesthesia at this time.     5/21/2018   CLEAR LIQUIDS all day   Start bowel prep  Ducolax Laxative tablets - 2 tablets at 12 noon  Magnesium Citrate - 1 bottle at 2 pm   Do not eat or drink anything after midnight.                 5/22/2018   Hospital Admission for surgery.  Report to 2nd floor, Same Day Surgery desk at 5:30am   Surgery is scheduled for 7:00am        [facility] [address]  [place]  [id number]  [phone]      INFORMATION REGARDING YOUR PROCEDURE      We look forward to serving you and your family and appreciate that you have chosen [facility] for your healthcare needs. Before, during, and after your surgery, you will be cared for by skilled medical professionals. Our surgeons, anesthesiologists, nurses,  and other healthcare professionals will work with you and your family to ensure a safe, smooth and comfortable surgery and recovery.    In order to best meet your pre-admission needs, your surgeon or ordering physicians office will contact our Scheduling Office at [phone] and schedule a Pre-Procedure Appointment.  This should preferably be done 72 hours or greater before your scheduled procedure date.     During your pre-procedure visit your insurance will be verified for your procedure. You will meet with a Registered Nurse and an Anesthesiologist or Nurse Anesthetist. If tests are required, they will be performed during your visit. Please allow about one and a half hours for this visit.      You will need to bring the  following information or items with you to your Pre-Procedure Appointment:    1.  Picture Identification  2.  Insurance Card  3.  Current list of medications to include name and dosage  4.  List of allergies  5.  Orders and any other forms your doctor has given to you  6.  Copies of lab results performed at other facilities. This may include blood work, EKGs or chest xrays.   These results can be faxed to [phone].    The Pre-Op Center Registration Desk is located on the first floor of the hospital ([address] [place] [address]) in the [facility].  The Pre-Operative Center is located on the second floor of the hospital. Someone will walk you from the registration area to the Pre-Operative Center.    Free parking is located in the front of the hospital and medical office building and is easily accessed from [address] and [address]. When you arrive, please check in at the main information desk of the hospital.    Please call Outpatient Surgery at [phone] after 12:00pm on the day before your procedure for your arrival time and updated procedure time.      Pre-Op Bathing Instructions    Before surgery, you can play an important role in your own health.    Because skin is not sterile, we need to be sure that your skin is as free of germs as possible. By following the instructions below, you can reduce the number of germs on your skin before surgery.    IMPORTANT: You will need to shower with a special soap called Hibiclens*, available at any pharmacy.  If you are allergic to Chlorhexidine (the antiseptic in Hibiclens), use an antibacterial soap such as Dial Soap for your preoperative shower.  You will shower with Hibiclens both the night before your surgery and the morning of your surgery.  Do not use Hibiclens on the head, face or genitals to avoid injury to those areas.    STEP #1: THE NIGHT BEFORE YOUR SURGERY     1. Do not shave the area of your body where your surgery will be  performed.  2. Shower and wash your hair and body as usual with your normal soap and shampoo.  3. Rinse your hair and body thoroughly after you shower to remove all soap residue.  4. With your hand, apply one packet of Hibiclens soap to the surgical site.   5. Wash the site gently for five (5) minutes. Do not scrub your skin too hard.   6. Do not wash with your regular soap after Hibiclens is used.  7. Rinse your body thoroughly.  8. Pat yourself dry with a clean, soft towel.  9. Do not use lotion, cream, or powder.  10. Wear clean clothes.    STEP #2: THE MORNING OF YOUR SURGERY     1. Repeat Step #1.    * Not to be used by people allergic to Chlorhexidine.

## 2018-05-07 ENCOUNTER — TELEPHONE (OUTPATIENT)
Dept: NEUROLOGY | Facility: HOSPITAL | Age: 48
End: 2018-05-07

## 2018-05-07 NOTE — TELEPHONE ENCOUNTER
Returned pts call. Pt wondering if sex could cause diverticulitis. Advised pt that it is unlikely that sex could cause his symptoms. Pt verbalizes understanding.

## 2018-05-07 NOTE — TELEPHONE ENCOUNTER
----- Message from Monique Wong sent at 5/7/2018 12:36 PM CDT -----  MEAGAN- Patient has a medical question for the nurse please call back at [phone].

## 2018-05-11 ENCOUNTER — TELEPHONE (OUTPATIENT)
Dept: NEUROLOGY | Facility: HOSPITAL | Age: 48
End: 2018-05-11

## 2018-05-11 NOTE — TELEPHONE ENCOUNTER
Patient authorized for 5/22-5/24 - Inpatient per [facility] at [facility] -- Auth# is [id number]  Thanks  abc

## 2018-05-16 ENCOUNTER — ANESTHESIA EVENT (OUTPATIENT)
Dept: SURGERY | Facility: HOSPITAL | Age: 48
DRG: 330 | End: 2018-05-16
Payer: COMMERCIAL

## 2018-05-17 ENCOUNTER — HOSPITAL ENCOUNTER (OUTPATIENT)
Dept: RADIOLOGY | Facility: HOSPITAL | Age: 48
Discharge: HOME | End: 2018-05-17
Attending: SURGERY
Payer: COMMERCIAL

## 2018-05-17 ENCOUNTER — OFFICE VISIT (OUTPATIENT)
Dept: NEUROLOGY | Facility: HOSPITAL | Age: 48
End: 2018-05-17
Attending: SURGERY
Payer: COMMERCIAL

## 2018-05-17 ENCOUNTER — CLINICAL SUPPORT (OUTPATIENT)
Dept: LAB | Facility: HOSPITAL | Age: 48
End: 2018-05-17
Attending: SURGERY
Payer: COMMERCIAL

## 2018-05-17 ENCOUNTER — HOSPITAL ENCOUNTER (OUTPATIENT)
Dept: PREADMISSION TESTING | Facility: HOSPITAL | Age: 48
Discharge: HOME | End: 2018-05-17
Attending: SURGERY
Payer: COMMERCIAL

## 2018-05-17 VITALS
DIASTOLIC BLOOD PRESSURE: 86 MMHG | HEIGHT: 69 IN | TEMPERATURE: 98 F | BODY MASS INDEX: 32.03 KG/M2 | OXYGEN SATURATION: 99 % | HEART RATE: 57 BPM | WEIGHT: 216.25 LBS | SYSTOLIC BLOOD PRESSURE: 148 MMHG | RESPIRATION RATE: 18 BRPM

## 2018-05-17 VITALS
DIASTOLIC BLOOD PRESSURE: 86 MMHG | SYSTOLIC BLOOD PRESSURE: 127 MMHG | BODY MASS INDEX: 32.16 KG/M2 | TEMPERATURE: 98 F | HEART RATE: 78 BPM | HEIGHT: 69 IN | WEIGHT: 217.13 LBS

## 2018-05-17 DIAGNOSIS — K57.92 DIVERTICULITIS: Primary | ICD-10-CM

## 2018-05-17 DIAGNOSIS — K57.92 DIVERTICULITIS: ICD-10-CM

## 2018-05-17 DIAGNOSIS — Z01.818 PRE-OP TESTING: Primary | ICD-10-CM

## 2018-05-17 PROCEDURE — 93005 ELECTROCARDIOGRAM TRACING: CPT

## 2018-05-17 PROCEDURE — 99214 OFFICE O/P EST MOD 30 MIN: CPT | Mod: 25 | Performed by: SURGERY

## 2018-05-17 PROCEDURE — 71045 X-RAY EXAM CHEST 1 VIEW: CPT | Mod: TC,FY

## 2018-05-17 PROCEDURE — 71045 X-RAY EXAM CHEST 1 VIEW: CPT | Mod: 26,,, | Performed by: RADIOLOGY

## 2018-05-17 RX ORDER — NEOMYCIN SULFATE 500 MG/1
TABLET ORAL
Qty: 6 TABLET | Refills: 0 | Status: SHIPPED | OUTPATIENT
Start: 2018-05-17 | End: 2018-06-07

## 2018-05-17 RX ORDER — ERYTHROMYCIN 500 MG/1
TABLET, COATED ORAL
Qty: 6 TABLET | Refills: 0 | Status: SHIPPED | OUTPATIENT
Start: 2018-05-17 | End: 2018-06-07

## 2018-05-17 RX ORDER — ENOXAPARIN SODIUM 100 MG/ML
40 INJECTION SUBCUTANEOUS EVERY 24 HOURS
Status: CANCELLED | OUTPATIENT
Start: 2018-05-17

## 2018-05-17 RX ORDER — SODIUM CHLORIDE 9 MG/ML
INJECTION, SOLUTION INTRAVENOUS CONTINUOUS
Status: CANCELLED | OUTPATIENT
Start: 2018-05-17

## 2018-05-17 RX ORDER — SODIUM CHLORIDE, SODIUM LACTATE, POTASSIUM CHLORIDE, CALCIUM CHLORIDE 600; 310; 30; 20 MG/100ML; MG/100ML; MG/100ML; MG/100ML
INJECTION, SOLUTION INTRAVENOUS CONTINUOUS
Status: CANCELLED | OUTPATIENT
Start: 2018-05-17

## 2018-05-17 RX ORDER — LIDOCAINE HYDROCHLORIDE 10 MG/ML
1 INJECTION, SOLUTION EPIDURAL; INFILTRATION; INTRACAUDAL; PERINEURAL ONCE
Status: CANCELLED | OUTPATIENT
Start: 2018-05-17 | End: 2018-05-17

## 2018-05-17 RX ORDER — POLYETHYLENE GLYCOL 3350, SODIUM SULFATE ANHYDROUS, SODIUM BICARBONATE, SODIUM CHLORIDE, POTASSIUM CHLORIDE 236; 22.74; 6.74; 5.86; 2.97 G/4L; G/4L; G/4L; G/4L; G/4L
4 POWDER, FOR SOLUTION ORAL ONCE
Qty: 4000 ML | Refills: 0 | Status: SHIPPED | OUTPATIENT
Start: 2018-05-17 | End: 2018-05-17

## 2018-05-17 RX ORDER — METRONIDAZOLE 500 MG/100ML
500 INJECTION, SOLUTION INTRAVENOUS
Status: CANCELLED | OUTPATIENT
Start: 2018-05-17

## 2018-05-17 NOTE — ANESTHESIA PREPROCEDURE EVALUATION
05/17/2018  Mario Castro Sr. is a 48 y.o., male for laparoscopic low anterior colon resection under GET[facility] on 5-22-18    Past Medical History:   Diagnosis Date    Arthritis           to right hip    Diverticulitis of intestine     Hypertension        Past Surgical History:   Procedure Laterality Date    COLONOSCOPY N/A 3/8/2018    Procedure: COLONOSCOPY;  Surgeon: Juno Curry MD;  Location: [facility];  Service: Endoscopy;  Laterality: N/A;           Anesthesia Evaluation    I have reviewed the Patient Summary Reports.    I have reviewed the Nursing Notes.   I have reviewed the Medications.     Review of Systems  Anesthesia Hx:  No problems with previous Anesthesia  Denies Family Hx of Anesthesia complications.   Denies Personal Hx of Anesthesia complications.   Social:  Non-Smoker, No Alcohol Use    Hematology/Oncology:  Hematology Normal   Oncology Normal     EENT/Dental:EENT/Dental Normal   Cardiovascular:   Exercise tolerance: good Hypertension, well controlled Denies cp/sob  >4METS  Walks 2-3 miles daily   Pulmonary:  Pulmonary Normal    Renal/:  Renal/ Normal     Hepatic/GI:   Bowel Prep. diverticulitis   Musculoskeletal:   Arthritis (right hip)  Steroid shot for right hip pain 3 months ago   Neurological:  Neurology Normal    Endocrine:  Endocrine Normal    Psych:  Psychiatric Normal           Physical Exam  General:  Obesity    Airway/Jaw/Neck:  Airway Findings: Mouth Opening: Normal Tongue: Normal  General Airway Assessment: Adult  Mallampati: I  Improves to I with phonation.  TM Distance: 4 - 6 cm  Jaw/Neck Findings:  Neck ROM: Normal ROM      Dental:  Dental Findings: In tact, Periodontal disease, Mild   Chest/Lungs:  Chest/Lungs Findings: Clear to auscultation, Normal Respiratory Rate     Heart/Vascular:  Heart Findings: Rate: Normal  Rhythm: Regular Rhythm  Sounds: Normal         Mental Status:  Mental Status Findings:  Cooperative, Alert and Oriented         Anesthesia Plan  Type of Anesthesia, risks & benefits discussed:  Anesthesia Type:  general  Patient's Preference:   Intra-op Monitoring Plan:   Intra-op Monitoring Plan Comments:   Post Op Pain Control Plan:   Post Op Pain Control Plan Comments:   Induction:   IV  Beta Blocker:  Patient is not currently on a Beta-Blocker (No further documentation required).       Informed Consent: Patient understands risks and agrees with Anesthesia plan.  Questions answered.   ASA Score: 2     Day of Surgery Review of History & Physical:            Ready For Surgery From Anesthesia Perspective.

## 2018-05-17 NOTE — PATIENT INSTRUCTIONS
Patient Instructions       Take a Hibiclens shower twice a day for 3 days prior to surgery, including the morning of surgery.   Gargle with Listerine twice a day for 2 days prior to surgery, including the morning of surgery.      Today     Appointment with Dr. Bryant   Pre [place] for Hospital Admission - Go to 1st floor of the hospital-admitting desk. You will do paper work, get blood drawn,  get x-rays and see Anesthesia at this time.     Monday   CLEAR LIQUIDS all day   Start bowel prep  Ducolax Laxative tablets - 2 tablets at 12 noon  Magnesium Citrate - 1 bottle at 2 pm   Do not eat or drink anything after midnight.   Antibiotics sent to your pharmacy                 Tuesday   Hospital Admission for surgery.  Report to 2nd floor, Same Day Surgery desk at 5:30am   Surgery is scheduled for 7:00am        [facility] [address]  [place]  [address]  [phone]      INFORMATION REGARDING YOUR PROCEDURE      We look forward to serving you and your family and appreciate that you have chosen [facility] for your healthcare needs. Before, during, and after your surgery, you will be cared for by skilled medical professionals. Our surgeons, anesthesiologists, nurses,  and other healthcare professionals will work with you and your family to ensure a safe, smooth and comfortable surgery and recovery.    In order to best meet your pre-admission needs, your surgeon or ordering physicians office will contact our Scheduling Office at [phone] and schedule a Pre-Procedure Appointment.  This should preferably be done 72 hours or greater before your scheduled procedure date.     During your pre-procedure visit your insurance will be verified for your procedure. You will meet with a Registered Nurse and an Anesthesiologist or Nurse Anesthetist. If tests are required, they will be performed during your visit. Please allow about one and a half hours for this visit.       You will need to bring the following information or items with you to your Pre-Procedure Appointment:    1.  Picture Identification  2.  Insurance Card  3.  Current list of medications to include name and dosage  4.  List of allergies  5.  Orders and any other forms your doctor has given to you  6.  Copies of lab results performed at other facilities. This may include blood work, EKGs or chest xrays.   These results can be faxed to [phone].    The Pre-Op Center Registration Desk is located on the first floor of the hospital ([address] [place] [address]) in the [facility].  The Pre-Operative Center is located on the second floor of the hospital. Someone will walk you from the registration area to the Pre-Operative Center.    Free parking is located in the front of the hospital and medical office building and is easily accessed from [address] and [address]. When you arrive, please check in at the main information desk of the hospital.    Please call Outpatient Surgery at [phone] after 12:00pm on the day before your procedure for your arrival time and updated procedure time.      Pre-Op Bathing Instructions    Before surgery, you can play an important role in your own health.    Because skin is not sterile, we need to be sure that your skin is as free of germs as possible. By following the instructions below, you can reduce the number of germs on your skin before surgery.    IMPORTANT: You will need to shower with a special soap called Hibiclens*, available at any pharmacy.  If you are allergic to Chlorhexidine (the antiseptic in Hibiclens), use an antibacterial soap such as Dial Soap for your preoperative shower.  You will shower with Hibiclens both the night before your surgery and the morning of your surgery.  Do not use Hibiclens on the head, face or genitals to avoid injury to those areas.    STEP #1: THE NIGHT BEFORE YOUR SURGERY     1. Do not shave the area of your body where  your surgery will be performed.  2. Shower and wash your hair and body as usual with your normal soap and shampoo.  3. Rinse your hair and body thoroughly after you shower to remove all soap residue.  4. With your hand, apply one packet of Hibiclens soap to the surgical site.   5. Wash the site gently for five (5) minutes. Do not scrub your skin too hard.   6. Do not wash with your regular soap after Hibiclens is used.  7. Rinse your body thoroughly.  8. Pat yourself dry with a clean, soft towel.  9. Do not use lotion, cream, or powder.  10. Wear clean clothes.    STEP #2: THE MORNING OF YOUR SURGERY     1. Repeat Step #1.    * Not to be used by people allergic to Chlorhexidine.

## 2018-05-17 NOTE — DISCHARGE INSTRUCTIONS
Your surgery is scheduled for  5/22/2018     Please report to Procedure Check-in Room on the 2nd floor at 5:30  A.M.          INSTRUCTIONS IMPORTANT!!!  ¨ Do not eat or drink after 12 midnight-including water. OK to brush teeth, no   gum, candy or mints!    ¨ Take only these medicines with a small swallow of water-morning of surgery: enalapril        ____  Please remove all jewelry, including piercings and leave at home.  ____  No money or valuables needed. Please leave at home.  ____  If going home the same day, arrange for a ride home. You will not be able to             drive if Anesthesia was used.  ____  Wear loose fitting clothing. Allow for dressings, bandages.  ____  Stop Aspirin, Ibuprofen, Motrin and Aleve at least 3-5 days before surgery, unless otherwise instructed by your doctor, or the nurse.            You MAY use Tylenol/acetaminophen until day of surgery.  ____ Stop taking any Fish Oil supplements or any Vitamins that contain Vitamin E at least 5 days prior to surgery.  ____  If you take diabetic medication, do not take am of surgery unless instructed by Doctor.  ____  Call MD for temperature above 101 degrees.  ____ Do Not wear your contact lenses the day of your procedure.  You may wear your glasses.        I have read or had read and explained to me, and understand the above information.  Additional comments or instructions:  For additional questions call [phone]     Take a Hibiclens shower the night before and  the morning of surgery.    NO powders, creams, or lotions on your skin the day of surgery.            Anesthesia: General Anesthesia     You are watched continuously during your procedure by your anesthesia provider.     Youre due to have surgery. During surgery, youll be given medicine called anesthesia or anesthetic. This will keep you comfortable and pain-free. Your anesthesia provider will use general anesthesia.  What is general anesthesia?  General anesthesia puts you into a  state like deep sleep. It goes into the bloodstream (IV anesthetics), into the lungs (gas anesthetics), or both. You feel nothing during the procedure. You will not remember it. During the procedure, the anesthesia provider monitors you continuously. He or she checks your heart rate and rhythm, blood pressure, breathing, and blood oxygen.  · IV anesthetics. IV anesthetics are given through an IV line in your arm. Theyre often given first. This is so you are asleep before a gas anesthetic is started. Some kinds of IV anesthetics relieve pain. Others relax you. Your doctor will decide which kind is best in your case.  · Gas anesthetics. Gas anesthetics are breathed into the lungs. They are often used to keep you asleep. They can be given through a facemask or a tube placed in your larynx or trachea (breathing tube).  ¨ If you have a facemask, your anesthesia provider will most likely place it over your nose and mouth while youre still awake. Youll breathe oxygen through the mask as your IV anesthetic is started. Gas anesthetic may be added through the mask.  ¨ If you have a tube in the larynx or trachea, it will be inserted into your throat after youre asleep.  Anesthesia tools and medicines  You will likely have:  · IV anesthetics. These are put into an IV line into your bloodstream.  · Gas anesthetics. You breathe these anesthetics into your lungs, where they pass into your bloodstream.  · Pulse oximeter. This is a small clip that is attached to the end of your finger. This measures your blood oxygen level.  · Electrocardiography leads (electrodes). These are small sticky pads that are placed on your chest. They record your heart rate and rhythm.  · Blood pressure cuff. This reads your blood pressure.  Risks and possible complications  General anesthesia has some risks. These include:  · Breathing problems  · Nausea and vomiting  · Sore throat or hoarseness (usually temporary)  · Allergic reaction to the  anesthetic  · Irregular heartbeat (rare)  · Cardiac arrest (rare)   Anesthesia safety  · Follow all instructions you are given for how long not to eat or drink before your procedure.  · Be sure your doctor knows what medicines and drugs you take. This includes over-the-counter medicines, herbs, supplements, alcohol or other drugs. You will be asked when those were last taken.  · Have an adult family member or friend drive you home after the procedure.  · For the first 24 hours after your surgery:  ¨ Do not drive or use heavy equipment.  ¨ Do not make important decisions or sign legal documents. If important decisions or signing legal documents is necessary during the first 24 hours after surgery, have a trusted family member or spouse act on your behalf.  ¨ Avoid alcohol.  ¨ Have a responsible adult stay with you. He or she can watch for problems and help keep you safe.  Date Last Reviewed: 12/1/2016  © 3499-7730 [employer]. [address] [place] [address], [place] [address]. All rights reserved. This information is not intended as a substitute for professional medical care. Always follow your healthcare professional's instructions.

## 2018-05-17 NOTE — PROGRESS NOTES
"NOLANETS:  [facility]  A collaboration between [facility] and [facility]      PATIENT: Mario Castro Sr.  MRN: [id number]  DATE: 5/17/2018    Subjective:      Chief Complaint: Follow-up (pre-op/sign cosents/colon resection)   Mr. Castro is here for preoperative visit.  He is well known to service as he is status post multiple admissions for perforated diverticulitis.  He has had a complicated course of diverticulitis requiring prolonged hospitalization percutaneous drainage prolonged antibiotic treatment and multiple admissions for perforations.  He was managed conservative along and he has been clearly instructed he may benefit from having the surgery.  He was recently here about 4 weeks back and was discharged and since now he  Is not in the acute phase, he is here for evaluation for a elective sigmoid resection.  Still has a vague left lower quadrant pain but pain lot better no fever no diarrhea are any other issues.  And she is about surgery      Vitals:   Vitals:    05/17/18 1048   BP: 127/86   Pulse: 78   Temp: 98.3 °F (36.8 °C)   TempSrc: Oral   Weight: 98.5 kg (217 lb 2.5 oz)   Height: 5' 9" (1.753 m)        Karnofsky Score:     Diagnosis: No diagnosis found.     Oncologic History:     Interval History:     Past Medical History:  Past Medical History:   Diagnosis Date    Arthritis           to right hip    Diverticulitis of intestine     Hypertension        Past Surgical History:  Past Surgical History:   Procedure Laterality Date    COLONOSCOPY N/A 3/8/2018    Procedure: COLONOSCOPY;  Surgeon: Juno Curry MD;  Location: [facility];  Service: Endoscopy;  Laterality: N/A;       Family History:  Family History   Problem Relation Age of Onset    Hypertension Mother     Heart disease Mother     No Known Problems Father        Allergies:  Review of patient's allergies indicates:  No Known " Allergies    Medications:  Current Outpatient Prescriptions   Medication Sig Dispense Refill    acetaminophen (TYLENOL) 500 MG tablet Take 500 mg by mouth every 6 (six) hours as needed for Pain.      enalapril (VASOTEC) 10 MG tablet Take 10 mg by mouth once daily.       No current facility-administered medications for this visit.        Review of Systems   Constitutional: Positive for fatigue and unexpected weight change. Negative for activity change, appetite change, chills, diaphoresis and fever.   HENT: Negative for congestion, dental problem, drooling, ear discharge, ear pain, facial swelling, hearing loss, mouth sores, nosebleeds, postnasal drip, rhinorrhea, sinus pain, sinus pressure, sore throat, tinnitus and trouble swallowing.    Eyes: Negative for pain, discharge, redness, itching and visual disturbance.   Respiratory: Negative for apnea, cough, choking, chest tightness, shortness of breath, wheezing and stridor.    Cardiovascular: Negative for chest pain, palpitations and leg swelling.   Gastrointestinal: Positive for abdominal distention. Negative for anal bleeding, blood in stool, constipation, diarrhea, nausea and vomiting.   Endocrine: Negative.    Genitourinary: Negative.    Musculoskeletal: Negative for arthralgias, back pain, neck pain and neck stiffness.   Skin: Negative.    Allergic/Immunologic: Negative.    Neurological: Negative for dizziness, tremors, seizures, syncope, facial asymmetry, speech difficulty, weakness, light-headedness, numbness and headaches.   Hematological: Negative.    Psychiatric/Behavioral: Negative.       Objective:      Physical Exam   Constitutional: He appears well-developed and well-nourished. No distress.   HENT:   Head: Normocephalic.   Right Ear: External ear normal.   Left Ear: External ear normal.   Nose: Nose normal.   Eyes: Conjunctivae and EOM are normal. Pupils are equal, round, and reactive to light. Left eye exhibits no discharge. No scleral icterus.    Neck: Normal range of motion. Neck supple. No tracheal deviation present. No thyromegaly present.   Cardiovascular: Normal rate and regular rhythm.    Pulmonary/Chest: Effort normal and breath sounds normal.   Abdominal: Soft. Bowel sounds are normal. He exhibits no distension and no mass. There is no tenderness. There is no rebound and no guarding. No hernia.   Pendulous  Striae over the abdominal wall consistent with a loss of weight   Neurological: He is alert.   Skin: Skin is warm. He is not diaphoretic.   Psychiatric: He has a normal mood and affect. His behavior is normal.      Assessment:       No diagnosis found.    Laboratory Data:  Results for DENICE STOREY SR. (MRN [id number]) as of 5/17/2018 12:58   Ref. Range 4/14/2018 05:20 4/15/2018 04:55 4/16/2018 05:08 4/17/2018 06:12 5/17/2018 12:42   WBC Latest Ref Range: 3.90 - 12.70 K/uL 8.89 4.62 4.65 5.14    RBC Latest Ref Range: 4.60 - 6.20 M/uL 4.62 4.41 (L) 4.32 (L) 4.34 (L)    Hemoglobin Latest Ref Range: 14.0 - 18.0 g/dL 12.8 (L) 12.1 (L) 11.9 (L) 11.9 (L)    Hematocrit Latest Ref Range: 40.0 - 54.0 % 39.2 (L) 37.6 (L) 36.7 (L) 36.7 (L)    MCV Latest Ref Range: 82 - 98 fL 85 85 85 85    MCH Latest Ref Range: 27.0 - 31.0 pg 27.7 27.4 27.5 27.4    MCHC Latest Ref Range: 32.0 - 36.0 g/dL 32.7 32.2 32.4 32.4    RDW Latest Ref Range: 11.5 - 14.5 % 14.3 13.9 13.6 13.6    Platelets Latest Ref Range: 150 - 350 K/uL 200 225 246 228    MPV Latest Ref Range: 9.2 - 12.9 fL 11.4 11.5 11.4 10.7    Gran% Latest Ref Range: 38.0 - 73.0 % 74.8 (H) 61.3 65.4 60.5    Gran # (ANC) Latest Ref Range: 1.8 - 7.7 K/uL 6.6 2.8 3.0 3.1    Lymph% Latest Ref Range: 18.0 - 48.0 % 15.0 (L) 23.6 21.9 24.7    Lymph # Latest Ref Range: 1.0 - 4.8 K/uL 1.3 1.1 1.0 1.3    Mono% Latest Ref Range: 4.0 - 15.0 % 9.4 12.3 11.0 11.3    Mono # Latest Ref Range: 0.3 - 1.0 K/uL 0.8 0.6 0.5 0.6    Eosinophil% Latest Ref Range: 0.0 - 8.0 % 0.4 2.2 1.1 2.5    Eos # Latest Ref Range: 0.0 - 0.5 K/uL 0.0  0.1 0.1 0.1    Basophil% Latest Ref Range: 0.0 - 1.9 % 0.2 0.4 0.4 1.0    Baso # Latest Ref Range: 0.00 - 0.20 K/uL 0.02 0.02 0.02 0.05    Sodium Latest Ref Range: 136 - 145 mmol/L 136 138 137 139    Potassium Latest Ref Range: 3.5 - 5.1 mmol/L 3.7 3.8 3.2 (L) 3.7    Chloride Latest Ref Range: 95 - 110 mmol/L 102 101 101 104    CO2 Latest Ref Range: 23 - 29 mmol/L 26 27 27 27    Anion Gap Latest Ref Range: 8 - 16 mmol/L 8 10 9 8    BUN, Bld Latest Ref Range: 6 - 20 mg/dL 7 7 6 5 (L)    Creatinine Latest Ref Range: 0.5 - 1.4 mg/dL 0.9 1.0 0.9 0.9    eGFR if non African American Latest Ref Range: >60 mL/min/1.73 m^2 >60 >60 >60 >60    eGFR if  Latest Ref Range: >60 mL/min/1.73 m^2 >60 >60 >60 >60    Glucose Latest Ref Range: 70 - 110 mg/dL 139 (H) 106 106 94    Calcium Latest Ref Range: 8.7 - 10.5 mg/dL 8.9 8.8 8.7 8.6 (L)    Alkaline Phosphatase Latest Ref Range: 55 - 135 U/L 63 45 (L) 44 (L) 41 (L)    Total Protein Latest Ref Range: 6.0 - 8.4 g/dL 6.9 6.6 6.5 6.4    Albumin Latest Ref Range: 3.5 - 5.2 g/dL 3.1 (L) 3.0 (L) 3.1 (L) 3.0 (L)    Total Bilirubin Latest Ref Range: 0.1 - 1.0 mg/dL 0.4 0.4 0.3 0.2    AST Latest Ref Range: 10 - 40 U/L 7 (L) 7 (L) 10 8 (L)    ALT Latest Ref Range: 10 - 44 U/L 8 (L) 9 (L) 8 (L) 6 (L)    XR CHEST 1 VIEW Unknown     Rpt   Differential Method Unknown Automated Automated Automated Automated         Narrative     EXAMINATION:  CT ABDOMEN PELVIS WITH CONTRAST    CLINICAL HISTORY:  LLQ pain, suspect diverticulitis; Left lower quadrant pain    TECHNIQUE:  Low dose axial CT images obtained throughout the region of the abdomen and pelvis following the administration of 100 cc Omnipaque 350 intravenous contrast.  Axial, sagittal and coronal reconstructions were performed.    COMPARISON:  Comparison made to multiple prior studies, the most recent 02/06/2018    FINDINGS:  Lung bases are clear and the visualized portions of the heart and mediastinum are unremarkable.    There  is irregularly shaped rim enhancing collection of air and fluid in the mid abdomen most compatible with an abscess.  This measures on the order of 2.6 x 3.3 cm in maximal transverse dimension, over 5 cm in CC extent (sequence 4 image 55, sequence 601 image 26).  There is surrounding mesenteric stranding and edema.  Abrupt narrowing of the traversing distal small bowel with proximal wall thickening and dilatation.  Multiple air-fluid levels.  Findings compatible with a small bowel obstruction.    There is an additional smaller tubular collection in the anterior aspect of the left lower quadrant extending superolaterally from the urinary bladder.  This may be in partial continuity with the larger midline collection.    Mild diffuse colonic diverticulosis.  Focal wall thickening in the sigmoid colon, may reflect sequela of diverticulitis.    Small volume of free intraperitoneal fluid extending along the right paracolic gutter and overlying the liver.    The liver, gallbladder, spleen, pancreas, and adrenal glands appear within normal limits.  Note is made of high density within the gallbladder, likely vicarious secretion of contrast.    Kidneys appear stable.  Small bilateral cysts. The ureters are decompressed. Urinary bladder unremarkable.      Aorta tapers normally throughout its visualized course.    Osseous structures exhibit mild degenerative changes. No fracture or focal osseous destructive lesion.   Impression       Reaccumulation of intra-abdominal abscess.  There are surrounding inflammatory changes, with a new associated small bowel obstruction.  Findings further detailed above.    This report was flagged in Epic as abnormal.              Impression: Recurrent attacks of complicated diverticulitis with a recent being about 3-4 weeks back.  He is only 48-year-old and has a complicated course of diverticulitis requiring multiple hospital admission at admissions and drainage of the diverticulitis abscess.   During his admission in December he had also in the hospital for a long time with multiple drainages.      I had a long discussion with him and his wife and told him about the need for surgery as he has multiple attacks.  I showed him the picture on the monitor about how surgery would be done the plan would be to do laparoscopic sigmoid resection i.e. I clearly told him the purpose of surgery is decrease the chance of diverticulitis and not eradicate.  I then went over with him the risk of surgery such as bleeding infection DVT PE MI stroke anastomotic leaks requiring reoperations colostomy and need in case of abscess in the pelvis at the time of surgery are postoperative anastomotic leak, the chance of ureteral injury, the chance of conversion to open was open surgery if she had a lot of adhesions the chance of intravitreous fistula Heafey a lot of adhesions from small bowel adhesions.      I also talked to Dr. Hernandez this morning and the plan is to proceed with cystoscopy and left ureteral double-J stent placement which will stay for about 4-6 weeks time.      I then gave preop instructions of Liquid diet the day before surgery and I'm and bowel laxatives the day before and to take oral antibiotics to decrease the chance of superficial skin infection.    He was very anxious and nervous about the procedure I.  I counseled him.     The plan will be to proceed with cystoscopy and stent placement, attempt laparoscopic colon resection if not possible to open the possibility of colostomy at the time of surgery was discussed and the chance of conversion to open surgery was also discussed.        Plan:       For surgery on Tuesday  Nothing by mouth after Monday midnight   Liquid diet on Monday   GoLYTELY to be taken on Monday afternoon   Neomycin and erythromycin 1 g each at 4 7:10 PM the day before surgery     Discussed the benefits and risk and consent was obtained .  Spent over 35 minutes in in explaining counseling  and discussing the risks and benefits of surgery               PRINCE Bryant MD, FACS   Associate Professor of Surgery, [facility]   Neuroendocrine Surgery, Hepatic/Pancreatic & General Surgery   [address], [address]   [place] [address]   ph. [phone]; [phone]   fax. [phone]

## 2018-05-18 ENCOUNTER — TELEPHONE (OUTPATIENT)
Dept: NEUROLOGY | Facility: HOSPITAL | Age: 48
End: 2018-05-18

## 2018-05-18 NOTE — TELEPHONE ENCOUNTER
----- Message from Monique Wong sent at 5/18/2018 11:20 AM CDT -----  MEAGAN- Please contact [employer] [phone] claim#[id number].

## 2018-05-21 ENCOUNTER — TELEPHONE (OUTPATIENT)
Dept: NEUROLOGY | Facility: HOSPITAL | Age: 48
End: 2018-05-21

## 2018-05-21 NOTE — TELEPHONE ENCOUNTER
----- Message from Aster Melendez sent at 5/21/2018  9:44 AM CDT -----  Contact: Patient  MEAGAN--Patient is calling in regards to medication questions and paperwork before his surgery tomorrow.  Call back number is [phone].

## 2018-05-21 NOTE — TELEPHONE ENCOUNTER
Returned pts call. Pt inquiring if he can take tylenol today. Advised pt that this is OK, just no ibuprofen. Also informed that STD paperwork received and completed. Also reviewed clear liquid diet.

## 2018-05-22 ENCOUNTER — SURGERY (OUTPATIENT)
Age: 48
End: 2018-05-22

## 2018-05-22 ENCOUNTER — ANESTHESIA (OUTPATIENT)
Dept: SURGERY | Facility: HOSPITAL | Age: 48
DRG: 330 | End: 2018-05-22
Payer: COMMERCIAL

## 2018-05-22 ENCOUNTER — HOSPITAL ENCOUNTER (INPATIENT)
Facility: HOSPITAL | Age: 48
LOS: 4 days | Discharge: HOME | DRG: 330 | End: 2018-05-26
Attending: SURGERY | Admitting: SURGERY
Payer: COMMERCIAL

## 2018-05-22 DIAGNOSIS — K57.20 DIVERTICULITIS OF LARGE INTESTINE WITH PERFORATION AND ABSCESS WITHOUT BLEEDING: ICD-10-CM

## 2018-05-22 DIAGNOSIS — K57.92 DIVERTICULITIS: ICD-10-CM

## 2018-05-22 PROBLEM — K57.32 DIVERTICULITIS LARGE INTESTINE: Status: ACTIVE | Noted: 2018-05-22

## 2018-05-22 PROCEDURE — 27201423 OPTIME MED/SURG SUP & DEVICES STERILE SUPPLY: Performed by: SURGERY

## 2018-05-22 PROCEDURE — 63600175 PHARM REV CODE 636 W HCPCS: Performed by: SURGERY

## 2018-05-22 PROCEDURE — 27000221 HC OXYGEN, UP TO 24 HOURS

## 2018-05-22 PROCEDURE — 36000710: Performed by: SURGERY

## 2018-05-22 PROCEDURE — 25000003 PHARM REV CODE 250: Performed by: ANESTHESIOLOGY

## 2018-05-22 PROCEDURE — S0028 INJECTION, FAMOTIDINE, 20 MG: HCPCS | Performed by: SURGERY

## 2018-05-22 PROCEDURE — 25500020 PHARM REV CODE 255: Performed by: UROLOGY

## 2018-05-22 PROCEDURE — 36000711: Performed by: SURGERY

## 2018-05-22 PROCEDURE — 74420 UROGRAPHY RTRGR +-KUB: CPT | Mod: 26,,, | Performed by: UROLOGY

## 2018-05-22 PROCEDURE — 52332 CYSTOSCOPY AND TREATMENT: CPT | Mod: LT,,, | Performed by: UROLOGY

## 2018-05-22 PROCEDURE — 25000003 PHARM REV CODE 250: Performed by: SURGERY

## 2018-05-22 PROCEDURE — 25000003 PHARM REV CODE 250: Performed by: NURSE ANESTHETIST, CERTIFIED REGISTERED

## 2018-05-22 PROCEDURE — C1769 GUIDE WIRE: HCPCS | Performed by: SURGERY

## 2018-05-22 PROCEDURE — S0030 INJECTION, METRONIDAZOLE: HCPCS | Performed by: SURGERY

## 2018-05-22 PROCEDURE — 63600175 PHARM REV CODE 636 W HCPCS: Performed by: NURSE ANESTHETIST, CERTIFIED REGISTERED

## 2018-05-22 PROCEDURE — 88307 TISSUE EXAM BY PATHOLOGIST: CPT | Mod: 26,,, | Performed by: PATHOLOGY

## 2018-05-22 PROCEDURE — 76000 FLUOROSCOPY <1 HR PHYS/QHP: CPT | Mod: 26,59,, | Performed by: UROLOGY

## 2018-05-22 PROCEDURE — 0DNW4ZZ RELEASE PERITONEUM, PERCUTANEOUS ENDOSCOPIC APPROACH: ICD-10-PCS | Performed by: SURGERY

## 2018-05-22 PROCEDURE — C2617 STENT, NON-COR, TEM W/O DEL: HCPCS | Performed by: SURGERY

## 2018-05-22 PROCEDURE — 99900035 HC TECH TIME PER 15 MIN (STAT)

## 2018-05-22 PROCEDURE — 63600175 PHARM REV CODE 636 W HCPCS: Performed by: ANESTHESIOLOGY

## 2018-05-22 PROCEDURE — 71000033 HC RECOVERY, INTIAL HOUR: Performed by: SURGERY

## 2018-05-22 PROCEDURE — 0T778DZ DILATION OF LEFT URETER WITH INTRALUMINAL DEVICE, VIA NATURAL OR ARTIFICIAL OPENING ENDOSCOPIC: ICD-10-PCS | Performed by: UROLOGY

## 2018-05-22 PROCEDURE — 94770 HC EXHALED C02 TEST: CPT

## 2018-05-22 PROCEDURE — 37000008 HC ANESTHESIA 1ST 15 MINUTES: Performed by: SURGERY

## 2018-05-22 PROCEDURE — 88307 TISSUE EXAM BY PATHOLOGIST: CPT | Performed by: PATHOLOGY

## 2018-05-22 PROCEDURE — 71000039 HC RECOVERY, EACH ADD'L HOUR: Performed by: SURGERY

## 2018-05-22 PROCEDURE — C9290 INJ, BUPIVACAINE LIPOSOME: HCPCS | Performed by: SURGERY

## 2018-05-22 PROCEDURE — 0DTP4ZZ RESECTION OF RECTUM, PERCUTANEOUS ENDOSCOPIC APPROACH: ICD-10-PCS | Performed by: SURGERY

## 2018-05-22 PROCEDURE — 94761 N-INVAS EAR/PLS OXIMETRY MLT: CPT

## 2018-05-22 PROCEDURE — 94799 UNLISTED PULMONARY SVC/PX: CPT

## 2018-05-22 PROCEDURE — 25000003 PHARM REV CODE 250: Performed by: UROLOGY

## 2018-05-22 PROCEDURE — 11000001 HC ACUTE MED/SURG PRIVATE ROOM

## 2018-05-22 PROCEDURE — 0DTN4ZZ RESECTION OF SIGMOID COLON, PERCUTANEOUS ENDOSCOPIC APPROACH: ICD-10-PCS | Performed by: SURGERY

## 2018-05-22 PROCEDURE — C1758 CATHETER, URETERAL: HCPCS | Performed by: SURGERY

## 2018-05-22 PROCEDURE — 37000009 HC ANESTHESIA EA ADD 15 MINS: Performed by: SURGERY

## 2018-05-22 DEVICE — STENT SET URETERAL 6X28CM: Type: IMPLANTABLE DEVICE | Site: URETER | Status: FUNCTIONAL

## 2018-05-22 RX ORDER — SODIUM CHLORIDE 9 MG/ML
INJECTION, SOLUTION INTRAVENOUS CONTINUOUS
Status: DISCONTINUED | OUTPATIENT
Start: 2018-05-22 | End: 2018-05-22

## 2018-05-22 RX ORDER — METRONIDAZOLE 500 MG/100ML
500 INJECTION, SOLUTION INTRAVENOUS
Status: COMPLETED | OUTPATIENT
Start: 2018-05-22 | End: 2018-05-22

## 2018-05-22 RX ORDER — DIPHENHYDRAMINE HYDROCHLORIDE 50 MG/ML
25 INJECTION INTRAMUSCULAR; INTRAVENOUS EVERY 4 HOURS PRN
Status: DISCONTINUED | OUTPATIENT
Start: 2018-05-22 | End: 2018-05-26 | Stop reason: HOSPADM

## 2018-05-22 RX ORDER — METOCLOPRAMIDE HYDROCHLORIDE 5 MG/ML
10 INJECTION INTRAMUSCULAR; INTRAVENOUS EVERY 8 HOURS
Status: DISCONTINUED | OUTPATIENT
Start: 2018-05-22 | End: 2018-05-26 | Stop reason: HOSPADM

## 2018-05-22 RX ORDER — MIDAZOLAM HYDROCHLORIDE 1 MG/ML
INJECTION INTRAMUSCULAR; INTRAVENOUS
Status: DISCONTINUED | OUTPATIENT
Start: 2018-05-22 | End: 2018-05-22

## 2018-05-22 RX ORDER — PHENYLEPHRINE HYDROCHLORIDE 10 MG/ML
INJECTION INTRAVENOUS
Status: DISCONTINUED | OUTPATIENT
Start: 2018-05-22 | End: 2018-05-22

## 2018-05-22 RX ORDER — PROPOFOL 10 MG/ML
VIAL (ML) INTRAVENOUS
Status: DISCONTINUED | OUTPATIENT
Start: 2018-05-22 | End: 2018-05-22

## 2018-05-22 RX ORDER — ENALAPRIL MALEATE 2.5 MG/1
10 TABLET ORAL DAILY
Status: DISCONTINUED | OUTPATIENT
Start: 2018-05-22 | End: 2018-05-26 | Stop reason: HOSPADM

## 2018-05-22 RX ORDER — HYDROMORPHONE HCL IN 0.9% NACL 6 MG/30 ML
PATIENT CONTROLLED ANALGESIA SYRINGE INTRAVENOUS CONTINUOUS
Status: DISCONTINUED | OUTPATIENT
Start: 2018-05-22 | End: 2018-05-22

## 2018-05-22 RX ORDER — FLUCONAZOLE 2 MG/ML
INJECTION, SOLUTION INTRAVENOUS
Status: DISCONTINUED | OUTPATIENT
Start: 2018-05-22 | End: 2018-05-22

## 2018-05-22 RX ORDER — NALOXONE HCL 0.4 MG/ML
0.02 VIAL (ML) INJECTION
Status: DISCONTINUED | OUTPATIENT
Start: 2018-05-22 | End: 2018-05-22

## 2018-05-22 RX ORDER — ONDANSETRON 2 MG/ML
4 INJECTION INTRAMUSCULAR; INTRAVENOUS ONCE AS NEEDED
Status: DISCONTINUED | OUTPATIENT
Start: 2018-05-22 | End: 2018-05-22 | Stop reason: SDUPTHER

## 2018-05-22 RX ORDER — ONDANSETRON 2 MG/ML
4 INJECTION INTRAMUSCULAR; INTRAVENOUS ONCE AS NEEDED
Status: DISCONTINUED | OUTPATIENT
Start: 2018-05-22 | End: 2018-05-22 | Stop reason: HOSPADM

## 2018-05-22 RX ORDER — FAMOTIDINE 10 MG/ML
20 INJECTION INTRAVENOUS EVERY 12 HOURS
Status: DISCONTINUED | OUTPATIENT
Start: 2018-05-22 | End: 2018-05-24

## 2018-05-22 RX ORDER — METRONIDAZOLE 500 MG/100ML
500 INJECTION, SOLUTION INTRAVENOUS
Status: COMPLETED | OUTPATIENT
Start: 2018-05-22 | End: 2018-05-23

## 2018-05-22 RX ORDER — SODIUM CHLORIDE 0.9 % (FLUSH) 0.9 %
3 SYRINGE (ML) INJECTION EVERY 8 HOURS
Status: DISCONTINUED | OUTPATIENT
Start: 2018-05-22 | End: 2018-05-22 | Stop reason: HOSPADM

## 2018-05-22 RX ORDER — BACITRACIN 50000 [IU]/1
INJECTION, POWDER, FOR SOLUTION INTRAMUSCULAR
Status: DISCONTINUED | OUTPATIENT
Start: 2018-05-22 | End: 2018-05-22 | Stop reason: HOSPADM

## 2018-05-22 RX ORDER — ONDANSETRON 2 MG/ML
4 INJECTION INTRAMUSCULAR; INTRAVENOUS EVERY 12 HOURS PRN
Status: DISCONTINUED | OUTPATIENT
Start: 2018-05-22 | End: 2018-05-22

## 2018-05-22 RX ORDER — NEOSTIGMINE METHYLSULFATE 1 MG/ML
INJECTION, SOLUTION INTRAVENOUS
Status: DISCONTINUED | OUTPATIENT
Start: 2018-05-22 | End: 2018-05-22

## 2018-05-22 RX ORDER — ROCURONIUM BROMIDE 10 MG/ML
INJECTION, SOLUTION INTRAVENOUS
Status: DISCONTINUED | OUTPATIENT
Start: 2018-05-22 | End: 2018-05-22

## 2018-05-22 RX ORDER — ACETAMINOPHEN 10 MG/ML
INJECTION, SOLUTION INTRAVENOUS
Status: DISCONTINUED | OUTPATIENT
Start: 2018-05-22 | End: 2018-05-22

## 2018-05-22 RX ORDER — HYDROMORPHONE HCL IN 0.9% NACL 6 MG/30 ML
PATIENT CONTROLLED ANALGESIA SYRINGE INTRAVENOUS CONTINUOUS
Status: DISCONTINUED | OUTPATIENT
Start: 2018-05-22 | End: 2018-05-24

## 2018-05-22 RX ORDER — HYDROMORPHONE HYDROCHLORIDE 2 MG/ML
0.5 INJECTION, SOLUTION INTRAMUSCULAR; INTRAVENOUS; SUBCUTANEOUS EVERY 5 MIN PRN
Status: DISCONTINUED | OUTPATIENT
Start: 2018-05-22 | End: 2018-05-22

## 2018-05-22 RX ORDER — BUPIVACAINE HYDROCHLORIDE 2.5 MG/ML
INJECTION, SOLUTION EPIDURAL; INFILTRATION; INTRACAUDAL
Status: DISCONTINUED | OUTPATIENT
Start: 2018-05-22 | End: 2018-05-22 | Stop reason: HOSPADM

## 2018-05-22 RX ORDER — ENOXAPARIN SODIUM 100 MG/ML
40 INJECTION SUBCUTANEOUS EVERY 24 HOURS
Status: DISCONTINUED | OUTPATIENT
Start: 2018-05-22 | End: 2018-05-22

## 2018-05-22 RX ORDER — METRONIDAZOLE 500 MG/100ML
500 INJECTION, SOLUTION INTRAVENOUS
Status: DISCONTINUED | OUTPATIENT
Start: 2018-05-22 | End: 2018-05-22

## 2018-05-22 RX ORDER — HYDROCODONE BITARTRATE AND ACETAMINOPHEN 5; 325 MG/1; MG/1
1 TABLET ORAL EVERY 4 HOURS PRN
Status: DISCONTINUED | OUTPATIENT
Start: 2018-05-22 | End: 2018-05-26 | Stop reason: HOSPADM

## 2018-05-22 RX ORDER — KETOROLAC TROMETHAMINE 30 MG/ML
INJECTION, SOLUTION INTRAMUSCULAR; INTRAVENOUS
Status: DISCONTINUED | OUTPATIENT
Start: 2018-05-22 | End: 2018-05-22

## 2018-05-22 RX ORDER — FENTANYL CITRATE 50 UG/ML
INJECTION, SOLUTION INTRAMUSCULAR; INTRAVENOUS
Status: DISCONTINUED | OUTPATIENT
Start: 2018-05-22 | End: 2018-05-22

## 2018-05-22 RX ORDER — GLYCOPYRROLATE 0.2 MG/ML
INJECTION INTRAMUSCULAR; INTRAVENOUS
Status: DISCONTINUED | OUTPATIENT
Start: 2018-05-22 | End: 2018-05-22

## 2018-05-22 RX ORDER — FLUCONAZOLE 2 MG/ML
400 INJECTION, SOLUTION INTRAVENOUS
Status: DISCONTINUED | OUTPATIENT
Start: 2018-05-22 | End: 2018-05-24

## 2018-05-22 RX ORDER — LIDOCAINE HYDROCHLORIDE 20 MG/ML
JELLY TOPICAL
Status: DISCONTINUED | OUTPATIENT
Start: 2018-05-22 | End: 2018-05-22 | Stop reason: HOSPADM

## 2018-05-22 RX ORDER — SODIUM CHLORIDE 0.9 % (FLUSH) 0.9 %
3 SYRINGE (ML) INJECTION
Status: DISCONTINUED | OUTPATIENT
Start: 2018-05-22 | End: 2018-05-22

## 2018-05-22 RX ORDER — ONDANSETRON HYDROCHLORIDE 2 MG/ML
INJECTION, SOLUTION INTRAMUSCULAR; INTRAVENOUS
Status: DISCONTINUED | OUTPATIENT
Start: 2018-05-22 | End: 2018-05-22

## 2018-05-22 RX ORDER — ACETAMINOPHEN 325 MG/1
650 TABLET ORAL EVERY 6 HOURS PRN
Status: DISCONTINUED | OUTPATIENT
Start: 2018-05-22 | End: 2018-05-26 | Stop reason: HOSPADM

## 2018-05-22 RX ORDER — SODIUM CHLORIDE 9 MG/ML
INJECTION, SOLUTION INTRAVENOUS CONTINUOUS
Status: DISCONTINUED | OUTPATIENT
Start: 2018-05-22 | End: 2018-05-25

## 2018-05-22 RX ORDER — LIDOCAINE HYDROCHLORIDE 10 MG/ML
1 INJECTION, SOLUTION EPIDURAL; INFILTRATION; INTRACAUDAL; PERINEURAL ONCE
Status: DISCONTINUED | OUTPATIENT
Start: 2018-05-22 | End: 2018-05-22 | Stop reason: HOSPADM

## 2018-05-22 RX ORDER — VECURONIUM BROMIDE FOR INJECTION 1 MG/ML
INJECTION, POWDER, LYOPHILIZED, FOR SOLUTION INTRAVENOUS
Status: DISCONTINUED | OUTPATIENT
Start: 2018-05-22 | End: 2018-05-22

## 2018-05-22 RX ORDER — SUCCINYLCHOLINE CHLORIDE 20 MG/ML
INJECTION INTRAMUSCULAR; INTRAVENOUS
Status: DISCONTINUED | OUTPATIENT
Start: 2018-05-22 | End: 2018-05-22

## 2018-05-22 RX ORDER — ONDANSETRON 2 MG/ML
4 INJECTION INTRAMUSCULAR; INTRAVENOUS EVERY 12 HOURS PRN
Status: DISCONTINUED | OUTPATIENT
Start: 2018-05-22 | End: 2018-05-26 | Stop reason: HOSPADM

## 2018-05-22 RX ORDER — SODIUM CHLORIDE 0.9 % (FLUSH) 0.9 %
3 SYRINGE (ML) INJECTION
Status: DISCONTINUED | OUTPATIENT
Start: 2018-05-22 | End: 2018-05-26 | Stop reason: HOSPADM

## 2018-05-22 RX ORDER — SODIUM CHLORIDE, SODIUM LACTATE, POTASSIUM CHLORIDE, CALCIUM CHLORIDE 600; 310; 30; 20 MG/100ML; MG/100ML; MG/100ML; MG/100ML
INJECTION, SOLUTION INTRAVENOUS CONTINUOUS
Status: DISCONTINUED | OUTPATIENT
Start: 2018-05-22 | End: 2018-05-22

## 2018-05-22 RX ORDER — LIDOCAINE HCL/PF 100 MG/5ML
SYRINGE (ML) INTRAVENOUS
Status: DISCONTINUED | OUTPATIENT
Start: 2018-05-22 | End: 2018-05-22

## 2018-05-22 RX ADMIN — SODIUM CHLORIDE, SODIUM LACTATE, POTASSIUM CHLORIDE, AND CALCIUM CHLORIDE: .6; .31; .03; .02 INJECTION, SOLUTION INTRAVENOUS at 08:05

## 2018-05-22 RX ADMIN — ENALAPRIL MALEATE 10 MG: 5 TABLET ORAL at 04:05

## 2018-05-22 RX ADMIN — FLUCONAZOLE 400 MG: 2 INJECTION INTRAVENOUS at 08:05

## 2018-05-22 RX ADMIN — HYDROCODONE BITARTRATE AND ACETAMINOPHEN 1 TABLET: 5; 325 TABLET ORAL at 09:05

## 2018-05-22 RX ADMIN — IOHEXOL 50 ML: 300 INJECTION, SOLUTION INTRAVENOUS at 07:05

## 2018-05-22 RX ADMIN — KETOROLAC TROMETHAMINE 30 MG: 30 INJECTION, SOLUTION INTRAMUSCULAR; INTRAVENOUS at 12:05

## 2018-05-22 RX ADMIN — BACITRACIN 50000 UNITS: 5000 INJECTION, POWDER, FOR SOLUTION INTRAMUSCULAR at 09:05

## 2018-05-22 RX ADMIN — METRONIDAZOLE 500 MG: 500 SOLUTION INTRAVENOUS at 07:05

## 2018-05-22 RX ADMIN — FENTANYL CITRATE 50 MCG: 50 INJECTION, SOLUTION INTRAMUSCULAR; INTRAVENOUS at 09:05

## 2018-05-22 RX ADMIN — FENTANYL CITRATE 25 MCG: 50 INJECTION, SOLUTION INTRAMUSCULAR; INTRAVENOUS at 12:05

## 2018-05-22 RX ADMIN — Medication: at 02:05

## 2018-05-22 RX ADMIN — FENTANYL CITRATE 25 MCG: 50 INJECTION, SOLUTION INTRAMUSCULAR; INTRAVENOUS at 07:05

## 2018-05-22 RX ADMIN — HYDROMORPHONE HYDROCHLORIDE 0.5 MG: 2 INJECTION INTRAMUSCULAR; INTRAVENOUS; SUBCUTANEOUS at 02:05

## 2018-05-22 RX ADMIN — METRONIDAZOLE 500 MG: 500 INJECTION, SOLUTION INTRAVENOUS at 04:05

## 2018-05-22 RX ADMIN — ROCURONIUM BROMIDE 45 MG: 10 INJECTION, SOLUTION INTRAVENOUS at 07:05

## 2018-05-22 RX ADMIN — HYDROMORPHONE HYDROCHLORIDE 0.5 MG: 2 INJECTION INTRAMUSCULAR; INTRAVENOUS; SUBCUTANEOUS at 01:05

## 2018-05-22 RX ADMIN — FAMOTIDINE 20 MG: 10 INJECTION INTRAVENOUS at 09:05

## 2018-05-22 RX ADMIN — MIDAZOLAM HYDROCHLORIDE 2 MG: 1 INJECTION, SOLUTION INTRAMUSCULAR; INTRAVENOUS at 07:05

## 2018-05-22 RX ADMIN — GLYCOPYRROLATE 0.8 MG: 0.2 INJECTION, SOLUTION INTRAMUSCULAR; INTRAVENOUS at 12:05

## 2018-05-22 RX ADMIN — FENTANYL CITRATE 125 MCG: 50 INJECTION, SOLUTION INTRAMUSCULAR; INTRAVENOUS at 07:05

## 2018-05-22 RX ADMIN — FENTANYL CITRATE 25 MCG: 50 INJECTION, SOLUTION INTRAMUSCULAR; INTRAVENOUS at 10:05

## 2018-05-22 RX ADMIN — ONDANSETRON 4 MG: 2 INJECTION, SOLUTION INTRAMUSCULAR; INTRAVENOUS at 12:05

## 2018-05-22 RX ADMIN — BUPIVACAINE HYDROCHLORIDE 60 ML: 2.5 INJECTION, SOLUTION EPIDURAL; INFILTRATION; INTRACAUDAL; PERINEURAL at 09:05

## 2018-05-22 RX ADMIN — MIDAZOLAM HYDROCHLORIDE 3 MG: 1 INJECTION, SOLUTION INTRAMUSCULAR; INTRAVENOUS at 07:05

## 2018-05-22 RX ADMIN — BUPIVACAINE 20 ML: 13.3 INJECTION, SUSPENSION, LIPOSOMAL INFILTRATION at 12:05

## 2018-05-22 RX ADMIN — SODIUM CHLORIDE: 0.9 INJECTION, SOLUTION INTRAVENOUS at 12:05

## 2018-05-22 RX ADMIN — SODIUM CHLORIDE, SODIUM LACTATE, POTASSIUM CHLORIDE, AND CALCIUM CHLORIDE: .6; .31; .03; .02 INJECTION, SOLUTION INTRAVENOUS at 07:05

## 2018-05-22 RX ADMIN — PROPOFOL 170 MG: 10 INJECTION, EMULSION INTRAVENOUS at 07:05

## 2018-05-22 RX ADMIN — VECURONIUM BROMIDE FOR INJECTION 2 MG: 1 INJECTION, POWDER, LYOPHILIZED, FOR SOLUTION INTRAVENOUS at 09:05

## 2018-05-22 RX ADMIN — LIDOCAINE HYDROCHLORIDE 80 MG: 20 INJECTION, SOLUTION INTRAVENOUS at 07:05

## 2018-05-22 RX ADMIN — BUPIVACAINE HYDROCHLORIDE 60 ML: 2.5 INJECTION, SOLUTION EPIDURAL; INFILTRATION; INTRACAUDAL; PERINEURAL at 12:05

## 2018-05-22 RX ADMIN — PHENYLEPHRINE HYDROCHLORIDE 150 MCG: 10 INJECTION INTRAVENOUS at 10:05

## 2018-05-22 RX ADMIN — PHENYLEPHRINE HYDROCHLORIDE 100 MCG: 10 INJECTION INTRAVENOUS at 07:05

## 2018-05-22 RX ADMIN — SODIUM CHLORIDE: 0.9 INJECTION, SOLUTION INTRAVENOUS at 08:05

## 2018-05-22 RX ADMIN — ROCURONIUM BROMIDE 5 MG: 10 INJECTION, SOLUTION INTRAVENOUS at 07:05

## 2018-05-22 RX ADMIN — FLUCONAZOLE 400 MG: 2 INJECTION INTRAVENOUS at 05:05

## 2018-05-22 RX ADMIN — NEOSTIGMINE METHYLSULFATE 5 MG: 1 INJECTION INTRAVENOUS at 12:05

## 2018-05-22 RX ADMIN — METOCLOPRAMIDE 10 MG: 5 INJECTION, SOLUTION INTRAMUSCULAR; INTRAVENOUS at 09:05

## 2018-05-22 RX ADMIN — FENTANYL CITRATE 50 MCG: 50 INJECTION, SOLUTION INTRAMUSCULAR; INTRAVENOUS at 07:05

## 2018-05-22 RX ADMIN — PHENYLEPHRINE HYDROCHLORIDE 150 MCG: 10 INJECTION INTRAVENOUS at 07:05

## 2018-05-22 RX ADMIN — CEFTRIAXONE 2 G: 2 INJECTION, SOLUTION INTRAVENOUS at 07:05

## 2018-05-22 RX ADMIN — PHENYLEPHRINE HYDROCHLORIDE 200 MCG: 10 INJECTION INTRAVENOUS at 07:05

## 2018-05-22 RX ADMIN — VECURONIUM BROMIDE FOR INJECTION 4 MG: 1 INJECTION, POWDER, LYOPHILIZED, FOR SOLUTION INTRAVENOUS at 08:05

## 2018-05-22 RX ADMIN — VECURONIUM BROMIDE FOR INJECTION 2 MG: 1 INJECTION, POWDER, LYOPHILIZED, FOR SOLUTION INTRAVENOUS at 11:05

## 2018-05-22 RX ADMIN — ENOXAPARIN SODIUM 40 MG: 100 INJECTION SUBCUTANEOUS at 06:05

## 2018-05-22 RX ADMIN — LIDOCAINE HYDROCHLORIDE 5 ML: 20 JELLY TOPICAL at 07:05

## 2018-05-22 RX ADMIN — SODIUM CHLORIDE: 0.9 INJECTION, SOLUTION INTRAVENOUS at 02:05

## 2018-05-22 RX ADMIN — ACETAMINOPHEN 1000 MG: 10 INJECTION, SOLUTION INTRAVENOUS at 12:05

## 2018-05-22 RX ADMIN — SUCCINYLCHOLINE CHLORIDE 120 MG: 20 INJECTION, SOLUTION INTRAMUSCULAR; INTRAVENOUS at 07:05

## 2018-05-22 NOTE — H&P (VIEW-ONLY)
"NOLANETS:  [facility]  A collaboration between [facility] and [facility]      PATIENT: Mario Castro Sr.  MRN: [id number]  DATE: 5/17/2018    Subjective:      Chief Complaint: Follow-up (pre-op/sign cosents/colon resection)   Mr. Castro is here for preoperative visit.  He is well known to service as he is status post multiple admissions for perforated diverticulitis.  He has had a complicated course of diverticulitis requiring prolonged hospitalization percutaneous drainage prolonged antibiotic treatment and multiple admissions for perforations.  He was managed conservative along and he has been clearly instructed he may benefit from having the surgery.  He was recently here about 4 weeks back and was discharged and since now he  Is not in the acute phase, he is here for evaluation for a elective sigmoid resection.  Still has a vague left lower quadrant pain but pain lot better no fever no diarrhea are any other issues.  And she is about surgery      Vitals:   Vitals:    05/17/18 1048   BP: 127/86   Pulse: 78   Temp: 98.3 °F (36.8 °C)   TempSrc: Oral   Weight: 98.5 kg (217 lb 2.5 oz)   Height: 5' 9" (1.753 m)        Karnofsky Score:     Diagnosis: No diagnosis found.     Oncologic History:     Interval History:     Past Medical History:  Past Medical History:   Diagnosis Date    Arthritis           to right hip    Diverticulitis of intestine     Hypertension        Past Surgical History:  Past Surgical History:   Procedure Laterality Date    COLONOSCOPY N/A 3/8/2018    Procedure: COLONOSCOPY;  Surgeon: Juno Curry MD;  Location: [facility];  Service: Endoscopy;  Laterality: N/A;       Family History:  Family History   Problem Relation Age of Onset    Hypertension Mother     Heart disease Mother     No Known Problems Father        Allergies:  Review of patient's allergies indicates:  No Known " Allergies    Medications:  Current Outpatient Prescriptions   Medication Sig Dispense Refill    acetaminophen (TYLENOL) 500 MG tablet Take 500 mg by mouth every 6 (six) hours as needed for Pain.      enalapril (VASOTEC) 10 MG tablet Take 10 mg by mouth once daily.       No current facility-administered medications for this visit.        Review of Systems   Constitutional: Positive for fatigue and unexpected weight change. Negative for activity change, appetite change, chills, diaphoresis and fever.   HENT: Negative for congestion, dental problem, drooling, ear discharge, ear pain, facial swelling, hearing loss, mouth sores, nosebleeds, postnasal drip, rhinorrhea, sinus pain, sinus pressure, sore throat, tinnitus and trouble swallowing.    Eyes: Negative for pain, discharge, redness, itching and visual disturbance.   Respiratory: Negative for apnea, cough, choking, chest tightness, shortness of breath, wheezing and stridor.    Cardiovascular: Negative for chest pain, palpitations and leg swelling.   Gastrointestinal: Positive for abdominal distention. Negative for anal bleeding, blood in stool, constipation, diarrhea, nausea and vomiting.   Endocrine: Negative.    Genitourinary: Negative.    Musculoskeletal: Negative for arthralgias, back pain, neck pain and neck stiffness.   Skin: Negative.    Allergic/Immunologic: Negative.    Neurological: Negative for dizziness, tremors, seizures, syncope, facial asymmetry, speech difficulty, weakness, light-headedness, numbness and headaches.   Hematological: Negative.    Psychiatric/Behavioral: Negative.       Objective:      Physical Exam   Constitutional: He appears well-developed and well-nourished. No distress.   HENT:   Head: Normocephalic.   Right Ear: External ear normal.   Left Ear: External ear normal.   Nose: Nose normal.   Eyes: Conjunctivae and EOM are normal. Pupils are equal, round, and reactive to light. Left eye exhibits no discharge. No scleral icterus.    Neck: Normal range of motion. Neck supple. No tracheal deviation present. No thyromegaly present.   Cardiovascular: Normal rate and regular rhythm.    Pulmonary/Chest: Effort normal and breath sounds normal.   Abdominal: Soft. Bowel sounds are normal. He exhibits no distension and no mass. There is no tenderness. There is no rebound and no guarding. No hernia.   Pendulous  Striae over the abdominal wall consistent with a loss of weight   Neurological: He is alert.   Skin: Skin is warm. He is not diaphoretic.   Psychiatric: He has a normal mood and affect. His behavior is normal.      Assessment:       No diagnosis found.    Laboratory Data:  Results for DENICE STOREY SR. (MRN [id number]) as of 5/17/2018 12:58   Ref. Range 4/14/2018 05:20 4/15/2018 04:55 4/16/2018 05:08 4/17/2018 06:12 5/17/2018 12:42   WBC Latest Ref Range: 3.90 - 12.70 K/uL 8.89 4.62 4.65 5.14    RBC Latest Ref Range: 4.60 - 6.20 M/uL 4.62 4.41 (L) 4.32 (L) 4.34 (L)    Hemoglobin Latest Ref Range: 14.0 - 18.0 g/dL 12.8 (L) 12.1 (L) 11.9 (L) 11.9 (L)    Hematocrit Latest Ref Range: 40.0 - 54.0 % 39.2 (L) 37.6 (L) 36.7 (L) 36.7 (L)    MCV Latest Ref Range: 82 - 98 fL 85 85 85 85    MCH Latest Ref Range: 27.0 - 31.0 pg 27.7 27.4 27.5 27.4    MCHC Latest Ref Range: 32.0 - 36.0 g/dL 32.7 32.2 32.4 32.4    RDW Latest Ref Range: 11.5 - 14.5 % 14.3 13.9 13.6 13.6    Platelets Latest Ref Range: 150 - 350 K/uL 200 225 246 228    MPV Latest Ref Range: 9.2 - 12.9 fL 11.4 11.5 11.4 10.7    Gran% Latest Ref Range: 38.0 - 73.0 % 74.8 (H) 61.3 65.4 60.5    Gran # (ANC) Latest Ref Range: 1.8 - 7.7 K/uL 6.6 2.8 3.0 3.1    Lymph% Latest Ref Range: 18.0 - 48.0 % 15.0 (L) 23.6 21.9 24.7    Lymph # Latest Ref Range: 1.0 - 4.8 K/uL 1.3 1.1 1.0 1.3    Mono% Latest Ref Range: 4.0 - 15.0 % 9.4 12.3 11.0 11.3    Mono # Latest Ref Range: 0.3 - 1.0 K/uL 0.8 0.6 0.5 0.6    Eosinophil% Latest Ref Range: 0.0 - 8.0 % 0.4 2.2 1.1 2.5    Eos # Latest Ref Range: 0.0 - 0.5 K/uL 0.0  0.1 0.1 0.1    Basophil% Latest Ref Range: 0.0 - 1.9 % 0.2 0.4 0.4 1.0    Baso # Latest Ref Range: 0.00 - 0.20 K/uL 0.02 0.02 0.02 0.05    Sodium Latest Ref Range: 136 - 145 mmol/L 136 138 137 139    Potassium Latest Ref Range: 3.5 - 5.1 mmol/L 3.7 3.8 3.2 (L) 3.7    Chloride Latest Ref Range: 95 - 110 mmol/L 102 101 101 104    CO2 Latest Ref Range: 23 - 29 mmol/L 26 27 27 27    Anion Gap Latest Ref Range: 8 - 16 mmol/L 8 10 9 8    BUN, Bld Latest Ref Range: 6 - 20 mg/dL 7 7 6 5 (L)    Creatinine Latest Ref Range: 0.5 - 1.4 mg/dL 0.9 1.0 0.9 0.9    eGFR if non African American Latest Ref Range: >60 mL/min/1.73 m^2 >60 >60 >60 >60    eGFR if  Latest Ref Range: >60 mL/min/1.73 m^2 >60 >60 >60 >60    Glucose Latest Ref Range: 70 - 110 mg/dL 139 (H) 106 106 94    Calcium Latest Ref Range: 8.7 - 10.5 mg/dL 8.9 8.8 8.7 8.6 (L)    Alkaline Phosphatase Latest Ref Range: 55 - 135 U/L 63 45 (L) 44 (L) 41 (L)    Total Protein Latest Ref Range: 6.0 - 8.4 g/dL 6.9 6.6 6.5 6.4    Albumin Latest Ref Range: 3.5 - 5.2 g/dL 3.1 (L) 3.0 (L) 3.1 (L) 3.0 (L)    Total Bilirubin Latest Ref Range: 0.1 - 1.0 mg/dL 0.4 0.4 0.3 0.2    AST Latest Ref Range: 10 - 40 U/L 7 (L) 7 (L) 10 8 (L)    ALT Latest Ref Range: 10 - 44 U/L 8 (L) 9 (L) 8 (L) 6 (L)    XR CHEST 1 VIEW Unknown     Rpt   Differential Method Unknown Automated Automated Automated Automated         Narrative     EXAMINATION:  CT ABDOMEN PELVIS WITH CONTRAST    CLINICAL HISTORY:  LLQ pain, suspect diverticulitis; Left lower quadrant pain    TECHNIQUE:  Low dose axial CT images obtained throughout the region of the abdomen and pelvis following the administration of 100 cc Omnipaque 350 intravenous contrast.  Axial, sagittal and coronal reconstructions were performed.    COMPARISON:  Comparison made to multiple prior studies, the most recent 02/06/2018    FINDINGS:  Lung bases are clear and the visualized portions of the heart and mediastinum are unremarkable.    There  is irregularly shaped rim enhancing collection of air and fluid in the mid abdomen most compatible with an abscess.  This measures on the order of 2.6 x 3.3 cm in maximal transverse dimension, over 5 cm in CC extent (sequence 4 image 55, sequence 601 image 26).  There is surrounding mesenteric stranding and edema.  Abrupt narrowing of the traversing distal small bowel with proximal wall thickening and dilatation.  Multiple air-fluid levels.  Findings compatible with a small bowel obstruction.    There is an additional smaller tubular collection in the anterior aspect of the left lower quadrant extending superolaterally from the urinary bladder.  This may be in partial continuity with the larger midline collection.    Mild diffuse colonic diverticulosis.  Focal wall thickening in the sigmoid colon, may reflect sequela of diverticulitis.    Small volume of free intraperitoneal fluid extending along the right paracolic gutter and overlying the liver.    The liver, gallbladder, spleen, pancreas, and adrenal glands appear within normal limits.  Note is made of high density within the gallbladder, likely vicarious secretion of contrast.    Kidneys appear stable.  Small bilateral cysts. The ureters are decompressed. Urinary bladder unremarkable.      Aorta tapers normally throughout its visualized course.    Osseous structures exhibit mild degenerative changes. No fracture or focal osseous destructive lesion.   Impression       Reaccumulation of intra-abdominal abscess.  There are surrounding inflammatory changes, with a new associated small bowel obstruction.  Findings further detailed above.    This report was flagged in Epic as abnormal.              Impression: Recurrent attacks of complicated diverticulitis with a recent being about 3-4 weeks back.  He is only 48-year-old and has a complicated course of diverticulitis requiring multiple hospital admission at admissions and drainage of the diverticulitis abscess.   During his admission in December he had also in the hospital for a long time with multiple drainages.      I had a long discussion with him and his wife and told him about the need for surgery as he has multiple attacks.  I showed him the picture on the monitor about how surgery would be done the plan would be to do laparoscopic sigmoid resection i.e. I clearly told him the purpose of surgery is decrease the chance of diverticulitis and not eradicate.  I then went over with him the risk of surgery such as bleeding infection DVT PE MI stroke anastomotic leaks requiring reoperations colostomy and need in case of abscess in the pelvis at the time of surgery are postoperative anastomotic leak, the chance of ureteral injury, the chance of conversion to open was open surgery if she had a lot of adhesions the chance of intravitreous fistula Heafey a lot of adhesions from small bowel adhesions.      I also talked to Dr. Hernandez this morning and the plan is to proceed with cystoscopy and left ureteral double-J stent placement which will stay for about 4-6 weeks time.      I then gave preop instructions of Liquid diet the day before surgery and I'm and bowel laxatives the day before and to take oral antibiotics to decrease the chance of superficial skin infection.    He was very anxious and nervous about the procedure I.  I counseled him.     The plan will be to proceed with cystoscopy and stent placement, attempt laparoscopic colon resection if not possible to open the possibility of colostomy at the time of surgery was discussed and the chance of conversion to open surgery was also discussed.        Plan:       For surgery on Tuesday  Nothing by mouth after Monday midnight   Liquid diet on Monday   GoLYTELY to be taken on Monday afternoon   Neomycin and erythromycin 1 g each at 4 7:10 PM the day before surgery     Discussed the benefits and risk and consent was obtained .  Spent over 35 minutes in in explaining counseling  and discussing the risks and benefits of surgery               PRINCE Bryant MD, FACS   Associate Professor of Surgery, [facility]   Neuroendocrine Surgery, Hepatic/Pancreatic & General Surgery   [address], [address]   [place] [address]   ph. [phone]; [phone]   fax. [phone]

## 2018-05-22 NOTE — OP NOTE
[facility]  Surgery Department  Operative Note    SUMMARY     Date of Procedure: 5/22/2018     Procedure: Procedure(s) (LRB):  RESECTION-COLON-LOW ANTERIOR-LAPAROSCOPIC (N/A)  CYSTOSCOPY/ RETROGRADE PYELOGRAM/ STENT PLACEMENT/STENT EXCHANGE (Left)  DILATION-URETHRA (N/A)   Mobilization of splenic flexure  Extensive lysis of adhesions  Surgeon(s) and Role:  Panel 1:     * Matthew Flores MD - Resident - Assisting     * Annette Mathias MD - Primary    Panel 2:     * Tommie Hernandez MD - Primary        Pre-Operative Diagnosis: Diverticulitis [K57.92]    Post-Operative Diagnosis: Post-Op Diagnosis Codes:     * Diverticulitis [K57.92]  Umbilical hernia   Extensive phlegmeon left lower quadrant  Anesthesia: General    Technical Procedures Used: 4 port lap, llq incision for specimen rertraction  Description of the Findings of the Procedure: extensive phlegmon left lower quadrant    [id number]    Complications: none    Estimated Blood Loss (EBL): 275 mL           Implants:   Implant Name Type Inv. Item Serial No.  Lot No. LRB No. Used   STENT SET URETERAL [id number] - [id number]   STENT SET URETERAL [id number]   [id number] [id number] Left 1       Specimens:   Specimen (12h ago through future)    Start     Ordered    Pending  Specimen to Pathology - Surgery  Once     Comments:  Pre-op Diagnosis: Diverticulitis [K57.92]Post-op Diagnosis: Procedure(s):RESECTION-COLON-LOW ANTERIOR-LAPAROSCOPICCYSTOSCOPY/ RETROGRADE PYELOGRAM/ STENT PLACEMENT/STENT EXCHANGEDILATION-URETHRA Number of specimens:Name of specimens: 1. Sigmoid colon      Pending                  Condition: Stable    Disposition: PACU - hemodynamically stable.    Attestation: I was present and scrubbed for the entire procedure.

## 2018-05-22 NOTE — TRANSFER OF CARE
"Anesthesia Transfer of Care Note    Patient: Mario Castro Sr.    Procedure(s) Performed: Procedure(s) (LRB):  RESECTION-COLON-LOW ANTERIOR-LAPAROSCOPIC (N/A)  CYSTOSCOPY/ RETROGRADE PYELOGRAM/ STENT PLACEMENT/STENT EXCHANGE (Left)  DILATION-URETHRA (N/A)    Patient location: PACU    Anesthesia Type: general    Transport from OR: Transported from OR on 6-10 L/min O2 by face mask with adequate spontaneous ventilation    Post pain: adequate analgesia    Post assessment: no apparent anesthetic complications and tolerated procedure well    Post vital signs: stable    Level of consciousness: awake, alert and oriented    Nausea/Vomiting: no nausea/vomiting    Complications: none    Transfer of care protocol was followed      Last vitals:   Visit Vitals  /75 (BP Location: Right arm, Patient Position: Lying)   Pulse 69   Temp 37.2 °C (99 °F) (Skin)   Resp 20   Ht 5' 9" (1.753 m)   Wt 95.7 kg (211 lb)   SpO2 97%   BMI 31.16 kg/m²     "

## 2018-05-22 NOTE — INTERVAL H&P NOTE
The patient has been examined and the H&P has been reviewed:    I concur with the findings and no changes have occurred since H&P was written.    Anesthesia/Surgery risks, benefits and alternative options discussed and understood by patient/family.          Active Hospital Problems    Diagnosis  POA    Diverticulitis large intestine [K57.32]  Yes    Diverticulitis [K57.92]  Yes      Resolved Hospital Problems    Diagnosis Date Resolved POA   No resolved problems to display.

## 2018-05-22 NOTE — OP NOTE
[facility]  Operative Note    Date: 05/22/2018    Pre-Op Diagnosis:  Diverticulitis  Post-Op Diagnosis: same, also urethral stricture disease    Procedure(s) Performed:   1.  Cystoscopy with left JJ stent placement  2.  Fluoroscopy < 1 hr  3.  Intraoperative independent interpretation of fluoroscopic images.  4.  Urethral dilatation  5.  Placement of Grigsby catheteran      Specimen(s):  None from urology portion of procedure     Surgeon: Tommie Hernandez    Anesthesia:  General endotracheal    Indications: Mario Castro Sr. is a 48 y.o. male with diverticulitis in whom Dr. Mathias  requests placement of left indwelling stent     Findings:   1.  Normal left ureter  2.  Pinpoint mid urethral stricture    Estimated Blood Loss: min    Drains:   1.  6 x 28 left JJ ureteral stent without strings    Procedure in Detail:  After risks, benefits and possible complications of the procedure were explained, the patient elected to undergo the procedure and informed consent was obtained. All questions were answered in the octavia-operative area. The patient was transferred to the cystoscopy suite and placed on the fluoroscopy table in the supine position.  SCDs were applied and working. Time out was performed, octavia-procedural antibiotics were given.  General anesthesia was administered.  After adequate anesthesia the patient was placed in dorsal lithotomy position and prepped and draped in the usual sterile fashion.     A rigid cystoscope in a 22 Fr sheath was introduced into the patients urethra.  There was a pinpoint mid urethral stricture through which the scope was unable to be passed.  A 0.038 floppy-tipped high wire was able to be passed through the stricture and into the bladder. Next using coaxial dilators, and subsequently the Dumont sounds, the stricture was dilated up to size 30 Algerian.    The cystoscope was then able to be passed per urethra.  This showed the dilated stricture in the mid  urethra.  Prostatic urethra was obstructed by approximately 5-10 g of bilobar hyperplasia.  Careful inspection of bladder revealed no evidence of stones or tumors.  Both ureteral orifices were in the normal position and effluxing clear urine    Next using a 0.038 floppy-tipped Glidewire, the left ureteral orifice was cannulated and the Glidewire was seen to pass up easily into the region of the left renal pelvis under fluoroscopic control.  A 5 Zambian open-ended ureteral catheter was passed over the guidewire and the guidewire removed.  Retrograde pyelography showed normal upper tracts.    The Glidewire was reinserted and the open-ended catheter removed.  Next a 6 x 28 double-J stent was passed over the Glidewire.  This was seen to coil nicely in the region of the renal pelvis under fluoroscopic control and in the bladder under direct vision    A 0.038 floppy tip Glidewire was then passed through the cystoscope and into the bladder and the cystoscope is then removed.    An 18 Zambian Councill catheter is then passed over the guidewire into the bladder without difficulty.  10 cc was placed into the balloon and the Grigsby catheter is placed to gravity drainage    The patient is then transferred to the general surgery operating room where his general surgery procedure is performed.  This procedure is dictated separately.    Follow up care: The patient will be followed by me while in the hospital and I will explain to him the follow-up needed for urethral stricture disease and arrangements will be made for removal of stent in approximately 3 weeks in the office.      Tommie Hernandez MD

## 2018-05-22 NOTE — BRIEF OP NOTE
[facility]  Brief Operative Note    SUMMARY     Surgery Date: 5/22/2018     Surgeon(s) and Role:  Panel 1:     * Annette Mathias MD - Primary     * Matthew Flores MD - Resident - Assisting    Panel 2:     * Tommie Hernandez MD - Primary        Pre-op Diagnosis:  Diverticulitis [K57.92]    Post-op Diagnosis:  Post-Op Diagnosis Codes:     * Diverticulitis [K57.92]   Urethral Stricture    Procedure(s) (LRB):  RESECTION-COLON-LOW ANTERIOR-LAPAROSCOPIC (N/A)  CYSTOSCOPY/ RETROGRADE PYELOGRAM/ STENT PLACEMENT/STENT EXCHANGE (Left)  DILATION-URETHRA (N/A)   Insert chand catheter    Anesthesia: General    Description of Procedure: cysto, urethral dilation, left RPG, insert left JJ stent, insert chand cath for urology portion of case    Description of the findings of the procedure: urethral stricture for urology portion of case    Estimated Blood Loss: minimal  For urology portion of case     Specimens: none for urology portion of case  Specimen (12h ago through future)    Start     Ordered    Pending  Specimen to Pathology - Surgery  Once     Comments:  Pre-op Diagnosis: Diverticulitis [K57.92]Post-op Diagnosis: Procedure(s):RESECTION-COLON-LOW ANTERIOR-LAPAROSCOPICCYSTOSCOPY/ RETROGRADE PYELOGRAM/ STENT PLACEMENT/STENT EXCHANGEDILATION-URETHRA Number of specimens:Name of specimens: 1.      Pending

## 2018-05-22 NOTE — OP NOTE
DATE OF PROCEDURE:  05/22/2018.    PREOPERATIVE DIAGNOSES:  Recurrent attacks of diverticulitis with abscess,   multiple episodes of recurrent hospitalization each time and complicated   diverticulitis.    POSTOPERATIVE DIAGNOSES:  1.  Recurrent diverticulitis with diverticular phlegmon on left lower quadrant   extent.  2.  Extensive adhesions to the anterior abdominal wall and to small bowel.  3.  Small Umbilical hernia.    PROCEDURES DONE:  1.  Laparoscopic rectosigmoid resection.  2.  Splenic flexure mobilization.  3.  Extensive lysis of adhesion, complicated.    ANESTHESIA:  General endotracheal anesthesia.    SENIOR SURGEON:  Jyothi Bryant M.D.    SURGERY RESIDENT:  Dr. Madiha Ruiz.    SURGERY RESIDENT:  Dr. Pollo Flores.    BLOOD LOSS:  About 200 mL.    The patient was stable, transferred to Recovery Room.    COMPLICATIONS:  None.    HISTORY AND INDICATION:  This 48-year-old gentleman is scheduled for surgery for   sigmoid colon resection.  The patient has had a stormy hospital course for   perforated diverticulitis. He was initially admitted about 6 months back for   perforated diverticulitis.  He had abscess and prolonged hospital stay.  He also   had multiple percutaneous drainage.  He grew multi organism and also Candida.    The patient was in the hospital for a while and underwent multiple percutaneous   procedures and was finally discharged.  Following that, the patient had two more   episodes with perforated diverticulitis requiring hospital admission and IV   antibiotics.  The patient is young and since he has multiple and complicated   course and since it affects his work because of the hospital admission, I   discussed with him.  I gave him all the options.  I told him the option of   conservative management versus surgery.  He has failed conservative management.    Therefore, I talked to him about surgery.  I told him about the risk of surgery   such as bleeding, infection, DVT, PE, MI, stroke,  conversion to open, the   chance of recurrent colostomy at the time of surgery or if he develops a leak   during the stay in the hospital.  I also talked to him about the risk of   ureteric injury, bladder injury at the time of surgery because of the extensive   inflammation.  Because of a stormy intrahospital course, on all occasions, I   anticipated a lot of adhesion.  I informed him that his surgery may be difficult   because of the adhesions.  After going over all the risk and benefits, consent   was obtained.    FINDINGS:  The patient had significant adhesions between the colon and the small   bowel and to the anterior abdominal wall.  He had a granulation tissue and   thick adhesions to the anterior abdominal wall in the left lower quadrant.  The   peritoneum has to be dissected out along with the colon to dissect away the   colon from the anterior abdominal wall.    Because of the anticipation of extensive dissection, I had requested Dr. Hernandez   to place a stent.  At the time of stent placement, he was also found to have a   ureteral stricture, which will be addressed by Dr. Hernandez later.  The double-J   stent on the left side was placed.     PROCEDURE IN DETAIL:  The patient was brought to the Operating Room from   Cystoscopy Suite after having the double-J stent placed.  He already had a Grigsby   catheter and came to the room intubated.  He was then placed in lithotomy   position.  The abdomen and the perineal region was prepped and draped in the   usual sterile manner.  A small incision was made in the left upper quadrant.    Veress needle was inserted and abdomen was insufflated with CO2.  After adequate   insufflation, an Optiview trocar was inserted into the abdominal cavity under   direct vision with the camera on board.  Following this, a 5-mm port in the   right upper quadrant and a 12-mm port in the right lower quadrants were placed.    Another 11 mm port was placed just about to the right of the  umbilicus under   direct vision.  Following this, the patient was then placed in the Trendelenburg   position with the left side elevated.  The patient had extensive adhesions to   the anterior abdominal wall on the lower abdomen.  The colon and small bowel was   all stuck.  Meticulous dissection was done with a combination of fine as well   as blunt to take down the adhesions from the anterior abdominal wall.  The small   bowel and the colon was entwined.  It took a while to dissect away the small   bowel from the colon and the colon from the anterior abdominal wall.  Finally, I   was able to accomplish the lysis of adhesion and I was able to separate the   small bowel from the colon.    Following this, I lifted the colon through the left-sided port.  The vascular   pedicle was identified.  The peritoneum was incised on the mesocolon.    Dissection was done and was extended into the left retroperitoneum.  The ureter   was identified, so was the common iliac artery.  These structures were traced   proximally and distally.  The vascular pedicle containing the inferior   mesenteric artery and the vein was transected using a vascular stapler as close   as possible to the colon.  Following this, dissection was done all the way   around.  The rectum presacral fascia was identified, mobilization was done   anterior to that.  Just about few inches below the pelvic brim, I decided to   stop the distal dissection.  Following this, dissection was done at the lateral   aspect.  The colon was mobilized from the lateral abdominal wall by incising the   previous line of Toldt.  Dissection was done all the way.  The splenic flexure   was mobilized completely.  There were extensive adhesions to the left upper   quadrant.  The omentum was stuck there.  This omentum was taken down.    Carefully, the splenocolic ligament was taken down and the splenic flexure was   completely mobilized.  Also, the attachment between the splenic  flexure and the   Gerota's fascia on the left side was mobilized thus enabling the complete   mobilization of the left colon.    Following this, again I paid attention to the rectum.  I was able to mobilize   the rectum away from the mesorectum.  The mesorectum was taken down using   LigaSure.  After completely identifying and isolating the rectum, using blue   stapler, the rectum was transected about a few inches below the pelvic brim.    Following this, all the attachment to the retroperitoneum to the descending   colon was all taken down.  I chose a point in the middle ascending colon where   the transection would be done until that length all the avascular attachments   were all mobilized using LigaSure.    A small incision was made in the left lower quadrant.  Incision was extended   deep since the patient was obese and has lost a lot of weight.  He had a   significant subcutaneous pannus.  The excess fascia was identified.  The rectus   muscle was transected.  The posterior fascia was transected.  Following this,   the abdominal cavity was entered.  The single port.  The hourglass part of the   single port was placed into the abdominal cavity and the other end was folded on   to itself.  Following this, the rectum was transected, it out was brought out   to the exterior through this incision at about the middle of the descending   colon, I did the colotomy.  The sharp end of the anvil was inserted and was   brought out few inches proximal to the colotomy site on the tenia.  Following   this, the descending colon was transected just proximal to the colotomy.    Following this, a pursestring suture was made around the angle of the anvil and   the descending colon was placed back into the abdominal cavity.  Meticulous   hemostasis was accomplished.  The whole abdomen was irrigated with lots of   antibiotic solution and was completely drained.  I was able to bring the   descending colon all the way down to the  pelvic brim and anastomosis, between   the stable end the rectum and the descending colon would be able to do   accomplish without any tension.  Following this, the handle of the 29 mm EEA was   inserted through the anal canal and was brought out through the rectum.  Under   direct vision, the sharp point of the handle was brought out through the rectum   and it was larger on to the anvil under direct vision.  Following this, an   anterior anastomosis was accomplished by turning the EEA all the way down   accomplishing the stapler.  After 1-1/2 turns on the reverse pattern, the handle   was carefully taken out.  I checked the transected ends of the colon and the   rectum and the doughnut on both sides were intact.  Following this, the   descending colon was clamped using a bowel grasper.  The pelvis was irrigated   with antibiotic solution and I inflated the rectum with air and there was found   to be no air leak.  Same way, I infused Betadine through the rectum and I did   not see any leak in the pelvis.  Following this, meticulous hemostasis was   accomplished all around.  The anal canal was dilated.    Following this, the whole abdomen was again irrigated.  Meticulous hemostasis   was accomplished.  The umbilical in the right lower quadrant ports were closed   endoscopically using Juan-Loc needle with 0 Vicryl stitch.  The left   lower quadrant fascial incision was approximated using 2 layers using #1 Prolene   followed by looped #1 PDS.  Marcaine and Exparel was injected at all the   incisional area.  The skin was closed using 4-0 Monocryl.  Blood loss was about   200 mL  The patient was stable.  The instrument and sponge counts were correct.    The patient was extubated, taken to the Recovery Room in stable condition.      TR/IN  dd: 05/22/2018 12:57:31 (CDT)  td: 05/22/2018 14:43:50 (CDT)  Doc ID   #[id number]  Job ID #[id number]    CC:

## 2018-05-22 NOTE — ANESTHESIA POSTPROCEDURE EVALUATION
"Anesthesia Post Evaluation    Patient: Mario Castro SrZurdo    Procedure(s) Performed: Procedure(s) (LRB):  RESECTION-COLON-LOW ANTERIOR-LAPAROSCOPIC (N/A)  CYSTOSCOPY/ RETROGRADE PYELOGRAM/ STENT PLACEMENT/STENT EXCHANGE (Left)  DILATION-URETHRA (N/A)    Final Anesthesia Type: general  Patient location during evaluation: PACU  Patient participation: Yes- Able to Participate  Level of consciousness: awake and alert  Post-procedure vital signs: reviewed and stable  Pain management: adequate  Airway patency: patent  PONV status at discharge: No PONV  Anesthetic complications: no      Cardiovascular status: blood pressure returned to baseline and hemodynamically stable  Respiratory status: unassisted  Hydration status: euvolemic  Follow-up not needed.        Visit Vitals  /64   Pulse 85   Temp 37.2 °C (99 °F) (Skin)   Resp (!) 9   Ht 5' 9" (1.753 m)   Wt 95.7 kg (211 lb)   SpO2 100%   BMI 31.16 kg/m²       Pain/Edie Score: Pain Assessment Performed: Yes (5/22/2018  3:17 PM)  Presence of Pain: non-verbal indicators absent (Patient sleeping. ) (5/22/2018  3:17 PM)  Pain Rating Prior to Med Admin: 6 (5/22/2018  2:43 PM)  Edie Score: 9 (5/22/2018  3:17 PM)      "

## 2018-05-22 NOTE — PLAN OF CARE
Patient sleeping, snoring. Easily arousable. VSS. No pain noted. Report to Dieter MARTÍNEZ with time for questions, verbalized understanding, Dr. Chace moss to release patient from PACU.

## 2018-05-23 PROBLEM — N35.919 URETHRAL STRICTURE: Status: ACTIVE | Noted: 2018-05-23

## 2018-05-23 PROBLEM — Z96.0 URETERAL STENT RETAINED: Status: ACTIVE | Noted: 2018-05-23

## 2018-05-23 LAB
ANION GAP SERPL CALC-SCNC: 6 MMOL/L
BASOPHILS # BLD AUTO: 0.02 K/UL
BASOPHILS NFR BLD: 0.2 %
BUN SERPL-MCNC: 9 MG/DL
CALCIUM SERPL-MCNC: 8.3 MG/DL
CHLORIDE SERPL-SCNC: 110 MMOL/L
CO2 SERPL-SCNC: 23 MMOL/L
CREAT SERPL-MCNC: 0.8 MG/DL
DIFFERENTIAL METHOD: ABNORMAL
EOSINOPHIL # BLD AUTO: 0 K/UL
EOSINOPHIL NFR BLD: 0.1 %
ERYTHROCYTE [DISTWIDTH] IN BLOOD BY AUTOMATED COUNT: 14.7 %
EST. GFR  (AFRICAN AMERICAN): >60 ML/MIN/1.73 M^2
EST. GFR  (NON AFRICAN AMERICAN): >60 ML/MIN/1.73 M^2
GLUCOSE SERPL-MCNC: 98 MG/DL
HCT VFR BLD AUTO: 36.2 %
HGB BLD-MCNC: 12 G/DL
LYMPHOCYTES # BLD AUTO: 1.3 K/UL
LYMPHOCYTES NFR BLD: 12.9 %
MCH RBC QN AUTO: 27.8 PG
MCHC RBC AUTO-ENTMCNC: 33.1 G/DL
MCV RBC AUTO: 84 FL
MONOCYTES # BLD AUTO: 0.8 K/UL
MONOCYTES NFR BLD: 8.1 %
NEUTROPHILS # BLD AUTO: 8.1 K/UL
NEUTROPHILS NFR BLD: 78.4 %
PLATELET # BLD AUTO: 174 K/UL
PMV BLD AUTO: 10.8 FL
POTASSIUM SERPL-SCNC: 4 MMOL/L
RBC # BLD AUTO: 4.32 M/UL
SODIUM SERPL-SCNC: 139 MMOL/L
WBC # BLD AUTO: 10.33 K/UL

## 2018-05-23 PROCEDURE — 99900035 HC TECH TIME PER 15 MIN (STAT)

## 2018-05-23 PROCEDURE — 80048 BASIC METABOLIC PNL TOTAL CA: CPT

## 2018-05-23 PROCEDURE — 36415 COLL VENOUS BLD VENIPUNCTURE: CPT

## 2018-05-23 PROCEDURE — 94761 N-INVAS EAR/PLS OXIMETRY MLT: CPT

## 2018-05-23 PROCEDURE — 94799 UNLISTED PULMONARY SVC/PX: CPT

## 2018-05-23 PROCEDURE — 11000001 HC ACUTE MED/SURG PRIVATE ROOM

## 2018-05-23 PROCEDURE — 63600175 PHARM REV CODE 636 W HCPCS: Performed by: SURGERY

## 2018-05-23 PROCEDURE — 25000003 PHARM REV CODE 250: Performed by: SURGERY

## 2018-05-23 PROCEDURE — S0028 INJECTION, FAMOTIDINE, 20 MG: HCPCS | Performed by: SURGERY

## 2018-05-23 PROCEDURE — 63600175 PHARM REV CODE 636 W HCPCS: Performed by: STUDENT IN AN ORGANIZED HEALTH CARE EDUCATION/TRAINING PROGRAM

## 2018-05-23 PROCEDURE — S0030 INJECTION, METRONIDAZOLE: HCPCS | Performed by: SURGERY

## 2018-05-23 PROCEDURE — 94770 HC EXHALED C02 TEST: CPT

## 2018-05-23 PROCEDURE — 85025 COMPLETE CBC W/AUTO DIFF WBC: CPT

## 2018-05-23 PROCEDURE — 99232 SBSQ HOSP IP/OBS MODERATE 35: CPT | Mod: ,,, | Performed by: UROLOGY

## 2018-05-23 RX ORDER — ENOXAPARIN SODIUM 100 MG/ML
40 INJECTION SUBCUTANEOUS EVERY 24 HOURS
Status: DISCONTINUED | OUTPATIENT
Start: 2018-05-23 | End: 2018-05-26 | Stop reason: HOSPADM

## 2018-05-23 RX ADMIN — PIPERACILLIN AND TAZOBACTAM 4.5 G: 4; .5 INJECTION, POWDER, LYOPHILIZED, FOR SOLUTION INTRAVENOUS; PARENTERAL at 10:05

## 2018-05-23 RX ADMIN — METOCLOPRAMIDE 10 MG: 5 INJECTION, SOLUTION INTRAMUSCULAR; INTRAVENOUS at 01:05

## 2018-05-23 RX ADMIN — HYDROCODONE BITARTRATE AND ACETAMINOPHEN 1 TABLET: 5; 325 TABLET ORAL at 10:05

## 2018-05-23 RX ADMIN — ENOXAPARIN SODIUM 40 MG: 100 INJECTION SUBCUTANEOUS at 06:05

## 2018-05-23 RX ADMIN — FLUCONAZOLE 400 MG: 2 INJECTION INTRAVENOUS at 10:05

## 2018-05-23 RX ADMIN — ENALAPRIL MALEATE 10 MG: 5 TABLET ORAL at 10:05

## 2018-05-23 RX ADMIN — METRONIDAZOLE 500 MG: 500 INJECTION, SOLUTION INTRAVENOUS at 12:05

## 2018-05-23 RX ADMIN — METOCLOPRAMIDE 10 MG: 5 INJECTION, SOLUTION INTRAMUSCULAR; INTRAVENOUS at 09:05

## 2018-05-23 RX ADMIN — HYDROCODONE BITARTRATE AND ACETAMINOPHEN 1 TABLET: 5; 325 TABLET ORAL at 06:05

## 2018-05-23 RX ADMIN — Medication: at 05:05

## 2018-05-23 RX ADMIN — PIPERACILLIN AND TAZOBACTAM 4.5 G: 4; .5 INJECTION, POWDER, LYOPHILIZED, FOR SOLUTION INTRAVENOUS; PARENTERAL at 06:05

## 2018-05-23 RX ADMIN — FAMOTIDINE 20 MG: 10 INJECTION INTRAVENOUS at 09:05

## 2018-05-23 RX ADMIN — METOCLOPRAMIDE 10 MG: 5 INJECTION, SOLUTION INTRAMUSCULAR; INTRAVENOUS at 05:05

## 2018-05-23 RX ADMIN — PIPERACILLIN AND TAZOBACTAM 4.5 G: 4; .5 INJECTION, POWDER, LYOPHILIZED, FOR SOLUTION INTRAVENOUS; PARENTERAL at 12:05

## 2018-05-23 RX ADMIN — HYDROCODONE BITARTRATE AND ACETAMINOPHEN 1 TABLET: 5; 325 TABLET ORAL at 01:05

## 2018-05-23 RX ADMIN — FAMOTIDINE 20 MG: 10 INJECTION INTRAVENOUS at 10:05

## 2018-05-23 NOTE — PROGRESS NOTES
.Pharmacy New Medication Education    Patient accepted medication education.    Pharmacy educated patient on name and purpose of medications and possible side effects, using the teach-back method.     Current Inpatient Medication Orders   0.9% NaCl infusion   acetaminophen tablet 650 mg   diphenhydrAMINE injection 25 mg   enalapril tablet 10 mg   famotidine (PF) injection 20 mg   fluconazole (DIFLUCAN) IVPB 400 mg 200 mL   HYDROcodone-acetaminophen 5-325 mg per tablet 1 tablet   HYDROmorphone PCA in 0.9 % NaCl 6 Mg/30 mL (0.2 mg/mL)   metoclopramide HCl injection 10 mg   ondansetron injection 4 mg   piperacillin-tazobactam 4.5 g in dextrose 5 % 100 mL IVPB (ready to mix system)   promethazine (PHENERGAN) 6.25 mg in dextrose 5 % 50 mL IVPB   sodium chloride 0.9% flush 3 mL       Learners of pharmacy medication education included:  Patient    Patient +/- learner response:  Verbalized Understanding, Teachback

## 2018-05-23 NOTE — ASSESSMENT & PLAN NOTE
Discussed at length in detail with patient and his family presence of urethral stricture.    The earliest that the Grigsby catheter should be removed is Friday, May 25th, 2018.  Okay to leave Grigsby catheter in longer if needed for monitoring of I&O.    I discussed that urethral stricture disease can be a recurring problem and that we will plan on performing cystoscopy in approximately 3-4 months for recheck.

## 2018-05-23 NOTE — SUBJECTIVE & OBJECTIVE
Interval History:  Postop day 1 status post diverticular surgery.  Left ureteral stent inserted by me preop.  Patient found to have urethral stricture disease which was dilated    Review of Systems no chest pain no shortness of breath  Objective:     Temp:  [98 °F (36.7 °C)-99.5 °F (37.5 °C)] 98.8 °F (37.1 °C)  Pulse:  [70-96] 93  Resp:  [9-18] 16  SpO2:  [97 %-100 %] 97 %  BP: (119-144)/(64-83) 119/75     Body mass index is 32.88 kg/m².      Date 05/23/18 0700 - 05/24/18 0659   Shift 1482-1138 9675-7735 2311-0942 24 Hour Total   I  N  T  A  K  E   P.O. 320   320    Shift Total  (mL/kg) 320  (3.2)   320  (3.2)   O  U  T  P  U  T   Shift Total  (mL/kg)       Weight (kg) 101 101 101 101          Drains     Drain                 Ureteral Drain/Stent 05/22/18 0806 Left ureter 5 Fr. 1 day         Urethral Catheter 05/22/18 0731 Non-latex;Straight-tip 18 Fr. 1 day                Physical Exam   Constitutional: He appears well-developed and well-nourished.   Neck: Normal range of motion. Neck supple.   Pulmonary/Chest: Effort normal.   Genitourinary:             Significant Labs:    BMP:    Recent Labs  Lab 05/17/18  1308 05/23/18  0556    139   K 4.0 4.0    110   CO2 25 23   BUN 13 9   CREATININE 0.9 0.8   CALCIUM 9.3 8.3*       CBC:     Recent Labs  Lab 05/17/18  1308 05/23/18  0556   WBC 5.23 10.33   HGB 11.6* 12.0*   HCT 36.0* 36.2*    174       All pertinent labs results from the past 24 hours have been reviewed.    Significant Imaging:  All pertinent imaging results/findings from the past 24 hours have been reviewed.

## 2018-05-23 NOTE — PROGRESS NOTES
Surgery Progress Note    S:  NAEON. Afebrile. Taken to OR yesterday for lap sigmoid colon resection. Tolerated well. Transferred to floor following surgery. Pain controlled.     O:  Vitals:    05/23/18 0830   BP: 120/66   Pulse: 89   Resp: 18   Temp: 98.7 °F (37.1 °C)       Physical Exam:  NAD  No respiratory distress  abd soft, appropriately tender, non-distended, incisions C/D/I    Labs:  WBC 10  H/H 12/36    A/P:  48yM s/p lap sigmoid colon resection POD1    1. Clear liquid diet  2. Pain control  3. Discontinue abx  4. Encourage ambulation and IS  5. lovenox      Pollo Flores MD

## 2018-05-23 NOTE — ASSESSMENT & PLAN NOTE
Discussed that removal of his indwelling stent is in office procedure.    We will plan on removing in approximately 3 weeks    Have patient follow up with me 3 weeks after discharge for cystoscopy and stent removal.

## 2018-05-23 NOTE — PLAN OF CARE
TN met with pt, wife Stephenie    and family   s/p elective colon rsxn  5/22 due to Diverticulitis   pt up and ambulating   independent prior to admit - no hh, no dme     d/c needs TBD          05/23/18 4271   Discharge Assessment   Assessment Type Discharge Planning Assessment   Confirmed/corrected address and phone number on facesheet? Yes   Assessment information obtained from? Patient;Caregiver;Medical Record   Expected Length of Stay (days) 3   Communicated expected length of stay with patient/caregiver yes   Prior to hospitilization cognitive status: Alert/Oriented   Prior to hospitalization functional status: Independent   Current cognitive status: Alert/Oriented   Current Functional Status: Independent   Lives With spouse   Able to Return to Prior Arrangements yes   Is patient able to care for self after discharge? Yes   Who are your caregiver(s) and their phone number(s)? (wife - Stephenie   [phone] )   Patient's perception of discharge disposition home or selfcare   Readmission Within The Last 30 Days no previous admission in last 30 days   Patient currently being followed by outpatient case management? No   Patient currently receives any other outside agency services? No   Equipment Currently Used at Home none   Do you have any problems affording any of your prescribed medications? No  ([facility]  )   Is the patient taking medications as prescribed? yes   Does the patient have transportation home? Yes   Transportation Available family or friend will provide;car   Does the patient receive services at the [facility]? No   Discharge Plan A Home;Home with family   Discharge Plan B Home;Home with family;Home Health   Patient/Family In Agreement With Plan yes

## 2018-05-23 NOTE — PROGRESS NOTES
[facility]  Urology  Progress Note    Patient Name: Mario Castro Sr.  MRN: [id number]  Admission Date: 5/22/2018  Hospital Length of Stay: 1 days  Code Status: Prior   Attending Provider: Annette Mathias MD   Primary Care Physician: JANEL Sierra MD    Subjective:     HPI:  No notes on file    Interval History:  Postop day 1 status post diverticular surgery.  Left ureteral stent inserted by me preop.  Patient found to have urethral stricture disease which was dilated    Review of Systems no chest pain no shortness of breath  Objective:     Temp:  [98 °F (36.7 °C)-99.5 °F (37.5 °C)] 98.8 °F (37.1 °C)  Pulse:  [70-96] 93  Resp:  [9-18] 16  SpO2:  [97 %-100 %] 97 %  BP: (119-144)/(64-83) 119/75     Body mass index is 32.88 kg/m².      Date 05/23/18 0700 - 05/24/18 0659   Shift 8159-8916 7833-1574 7912-3255 24 Hour Total   I  N  T  A  K  E   P.O. 320   320    Shift Total  (mL/kg) 320  (3.2)   320  (3.2)   O  U  T  P  U  T   Shift Total  (mL/kg)       Weight (kg) 101 101 101 101          Drains     Drain                 Ureteral Drain/Stent 05/22/18 0806 Left ureter 5 Fr. 1 day         Urethral Catheter 05/22/18 0731 Non-latex;Straight-tip 18 Fr. 1 day                Physical Exam   Constitutional: He appears well-developed and well-nourished.   Neck: Normal range of motion. Neck supple.   Pulmonary/Chest: Effort normal.   Genitourinary:             Significant Labs:    BMP:    Recent Labs  Lab 05/17/18  1308 05/23/18  0556    139   K 4.0 4.0    110   CO2 25 23   BUN 13 9   CREATININE 0.9 0.8   CALCIUM 9.3 8.3*       CBC:     Recent Labs  Lab 05/17/18  1308 05/23/18  0556   WBC 5.23 10.33   HGB 11.6* 12.0*   HCT 36.0* 36.2*    174       All pertinent labs results from the past 24 hours have been reviewed.    Significant Imaging:  All pertinent imaging results/findings from the past 24 hours have been reviewed.                  Assessment/Plan:     Ureteral stent retained     Discussed that removal of his indwelling stent is in office procedure.    We will plan on removing in approximately 3 weeks    Have patient follow up with me 3 weeks after discharge for cystoscopy and stent removal.        Urethral stricture    Discussed at length in detail with patient and his family presence of urethral stricture.    The earliest that the Grigsby catheter should be removed is Friday, May 25th, 2018.  Okay to leave Grigsby catheter in longer if needed for monitoring of I&O.    I discussed that urethral stricture disease can be a recurring problem and that we will plan on performing cystoscopy in approximately 3-4 months for recheck.            VTE Risk Mitigation         Ordered     enoxaparin injection 40 mg  Daily      05/23/18 0932     Place sequential compression device  Until discontinued      05/23/18 0932          Tommie Hernandez MD  Urology  [facility]

## 2018-05-23 NOTE — PLAN OF CARE
Problem: Patient Care Overview  Goal: Plan of Care Review  AAOX4 CO2 MONITORING IN PROGRESS . PAIN MANAGEMENT W/ PCA AND PRN NORCO . ABD SUTURE SITES X 5  DERMABOND NOTED. ARELLANO DRAINING REDDISH URINE TO  BAG . TEDS  SCDS IN PLACE . CALL LIGHT IN REACH . SAFETY ALARMS ON . WILL CONTINUE TO MONITOR.

## 2018-05-23 NOTE — PROGRESS NOTES
Dr. Hernandez notified nurse that he would like to keep chand catheter in til Friday, May 25 and to discontinue chand catheter then, he will follow patient.  Will continue to monitor.

## 2018-05-24 PROCEDURE — 94761 N-INVAS EAR/PLS OXIMETRY MLT: CPT

## 2018-05-24 PROCEDURE — S0028 INJECTION, FAMOTIDINE, 20 MG: HCPCS | Performed by: SURGERY

## 2018-05-24 PROCEDURE — 94640 AIRWAY INHALATION TREATMENT: CPT

## 2018-05-24 PROCEDURE — 25000003 PHARM REV CODE 250: Performed by: STUDENT IN AN ORGANIZED HEALTH CARE EDUCATION/TRAINING PROGRAM

## 2018-05-24 PROCEDURE — 94770 HC EXHALED C02 TEST: CPT

## 2018-05-24 PROCEDURE — 99900035 HC TECH TIME PER 15 MIN (STAT)

## 2018-05-24 PROCEDURE — 63600175 PHARM REV CODE 636 W HCPCS: Performed by: STUDENT IN AN ORGANIZED HEALTH CARE EDUCATION/TRAINING PROGRAM

## 2018-05-24 PROCEDURE — 25000003 PHARM REV CODE 250: Performed by: ANESTHESIOLOGY

## 2018-05-24 PROCEDURE — 25000003 PHARM REV CODE 250: Performed by: SURGERY

## 2018-05-24 PROCEDURE — 63600175 PHARM REV CODE 636 W HCPCS: Performed by: SURGERY

## 2018-05-24 PROCEDURE — 25000242 PHARM REV CODE 250 ALT 637 W/ HCPCS: Performed by: SURGERY

## 2018-05-24 PROCEDURE — 11000001 HC ACUTE MED/SURG PRIVATE ROOM

## 2018-05-24 RX ORDER — SODIUM CHLORIDE 9 MG/ML
INJECTION, SOLUTION INTRAVENOUS CONTINUOUS
Status: DISCONTINUED | OUTPATIENT
Start: 2018-05-24 | End: 2018-05-25

## 2018-05-24 RX ORDER — HYDROCODONE BITARTRATE AND ACETAMINOPHEN 10; 325 MG/1; MG/1
1 TABLET ORAL EVERY 4 HOURS PRN
Status: DISCONTINUED | OUTPATIENT
Start: 2018-05-24 | End: 2018-05-26 | Stop reason: HOSPADM

## 2018-05-24 RX ORDER — IPRATROPIUM BROMIDE AND ALBUTEROL SULFATE 2.5; .5 MG/3ML; MG/3ML
3 SOLUTION RESPIRATORY (INHALATION)
Status: DISCONTINUED | OUTPATIENT
Start: 2018-05-25 | End: 2018-05-26 | Stop reason: HOSPADM

## 2018-05-24 RX ORDER — IPRATROPIUM BROMIDE AND ALBUTEROL SULFATE 2.5; .5 MG/3ML; MG/3ML
3 SOLUTION RESPIRATORY (INHALATION) EVERY 4 HOURS
Status: DISCONTINUED | OUTPATIENT
Start: 2018-05-24 | End: 2018-05-24

## 2018-05-24 RX ORDER — FLUCONAZOLE 200 MG/1
400 TABLET ORAL DAILY
Status: DISCONTINUED | OUTPATIENT
Start: 2018-05-24 | End: 2018-05-24

## 2018-05-24 RX ORDER — FAMOTIDINE 20 MG/1
20 TABLET, FILM COATED ORAL 2 TIMES DAILY
Status: DISCONTINUED | OUTPATIENT
Start: 2018-05-24 | End: 2018-05-26 | Stop reason: HOSPADM

## 2018-05-24 RX ADMIN — HYDROCODONE BITARTRATE AND ACETAMINOPHEN 1 TABLET: 10; 325 TABLET ORAL at 05:05

## 2018-05-24 RX ADMIN — ENALAPRIL MALEATE 10 MG: 5 TABLET ORAL at 08:05

## 2018-05-24 RX ADMIN — METOCLOPRAMIDE 10 MG: 5 INJECTION, SOLUTION INTRAMUSCULAR; INTRAVENOUS at 01:05

## 2018-05-24 RX ADMIN — METOCLOPRAMIDE 10 MG: 5 INJECTION, SOLUTION INTRAMUSCULAR; INTRAVENOUS at 10:05

## 2018-05-24 RX ADMIN — FAMOTIDINE 20 MG: 10 INJECTION INTRAVENOUS at 08:05

## 2018-05-24 RX ADMIN — SODIUM CHLORIDE: 0.9 INJECTION, SOLUTION INTRAVENOUS at 04:05

## 2018-05-24 RX ADMIN — ENOXAPARIN SODIUM 40 MG: 100 INJECTION SUBCUTANEOUS at 04:05

## 2018-05-24 RX ADMIN — METOCLOPRAMIDE 10 MG: 5 INJECTION, SOLUTION INTRAMUSCULAR; INTRAVENOUS at 05:05

## 2018-05-24 RX ADMIN — FAMOTIDINE 20 MG: 20 TABLET ORAL at 09:05

## 2018-05-24 RX ADMIN — HYDROCODONE BITARTRATE AND ACETAMINOPHEN 1 TABLET: 10; 325 TABLET ORAL at 10:05

## 2018-05-24 RX ADMIN — Medication: at 07:05

## 2018-05-24 RX ADMIN — PIPERACILLIN AND TAZOBACTAM 4.5 G: 4; .5 INJECTION, POWDER, LYOPHILIZED, FOR SOLUTION INTRAVENOUS; PARENTERAL at 01:05

## 2018-05-24 RX ADMIN — IPRATROPIUM BROMIDE AND ALBUTEROL SULFATE 3 ML: .5; 3 SOLUTION RESPIRATORY (INHALATION) at 07:05

## 2018-05-24 RX ADMIN — PIPERACILLIN AND TAZOBACTAM 4.5 G: 4; .5 INJECTION, POWDER, LYOPHILIZED, FOR SOLUTION INTRAVENOUS; PARENTERAL at 08:05

## 2018-05-24 NOTE — PROGRESS NOTES
Surgery Progress Note    S:  NAEON. Afebrile. Clear liquid diet started. Patient tolerating well. Pain controlled. Ambulating.    O:  Vitals:    05/24/18 1116   BP: 126/71   Pulse: 95   Resp: 20   Temp: 98.7 °F (37.1 °C)       Physical Exam:  NAD  No respiratory distress  abd soft, appropriately tender, non-distended, incisions C/D/I    A/P:  48yM s/p lap sigmoid colon resection POD2    1. Clear liquid diet  2. Pain control, d/c PCA and start PO analgesics  3. Discontinue abx  4. Encourage ambulation and IS  5. lovenox      Pollo Flores MD

## 2018-05-24 NOTE — PLAN OF CARE
Problem: Patient Care Overview  Goal: Plan of Care Review  AAOX3 PAIN CONTROLLED BY PCA DEVICE . ARELLANO DRAINING MARROON COLORED URINE TO  BAG . REMAINS ON IV ABX THERAPY . TEDS AND SCDS IN PLACE. SAFETY PRECAUTIONS MAINTAINED CALL LIGHT IN REACH.

## 2018-05-24 NOTE — PROGRESS NOTES
Pharmacist Intervention IV to PO Note    Mario Castro Sr. is a 48 y.o. male being treated with IV medication famotidine    Patient Data:    Vital Signs (Most Recent):  Temp: 98.7 °F (37.1 °C) (05/24/18 1116)  Pulse: 95 (05/24/18 1116)  Resp: 20 (05/24/18 1116)  BP: 126/71 (05/24/18 1116)  SpO2: (!) 94 % (05/24/18 1116)   Vital Signs (72h Range):  Temp:  [97.8 °F (36.6 °C)-100.8 °F (38.2 °C)]   Pulse:  [69-97]   Resp:  [9-20]   BP: (110-144)/(63-83)   SpO2:  [94 %-100 %]      CBC:    Recent Labs     Lab 05/23/18  0556   WBC 10.33   RBC 4.32*   HGB 12.0*   HCT 36.2*      MCV 84   MCH 27.8   MCHC 33.1     CMP:     Recent Labs     Lab 05/23/18  0556   GLU 98   CALCIUM 8.3*      K 4.0   CO2 23      BUN 9   CREATININE 0.8       Dietary Orders:  Diet Orders            None            Based on the following criteria, this patient qualifies for intravenous to oral conversion:  [x] The patients gastrointestinal tract is functioning (tolerating medications via oral or enteral route for 24 hours and tolerating food or enteral feeds for 24 hours).  [x] The patient is hemodynamically stable for 24 hours (heart rate <100 beats per minute, systolic blood pressure >99 mm Hg, and respiratory rate <20 breaths per minute).  [x] The patient shows clinical improvement (afebrile for at least 24 hours and white blood cell count downtrending or normalized). Additionally, the patient must be non-neutropenic (absolute neutrophil count >500 cells/mm3).  [x] For antimicrobials, the patient has received IV therapy for at least 24 hours.    IV medication famotidine will be changed to oral medication Famotidine    Pharmacist's Name: Silvia Adams  Pharmacist's Extension: [phone]

## 2018-05-25 LAB
ALBUMIN SERPL BCP-MCNC: 2.8 G/DL
ALP SERPL-CCNC: 48 U/L
ALT SERPL W/O P-5'-P-CCNC: 8 U/L
ANION GAP SERPL CALC-SCNC: 13 MMOL/L
AST SERPL-CCNC: 10 U/L
BASOPHILS # BLD AUTO: 0.04 K/UL
BASOPHILS NFR BLD: 0.4 %
BILIRUB SERPL-MCNC: 0.5 MG/DL
BUN SERPL-MCNC: 6 MG/DL
CALCIUM SERPL-MCNC: 8.4 MG/DL
CHLORIDE SERPL-SCNC: 106 MMOL/L
CO2 SERPL-SCNC: 18 MMOL/L
CREAT SERPL-MCNC: 0.8 MG/DL
DIFFERENTIAL METHOD: ABNORMAL
EOSINOPHIL # BLD AUTO: 0.3 K/UL
EOSINOPHIL NFR BLD: 2.6 %
ERYTHROCYTE [DISTWIDTH] IN BLOOD BY AUTOMATED COUNT: 14.9 %
EST. GFR  (AFRICAN AMERICAN): >60 ML/MIN/1.73 M^2
EST. GFR  (NON AFRICAN AMERICAN): >60 ML/MIN/1.73 M^2
GLUCOSE SERPL-MCNC: 74 MG/DL
HCT VFR BLD AUTO: 32.8 %
HGB BLD-MCNC: 10.5 G/DL
LYMPHOCYTES # BLD AUTO: 1.3 K/UL
LYMPHOCYTES NFR BLD: 12 %
MCH RBC QN AUTO: 26.9 PG
MCHC RBC AUTO-ENTMCNC: 32 G/DL
MCV RBC AUTO: 84 FL
MONOCYTES # BLD AUTO: 0.9 K/UL
MONOCYTES NFR BLD: 8.6 %
NEUTROPHILS # BLD AUTO: 8.1 K/UL
NEUTROPHILS NFR BLD: 76 %
PLATELET # BLD AUTO: 173 K/UL
PMV BLD AUTO: 10.4 FL
POTASSIUM SERPL-SCNC: 3.3 MMOL/L
PROT SERPL-MCNC: 6.3 G/DL
RBC # BLD AUTO: 3.9 M/UL
SODIUM SERPL-SCNC: 137 MMOL/L
WBC # BLD AUTO: 10.66 K/UL

## 2018-05-25 PROCEDURE — 63600175 PHARM REV CODE 636 W HCPCS: Performed by: SURGERY

## 2018-05-25 PROCEDURE — 94640 AIRWAY INHALATION TREATMENT: CPT

## 2018-05-25 PROCEDURE — 25000003 PHARM REV CODE 250: Performed by: STUDENT IN AN ORGANIZED HEALTH CARE EDUCATION/TRAINING PROGRAM

## 2018-05-25 PROCEDURE — 94761 N-INVAS EAR/PLS OXIMETRY MLT: CPT

## 2018-05-25 PROCEDURE — 25000242 PHARM REV CODE 250 ALT 637 W/ HCPCS: Performed by: SURGERY

## 2018-05-25 PROCEDURE — 85025 COMPLETE CBC W/AUTO DIFF WBC: CPT

## 2018-05-25 PROCEDURE — 25000003 PHARM REV CODE 250: Performed by: SURGERY

## 2018-05-25 PROCEDURE — 80053 COMPREHEN METABOLIC PANEL: CPT

## 2018-05-25 PROCEDURE — 99231 SBSQ HOSP IP/OBS SF/LOW 25: CPT | Mod: ,,, | Performed by: UROLOGY

## 2018-05-25 PROCEDURE — 11000001 HC ACUTE MED/SURG PRIVATE ROOM

## 2018-05-25 PROCEDURE — 36415 COLL VENOUS BLD VENIPUNCTURE: CPT

## 2018-05-25 RX ORDER — POTASSIUM CHLORIDE 20 MEQ/1
40 TABLET, EXTENDED RELEASE ORAL ONCE
Status: COMPLETED | OUTPATIENT
Start: 2018-05-25 | End: 2018-05-25

## 2018-05-25 RX ADMIN — IPRATROPIUM BROMIDE AND ALBUTEROL SULFATE 3 ML: .5; 3 SOLUTION RESPIRATORY (INHALATION) at 11:05

## 2018-05-25 RX ADMIN — METOCLOPRAMIDE 10 MG: 5 INJECTION, SOLUTION INTRAMUSCULAR; INTRAVENOUS at 06:05

## 2018-05-25 RX ADMIN — FAMOTIDINE 20 MG: 20 TABLET ORAL at 09:05

## 2018-05-25 RX ADMIN — SODIUM CHLORIDE: 0.9 INJECTION, SOLUTION INTRAVENOUS at 12:05

## 2018-05-25 RX ADMIN — POTASSIUM CHLORIDE 40 MEQ: 20 TABLET, EXTENDED RELEASE ORAL at 09:05

## 2018-05-25 RX ADMIN — HYDROCODONE BITARTRATE AND ACETAMINOPHEN 1 TABLET: 10; 325 TABLET ORAL at 09:05

## 2018-05-25 RX ADMIN — METOCLOPRAMIDE 10 MG: 5 INJECTION, SOLUTION INTRAMUSCULAR; INTRAVENOUS at 09:05

## 2018-05-25 RX ADMIN — IPRATROPIUM BROMIDE AND ALBUTEROL SULFATE 3 ML: .5; 3 SOLUTION RESPIRATORY (INHALATION) at 07:05

## 2018-05-25 RX ADMIN — IPRATROPIUM BROMIDE AND ALBUTEROL SULFATE 3 ML: .5; 3 SOLUTION RESPIRATORY (INHALATION) at 08:05

## 2018-05-25 RX ADMIN — HYDROCODONE BITARTRATE AND ACETAMINOPHEN 1 TABLET: 10; 325 TABLET ORAL at 06:05

## 2018-05-25 RX ADMIN — HYDROCODONE BITARTRATE AND ACETAMINOPHEN 1 TABLET: 10; 325 TABLET ORAL at 04:05

## 2018-05-25 RX ADMIN — DIPHENHYDRAMINE HYDROCHLORIDE 25 MG: 50 INJECTION, SOLUTION INTRAMUSCULAR; INTRAVENOUS at 06:05

## 2018-05-25 RX ADMIN — HYDROCODONE BITARTRATE AND ACETAMINOPHEN 1 TABLET: 10; 325 TABLET ORAL at 11:05

## 2018-05-25 RX ADMIN — ENALAPRIL MALEATE 10 MG: 5 TABLET ORAL at 08:05

## 2018-05-25 RX ADMIN — FAMOTIDINE 20 MG: 20 TABLET ORAL at 08:05

## 2018-05-25 RX ADMIN — IPRATROPIUM BROMIDE AND ALBUTEROL SULFATE 3 ML: .5; 3 SOLUTION RESPIRATORY (INHALATION) at 04:05

## 2018-05-25 NOTE — PLAN OF CARE
pt, wife Stephenie    and family   s/p elective colon rsxn  5/22 due to Diverticulitis   pt up and ambulating   independent prior to admit - no hh, no dme     d/c needs TBD     5/22 - LAP SIGMOID COLON RSXN    dx:  Diverticulitis    cl liq diet      per Urology - f/u win 3 wks for cystoscopy and stent removal          05/25/18 1808   Discharge Reassessment   Assessment Type Discharge Planning Reassessment   Provided patient/caregiver education on the expected discharge date and the discharge plan Yes   Do you have any problems affording any of your prescribed medications? TBD   Discharge Plan A Home;Home with family   Can the patient answer the patient profile reliably? Yes, cognitively intact   How does the patient rate their overall health at the present time? Good   Describe the patient's ability to walk at the present time. No restrictions

## 2018-05-25 NOTE — PROGRESS NOTES
.Pharmacy New Medication Education    Patient accepted medication education.    Pharmacy educated patient on name and purpose of medications and possible side effects, using the teach-back method.     Current Inpatient Medication Orders   0.9% NaCl infusion   0.9% NaCl infusion   acetaminophen tablet 650 mg   albuterol-ipratropium 2.5 mg-0.5 mg/3 mL nebulizer solution 3 mL   diphenhydrAMINE injection 25 mg   enalapril tablet 10 mg   enoxaparin injection 40 mg   famotidine tablet 20 mg   HYDROcodone-acetaminophen  mg per tablet 1 tablet   HYDROcodone-acetaminophen 5-325 mg per tablet 1 tablet   metoclopramide HCl injection 10 mg   ondansetron injection 4 mg   promethazine (PHENERGAN) 6.25 mg in dextrose 5 % 50 mL IVPB   sodium chloride 0.9% flush 3 mL       Learners of pharmacy medication education included:  Patient    Patient +/- learner response:  Verbalized Understanding, Teachback

## 2018-05-25 NOTE — SUBJECTIVE & OBJECTIVE
Interval History:  Grigsby catheter has been removed and patient voiding satisfactorily    Review of Systems no chest pains no shortness of breath  Objective:     Temp:  [98 °F (36.7 °C)-99.1 °F (37.3 °C)] 98.8 °F (37.1 °C)  Pulse:  [] 82  Resp:  [16-18] 16  SpO2:  [97 %-99 %] 98 %  BP: (132-156)/(75-97) 156/97     Body mass index is 32.88 kg/m².      Date 05/25/18 0700 - 05/26/18 0659   Shift 6890-7750 6699-0451 9651-1119 24 Hour Total   I  N  T  A  K  E   I.V.  (mL/kg) 875  (8.7)   875  (8.7)    Shift Total  (mL/kg) 875  (8.7)   875  (8.7)   O  U  T  P  U  T   Urine  (mL/kg/hr) 100  (0.1)   100    Shift Total  (mL/kg) 100  (1)   100  (1)   Weight (kg) 101 101 101 101          Drains     Drain                 Ureteral Drain/Stent 05/22/18 0806 Left ureter 5 Fr. 3 days                Physical Exam Physical exam reveals a well-developed well-nourished patient in no acute distress. Vital signs are reviewed.  The patient is alert and oriented ×3 with normal mood and affect.  Respiratory effort is normal and there is no peripheral edema.  Skin is normal to inspection and palpation.    Significant Labs:    BMP:    Recent Labs  Lab 05/23/18  0556 05/25/18  0452    137   K 4.0 3.3*    106   CO2 23 18*   BUN 9 6   CREATININE 0.8 0.8   CALCIUM 8.3* 8.4*       CBC:     Recent Labs  Lab 05/23/18  0556 05/25/18  0452   WBC 10.33 10.66   HGB 12.0* 10.5*   HCT 36.2* 32.8*    173       All pertinent labs results from the past 24 hours have been reviewed.    Significant Imaging:  All pertinent imaging results/findings from the past 24 hours have been reviewed.

## 2018-05-25 NOTE — NURSING
"Pt has voided post chand cath removal. Ambulating in hallway. Pain controlled with po meds. Asking for regular diet for supper. Stated  That "he would like to try the meatloaf ".Appears in good spirits. Up in chair for most of the morning.  "

## 2018-05-25 NOTE — PROGRESS NOTES
Notified Dr. Ruiz patient's urine output was clearing up but now is coming out bloody and patient was concerned. Dr Ruiz said she was not concerned and that this is expected and normal for his procedure. She also said the chand catheter is to stay in for now and not to take it out yet. Will continue to monitor.

## 2018-05-25 NOTE — PLAN OF CARE
Problem: Patient Care Overview  Goal: Plan of Care Review  Outcome: Ongoing (interventions implemented as appropriate)  The proper method of use, as well as anticipated side effects, of this aerosol treatment are discussed and demonstrated to the patient.   Pt on RA with documented sats.98%.  Will continue to monitor.

## 2018-05-25 NOTE — ASSESSMENT & PLAN NOTE
Discussed that removal of his indwelling stent is in office procedure.    We will plan on removing in approximately 3 weeks    Have patient follow up with me 3 weeks after discharge for cystoscopy and stent removal.    05/25/2018.  Patient should follow up with me approximately 3 weeks after discharge for cystoscopy and stent removal in the office

## 2018-05-25 NOTE — PLAN OF CARE
Problem: Patient Care Overview  Goal: Plan of Care Review  Outcome: Ongoing (interventions implemented as appropriate)  Patient remains free from falls, bed alarm in use. Medicated for complaint of pain once over night with good pain relief noted. IV fluids infusing without and problems. Patient turns in bed, so skin break down noted. 4 abdominal lap sites and a LLQ incision all well approximated with no redness, swelling or drainage noted, all derma bond closed. Grigsby catheter in place with bloody urine noted.

## 2018-05-25 NOTE — ASSESSMENT & PLAN NOTE
Discussed at length in detail with patient and his family presence of urethral stricture.    The earliest that the Grigsby catheter should be removed is Friday, May 25th, 2018.  Okay to leave Grigsby catheter in longer if needed for monitoring of I&O.    I discussed that urethral stricture disease can be a recurring problem and that we will plan on performing cystoscopy in approximately 3-4 months for recheck.    05/25/2018.  Grigsby catheter removed today and patient voiding satisfactorily.    Plan as discussed above.

## 2018-05-25 NOTE — PROGRESS NOTES
[facility]  Urology  Progress Note    Patient Name: Mario Castro Sr.  MRN: [id number]  Admission Date: 5/22/2018  Hospital Length of Stay: 3 days  Code Status: Prior   Attending Provider: Annette Mathias MD   Primary Care Physician: JANEL Sierra MD    Subjective:     HPI:  No notes on file    Interval History:  Grigsby catheter has been removed and patient voiding satisfactorily    Review of Systems no chest pains no shortness of breath  Objective:     Temp:  [98 °F (36.7 °C)-99.1 °F (37.3 °C)] 98.8 °F (37.1 °C)  Pulse:  [] 82  Resp:  [16-18] 16  SpO2:  [97 %-99 %] 98 %  BP: (132-156)/(75-97) 156/97     Body mass index is 32.88 kg/m².      Date 05/25/18 0700 - 05/26/18 0659   Shift 3957-6864 0403-6687 3940-6932 24 Hour Total   I  N  T  A  K  E   I.V.  (mL/kg) 875  (8.7)   875  (8.7)    Shift Total  (mL/kg) 875  (8.7)   875  (8.7)   O  U  T  P  U  T   Urine  (mL/kg/hr) 100  (0.1)   100    Shift Total  (mL/kg) 100  (1)   100  (1)   Weight (kg) 101 101 101 101          Drains     Drain                 Ureteral Drain/Stent 05/22/18 0806 Left ureter 5 Fr. 3 days                Physical Exam Physical exam reveals a well-developed well-nourished patient in no acute distress. Vital signs are reviewed.  The patient is alert and oriented ×3 with normal mood and affect.  Respiratory effort is normal and there is no peripheral edema.  Skin is normal to inspection and palpation.    Significant Labs:    BMP:    Recent Labs  Lab 05/23/18  0556 05/25/18  0452    137   K 4.0 3.3*    106   CO2 23 18*   BUN 9 6   CREATININE 0.8 0.8   CALCIUM 8.3* 8.4*       CBC:     Recent Labs  Lab 05/23/18  0556 05/25/18  0452   WBC 10.33 10.66   HGB 12.0* 10.5*   HCT 36.2* 32.8*    173       All pertinent labs results from the past 24 hours have been reviewed.    Significant Imaging:  All pertinent imaging results/findings from the past 24 hours have been reviewed.                  Assessment/Plan:      Ureteral stent retained    Discussed that removal of his indwelling stent is in office procedure.    We will plan on removing in approximately 3 weeks    Have patient follow up with me 3 weeks after discharge for cystoscopy and stent removal.    05/25/2018.  Patient should follow up with me approximately 3 weeks after discharge for cystoscopy and stent removal in the office        Urethral stricture    Discussed at length in detail with patient and his family presence of urethral stricture.    The earliest that the Grigsby catheter should be removed is Friday, May 25th, 2018.  Okay to leave Grigsby catheter in longer if needed for monitoring of I&O.    I discussed that urethral stricture disease can be a recurring problem and that we will plan on performing cystoscopy in approximately 3-4 months for recheck.    05/25/2018.  Grigsby catheter removed today and patient voiding satisfactorily.    Plan as discussed above.            VTE Risk Mitigation         Ordered     enoxaparin injection 40 mg  Daily      05/23/18 0932     Place sequential compression device  Until discontinued      05/23/18 0932          Tommie Hernandez MD  Urology  [facility]

## 2018-05-25 NOTE — PROGRESS NOTES
Surgery Progress Note    S:  NAEON. Afebrile. Clear liquid diet started. Patient tolerating well. Pain controlled. Ambulating.    O:  Vitals:    05/25/18 1200   BP:    Pulse: 81   Resp: 16   Temp:        Physical Exam:  NAD  No respiratory distress  abd soft, appropriately tender, non-distended, incisions C/D/I    A/Pdoing well      Labs stable  voidinbg without catheter    Continue ambulation

## 2018-05-26 ENCOUNTER — NURSE TRIAGE (OUTPATIENT)
Dept: ADMINISTRATIVE | Facility: CLINIC | Age: 48
End: 2018-05-26

## 2018-05-26 VITALS
SYSTOLIC BLOOD PRESSURE: 156 MMHG | BODY MASS INDEX: 32.98 KG/M2 | DIASTOLIC BLOOD PRESSURE: 93 MMHG | RESPIRATION RATE: 18 BRPM | TEMPERATURE: 98 F | OXYGEN SATURATION: 97 % | HEART RATE: 89 BPM | WEIGHT: 222.69 LBS | HEIGHT: 69 IN

## 2018-05-26 LAB
ALBUMIN SERPL BCP-MCNC: 2.9 G/DL
ALP SERPL-CCNC: 47 U/L
ALT SERPL W/O P-5'-P-CCNC: 8 U/L
ANION GAP SERPL CALC-SCNC: 10 MMOL/L
AST SERPL-CCNC: 10 U/L
BASOPHILS # BLD AUTO: 0.05 K/UL
BASOPHILS NFR BLD: 0.6 %
BILIRUB SERPL-MCNC: 0.6 MG/DL
BUN SERPL-MCNC: 4 MG/DL
CALCIUM SERPL-MCNC: 8.8 MG/DL
CHLORIDE SERPL-SCNC: 106 MMOL/L
CO2 SERPL-SCNC: 23 MMOL/L
CREAT SERPL-MCNC: 0.7 MG/DL
DIFFERENTIAL METHOD: ABNORMAL
EOSINOPHIL # BLD AUTO: 0.3 K/UL
EOSINOPHIL NFR BLD: 3.4 %
ERYTHROCYTE [DISTWIDTH] IN BLOOD BY AUTOMATED COUNT: 14.9 %
EST. GFR  (AFRICAN AMERICAN): >60 ML/MIN/1.73 M^2
EST. GFR  (NON AFRICAN AMERICAN): >60 ML/MIN/1.73 M^2
GLUCOSE SERPL-MCNC: 106 MG/DL
HCT VFR BLD AUTO: 31 %
HGB BLD-MCNC: 10.3 G/DL
LYMPHOCYTES # BLD AUTO: 1 K/UL
LYMPHOCYTES NFR BLD: 12.1 %
MCH RBC QN AUTO: 27.5 PG
MCHC RBC AUTO-ENTMCNC: 33.2 G/DL
MCV RBC AUTO: 83 FL
MONOCYTES # BLD AUTO: 0.8 K/UL
MONOCYTES NFR BLD: 9.4 %
NEUTROPHILS # BLD AUTO: 6.1 K/UL
NEUTROPHILS NFR BLD: 74.3 %
PLATELET # BLD AUTO: 220 K/UL
PMV BLD AUTO: 11 FL
POTASSIUM SERPL-SCNC: 3.1 MMOL/L
PROT SERPL-MCNC: 6.5 G/DL
RBC # BLD AUTO: 3.75 M/UL
SODIUM SERPL-SCNC: 139 MMOL/L
WBC # BLD AUTO: 8.27 K/UL

## 2018-05-26 PROCEDURE — 25000003 PHARM REV CODE 250: Performed by: SURGERY

## 2018-05-26 PROCEDURE — 94761 N-INVAS EAR/PLS OXIMETRY MLT: CPT

## 2018-05-26 PROCEDURE — 94640 AIRWAY INHALATION TREATMENT: CPT

## 2018-05-26 PROCEDURE — 25000003 PHARM REV CODE 250: Performed by: STUDENT IN AN ORGANIZED HEALTH CARE EDUCATION/TRAINING PROGRAM

## 2018-05-26 PROCEDURE — 85025 COMPLETE CBC W/AUTO DIFF WBC: CPT

## 2018-05-26 PROCEDURE — 36415 COLL VENOUS BLD VENIPUNCTURE: CPT

## 2018-05-26 PROCEDURE — 25000242 PHARM REV CODE 250 ALT 637 W/ HCPCS: Performed by: SURGERY

## 2018-05-26 PROCEDURE — 63600175 PHARM REV CODE 636 W HCPCS: Performed by: SURGERY

## 2018-05-26 PROCEDURE — 80053 COMPREHEN METABOLIC PANEL: CPT

## 2018-05-26 RX ORDER — HYDROCODONE BITARTRATE AND ACETAMINOPHEN 10; 325 MG/1; MG/1
1 TABLET ORAL EVERY 4 HOURS PRN
Qty: 30 TABLET | Refills: 0 | Status: SHIPPED | OUTPATIENT
Start: 2018-05-26 | End: 2018-09-10

## 2018-05-26 RX ORDER — POTASSIUM CHLORIDE 20 MEQ/1
40 TABLET, EXTENDED RELEASE ORAL ONCE
Status: COMPLETED | OUTPATIENT
Start: 2018-05-26 | End: 2018-05-26

## 2018-05-26 RX ADMIN — FAMOTIDINE 20 MG: 20 TABLET ORAL at 09:05

## 2018-05-26 RX ADMIN — IPRATROPIUM BROMIDE AND ALBUTEROL SULFATE 3 ML: .5; 3 SOLUTION RESPIRATORY (INHALATION) at 08:05

## 2018-05-26 RX ADMIN — HYDROCODONE BITARTRATE AND ACETAMINOPHEN 1 TABLET: 10; 325 TABLET ORAL at 11:05

## 2018-05-26 RX ADMIN — METOCLOPRAMIDE 10 MG: 5 INJECTION, SOLUTION INTRAMUSCULAR; INTRAVENOUS at 05:05

## 2018-05-26 RX ADMIN — POTASSIUM CHLORIDE 40 MEQ: 20 TABLET, EXTENDED RELEASE ORAL at 09:05

## 2018-05-26 RX ADMIN — ENALAPRIL MALEATE 10 MG: 5 TABLET ORAL at 09:05

## 2018-05-26 RX ADMIN — HYDROCODONE BITARTRATE AND ACETAMINOPHEN 1 TABLET: 10; 325 TABLET ORAL at 04:05

## 2018-05-26 NOTE — DISCHARGE SUMMARY
[facility]  General Surgery  Discharge Summary      Patient Name: Mario Castro Sr.  MRN: [id number]  Admission Date: 5/22/2018  Hospital Length of Stay: 4 days  Discharge Date and Time:  05/26/2018 10:20 AM  Attending Physician: Annette Mathias MD   Discharging Provider: Matthew Flores MD  Primary Care Provider: JANEL Sierra MD     HPI: history of perforated diverticulitis, presenting for colon resection    Procedure(s) (LRB):  RESECTION-COLON-LOW ANTERIOR-LAPAROSCOPIC (N/A)  CYSTOSCOPY/ RETROGRADE PYELOGRAM/ STENT PLACEMENT/STENT EXCHANGE (Left)  DILATION-URETHRA (N/A)     Hospital Course:   The patient arrived day of surgery on 5/22 for a lap sigmoid colon resection. Patient tolerated procedure well. Recovered well over the next few days. Diet was advanced. Pain controlled. Passed flatus prior to discharge. By time of discharge was tolerating a diet, had adequate pain control, and was ambulating.    Consults:     Significant Diagnostic Studies: none    Pending Diagnostic Studies:     None        Final Active Diagnoses:    Diagnosis Date Noted POA    PRINCIPAL PROBLEM:  Diverticulitis large intestine [K57.32] 05/22/2018 Yes    Urethral stricture [N35.9] 05/23/2018 Yes    Ureteral stent retained [Z96.0] 05/23/2018 Not Applicable    Diverticulitis [K57.92] 12/22/2017 Yes      Problems Resolved During this Admission:    Diagnosis Date Noted Date Resolved POA      Discharged Condition: stable    Disposition: Home or Self Care    Follow Up:  Follow-up Information     Tommie Hernandez MD In 3 weeks.    Specialty:  Urology  Why:  Cystoscopy and stent removal / Offices closed for Weekend, Patient to schedule own follow up appointment  Contact information:  [address] [place] [address]  [phone]             Annette Mathias MD In 2 weeks.    Specialty:  General Surgery  Why:  Offices closed for Weekend, Patient to schedule own follow up  appointment  Contact information:  [address]  [address]  [place] [address]  [phone]                 Patient Instructions:   No discharge procedures on file.  Medications:  Reconciled Home Medications:      Medication List      START taking these medications    HYDROcodone-acetaminophen  mg per tablet  Commonly known as:  NORCO  Take 1 tablet by mouth every 4 (four) hours as needed.        CONTINUE taking these medications    acetaminophen 500 MG tablet  Commonly known as:  TYLENOL  Take 500 mg by mouth every 6 (six) hours as needed for Pain.     enalapril 10 MG tablet  Commonly known as:  VASOTEC  Take 10 mg by mouth once daily.     erythromycin base 500 MG tablet  Commonly known as:  E-MYCIN  TAKE 1 GRAM (2 TABLETS) BY MOUTH AT 1PM, 3PM, AND 10PM THE NIGHT PRIOR TO SURGERY     magnesium citrate solution  DRINK ENTIRE BOTTLE AT 2PM THE DAY PRIOR TO SURGERY     neomycin 500 mg Tab  Commonly known as:  MYCIFRADIN  TAKE 1G (2 TABLETS) AT 1PM, 3PM, AND 10PM THE NIGHT PRIOR TO SURGERY            Matthew Flores MD  General Surgery  [facility]

## 2018-05-26 NOTE — PROGRESS NOTES
Patient is AAOx3, NAD noted, VSS.  Safety maintained.  IV removed.  Reviewed discharge instructions with patient.  Patient verbalized understanding.  Wife at the bedside. Paper prescription for Norco given to patient.  Wheelchair ready to bring patient to lobby for discharge to home.

## 2018-05-26 NOTE — PLAN OF CARE
Discharge orders noted, no HH or HME ordered.    Pt's nurse will go over medications/signs and symptoms prior to discharge       05/26/18 0906   Final Note   Assessment Type Final Discharge Note   Discharge Disposition Home   What phone number can be called within the next 1-3 days to see how you are doing after discharge? [phone]   Hospital Follow Up  Appt(s) scheduled? No  (Offices closed for Weekend, Patient to schedule own follow up appointment)   Right Care Referral Info   Post Acute Recommendation No Care     Mili Oropeza RN Transitional Navigator  [phone]

## 2018-05-26 NOTE — PLAN OF CARE
Problem: Patient Care Overview  Goal: Plan of Care Review  Outcome: Ongoing (interventions implemented as appropriate)  Pts safety maintained, family at bedside. Meds given per MAR, pain relieved with PRN Hydrocodone. No N/V, SOB, distress. Voiding, urine clearing up. Incisions CDI. Walked in the hallway. Vitals stable through the night.

## 2018-05-27 ENCOUNTER — NURSE TRIAGE (OUTPATIENT)
Dept: ADMINISTRATIVE | Facility: CLINIC | Age: 48
End: 2018-05-27

## 2018-05-27 RX ORDER — ONDANSETRON 4 MG/1
4 TABLET, FILM COATED ORAL 2 TIMES DAILY
Qty: 25 TABLET | Refills: 0 | Status: SHIPPED | OUTPATIENT
Start: 2018-05-27 | End: 2018-06-07

## 2018-05-27 NOTE — TELEPHONE ENCOUNTER
"  Reason for Disposition   [1] Vomiting AND [2] present < 4 hours    Additional Information   Negative: Constipation     Feels like I need to. But not constipated    Answer Assessment - Initial Assessment Questions  1. SYMPTOM: "What's the main symptom you're concerned about?" (e.g., pain, fever, vomiting)      vomit  2. ONSET: "When did ________  start?"      1030  3. SURGERY: "What surgery was performed?"      resection  4. DATE of SURGERY: "When was surgery performed?"       5/22  5. ANESTHESIA: " What type of anesthesia did you have?" (e.g., general, spinal, epidural, local)      general  6. PAIN: "Is there any pain?" If so, ask: "How bad is it?"  (Scale 1-10; or mild, moderate, severe)      6-7  7. FEVER: "Do you have a fever?" If so, ask: "What is your temperature, how was it measured, and when did it start?"      99.4  8. VOMITING: "Is there any vomiting?" If yes, ask: "How many times?"      x1  9. BLEEDING: "Is there any bleeding?" If so, ask: "How much?" and "Where?"      no  10. OTHER SYMPTOMS: "Do you have any other symptoms?" (e.g., drainage from wound, painful urination, constipation)        No BM since surgery    Protocols used: ST POST-OP SYMPTOMS AND [id number]    "

## 2018-05-27 NOTE — TELEPHONE ENCOUNTER
"  Reason for Disposition   SEVERE rectal bleeding (large blood clots; on and off, or constant bleeding)    Answer Assessment - Initial Assessment Questions  1. APPEARANCE of BLOOD: "What color is it?" "Is it passed separately, on the surface of the stool, or mixed in with the stool?"       Red and black  2. AMOUNT: "How much blood was passed?"       Nothing blood spatters, and clunk of blood   3. FREQUENCY: "How many times has blood been passed with the stools?"       1st time today  4. ONSET: "When was the blood first seen in the stools?" (Days or weeks)       This morning   5. DIARRHEA: "Is there also some diarrhea?" If so, ask: "How many diarrhea stools were passed in past 24 hours?"       Loose bloody stool   6. CONSTIPATION: "Do you have constipation?" If so, "How bad is it?"      no  7. RECURRENT SYMPTOMS: "Have you had blood in your stools before?" If so, ask: "When was the last time?" and "What happened that time?"       no  8. BLOOD THINNERS: "Do you take any blood thinners?" (e.g., Coumadin/warfarin, Pradaxa/dabigatran, aspirin)      no  9. OTHER SYMPTOMS: "Do you have any other symptoms?"  (e.g., abdominal pain, vomiting, dizziness, fever)      Abdominal pain and nausea   10. PREGNANCY: "Is there any chance you are pregnant?" "When was your last menstrual period?"        N/a    Protocols used: ST RECTAL BLEEDING-A-    Patient passed a large clot of black and red blood "clunk" this morning and he is feeling weak. Patient advised to go to the ED, but he requested to speak to Dr. Wilmer Mathias. The call was transferred to Dr. Guillen after we were connected by the .   "

## 2018-05-28 NOTE — TELEPHONE ENCOUNTER
Called pt to f/u on constipation per request of Dr. Bryant. Pt reports feeling much better. States he passed the BM this morning. It was black/brown, per Dr. bryant this is expected. Denies fever. Able to tolerate fruits. Advised to call if any problems persist. Pt verbalizes understanding.

## 2018-06-01 ENCOUNTER — TELEPHONE (OUTPATIENT)
Dept: NEUROLOGY | Facility: HOSPITAL | Age: 48
End: 2018-06-01

## 2018-06-01 NOTE — TELEPHONE ENCOUNTER
Returned pts call. Pt wanting to be sure he can go places and do things. Advised pt increase activity as tolerated, no lifting >10lbs. Pt verbalizes understanding.

## 2018-06-01 NOTE — TELEPHONE ENCOUNTER
----- Message from Raegan Patel sent at 6/1/2018 12:59 PM CDT -----  Contact: patient  MEAGAN- Patient wants to speak to nurse regarding progress. Call back number [phone]

## 2018-06-07 ENCOUNTER — OFFICE VISIT (OUTPATIENT)
Dept: NEUROLOGY | Facility: HOSPITAL | Age: 48
End: 2018-06-07
Attending: SURGERY
Payer: COMMERCIAL

## 2018-06-07 ENCOUNTER — TELEPHONE (OUTPATIENT)
Dept: NEUROLOGY | Facility: HOSPITAL | Age: 48
End: 2018-06-07

## 2018-06-07 VITALS
DIASTOLIC BLOOD PRESSURE: 89 MMHG | SYSTOLIC BLOOD PRESSURE: 127 MMHG | TEMPERATURE: 99 F | BODY MASS INDEX: 31.49 KG/M2 | HEART RATE: 79 BPM | WEIGHT: 212.63 LBS | HEIGHT: 69 IN

## 2018-06-07 DIAGNOSIS — Z09 POSTOP CHECK: Primary | ICD-10-CM

## 2018-06-07 PROCEDURE — 99213 OFFICE O/P EST LOW 20 MIN: CPT | Performed by: SURGERY

## 2018-06-07 NOTE — PROGRESS NOTES
S/p LAR for sigmois tics    Doing well    abd soft woun dhealing    Eating     Having frequent BM    Overall good  F/u 3 weeks with me and Dr. Hernandez

## 2018-06-07 NOTE — TELEPHONE ENCOUNTER
----- Message from Raegan Patel sent at 6/7/2018  9:34 AM CDT -----  Contact: wife  MEAGAN- Wife would like to speak to nurse regarding her  call back number is

## 2018-06-08 NOTE — TELEPHONE ENCOUNTER
----- Message from Raegan Patel sent at 6/8/2018 11:29 AM CDT -----  Contact: patient  MEAGAN- Patient called regarding daily activites. Patient call back number [phone]

## 2018-06-08 NOTE — TELEPHONE ENCOUNTER
Returned pts call. Pt inquiring if he is able to walk around the block. Advised pt activity as tolerated. Pt verbalizes understanding.

## 2018-07-02 ENCOUNTER — OFFICE VISIT (OUTPATIENT)
Dept: NEUROLOGY | Facility: HOSPITAL | Age: 48
End: 2018-07-02
Attending: SURGERY
Payer: COMMERCIAL

## 2018-07-02 ENCOUNTER — TELEPHONE (OUTPATIENT)
Dept: UROLOGY | Facility: CLINIC | Age: 48
End: 2018-07-02

## 2018-07-02 VITALS
BODY MASS INDEX: 31.13 KG/M2 | TEMPERATURE: 100 F | HEIGHT: 69 IN | SYSTOLIC BLOOD PRESSURE: 120 MMHG | HEART RATE: 79 BPM | WEIGHT: 210.19 LBS | DIASTOLIC BLOOD PRESSURE: 82 MMHG

## 2018-07-02 DIAGNOSIS — N35.9 URETHRAL STRICTURE, UNSPECIFIED STRICTURE TYPE: Primary | ICD-10-CM

## 2018-07-02 DIAGNOSIS — Z96.0 RETAINED URETERAL STENT: ICD-10-CM

## 2018-07-02 DIAGNOSIS — Z09 POSTOP CHECK: Primary | ICD-10-CM

## 2018-07-02 PROCEDURE — 99213 OFFICE O/P EST LOW 20 MIN: CPT | Performed by: SURGERY

## 2018-07-02 NOTE — TELEPHONE ENCOUNTER
----- Message from Maureen العلي sent at 7/2/2018  2:19 PM CDT -----  Contact: self -  [phone]  Patient is returning a call from your office. Please advise

## 2018-07-02 NOTE — TELEPHONE ENCOUNTER
Please contact the patient and schedule appointment for cystoscopy with stent removal this week or next week.

## 2018-07-10 ENCOUNTER — TELEPHONE (OUTPATIENT)
Dept: NEUROLOGY | Facility: HOSPITAL | Age: 48
End: 2018-07-10

## 2018-07-11 ENCOUNTER — LAB VISIT (OUTPATIENT)
Dept: LAB | Facility: HOSPITAL | Age: 48
End: 2018-07-11
Attending: UROLOGY
Payer: COMMERCIAL

## 2018-07-11 ENCOUNTER — PROCEDURE VISIT (OUTPATIENT)
Dept: UROLOGY | Facility: CLINIC | Age: 48
End: 2018-07-11
Payer: COMMERCIAL

## 2018-07-11 ENCOUNTER — TELEPHONE (OUTPATIENT)
Dept: NEUROLOGY | Facility: HOSPITAL | Age: 48
End: 2018-07-11

## 2018-07-11 VITALS
SYSTOLIC BLOOD PRESSURE: 131 MMHG | BODY MASS INDEX: 31.1 KG/M2 | WEIGHT: 210 LBS | DIASTOLIC BLOOD PRESSURE: 80 MMHG | HEIGHT: 69 IN | HEART RATE: 71 BPM

## 2018-07-11 DIAGNOSIS — N35.9 URETHRAL STRICTURE, UNSPECIFIED STRICTURE TYPE: Primary | ICD-10-CM

## 2018-07-11 DIAGNOSIS — Z96.0 URETERAL STENT RETAINED: ICD-10-CM

## 2018-07-11 DIAGNOSIS — N35.9 URETHRAL STRICTURE, UNSPECIFIED STRICTURE TYPE: ICD-10-CM

## 2018-07-11 LAB
ANION GAP SERPL CALC-SCNC: 7 MMOL/L
BASOPHILS # BLD AUTO: 0.09 K/UL
BASOPHILS NFR BLD: 1.7 %
BUN SERPL-MCNC: 19 MG/DL
CALCIUM SERPL-MCNC: 9.3 MG/DL
CHLORIDE SERPL-SCNC: 107 MMOL/L
CO2 SERPL-SCNC: 25 MMOL/L
CREAT SERPL-MCNC: 1 MG/DL
DIFFERENTIAL METHOD: ABNORMAL
EOSINOPHIL # BLD AUTO: 0.2 K/UL
EOSINOPHIL NFR BLD: 3 %
ERYTHROCYTE [DISTWIDTH] IN BLOOD BY AUTOMATED COUNT: 15.1 %
EST. GFR  (AFRICAN AMERICAN): >60 ML/MIN/1.73 M^2
EST. GFR  (NON AFRICAN AMERICAN): >60 ML/MIN/1.73 M^2
GLUCOSE SERPL-MCNC: 75 MG/DL
HCT VFR BLD AUTO: 34.8 %
HGB BLD-MCNC: 11.5 G/DL
LYMPHOCYTES # BLD AUTO: 2 K/UL
LYMPHOCYTES NFR BLD: 37.9 %
MCH RBC QN AUTO: 28.4 PG
MCHC RBC AUTO-ENTMCNC: 33 G/DL
MCV RBC AUTO: 86 FL
MONOCYTES # BLD AUTO: 0.4 K/UL
MONOCYTES NFR BLD: 7.2 %
NEUTROPHILS # BLD AUTO: 2.6 K/UL
NEUTROPHILS NFR BLD: 50 %
PLATELET # BLD AUTO: 194 K/UL
PMV BLD AUTO: 12.1 FL
POTASSIUM SERPL-SCNC: 4 MMOL/L
RBC # BLD AUTO: 4.05 M/UL
SODIUM SERPL-SCNC: 139 MMOL/L
WBC # BLD AUTO: 5.28 K/UL

## 2018-07-11 PROCEDURE — 99499 UNLISTED E&M SERVICE: CPT | Mod: S$GLB,,, | Performed by: UROLOGY

## 2018-07-11 PROCEDURE — 52000 CYSTOURETHROSCOPY: CPT | Mod: 52,S$GLB,, | Performed by: UROLOGY

## 2018-07-11 PROCEDURE — 85025 COMPLETE CBC W/AUTO DIFF WBC: CPT

## 2018-07-11 PROCEDURE — 36415 COLL VENOUS BLD VENIPUNCTURE: CPT

## 2018-07-11 PROCEDURE — 80048 BASIC METABOLIC PNL TOTAL CA: CPT

## 2018-07-11 NOTE — TELEPHONE ENCOUNTER
Pt called to inform nurse that Rhiannon Isaacs will be sending STD and ADA paperwork, he would like to change return date to September 10th. Will complete forms on pts behalf.

## 2018-07-12 NOTE — PROCEDURES
Procedures     PLEASE NOTE:  Please be advised that portions of this note were dictated using voice recognition software and may contain dictation related errors in spelling/grammar/appropriate pronouns/syntax or other errors that might have not been found and or corrected on text review.      Procedures: Flexible cystourethroscopy    Pre Procedure Diagnosis:  Retained stent and urethral stricture disease    Post Procedure Diagnosis:  Same    Date of Procedure. 07/11/2018    Indications.  This gentleman status post insertion of stent on left prior to diverticular surgery.  At time of stent placement patient found to have urethral stricture which was dilated.    Patient now comes for stent removal    Surgeon: Tommie Hernandez MD    Anesthesia: 2% uro-jet lidocaine jelly for local analgesia    Flexible cysto-urethroscopy was performed after consent was obtained.    2% lidocaine urojet was used for local analgesia.  The genitalia were prepped and draped in the usual sterile fashion.    The flexible scope was advanced into the urethra.    A moderate size mid urethral stricture noted through which the scope was unable to be passed.  The scope was therefore removed    The patient tolerated the procedure well without complication.    Impression:  Retained stent in urethral stricture disease.      Plan.  Patient will need optical internal urethrotomy.  Will plan on leaving indwelling catheter for approximately 1 week.  We will also remove stent at same time.    I further discussed the possibility that patient might need open urethroplasty if this 2nd internal urethrotomy does not give long lasting success with keeping his urethra open.    Physical exam reveals reveals a well-developed well-nourished patient  in no acute distress.  Patient is alert and oriented ×3 with normal mood and affect.  Respiratory effort is normal and there is no peripheral edema.  Skin is normal to inspection and palpation.Penis/perineum without  "lesions, scrotum without rash/cysts, epididymis nontender bilaterally, urethral meatus in normal location normal size, no penile plaques palpated.   Testes equal in size without masses    /80 (BP Location: Right arm, Patient Position: Sitting)   Pulse 71   Ht 5' 9" (1.753 m)   Wt 95.3 kg (210 lb)   BMI 31.01 kg/m²   Review of Systems  General ROS: negative for chills, fever or weight loss  Respiratory ROS: no cough, shortness of breath, or wheezing  Cardiovascular ROS: no chest pain or dyspnea on exertion  Musculoskeletal ROS: negative for gait disturbance or muscular weakness    Family History   Problem Relation Age of Onset    Hypertension Mother     Heart disease Mother     No Known Problems Father      Past Medical History:   Diagnosis Date    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 Urethral stricture, unspecified stricture type N35.9 598.9 Cystoscopy      Basic metabolic panel      CBC auto differential   2. Ureteral stent retained Z96.0 V43.89              "

## 2018-07-16 ENCOUNTER — TELEPHONE (OUTPATIENT)
Dept: NEUROLOGY | Facility: HOSPITAL | Age: 48
End: 2018-07-16

## 2018-07-16 NOTE — TELEPHONE ENCOUNTER
Called to notify that paperwork faxed at this time. No answer. LVM w/ this info and to return call w/ questions.

## 2018-07-16 NOTE — TELEPHONE ENCOUNTER
----- Message from Archana De La Cruz sent at 7/16/2018  2:45 PM CDT -----  Contact: Rhiannon from [facility]  MEAGAN:  She wanted to make sure that we got the new paperwork for Mr. Castro's return to work date change.  She faxed it on 7/10/18.  She can be reached at [phone].  Thank you  abc

## 2018-07-19 ENCOUNTER — HOSPITAL ENCOUNTER (OUTPATIENT)
Dept: PREADMISSION TESTING | Facility: HOSPITAL | Age: 48
Discharge: HOME | End: 2018-07-19
Attending: UROLOGY
Payer: COMMERCIAL

## 2018-07-19 ENCOUNTER — ANESTHESIA EVENT (OUTPATIENT)
Dept: SURGERY | Facility: HOSPITAL | Age: 48
End: 2018-07-19
Payer: COMMERCIAL

## 2018-07-19 VITALS
RESPIRATION RATE: 16 BRPM | SYSTOLIC BLOOD PRESSURE: 127 MMHG | HEART RATE: 70 BPM | WEIGHT: 205 LBS | OXYGEN SATURATION: 98 % | BODY MASS INDEX: 30.36 KG/M2 | HEIGHT: 69 IN | DIASTOLIC BLOOD PRESSURE: 76 MMHG

## 2018-07-19 PROBLEM — K57.92 DIVERTICULITIS: Status: RESOLVED | Noted: 2017-12-22 | Resolved: 2018-07-19

## 2018-07-19 PROBLEM — K57.32 DIVERTICULITIS LARGE INTESTINE: Status: RESOLVED | Noted: 2018-05-22 | Resolved: 2018-07-19

## 2018-07-19 PROBLEM — K57.20 DIVERTICULITIS OF LARGE INTESTINE WITH PERFORATION AND ABSCESS WITHOUT BLEEDING: Status: RESOLVED | Noted: 2017-12-11 | Resolved: 2018-07-19

## 2018-07-19 RX ORDER — SODIUM CHLORIDE, SODIUM LACTATE, POTASSIUM CHLORIDE, CALCIUM CHLORIDE 600; 310; 30; 20 MG/100ML; MG/100ML; MG/100ML; MG/100ML
INJECTION, SOLUTION INTRAVENOUS CONTINUOUS
Status: CANCELLED | OUTPATIENT
Start: 2018-07-19

## 2018-07-19 RX ORDER — LIDOCAINE HYDROCHLORIDE 10 MG/ML
1 INJECTION, SOLUTION EPIDURAL; INFILTRATION; INTRACAUDAL; PERINEURAL ONCE
Status: CANCELLED | OUTPATIENT
Start: 2018-07-19 | End: 2018-07-19

## 2018-07-19 NOTE — PRE-PROCEDURE INSTRUCTIONS
[facility] [phone] - wife    Allergies, medical, surgical, family and psychosocial histories reviewed with patient. Periop plan of care reviewed. Preop instructions given, including medications to take and to hold. Time allotted for questions to be addressed.  Patient verbalized understanding.

## 2018-07-19 NOTE — ANESTHESIA PREPROCEDURE EVALUATION
07/19/2018  Mario Castro Sr. is a 48 y.o., male is scheduled for    CYSTOSCOPY, WITH DIRECT VISION INTERNAL URETHROTOMY,  CYSTOURETEROSCOPY, WITH RETROGRADE PYELOGRAM AND URETERAL STENT INSERTION (Left) under GET[facility] on 7/24/2018.      Past Surgical History:   Procedure Laterality Date    COLECTOMY  05/2018    COLONOSCOPY N/A 3/8/2018            Anesthesia Evaluation    I have reviewed the Patient Summary Reports.    I have reviewed the Nursing Notes.   I have reviewed the Medications.   Steroids Taken In Past Year: Cortisone    Review of Systems  Anesthesia Hx:  No problems with previous Anesthesia  History of prior surgery of interest to airway management or planning: Previous anesthesia: General, MAC  Procedure performed at an [facility].   Procedure performed at an [facility]. Denies Family Hx of Anesthesia complications.   Denies Personal Hx of Anesthesia complications.   Social:  Non-Smoker, No Alcohol Use    Hematology/Oncology:  Hematology Normal        EENT/Dental:EENT/Dental Normal   Cardiovascular:   Exercise tolerance: good Hypertension, well controlled Denies Dysrhythmias.   Denies Angina.     ECG has been reviewed.    Pulmonary:   Denies Shortness of breath.    Renal/:    C/o dysuria ; denies fever/chills; c/o left flank pain Renal Symptoms/Infections/Stones:  Hx of ureteral stricture; stent placement 5/2018   Hepatic/GI:   Hx of diverticulitis s/p colon resection 5/2018   Musculoskeletal:   Arthritis (right hip)  Steroid shot for right hip pain 3 months ago   Neurological:  Neurology Normal    Endocrine:  Endocrine Normal    Psych:  Psychiatric Normal           Physical Exam  General:  Obesity    Airway/Jaw/Neck:  Airway Findings: Mouth Opening: Normal Tongue: Normal  General Airway Assessment: Adult  Mallampati: I  Improves to I with phonation.  TM Distance: 4 - 6 cm         Eyes/Ears/Nose:  EYES/EARS/NOSE FINDINGS: Normal   Dental:  Dental Findings: In tact, Periodontal disease, Mild   Chest/Lungs:  Chest/Lungs Clear    Heart/Vascular:  Heart Findings: Normal Heart murmur: negative    Abdomen:  Abdomen Findings: Normal     Skin:  Skin Findings:  Surgical Incision, Recent  abd well healed, mild tenderness RLQ incision     Mental Status:  Mental Status Findings: Normal      EKG 5/2018  Sinus bradycardia  Otherwise normal ECG  No previous ECGs available  Confirmed by Casper Lee MD ([id number]) on 5/18/2018 11:02:24 AM    Anesthesia Plan  Type of Anesthesia, risks & benefits discussed:  Anesthesia Type:  general  Patient's Preference:   Intra-op Monitoring Plan: standard ASA monitors  Intra-op Monitoring Plan Comments:   Post Op Pain Control Plan: multimodal analgesia  Post Op Pain Control Plan Comments:   Induction:   IV  Beta Blocker:  Patient is not currently on a Beta-Blocker (No further documentation required).       Informed Consent: Patient understands risks and agrees with Anesthesia plan.  Questions answered. Anesthesia consent signed with patient.  ASA Score: 2     Day of Surgery Review of History & Physical:  There are no significant changes.      Anesthesia Plan Notes: Anesthesia consent will be obtained prior to procedure on 7/24/2018.        Ready For Surgery From Anesthesia Perspective.

## 2018-07-24 ENCOUNTER — HOSPITAL ENCOUNTER (OUTPATIENT)
Facility: HOSPITAL | Age: 48
Discharge: HOME | End: 2018-07-24
Attending: UROLOGY | Admitting: UROLOGY
Payer: COMMERCIAL

## 2018-07-24 ENCOUNTER — SURGERY (OUTPATIENT)
Age: 48
End: 2018-07-24

## 2018-07-24 ENCOUNTER — TELEPHONE (OUTPATIENT)
Dept: NEUROLOGY | Facility: HOSPITAL | Age: 48
End: 2018-07-24

## 2018-07-24 ENCOUNTER — TELEPHONE (OUTPATIENT)
Dept: UROLOGY | Facility: HOSPITAL | Age: 48
End: 2018-07-24

## 2018-07-24 ENCOUNTER — ANESTHESIA (OUTPATIENT)
Dept: SURGERY | Facility: HOSPITAL | Age: 48
End: 2018-07-24
Payer: COMMERCIAL

## 2018-07-24 VITALS
WEIGHT: 205 LBS | OXYGEN SATURATION: 98 % | BODY MASS INDEX: 30.36 KG/M2 | RESPIRATION RATE: 18 BRPM | SYSTOLIC BLOOD PRESSURE: 123 MMHG | DIASTOLIC BLOOD PRESSURE: 85 MMHG | HEIGHT: 69 IN | HEART RATE: 64 BPM | TEMPERATURE: 98 F

## 2018-07-24 DIAGNOSIS — N35.919 URETHRAL STRICTURE: Primary | ICD-10-CM

## 2018-07-24 DIAGNOSIS — N35.9 URETHRAL STRICTURE, UNSPECIFIED STRICTURE TYPE: ICD-10-CM

## 2018-07-24 PROCEDURE — 63600175 PHARM REV CODE 636 W HCPCS: Performed by: ANESTHESIOLOGY

## 2018-07-24 PROCEDURE — 37000009 HC ANESTHESIA EA ADD 15 MINS: Performed by: UROLOGY

## 2018-07-24 PROCEDURE — 63600175 PHARM REV CODE 636 W HCPCS: Performed by: NURSE ANESTHETIST, CERTIFIED REGISTERED

## 2018-07-24 PROCEDURE — 27201423 OPTIME MED/SURG SUP & DEVICES STERILE SUPPLY: Performed by: UROLOGY

## 2018-07-24 PROCEDURE — 52276 CYSTOSCOPY AND TREATMENT: CPT | Mod: ,,, | Performed by: UROLOGY

## 2018-07-24 PROCEDURE — 25000003 PHARM REV CODE 250: Performed by: NURSE PRACTITIONER

## 2018-07-24 PROCEDURE — 25500020 PHARM REV CODE 255: Performed by: UROLOGY

## 2018-07-24 PROCEDURE — 71000015 HC POSTOP RECOV 1ST HR: Performed by: UROLOGY

## 2018-07-24 PROCEDURE — 36000707: Performed by: UROLOGY

## 2018-07-24 PROCEDURE — 25000003 PHARM REV CODE 250: Performed by: NURSE ANESTHETIST, CERTIFIED REGISTERED

## 2018-07-24 PROCEDURE — 36000706: Performed by: UROLOGY

## 2018-07-24 PROCEDURE — 37000008 HC ANESTHESIA 1ST 15 MINUTES: Performed by: UROLOGY

## 2018-07-24 PROCEDURE — 76000 FLUOROSCOPY <1 HR PHYS/QHP: CPT | Mod: 26,59,, | Performed by: UROLOGY

## 2018-07-24 PROCEDURE — 25000003 PHARM REV CODE 250: Performed by: UROLOGY

## 2018-07-24 PROCEDURE — 63600175 PHARM REV CODE 636 W HCPCS: Performed by: UROLOGY

## 2018-07-24 PROCEDURE — 71000033 HC RECOVERY, INTIAL HOUR: Performed by: UROLOGY

## 2018-07-24 PROCEDURE — 71000039 HC RECOVERY, EACH ADD'L HOUR: Performed by: UROLOGY

## 2018-07-24 PROCEDURE — C1769 GUIDE WIRE: HCPCS | Performed by: UROLOGY

## 2018-07-24 RX ORDER — DIPHENHYDRAMINE HYDROCHLORIDE 50 MG/ML
12.5 INJECTION INTRAMUSCULAR; INTRAVENOUS EVERY 6 HOURS PRN
Status: DISCONTINUED | OUTPATIENT
Start: 2018-07-24 | End: 2018-07-24 | Stop reason: HOSPADM

## 2018-07-24 RX ORDER — DEXAMETHASONE SODIUM PHOSPHATE 4 MG/ML
INJECTION, SOLUTION INTRA-ARTICULAR; INTRALESIONAL; INTRAMUSCULAR; INTRAVENOUS; SOFT TISSUE
Status: DISCONTINUED | OUTPATIENT
Start: 2018-07-24 | End: 2018-07-24

## 2018-07-24 RX ORDER — SODIUM CHLORIDE, SODIUM LACTATE, POTASSIUM CHLORIDE, CALCIUM CHLORIDE 600; 310; 30; 20 MG/100ML; MG/100ML; MG/100ML; MG/100ML
INJECTION, SOLUTION INTRAVENOUS CONTINUOUS
Status: DISCONTINUED | OUTPATIENT
Start: 2018-07-24 | End: 2018-07-24 | Stop reason: HOSPADM

## 2018-07-24 RX ORDER — SODIUM CHLORIDE 0.9 % (FLUSH) 0.9 %
3 SYRINGE (ML) INJECTION
Status: DISCONTINUED | OUTPATIENT
Start: 2018-07-24 | End: 2018-07-24 | Stop reason: HOSPADM

## 2018-07-24 RX ORDER — MIDAZOLAM HYDROCHLORIDE 1 MG/ML
INJECTION INTRAMUSCULAR; INTRAVENOUS
Status: DISCONTINUED | OUTPATIENT
Start: 2018-07-24 | End: 2018-07-24

## 2018-07-24 RX ORDER — FENTANYL CITRATE 50 UG/ML
INJECTION, SOLUTION INTRAMUSCULAR; INTRAVENOUS
Status: DISCONTINUED | OUTPATIENT
Start: 2018-07-24 | End: 2018-07-24

## 2018-07-24 RX ORDER — LIDOCAINE HYDROCHLORIDE 10 MG/ML
1 INJECTION, SOLUTION EPIDURAL; INFILTRATION; INTRACAUDAL; PERINEURAL ONCE
Status: DISCONTINUED | OUTPATIENT
Start: 2018-07-24 | End: 2018-07-24 | Stop reason: HOSPADM

## 2018-07-24 RX ORDER — ROCURONIUM BROMIDE 10 MG/ML
INJECTION, SOLUTION INTRAVENOUS
Status: DISCONTINUED | OUTPATIENT
Start: 2018-07-24 | End: 2018-07-24

## 2018-07-24 RX ORDER — PROPOFOL 10 MG/ML
VIAL (ML) INTRAVENOUS
Status: DISCONTINUED | OUTPATIENT
Start: 2018-07-24 | End: 2018-07-24

## 2018-07-24 RX ORDER — PHENYLEPHRINE HYDROCHLORIDE 10 MG/ML
INJECTION INTRAVENOUS
Status: DISCONTINUED | OUTPATIENT
Start: 2018-07-24 | End: 2018-07-24

## 2018-07-24 RX ORDER — ONDANSETRON HYDROCHLORIDE 2 MG/ML
INJECTION, SOLUTION INTRAMUSCULAR; INTRAVENOUS
Status: DISCONTINUED | OUTPATIENT
Start: 2018-07-24 | End: 2018-07-24

## 2018-07-24 RX ORDER — CEPHALEXIN 500 MG/1
500 CAPSULE ORAL 3 TIMES DAILY
Qty: 30 CAPSULE | Refills: 0 | Status: SHIPPED | OUTPATIENT
Start: 2018-07-24 | End: 2018-08-03

## 2018-07-24 RX ORDER — CEFAZOLIN SODIUM 2 G/50ML
2 SOLUTION INTRAVENOUS
Status: DISCONTINUED | OUTPATIENT
Start: 2018-07-24 | End: 2018-07-24 | Stop reason: HOSPADM

## 2018-07-24 RX ORDER — ONDANSETRON 2 MG/ML
4 INJECTION INTRAMUSCULAR; INTRAVENOUS DAILY PRN
Status: DISCONTINUED | OUTPATIENT
Start: 2018-07-24 | End: 2018-07-24 | Stop reason: HOSPADM

## 2018-07-24 RX ORDER — SODIUM CHLORIDE 0.9 G/100ML
IRRIGANT IRRIGATION
Status: DISCONTINUED | OUTPATIENT
Start: 2018-07-24 | End: 2018-07-24 | Stop reason: HOSPADM

## 2018-07-24 RX ORDER — HYDROCODONE BITARTRATE AND ACETAMINOPHEN 5; 325 MG/1; MG/1
1 TABLET ORAL EVERY 4 HOURS PRN
Status: DISCONTINUED | OUTPATIENT
Start: 2018-07-24 | End: 2018-07-24 | Stop reason: HOSPADM

## 2018-07-24 RX ORDER — HYDROMORPHONE HYDROCHLORIDE 2 MG/ML
0.5 INJECTION, SOLUTION INTRAMUSCULAR; INTRAVENOUS; SUBCUTANEOUS EVERY 5 MIN PRN
Status: DISCONTINUED | OUTPATIENT
Start: 2018-07-24 | End: 2018-07-24 | Stop reason: HOSPADM

## 2018-07-24 RX ORDER — CEFAZOLIN SODIUM 2 G/50ML
2 SOLUTION INTRAVENOUS
Status: DISCONTINUED | OUTPATIENT
Start: 2018-07-24 | End: 2018-07-24 | Stop reason: SDUPTHER

## 2018-07-24 RX ORDER — LIDOCAINE HCL/PF 100 MG/5ML
SYRINGE (ML) INTRAVENOUS
Status: DISCONTINUED | OUTPATIENT
Start: 2018-07-24 | End: 2018-07-24

## 2018-07-24 RX ORDER — SODIUM CHLORIDE 0.9 % (FLUSH) 0.9 %
3 SYRINGE (ML) INJECTION EVERY 8 HOURS
Status: DISCONTINUED | OUTPATIENT
Start: 2018-07-24 | End: 2018-07-24 | Stop reason: HOSPADM

## 2018-07-24 RX ORDER — SUCCINYLCHOLINE CHLORIDE 20 MG/ML
INJECTION INTRAMUSCULAR; INTRAVENOUS
Status: DISCONTINUED | OUTPATIENT
Start: 2018-07-24 | End: 2018-07-24

## 2018-07-24 RX ADMIN — ROCURONIUM BROMIDE 10 MG: 10 INJECTION, SOLUTION INTRAVENOUS at 09:07

## 2018-07-24 RX ADMIN — PROPOFOL 50 MG: 10 INJECTION, EMULSION INTRAVENOUS at 10:07

## 2018-07-24 RX ADMIN — GENTAMICIN SULFATE 119.4 MG: 40 INJECTION, SOLUTION INTRAMUSCULAR; INTRAVENOUS at 09:07

## 2018-07-24 RX ADMIN — HYDROMORPHONE HYDROCHLORIDE 0.5 MG: 2 INJECTION, SOLUTION INTRAMUSCULAR; INTRAVENOUS; SUBCUTANEOUS at 11:07

## 2018-07-24 RX ADMIN — SUCCINYLCHOLINE CHLORIDE 120 MG: 20 INJECTION, SOLUTION INTRAMUSCULAR; INTRAVENOUS at 09:07

## 2018-07-24 RX ADMIN — PROPOFOL 150 MG: 10 INJECTION, EMULSION INTRAVENOUS at 09:07

## 2018-07-24 RX ADMIN — DEXAMETHASONE SODIUM PHOSPHATE 4 MG: 4 INJECTION, SOLUTION INTRAMUSCULAR; INTRAVENOUS at 10:07

## 2018-07-24 RX ADMIN — IOHEXOL 100 ML: 300 INJECTION, SOLUTION INTRAVENOUS at 08:07

## 2018-07-24 RX ADMIN — SODIUM CHLORIDE, SODIUM LACTATE, POTASSIUM CHLORIDE, AND CALCIUM CHLORIDE: .6; .31; .03; .02 INJECTION, SOLUTION INTRAVENOUS at 09:07

## 2018-07-24 RX ADMIN — FENTANYL CITRATE 100 MCG: 50 INJECTION, SOLUTION INTRAMUSCULAR; INTRAVENOUS at 09:07

## 2018-07-24 RX ADMIN — SODIUM CHLORIDE 6000 ML: 0.9 IRRIGANT IRRIGATION at 10:07

## 2018-07-24 RX ADMIN — HYDROCODONE BITARTRATE AND ACETAMINOPHEN 1 TABLET: 5; 325 TABLET ORAL at 12:07

## 2018-07-24 RX ADMIN — PHENYLEPHRINE HYDROCHLORIDE 100 MCG: 10 INJECTION INTRAVENOUS at 10:07

## 2018-07-24 RX ADMIN — LIDOCAINE HYDROCHLORIDE 60 MG: 20 INJECTION, SOLUTION INTRAVENOUS at 09:07

## 2018-07-24 RX ADMIN — MIDAZOLAM HYDROCHLORIDE 2 MG: 1 INJECTION, SOLUTION INTRAMUSCULAR; INTRAVENOUS at 09:07

## 2018-07-24 RX ADMIN — ONDANSETRON 8 MG: 2 INJECTION, SOLUTION INTRAMUSCULAR; INTRAVENOUS at 10:07

## 2018-07-24 NOTE — TRANSFER OF CARE
"Anesthesia Transfer of Care Note    Patient: Mario Castro Sr.    Procedure(s) Performed: Procedure(s) (LRB):  CYSTOSCOPY, WITH DIRECT VISION INTERNAL URETHROTOMY (Left)  CYSTOSCOPY, WITH URETERAL STENT REMOVAL (Left)    Patient location: PACU    Anesthesia Type: general    Transport from OR: Transported from OR on 6-10 L/min O2 by face mask with adequate spontaneous ventilation    Post pain: adequate analgesia    Post assessment: no apparent anesthetic complications and tolerated procedure well    Post vital signs: stable    Level of consciousness: awake    Nausea/Vomiting: no nausea/vomiting    Complications: none    Transfer of care protocol was followed      Last vitals:   Visit Vitals  /70 (Patient Position: Sitting)   Pulse (!) 56   Temp 36.6 °C (97.9 °F) (Skin)   Resp 16   Ht 5' 9" (1.753 m)   Wt 93 kg (205 lb)   SpO2 100%   BMI 30.27 kg/m²     "

## 2018-07-24 NOTE — BRIEF OP NOTE
[facility]  Brief Operative Note    SUMMARY     Surgery Date: 7/24/2018     Surgeon(s) and Role:     * Tommie Hernandez MD - Primary    Assisting Surgeon: None    Pre-op Diagnosis:  Retained ureteral stent [Z96.0]  Urethral stricture, unspecified stricture type [N35.9]    Post-op Diagnosis:  Post-Op Diagnosis Codes:     * Retained ureteral stent [Z96.0]     * Urethral stricture, unspecified stricture type [N35.9]    Procedure(s) (LRB):  CYSTOSCOPY, WITH DIRECT VISION INTERNAL URETHROTOMY (N/A)  CYSTOSCOPY, WITH URETERAL STENT REMOVAL (Left)    Anesthesia: General    Description of Procedure: as above    Description of the findings of the procedure: as above    Estimated Blood Loss: minimal         Specimens: none  Specimen (12h ago through future)    None

## 2018-07-24 NOTE — TELEPHONE ENCOUNTER
----- Message from Aster Melendez sent at 7/24/2018 12:47 PM CDT -----  Contact: Patient  MEAGAN- Patient is calling about his short term disability. Patient can be reached at [phone].

## 2018-07-24 NOTE — H&P
[facility]  Urology  History & Physical    Patient Name: Mario Castro Sr.  MRN: [id number]  Admission Date: 7/24/2018  Code Status: Prior   Attending Provider: Tommie Hernandez MD   Primary Care Physician: JANEL Sierra MD  Principal Problem:<principal problem not specified>    Subjective:     HPI:  This patient presents for treatment of urethral stricture and removal of stent that was placed prior to diverticular surgery.    Attempt removal of stent in the office could not be performed due to recurrence of his urethral stricture    Past Medical History:   Diagnosis Date    Arthritis           to right hip    Diverticulitis of intestine     Hypertension        Past Surgical History:   Procedure Laterality Date    COLECTOMY  05/2018    COLONOSCOPY N/A 3/8/2018         CYSTOSCOPY W/ URETERAL STENT PLACEMENT  05/2018       Review of patient's allergies indicates:  No Known Allergies    Family History     Problem Relation (Age of Onset)    Heart disease Mother    Hypertension Mother    No Known Problems Father          Social History Main Topics    Smoking status: Never Smoker    Smokeless tobacco: Never Used    Alcohol use No    Drug use: No    Sexual activity: Yes     Partners: Female       Review of Systems   Constitutional: Negative.    HENT: Negative.    Eyes: Negative.    Respiratory: Negative.    Cardiovascular: Negative.    Gastrointestinal: Negative.    Genitourinary: Negative.    Musculoskeletal: Negative.    Skin: Negative.    Neurological: Negative.    Psychiatric/Behavioral: Negative.        Objective:     Temp:  [97.9 °F (36.6 °C)] 97.9 °F (36.6 °C)  Pulse:  [56] 56  Resp:  [16] 16  SpO2:  [100 %] 100 %  BP: (116)/(70) 116/70     Body mass index is 30.27 kg/m².    No intake/output data recorded.       Drains     Drain                 Ureteral Drain/Stent 05/22/18 0806 Left ureter 5 Fr. 62 days                Physical Exam   Nursing note and vitals reviewed.  Constitutional:  He is oriented to person, place, and time. He appears well-developed and well-nourished.   HENT:   Head: Normocephalic and atraumatic.   Eyes: Conjunctivae are normal. Pupils are equal, round, and reactive to light.   Neck: Normal range of motion. Neck supple.   Cardiovascular: Normal rate and regular rhythm.    Pulmonary/Chest: Effort normal and breath sounds normal.   Abdominal: Soft. There is no tenderness.   Genitourinary: Penis normal.   Musculoskeletal: Normal range of motion.   Neurological: He is alert and oriented to person, place, and time.   Skin: Skin is warm and dry.     Psychiatric: He has a normal mood and affect. His behavior is normal.       Significant Labs:    BMP:  No results for input(s): NA, K, CL, CO2, BUN, CREATININE, LABGLOM, GLUCOSE, CALCIUM in the last 168 hours.    CBC:  No results for input(s): WBC, HGB, HCT, PLT in the last 168 hours.    All pertinent labs results from the past 24 hours have been reviewed.    Significant Imaging:  All pertinent imaging results/findings from the past 24 hours have been reviewed.                Assessment and Plan:     Ureteral stent retained    For stent removal        Urethral stricture    For optical internal urethrotomy.            VTE Risk Mitigation         Ordered     Place sequential compression device  Until discontinued      07/24/18 0706     Place MELINDA hose  Until discontinued      07/24/18 0706     IP VTE LOW RISK PATIENT  Once      07/24/18 0706     Place sequential compression device  Until discontinued      07/24/18 0706     Place MELINDA hose  Until discontinued      07/24/18 0706          Tommie Hernandez MD  Urology  [facility]

## 2018-07-24 NOTE — PLAN OF CARE
Pt denies pain or discomfort @ this time.  Pt states he is ready to be discharged home @ this time. PIV dc'd with tip intact. Dressing placed. Discharge instructions reviewed with patient and family, with time allotted for questions. Instructions re: chand care and leg bag given to patient and spouse. Spouse and patient return demonstration of all chand care. Pt and family verbalize understanding of instructions and state they have no further questions @ this time. Wheeled out to family's car.

## 2018-07-24 NOTE — SUBJECTIVE & OBJECTIVE
Past Medical History:   Diagnosis Date    Arthritis           to right hip    Diverticulitis of intestine     Hypertension        Past Surgical History:   Procedure Laterality Date    COLECTOMY  05/2018    COLONOSCOPY N/A 3/8/2018         CYSTOSCOPY W/ URETERAL STENT PLACEMENT  05/2018       Review of patient's allergies indicates:  No Known Allergies    Family History     Problem Relation (Age of Onset)    Heart disease Mother    Hypertension Mother    No Known Problems Father          Social History Main Topics    Smoking status: Never Smoker    Smokeless tobacco: Never Used    Alcohol use No    Drug use: No    Sexual activity: Yes     Partners: Female       Review of Systems   Constitutional: Negative.    HENT: Negative.    Eyes: Negative.    Respiratory: Negative.    Cardiovascular: Negative.    Gastrointestinal: Negative.    Genitourinary: Negative.    Musculoskeletal: Negative.    Skin: Negative.    Neurological: Negative.    Psychiatric/Behavioral: Negative.        Objective:     Temp:  [97.9 °F (36.6 °C)] 97.9 °F (36.6 °C)  Pulse:  [56] 56  Resp:  [16] 16  SpO2:  [100 %] 100 %  BP: (116)/(70) 116/70     Body mass index is 30.27 kg/m².    No intake/output data recorded.       Drains     Drain                 Ureteral Drain/Stent 05/22/18 0806 Left ureter 5 Fr. 62 days                Physical Exam   Nursing note and vitals reviewed.  Constitutional: He is oriented to person, place, and time. He appears well-developed and well-nourished.   HENT:   Head: Normocephalic and atraumatic.   Eyes: Conjunctivae are normal. Pupils are equal, round, and reactive to light.   Neck: Normal range of motion. Neck supple.   Cardiovascular: Normal rate and regular rhythm.    Pulmonary/Chest: Effort normal and breath sounds normal.   Abdominal: Soft. There is no tenderness.   Genitourinary: Penis normal.   Musculoskeletal: Normal range of motion.   Neurological: He is alert and oriented to person, place, and time.    Skin: Skin is warm and dry.     Psychiatric: He has a normal mood and affect. His behavior is normal.       Significant Labs:    BMP:  No results for input(s): NA, K, CL, CO2, BUN, CREATININE, LABGLOM, GLUCOSE, CALCIUM in the last 168 hours.    CBC:  No results for input(s): WBC, HGB, HCT, PLT in the last 168 hours.    All pertinent labs results from the past 24 hours have been reviewed.    Significant Imaging:  All pertinent imaging results/findings from the past 24 hours have been reviewed.

## 2018-07-24 NOTE — ANESTHESIA POSTPROCEDURE EVALUATION
"Anesthesia Post Evaluation    Patient: Mario Castro SrZurdo    Procedure(s) Performed: Procedure(s) (LRB):  CYSTOSCOPY, WITH DIRECT VISION INTERNAL URETHROTOMY (Left)  CYSTOSCOPY, WITH URETERAL STENT REMOVAL (Left)    Final Anesthesia Type: general  Patient location during evaluation: PACU  Patient participation: Yes- Able to Participate  Level of consciousness: awake and alert, oriented and awake  Post-procedure vital signs: reviewed and stable  Pain management: adequate  Airway patency: patent  PONV status at discharge: No PONV  Anesthetic complications: no      Cardiovascular status: blood pressure returned to baseline  Respiratory status: unassisted and room air  Hydration status: euvolemic  Follow-up not needed.        Visit Vitals  /85   Pulse 64   Temp 36.6 °C (97.8 °F) (Temporal)   Resp 18   Ht 5' 9" (1.753 m)   Wt 93 kg (205 lb)   SpO2 98%   BMI 30.27 kg/m²       Pain/Edie Score: Pain Assessment Performed: Yes (7/24/2018  1:30 PM)  Presence of Pain: denies (7/24/2018  1:30 PM)  Pain Rating Prior to Med Admin: 5 (7/24/2018 12:27 PM)  Edie Score: 10 (7/24/2018  1:30 PM)      "

## 2018-07-24 NOTE — TELEPHONE ENCOUNTER
Returned pts call. Notified that STD paperwork was faxed on 7/20/2018. Pt verbalizes understanding.

## 2018-07-24 NOTE — DISCHARGE INSTRUCTIONS
DIET: You may resume your home diet. If nausea is present, increase your diet gradually with fluids and bland foods    ACTIVITY LEVEL: If you have received sedation or an anesthetic, you may feel sleepy for several hours. Rest until you are more awake. Gradually resume your normal activities    Medications: Pain medication should be taken only if needed and as directed. If antibiotics are prescribed, the medication should be taken until completed. You will be given an updated list of you medications.    No driving, alcoholic beverages or signing legal documents for next 24 hours or while taking pain medication.       CALL THE DOCTOR:    For any obvious bleeding (some dried blood over the incision is normal).      Redness, swelling, foul smell around incision or fever over 101.   Shortness of breath, Coughing up Bloody sputum, Pains or Swelling in your Calves .   Persistent pain or nausea not relieved by medication.    If any unusual problems or difficulties occur contact your doctor. If you cannot contact your doctor but feel your signs and symptoms warrant a physicians attention return to the emergency room.          Grigsby Catheter Care    A Grigsby catheter is a rubber tube that is placed through the urethra (opening where urine comes out) and into the bladder. This helps drain urine from the bladder. There is a small balloon on the end of the tube that is inflated after insertion. This keeps the catheter from sliding out of the bladder.  A Grigsby catheter is used to treat urinary retention (unable to pass urine). It is also used when there is incontinence (loss of bladder control).  Home care  · Finish taking any prescribed antibiotic even if you are feeling better before then.  · It is important to keep bacteria from getting into the collection bag. Do not disconnect the catheter from the collection bag.  · Use a leg band to secure the drainage tube, so it does not pull on the catheter. Drain the collection  bag when it becomes full using the drain spout at the bottom of the bag.  · Do not try to pull or remove your catheter. This will injure your urethra. It must be removed by your healthcare provider or nurse.  Follow-up care  Follow up with your healthcare provider as advised for repeat urine testing and catheter removal or replacement.  When to seek medical advice  Call your healthcare provider right away if any of these occur:  · Fever of 100.4ºF (38ºC) or higher, or as directed by your healthcare provider  · Bladder pain or fullness  · Abdominal swelling, nausea or vomiting, or back pain  · Blood or urine leakage around the catheter  · Bloody urine coming from the catheter (if a new symptom)  · Catheter falls out  · Catheter stops draining for 6 hours  · Weakness, dizziness, or fainting  Date Last Reviewed: 10/1/2016  © 0907-4724 [employer]. [address] [place] [address]. All rights reserved. This information is not intended as a substitute for professional medical care. Always follow your healthcare professional's instructions.        Discharge Instructions: After Your Surgery  Youve just had surgery. During surgery you were given medicine called anesthesia to keep you relaxed and free of pain. After surgery you may have some pain or nausea. This is common. Here are some tips for feeling better and getting well after surgery.     Stay on schedule with your medication.   Going home  Your doctor or nurse will show you how to take care of yourself when you go home. He or she will also answer your questions. Have an adult family member or friend drive you home. For the first 24 hours after your surgery:  · Do not drive or use heavy equipment.  · Do not make important decisions or sign legal papers.  · Do not drink alcohol.  · Have someone stay with you, if needed. He or she can watch for problems and help keep you safe.  Be sure to go to all follow-up visits with your doctor. And rest  after your surgery for as long as your doctor tells you to.  Coping with pain  If you have pain after surgery, pain medicine will help you feel better. Take it as told, before pain becomes severe. Also, ask your doctor or pharmacist about other ways to control pain. This might be with heat, ice, or relaxation. And follow any other instructions your surgeon or nurse gives you.  Tips for taking pain medicine  To get the best relief possible, remember these points:  · Pain medicines can upset your stomach. Taking them with a little food may help.  · Most pain relievers taken by mouth need at least 20 to 30 minutes to start to work.  · Taking medicine on a schedule can help you remember to take it. Try to time your medicine so that you can take it before starting an activity. This might be before you get dressed, go for a walk, or sit down for dinner.  · Constipation is a common side effect of pain medicines. Call your doctor before taking any medicines such as laxatives or stool softeners to help ease constipation. Also ask if you should skip any foods. Drinking lots of fluids and eating foods such as fruits and vegetables that are high in fiber can also help. Remember, do not take laxatives unless your surgeon has prescribed them.  · Drinking alcohol and taking pain medicine can cause dizziness and slow your breathing. It can even be deadly. Do not drink alcohol while taking pain medicine.  · Pain medicine can make you react more slowly to things. Do not drive or run machinery while taking pain medicine.  Your health care provider may tell you to take acetaminophen to help ease your pain. Ask him or her how much you are supposed to take each day. Acetaminophen or other pain relievers may interact with your prescription medicines or other over-the-counter (OTC) drugs. Some prescription medicines have acetaminophen and other ingredients. Using both prescription and OTC acetaminophen for pain can cause you to overdose.  Read the labels on your OTC medicines with care. This will help you to clearly know the list of ingredients, how much to take, and any warnings. It may also help you not take too much acetaminophen. If you have questions or do not understand the information, ask your pharmacist or health care provider to explain it to you before you take the OTC medicine.  Managing nausea  Some people have an upset stomach after surgery. This is often because of anesthesia, pain, or pain medicine, or the stress of surgery. These tips will help you handle nausea and eat healthy foods as you get better. If you were on a special food plan before surgery, ask your doctor if you should follow it while you get better. These tips may help:  · Do not push yourself to eat. Your body will tell you when to eat and how much.  · Start off with clear liquids and soup. They are easier to digest.  · Next try semi-solid foods, such as mashed potatoes, applesauce, and gelatin, as you feel ready.  · Slowly move to solid foods. Dont eat fatty, rich, or spicy foods at first.  · Do not force yourself to have 3 large meals a day. Instead eat smaller amounts more often.  · Take pain medicines with a small amount of solid food, such as crackers or toast, to avoid nausea.     Call your surgeon if  · You still have pain an hour after taking medicine. The medicine may not be strong enough.  · You feel too sleepy, dizzy, or groggy. The medicine may be too strong.  · You have side effects like nausea, vomiting, or skin changes, such as rash, itching, or hives.       If you have obstructive sleep apnea  You were given anesthesia medicine during surgery to keep you comfortable and free of pain. After surgery, you may have more apnea spells because of this medicine and other medicines you were given. The spells may last longer than usual.   At home:  · Keep using the continuous positive airway pressure (CPAP) device when you sleep. Unless your health care  provider tells you not to, use it when you sleep, day or night. CPAP is a common device used to treat obstructive sleep apnea.  · Talk with your provider before taking any pain medicine, muscle relaxants, or sedatives. Your provider will tell you about the possible dangers of taking these medicines.  © 4224-9699 [employer]. [address] [place] [address], [place] [address]. All rights reserved. This information is not intended as a substitute for professional medical care. Always follow your healthcare professional's instructions.          Cephalexin oral suspension  What is this medicine?  CEPHALEXIN (sef a SHRAVAN in) is a cephalosporin antibiotic. It is used to treat certain kinds of bacterial infections.It will not work for colds, flu, or other viral infections.  How should I use this medicine?  Take this medicine by mouth. Follow the directions on your prescription label. Shake well before using. Use a specially marked spoon or container to measure your medicine. Ask your pharmacist if you do not have one. Household spoons are not accurate. You can take this medicine with food or on an empty stomach. If the medicine upsets your stomach, take it with food. Do not take your medicine more often than directed. Finish the full course prescribed by your doctor or health care professional even if you think your condition is better.  Talk to your pediatrician regarding the use of this medicine in children. While this drug may be prescribed for selected conditions, precautions do apply.  What side effects may I notice from receiving this medicine?  Side effects that you should report to your doctor or health care professional as soon as possible:  · allergic reactions like skin rash, itching or hives, swelling of the face, lips, or tongue  · breathing problems  · pain or difficulty passing urine  · redness, blistering, peeling or loosening of the skin, including inside the mouth  · severe or watery  diarrhea  · unusually weak or tired  · yellowing of the eyes, skin  Side effects that usually do not require medical attention (report to your doctor or health care professional if they continue or are bothersome):  · gas or heartburn  · genital or anal irritation  · headache  · joint or muscle pain  · nausea, vomiting  What may interact with this medicine?  · probenecid  · some other antibiotics  What if I miss a dose?  If you miss a dose, take it as soon as you can. If it is almost time for your next dose, take only that dose. Do not take double or extra doses. There should be at least 4 to 6 hours between doses.  Where should I keep my medicine?  Keep out of the reach of children.  After this medicine is mixed by your pharmacist, store it in the refrigerator. Do not freeze. Throw away any unused medicine after 14 days.  What should I tell my health care provider before I take this medicine?  They need to know if you have any of these conditions:  · kidney disease  · stomach or intestine problems, especially colitis  · an unusual or allergic reaction to cephalexin, other cephalosporins, penicillins, other antibiotics, medicines, foods, dyes or preservatives  · pregnant or trying to get pregnant  · breast-feeding  What should I watch for while using this medicine?  Tell your doctor or health care professional if your symptoms do not begin to improve in a few days.  Do not treat diarrhea with over the counter products. Contact your doctor if you have diarrhea that lasts more than 2 days or if it is severe and watery.  If you have diabetes, you may get a false-positive result for sugar in your urine. Check with your doctor or health care professional.  NOTE:This sheet is a summary. It may not cover all possible information. If you have questions about this medicine, talk to your doctor, pharmacist, or health care provider. Copyright© 2017 [employer]

## 2018-07-24 NOTE — HPI
This patient presents for treatment of urethral stricture and removal of stent that was placed prior to diverticular surgery.    Attempt removal of stent in the office could not be performed due to recurrence of his urethral stricture

## 2018-07-25 NOTE — DISCHARGE SUMMARY
[facility]  Discharge Note  Short Stay    Admit Date: 7/24/2018    Discharge Date and Time: 7/24/2018  1:30 PM     Attending Physician: No att. providers found     Discharge Provider: Tommie Hernandez    Diagnoses:  Active Hospital Problems    Diagnosis  POA    *Urethral stricture [N35.9]  Yes    Ureteral stent retained [Z96.0]  Not Applicable      Resolved Hospital Problems    Diagnosis Date Resolved POA   No resolved problems to display.       Discharged Condition: good    Hospital Course: Patient was admitted for an outpatient procedure and tolerated the procedure well with no complications.    Final Diagnoses: Same as principal problem.    Disposition: Home or Self Care    Follow up/Patient Instructions:    Medications:  Reconciled Home Medications:      Medication List      START taking these medications    cephALEXin 500 MG capsule  Commonly known as:  KEFLEX  Take 1 capsule (500 mg total) by mouth 3 (three) times daily. for 10 days        CONTINUE taking these medications    enalapril 10 MG tablet  Commonly known as:  VASOTEC  Take 10 mg by mouth once daily.     HYDROcodone-acetaminophen  mg per tablet  Commonly known as:  NORCO  Take 1 tablet by mouth every 4 (four) hours as needed.            Discharge Procedure Orders  Diet general     Call MD for:  persistent nausea and vomiting     No dressing needed     Activity as tolerated       Follow-up Information     Tommie Hernandez MD In 6 days.    Specialty:  Urology  Why:  chand removal  Contact information:  [address] [place] [id number] [phone]                   Discharge Procedure Orders (must include Diet, Follow-up, Activity):    Discharge Procedure Orders (must include Diet, Follow-up, Activity)  Diet general     Call MD for:  persistent nausea and vomiting     No dressing needed     Activity as tolerated

## 2018-07-25 NOTE — OP NOTE
[facility]  Operative Note    Date: 07/24/2018    Pre-Op Diagnosis:  1.  Urethral stricture disease  2.  Retained stent  Post-Op Diagnosis: same    Procedure(s) Performed:   1.  Cystoscopy, optical internal urethrotomy, insertion of Grigsby catheter   2.  Fluoroscopy < 1 hr  3.  Cystoscopy with removal left ureteral stent    Specimen(s):  None     Surgeon: Tommie Hernandez    Anesthesia:  General  Indications: Mario Castro Sr. is a 48 y.o. male with urethral stricture disease and retained stent, status post insertion prior to diverticular surgery, who presents now for therapy.    Findings:   1.  Urethral stricture in pendulous urethra and retained stent    Estimated Blood Loss: min    Drains:   1.  Twenty Luxembourgish Grigsby catheter    Procedure in Detail:  After risks, benefits and possible complications of the procedure were explained, the patient elected to undergo the procedure and informed consent was obtained. All questions were answered in the octavia-operative area. The patient was transferred to the cystoscopy suite and placed on the fluoroscopy table in the supine position.  SCDs were applied and working. Time out was performed, octavia-procedural antibiotics were given. MAC anesthesia was administered.  After adequate anesthesia the patient was placed in dorsal lithotomy position and prepped and draped in the usual sterile fashion.     A rigid cystoscope in a 22 Fr sheath was introduced into the patients bladder per urethra. This shows a tight stricture in the pendulous urethra through which the scope was unable to be passed.  A 0.038 floppy-tipped guidewire was able to be passed through the pinpoint stricture.  Next using the Olympus hot knife, an incision is made at the 12 o'clock position down to crossing fibers.  The length of the stricture appeared to be less than half a cm.    Next using the Dumont sounds, the urethra was dilated up to size 30 Luxembourgish.    Next using the 22 sheath,  panendoscopy is undertaken.  This shows the well opened pendulous urethral stricture.  Prostatic urethra has minimal bilobar hyperplasia.  Careful inspection of bladder revealed no evidence of stones or tumors.  The stent is seen exiting from the left ureteral orifice.  This had been previously placed prior to diverticular surgery.    Now under fluoroscopic control, the stent is grasped and removed.  This was monitored under fluoroscopy and the proximal stent is seen to uncoil nicely.  The stent is removed without difficulty.    The cystoscope was then removed and a 20 Azerbaijani Grigsby catheter is passed per urethra without difficulty.  8 cc is placed into the balloon and the Grigsby catheter is placed to gravity drainage    The patient is then awakened and returned to the recovery room having tolerated the procedure well.    Follow up care: The patient will follow up with me in 3 days for Grigsby catheter removal..      Tommie Hernandez MD

## 2018-07-25 NOTE — TELEPHONE ENCOUNTER
Please contact the patient and schedule an office visit for Monday, July 30, 2018 for Grigsby catheter removal.

## 2018-07-30 ENCOUNTER — OFFICE VISIT (OUTPATIENT)
Dept: UROLOGY | Facility: CLINIC | Age: 48
End: 2018-07-30
Payer: COMMERCIAL

## 2018-07-30 VITALS
WEIGHT: 205 LBS | HEIGHT: 69 IN | DIASTOLIC BLOOD PRESSURE: 80 MMHG | SYSTOLIC BLOOD PRESSURE: 127 MMHG | BODY MASS INDEX: 30.36 KG/M2 | HEART RATE: 66 BPM

## 2018-07-30 DIAGNOSIS — N35.9 URETHRAL STRICTURE, UNSPECIFIED STRICTURE TYPE: Primary | ICD-10-CM

## 2018-07-30 PROCEDURE — 99999 PR PBB SHADOW E&M-EST. PATIENT-LVL III: CPT | Mod: PBBFAC,,, | Performed by: UROLOGY

## 2018-07-30 PROCEDURE — 99214 OFFICE O/P EST MOD 30 MIN: CPT | Mod: S$GLB,,, | Performed by: UROLOGY

## 2018-07-30 PROCEDURE — 3008F BODY MASS INDEX DOCD: CPT | Mod: CPTII,S$GLB,, | Performed by: UROLOGY

## 2018-07-30 NOTE — PROGRESS NOTES
"This patient was last seen by me July 24, 2018 at which time patient went optical internal urethrotomy and removal of left ureteral stent which had been placed prior to diverticular surgery    Patient reports no problems with his indwelling catheter    Physical exam reveals reveals a well-developed well-nourished patient  in no acute distress.  Patient is alert and oriented ×3 with normal mood and affect.  Respiratory effort is normal and there is no peripheral edema.  Skin is normal to inspection and palpation.  Grigsby catheter draining clear urine    /80 (BP Location: Right arm, Patient Position: Sitting)   Pulse 66   Ht 5' 9" (1.753 m)   Wt 93 kg (205 lb)   BMI 30.27 kg/m²   Review of Systems  General ROS: negative for chills, fever or weight loss  Respiratory ROS: no cough, shortness of breath, or wheezing  Cardiovascular ROS: no chest pain or dyspnea on exertion  Musculoskeletal ROS: negative for gait disturbance or muscular weakness    Family History   Problem Relation Age of Onset    Hypertension Mother     Heart disease Mother     No Known Problems Father      Past Medical History:   Diagnosis Date    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 Urethral stricture, unspecified stricture type N35.9 598.9 Cystoscopy       Plan:    #1.  Urethral stricture disease.  Plan.  Grigsby catheter removed today.  Follow-up 1 month for flow rate and repeat cystoscopy.  Discussed in general terms urethral stricture disease.    Today I spent 25 minutes with the patient, more than 50% of which was spent counseling and coordinating care concerning treatment of issues as " detailed above.    PLEASE NOTE:  Please be advised that portions of this note were dictated using voice recognition software and may contain dictation related errors in spelling/grammar/appropriate pronouns/syntax or other errors that might have not been found and or corrected on text review.

## 2018-08-30 ENCOUNTER — TELEPHONE (OUTPATIENT)
Dept: NEUROLOGY | Facility: HOSPITAL | Age: 48
End: 2018-08-30

## 2018-08-30 ENCOUNTER — PROCEDURE VISIT (OUTPATIENT)
Dept: UROLOGY | Facility: CLINIC | Age: 48
End: 2018-08-30
Payer: MEDICAID

## 2018-08-30 VITALS
BODY MASS INDEX: 30.36 KG/M2 | WEIGHT: 205 LBS | DIASTOLIC BLOOD PRESSURE: 74 MMHG | HEIGHT: 69 IN | HEART RATE: 60 BPM | SYSTOLIC BLOOD PRESSURE: 119 MMHG

## 2018-08-30 DIAGNOSIS — N35.9 URETHRAL STRICTURE, UNSPECIFIED STRICTURE TYPE: ICD-10-CM

## 2018-08-30 PROCEDURE — 52000 CYSTOURETHROSCOPY: CPT | Mod: S$PBB,,, | Performed by: UROLOGY

## 2018-08-30 PROCEDURE — 52000 CYSTOURETHROSCOPY: CPT | Mod: PBBFAC,PO | Performed by: UROLOGY

## 2018-08-30 NOTE — TELEPHONE ENCOUNTER
----- Message from Raegan Patel sent at 8/30/2018  2:40 PM CDT -----  Contact: patient   MEAGAN- Patient would like to see if we can extend his leave. Patient has a lot going on and will call nurse next week. Call back number is [phone]  
LM for patient to return a call to the office.   
[id number].[id number]. denies chest pain or discomfort. denies nausea or vomiting at this time. . need insulin order pt has not been covered.

## 2018-08-30 NOTE — TELEPHONE ENCOUNTER
Spoke with patient. Advised the patient that when he comes in for his appointment with Dr. Bryant on 9/10/18, they could discuss the possibility of extending his disability leave. Patient states that at this time he is not having any surgical problems, he just wanted to extend his time due to upcoming doctor appointment. Patient verbalized his understanding and has no further questions at this time.

## 2018-08-30 NOTE — PROCEDURES
Procedures     PLEASE NOTE:  Please be advised that portions of this note were dictated using voice recognition software and may contain dictation related errors in spelling/grammar/appropriate pronouns/syntax or other errors that might have not been found and or corrected on text review.      Procedures: Flexible cystourethroscopy    Pre Procedure Diagnosis:  Urethral stricture disease    Post Procedure Diagnosis:  Recurring urethral stricture    Date of Procedure. 08/30/2018    Indications.  This gentleman with serendipitous finding of urethral stricture at time of stent placement prior to diverticular surgery who presents now for follow-up.    His stricture was initially dilated at time of stent placement May 22, 2018.  Patient needed formal optical internal urethrotomy July 24, 2018 at time of stent removal.  Patient now comes for cystoscopy to assess his urethra.    Patient has a peak urinary flow of 7 mL/sec on a voided volume of 41 cc.  Bladder scan postvoid residual is 5 mL    Surgeon: Tommie Hernandez MD    Anesthesia: 2% uro-jet lidocaine jelly for local analgesia    Flexible cysto-urethroscopy was performed after consent was obtained.    2% lidocaine urojet was used for local analgesia.  The genitalia were prepped and draped in the usual sterile fashion.    The flexible scope was advanced into the urethra.     This showed a wide bore recurrence of his stricture through which the scope was unable to be passed.    I offered to perform dilation however patient did not want to have dilation performed without anesthesia.    The cystoscope was therefore removed.    The patient tolerated the procedure well without complication.    Impression:  Recurrent urethral stricture.    Plan.  I discussed at length in detail with the patient options of management including periodic dilation under anesthesia verses excision and reanastomosis of his site of stricture.    Given his young age, I recommend proceeding to  excision and reanastomosis.    Patient will be referred to  for evaluation for excision and reanastomosis of urethral stricture.

## 2018-09-10 ENCOUNTER — OFFICE VISIT (OUTPATIENT)
Dept: NEUROLOGY | Facility: HOSPITAL | Age: 48
End: 2018-09-10
Attending: SURGERY
Payer: MEDICAID

## 2018-09-10 VITALS
BODY MASS INDEX: 31.43 KG/M2 | DIASTOLIC BLOOD PRESSURE: 71 MMHG | HEIGHT: 69 IN | WEIGHT: 212.19 LBS | TEMPERATURE: 99 F | HEART RATE: 86 BPM | SYSTOLIC BLOOD PRESSURE: 115 MMHG

## 2018-09-10 DIAGNOSIS — Z90.49 S/P LEFT COLECTOMY: Primary | ICD-10-CM

## 2018-09-10 PROCEDURE — 99214 OFFICE O/P EST MOD 30 MIN: CPT | Performed by: SURGERY

## 2018-09-10 RX ORDER — TRAMADOL HYDROCHLORIDE 50 MG/1
TABLET ORAL
Refills: 0 | COMMUNITY
Start: 2018-08-27

## 2018-09-10 RX ORDER — DICLOFENAC SODIUM 75 MG/1
75 TABLET, DELAYED RELEASE ORAL 2 TIMES DAILY
Refills: 0 | COMMUNITY
Start: 2018-08-27

## 2018-09-10 RX ORDER — PHENYLEPHRINE HCL 10 MG/1
10 TABLET, FILM COATED ORAL EVERY 4 HOURS PRN
COMMUNITY
End: 2018-09-27

## 2018-09-10 NOTE — PROGRESS NOTES
"NOLANETS:  [facility]  A collaboration between [facility] and [facility]      PATIENT: Mario Castro Sr.  MRN: [id number]  DATE: 9/10/2018    Subjective:      Chief Complaint: Follow-up (2 month follow up)  s/p LAR 3 months back    Has constipation otherwise doing well  Intermittent cramping abdominal pain    Vitals:   Vitals:    09/10/18 1120   BP: 115/71   Pulse: 86   Temp: 98.9 °F (37.2 °C)   TempSrc: Oral   Weight: 96.2 kg (212 lb 3.1 oz)   Height: 5' 9" (1.753 m)        Karnofsky Score:     Diagnosis: No diagnosis found.     Oncologic History:     Interval History:     Past Medical History:  Past Medical History:   Diagnosis Date    Arthritis           to right hip    Diverticulitis of intestine     Hypertension        Past Surgical History:  Past Surgical History:   Procedure Laterality Date    COLECTOMY  05/2018    COLONOSCOPY N/A 3/8/2018         COLONOSCOPY N/A 3/8/2018    Performed by [facility]o [facility] at [facility] ENDO    CYSTOSCOPY W/ URETERAL STENT PLACEMENT  05/2018    CYSTOSCOPY W/ URETERAL STENT REMOVAL Left 7/24/2018    Procedure: CYSTOSCOPY, WITH URETERAL STENT REMOVAL;  Surgeon: Tommie Hernandez MD;  Location: [facility] OR;  Service: Urology;  Laterality: Left;    CYSTOSCOPY, WITH DIRECT VISION INTERNAL URETHROTOMY Left 7/24/2018    Performed by [facility]mie Hernandez MD at [facility] OR    CYSTOSCOPY, WITH URETERAL STENT REMOVAL Left 7/24/2018    Performed by Tommie Hernandez MD at [facility] OR    CYSTOSCOPY/ RETROGRADE PYELOGRAM/ STENT PLACEMENT/STENT EXCHANGE Left 5/22/2018    Performed by [facility] MD at [facility] OR    CYSTOURETHROSCOPY WITH DIRECT VISION INTERNAL URETHROTOMY Left 7/24/2018    Procedure: CYSTOSCOPY, WITH DIRECT VISION INTERNAL URETHROTOMY;  Surgeon: Tommie Hernandez MD;  Location: [facility] OR;  Service: Urology;  Laterality: Left;    DILATION-URETHRA N/A 5/22/2018    Performed " by Tommei Hernandez MD at [facility] OR    RESECTION-COLON-LOW ANTERIOR-LAPAROSCOPIC N/A 5/22/2018    Performed by Annette Mathias MD at [facility] OR       Family History:  Family History   Problem Relation Age of Onset    Hypertension Mother     Heart disease Mother     No Known Problems Father        Allergies:  Review of patient's allergies indicates:  No Known Allergies    Medications:  Current Outpatient Medications   Medication Sig Dispense Refill    diclofenac (VOLTAREN) 75 MG EC tablet Take 75 mg by mouth 2 (two) times daily.  0    enalapril (VASOTEC) 10 MG tablet Take 10 mg by mouth once daily.      phenylephrine (SUDAFED PE) 10 MG Tab Take 10 mg by mouth every 4 (four) hours as needed.      traMADol (ULTRAM) 50 mg tablet TAKE ONE TABLET BY MOUTH EVERY TWELVE HOURS AS NEEDED FOR PAIN  0     No current facility-administered medications for this visit.        Review of Systems   Constitutional: Negative for appetite change, chills, diaphoresis, fatigue, fever and unexpected weight change.   HENT: Negative for congestion, ear discharge, ear pain, hearing loss, mouth sores, nosebleeds, postnasal drip, rhinorrhea, sinus pressure, sinus pain, sneezing, sore throat and voice change.    Eyes: Negative for photophobia, pain, discharge, redness, itching and visual disturbance.   Respiratory: Negative for apnea, cough, chest tightness, shortness of breath, wheezing and stridor.    Cardiovascular: Negative for chest pain and leg swelling.   Gastrointestinal: Positive for abdominal pain and constipation. Negative for abdominal distention, anal bleeding, diarrhea, nausea and rectal pain.   Endocrine: Negative.    Genitourinary: Positive for difficulty urinating and dysuria.   Musculoskeletal: Positive for back pain. Negative for gait problem, joint swelling and neck stiffness.   Skin: Negative.    Allergic/Immunologic: Negative.    Neurological: Negative.    Hematological: Negative.    Psychiatric/Behavioral:  Negative.       Objective:      Physical Exam   Constitutional: He is oriented to person, place, and time. He appears well-developed and well-nourished.   HENT:   Head: Normocephalic and atraumatic.   Left Ear: External ear normal.   Eyes: Conjunctivae and EOM are normal. Pupils are equal, round, and reactive to light.   Neck: Normal range of motion.   Cardiovascular: Normal rate and regular rhythm.   Pulmonary/Chest: Effort normal and breath sounds normal.   Abdominal: Soft. Bowel sounds are normal. He exhibits no mass. There is no tenderness. There is no rebound and no guarding. No hernia.   Incision helaed   Musculoskeletal: Normal range of motion.   Neurological: He is alert and oriented to person, place, and time.   Skin: Skin is warm. He is not diaphoretic.   Psychiatric: He has a normal mood and affect. His behavior is normal.      Assessment:       No diagnosis found.    Laboratory Data:       Impression: s/p colrectal restion with anastamosis laparoscopic for complicated divrticulitis seeing urolgist for urethral stricture    Overall doin well with intermittent pain could be muscle related  Planning to get back to work  Incision looks good  Plan:     discussed about diet and activities  Continue f/u with urolgy      F/u 6 months    F/u urologist for urethral stricture              PRINCE Bryant MD, FACS   Associate Professor of Surgery, [facility]   Neuroendocrine Surgery, Hepatic/Pancreatic & General Surgery   [address], [address]   [place] [address]   ph. [phone]; [phone]   fax. [phone]

## 2018-09-12 ENCOUNTER — TELEPHONE (OUTPATIENT)
Dept: UROLOGY | Facility: CLINIC | Age: 48
End: 2018-09-12

## 2018-09-13 DIAGNOSIS — N35.9 URETHRAL STRICTURE, UNSPECIFIED STRICTURE TYPE: Primary | ICD-10-CM

## 2018-09-25 ENCOUNTER — TELEPHONE (OUTPATIENT)
Dept: RADIOLOGY | Facility: HOSPITAL | Age: 48
End: 2018-09-25

## 2018-09-26 ENCOUNTER — HOSPITAL ENCOUNTER (OUTPATIENT)
Dept: RADIOLOGY | Facility: HOSPITAL | Age: 48
Discharge: HOME | End: 2018-09-26
Attending: UROLOGY
Payer: MEDICAID

## 2018-09-26 DIAGNOSIS — N35.9 URETHRAL STRICTURE, UNSPECIFIED STRICTURE TYPE: ICD-10-CM

## 2018-09-26 PROCEDURE — 25000003 PHARM REV CODE 250: Performed by: UROLOGY

## 2018-09-26 PROCEDURE — 51610 INJECTION FOR BLADDER X-RAY: CPT | Mod: 59,,, | Performed by: RADIOLOGY

## 2018-09-26 PROCEDURE — 25500020 PHARM REV CODE 255: Performed by: UROLOGY

## 2018-09-26 PROCEDURE — 74450 X-RAY URETHRA/BLADDER: CPT | Mod: TC

## 2018-09-26 PROCEDURE — 74450 X-RAY URETHRA/BLADDER: CPT | Mod: 26,,, | Performed by: RADIOLOGY

## 2018-09-26 RX ORDER — LIDOCAINE HYDROCHLORIDE 20 MG/ML
JELLY TOPICAL ONCE
Status: COMPLETED | OUTPATIENT
Start: 2018-09-26 | End: 2018-09-26

## 2018-09-26 RX ADMIN — IOTHALAMATE MEGLUMINE 50 ML: 172 INJECTION URETERAL at 01:09

## 2018-09-26 RX ADMIN — LIDOCAINE HYDROCHLORIDE: 20 JELLY TOPICAL at 01:09

## 2018-09-27 ENCOUNTER — OFFICE VISIT (OUTPATIENT)
Dept: UROLOGY | Facility: CLINIC | Age: 48
End: 2018-09-27
Payer: MEDICAID

## 2018-09-27 VITALS
HEART RATE: 57 BPM | DIASTOLIC BLOOD PRESSURE: 81 MMHG | SYSTOLIC BLOOD PRESSURE: 127 MMHG | WEIGHT: 212 LBS | HEIGHT: 69 IN | BODY MASS INDEX: 31.4 KG/M2

## 2018-09-27 DIAGNOSIS — N35.9 URETHRAL STRICTURE, UNSPECIFIED STRICTURE TYPE: Primary | ICD-10-CM

## 2018-09-27 PROCEDURE — 99213 OFFICE O/P EST LOW 20 MIN: CPT | Mod: PBBFAC | Performed by: UROLOGY

## 2018-09-27 PROCEDURE — 99999 PR PBB SHADOW E&M-EST. PATIENT-LVL III: CPT | Mod: PBBFAC,,, | Performed by: UROLOGY

## 2018-09-27 PROCEDURE — 99214 OFFICE O/P EST MOD 30 MIN: CPT | Mod: S$PBB,,, | Performed by: UROLOGY

## 2018-09-27 NOTE — LETTER
September 27, 2018      Tommie Hernandez MD  [address] [place] [address]  [address]  [facility] [address]           [facility] - Urology 4th Floor  [address]  [place] [address]  Phone: [phone]          Patient: Mario Castro Sr.   MR Number: [id number]   YOB: 1970   Date of Visit: 9/27/2018       Dear Dr. Tommie Hernandez:    Thank you for referring Mario Castro to me for evaluation. Attached you will find relevant portions of my assessment and plan of care.    If you have questions, please do not hesitate to call me. I look forward to following Mario Castro along with you.    Sincerely,    Eduardo De La O MD    Enclosure  CC:  No Recipients    If you would like to receive this communication electronically, please contact [email or web address] or [phone] to request more information on [facility] access.    For providers and/or their staff who would like to refer a patient to [facility], please contact us through our one-stop-shop provider referral line, [facility], at [phone].    If you feel you have received this communication in error or would no longer like to receive these types of communications, please e-mail [email or web address]

## 2018-09-27 NOTE — PROGRESS NOTES
CC: bulbar urethral stricture.    Mario Castro Sr. is a 48 y.o. man who is here for the evaluation of Urethral Stricture (has weak stream, otherwise no issues )    A new pt referred by Dr. Hernandez.  He is here with his wife.  I ordered RUG prior to his visit in order to facilitate his evaluation.    He was referred by Dr. Hernandez for evaluation for possible urethroplasty.    This patient was last seen by Dr. Hernandez July 24, 2018 at which time patient went optical internal urethrotomy and removal of left ureteral stent which had been placed prior to diverticular surgery  He denies any prior urethral trauma, STD or UTI in the past.  However, he noticed weak urine flow for many years, at least more than 7 years.    Denies flank pain, dysuira, hematuria.      None smoker    Past Medical History:   Diagnosis Date    Arthritis           to right hip    Diverticulitis of intestine     Hypertension      Past Surgical History:   Procedure Laterality Date    COLECTOMY  05/2018    COLONOSCOPY N/A 3/8/2018         COLONOSCOPY N/A 3/8/2018    Performed by Juno Curry MD at [facility] ENDO    CYSTOSCOPY W/ URETERAL STENT PLACEMENT  05/2018    CYSTOSCOPY W/ URETERAL STENT REMOVAL Left 7/24/2018    Procedure: CYSTOSCOPY, WITH URETERAL STENT REMOVAL;  Surgeon: Tommie Hernandez MD;  Location: [facility] OR;  Service: Urology;  Laterality: Left;    CYSTOSCOPY, WITH DIRECT VISION INTERNAL URETHROTOMY Left 7/24/2018    Performed by Tommie Hernandez MD at [facility] OR    CYSTOSCOPY, WITH URETERAL STENT REMOVAL Left 7/24/2018    Performed by Tommie Hernandez MD at [facility] OR    CYSTOSCOPY/ RETROGRADE PYELOGRAM/ STENT PLACEMENT/STENT EXCHANGE Left 5/22/2018    Performed by Tommie Hernandez MD at [facility] OR    CYSTOURETHROSCOPY WITH DIRECT VISION INTERNAL URETHROTOMY Left 7/24/2018    Procedure: CYSTOSCOPY, WITH DIRECT VISION INTERNAL URETHROTOMY;  Surgeon: Tommie Hernandez MD;  Location: [facility] OR;  Service: Urology;   Laterality: Left;    DILATION-URETHRA N/A 5/22/2018    Performed by [facility] at [facility] OR    RESECTION-COLON-LOW ANTERIOR-LAPAROSCOPIC N/A 5/22/2018    Performed by Annette Mathias MD at [facility] OR     Social History     Tobacco Use    Smoking status: Never Smoker    Smokeless tobacco: Never Used   Substance Use Topics    Alcohol use: No    Drug use: No     Family History   Problem Relation Age of Onset    Hypertension Mother     Heart disease Mother     No Known Problems Father      Allergy:  Review of patient's allergies indicates:  No Known Allergies  Outpatient Encounter Medications as of 9/27/2018   Medication Sig Dispense Refill    diclofenac (VOLTAREN) 75 MG EC tablet Take 75 mg by mouth 2 (two) times daily.  0    enalapril (VASOTEC) 10 MG tablet Take 10 mg by mouth once daily.      traMADol (ULTRAM) 50 mg tablet TAKE ONE TABLET BY MOUTH EVERY TWELVE HOURS AS NEEDED FOR PAIN  0    [DISCONTINUED] phenylephrine (SUDAFED PE) 10 MG Tab Take 10 mg by mouth every 4 (four) hours as needed.       No facility-administered encounter medications on file as of 9/27/2018.      Review of Systems   ROS  Physical Exam     Vitals:    09/27/18 1451   BP: 127/81   Pulse: (!) 57     Physical Exam   Constitutional: He is oriented to person, place, and time. He appears well-developed and well-nourished. No distress.   HENT:   Head: Normocephalic and atraumatic.   Right Ear: External ear normal.   Left Ear: External ear normal.   Nose: Nose normal.   Mouth/Throat: Oropharynx is clear and moist.   Eyes: Conjunctivae are normal. Pupils are equal, round, and reactive to light.   Neck: Normal range of motion. Neck supple. No JVD present. No tracheal deviation present. No thyromegaly present.   Cardiovascular: Normal rate, regular rhythm, normal heart sounds and intact distal pulses.  Exam reveals no gallop and no friction rub.    No murmur heard.  Pulmonary/Chest: Effort normal and breath sounds normal. No  respiratory distress. He has no wheezes. He exhibits no tenderness.   Abdominal: Soft. Bowel sounds are normal. He exhibits no distension and no mass. There is no tenderness. There is no rebound and no guarding.   Genitourinary: Rectum normal, prostate normal and penis normal. No penile tenderness.   Musculoskeletal: Normal range of motion. He exhibits no edema, tenderness or deformity.   Lymphadenopathy:     He has no cervical adenopathy.   Neurological: He is alert and oriented to person, place, and time.   Skin: Skin is warm and dry. He is not diaphoretic.     Psychiatric: He has a normal mood and affect. His behavior is normal. Thought content normal.     Genitalia:  Scrotum: no rash or lesion  Normal symmetric epididymis without masses  Normal vas palpated  Normal size, symmetric testicles with no masses   Normal urethral meatus with no discharge  Normal circumcised penis with no lesion   Rectal:  Normal perineum and anus upon inspection.  Normal tone, no masses or tenderness;     LABS:  No results found for: PSA, PSADIAG, PSATOTAL, PSAFREE, PSAFREEPCT  No results found for this or any previous visit.  Lab Results   Component Value Date    CREATININE 1.0 07/11/2018    CREATININE 0.7 05/26/2018    CREATININE 0.8 05/25/2018     No results found for this or any previous visit.  No results found for: LABURIN  Radiology:  RUG  Contrast was visualized readily filling the entire urethra and refluxing into the bladder.    Note made of small stricture within the proximal membranous portion of the urethra.    No abnormal extravasation or ureteral dilatation.    Assessment and Plan:  Mario was seen today for urethral stricture.    Diagnoses and all orders for this visit:    Urethral stricture, unspecified stricture type    his RUG reviewed and my detailed explanation is given.    explained the options of treatment for his bulbar urethral stricture.  Given its short segment, I will recommend excision and primary  anastomosis of urethroplasty.  Nature and risks of surgery, hospital and recovery course explained.  He will need 2 wks for chand catheter following his surgery.    Alternatively he can try another endoscopic surgery with self urethral dilation for a long term.  He will think about his decision.  He understands Pros and Cons of these surgical therapies and potential consequences of not getting treated.  All questions answered.    Follow-up:  Follow-up if symptoms worsen or fail to improve.

## 2019-03-11 ENCOUNTER — OFFICE VISIT (OUTPATIENT)
Dept: NEUROLOGY | Facility: HOSPITAL | Age: 49
End: 2019-03-11
Attending: SURGERY
Payer: MEDICAID

## 2019-03-11 VITALS
BODY MASS INDEX: 33.87 KG/M2 | HEART RATE: 65 BPM | SYSTOLIC BLOOD PRESSURE: 131 MMHG | WEIGHT: 229.38 LBS | DIASTOLIC BLOOD PRESSURE: 82 MMHG | TEMPERATURE: 98 F

## 2019-03-11 DIAGNOSIS — Z87.19 H/O DIVERTICULITIS OF COLON: Primary | ICD-10-CM

## 2019-03-11 PROCEDURE — 99213 OFFICE O/P EST LOW 20 MIN: CPT | Performed by: SURGERY

## 2019-03-11 NOTE — PROGRESS NOTES
NOLANETS:  [facility]  A collaboration between [facility] and [facility]      PATIENT: Mario Castro Sr.  MRN: [id number]  DATE: 3/11/2019    Subjective:      Chief Complaint: Follow-up (6 month)  doing well overall  Eating and drinking    Had rt hip surgery 2 months back    Intermittent left sided abdominal pain  Otherwise ok    Vitals:   Vitals:    03/11/19 1313   BP: 131/82   BP Location: Right arm   Patient Position: Sitting   BP Method: Medium (Automatic)   Pulse: 65   Temp: 98.2 °F (36.8 °C)   TempSrc: Oral   Weight: 104.1 kg (229 lb 6.2 oz)        Karnofsky Score:   Karnofsky Score    Karnofsky Score:  80% - Normal Activity with Effort: Some Symptoms of Disease         Diagnosis: No diagnosis found.     Oncologic History:     Interval History:     Past Medical History:  Past Medical History:   Diagnosis Date    Arthritis           to right hip    Diverticulitis of intestine     Hypertension        Past Surgical History:  Past Surgical History:   Procedure Laterality Date    COLECTOMY  05/2018    COLONOSCOPY N/A 3/8/2018    Performed by Juno Curry MD at [facility] ENDO    CYSTOSCOPY W/ URETERAL STENT PLACEMENT  05/2018    CYSTOSCOPY, WITH DIRECT VISION INTERNAL URETHROTOMY Left 7/24/2018    Performed by Tommie Hernandez MD at [facility] OR    CYSTOSCOPY, WITH URETERAL STENT REMOVAL Left 7/24/2018    Performed by Tommie Hernandez MD at [facility] OR    CYSTOSCOPY/ RETROGRADE PYELOGRAM/ STENT PLACEMENT/STENT EXCHANGE Left 5/22/2018    Performed by Tommie Hernandez MD at [facility] OR    DILATION-URETHRA N/A 5/22/2018    Performed by Tommie Hernandez MD at [facility] OR    RESECTION-COLON-LOW ANTERIOR-LAPAROSCOPIC N/A 5/22/2018    Performed by Annette Mathias MD at [facility] OR       Family History:  Family History   Problem Relation Age of Onset    Hypertension Mother     Heart disease Mother     No Known  Problems Father        Allergies:  Review of patient's allergies indicates:  No Known Allergies    Medications:  Current Outpatient Medications   Medication Sig Dispense Refill    diclofenac (VOLTAREN) 75 MG EC tablet Take 75 mg by mouth 2 (two) times daily.  0    enalapril (VASOTEC) 10 MG tablet Take 10 mg by mouth once daily.      traMADol (ULTRAM) 50 mg tablet TAKE ONE TABLET BY MOUTH EVERY TWELVE HOURS AS NEEDED FOR PAIN  0     No current facility-administered medications for this visit.        Review of Systems   Constitutional: Negative for activity change, appetite change, chills, diaphoresis, fever and unexpected weight change.   HENT: Negative for dental problem, drooling, ear discharge, ear pain, facial swelling, hearing loss, mouth sores, nosebleeds, sneezing, tinnitus, trouble swallowing and voice change.    Eyes: Negative for pain, discharge, itching and visual disturbance.   Respiratory: Negative for apnea, choking, chest tightness, shortness of breath and stridor.    Cardiovascular: Negative for palpitations and leg swelling.   Gastrointestinal: Positive for abdominal distention and abdominal pain. Negative for blood in stool, constipation, diarrhea, nausea, rectal pain and vomiting.   Endocrine: Negative.    Genitourinary: Negative.    Musculoskeletal: Positive for arthralgias, back pain, gait problem and joint swelling.   Skin: Negative.    Allergic/Immunologic: Negative.    Neurological: Negative for seizures, facial asymmetry, speech difficulty, weakness, light-headedness, numbness and headaches.   Hematological: Negative.    Psychiatric/Behavioral: Negative for agitation, behavioral problems, confusion, decreased concentration and dysphoric mood.      Objective:      Physical Exam   Constitutional: He is oriented to person, place, and time. He appears well-developed and well-nourished.   HENT:   Head: Normocephalic.   Right Ear: External ear normal.   Left Ear: External ear normal.   Eyes:  Conjunctivae are normal. Pupils are equal, round, and reactive to light.   Neck: Normal range of motion. No thyromegaly present.   Cardiovascular: Normal rate and regular rhythm.   Pulmonary/Chest: Effort normal and breath sounds normal.   Abdominal: Soft. Bowel sounds are normal.   Wound looks good  No herrnia   Musculoskeletal: Normal range of motion.   Neurological: He is alert and oriented to person, place, and time.   Skin: Skin is warm.   Psychiatric: He has a normal mood and affect. His behavior is normal. Thought content normal.      Assessment:       No diagnosis found.    Laboratory Data:    No recent labs available    Impression:  48-year-old gentleman who is status post multiple admissions for diverticulitis with abscess and multiple complications underwent laparoscopic left colon resection about 10 months back.  He is doing well from the surgical point of view.  He has occasional intermittent pain which resolved spontaneously.     At the time of surgery he was found to have a urethral stricture and he has been seen by urologist for that.  He was recently found to have urinary tract infection.    Two months before he underwent right hip surgery for right hip replacement.  He has recovered well from that  Plan:         Overall stable GI functions   abdomen soft incision looks good  Will follow up in 6 months time  Continue follow-up with his primary care physician and his urologist                PRINCE Bryant MD, FACS   Associate Professor of Surgery, [facility]   Neuroendocrine Surgery, Hepatic/Pancreatic & General Surgery   [address], [address]   [place] [address]   ph. [phone]; [phone]   fax. [phone]

## 2019-04-04 NOTE — TELEPHONE ENCOUNTER
----- Message from Raegan Patel sent at 7/10/2018  2:22 PM CDT -----  Contact: patient  JPTRE- patient called regarding short term disability and ceasar paperwork to be filled out. Patients call back number [phone]  
Returned pts call. No answer. LVM to return call. Awaiting call back.   
Attending Attestation (For Attendings USE Only)...

## 2019-09-16 ENCOUNTER — OFFICE VISIT (OUTPATIENT)
Dept: NEUROLOGY | Facility: HOSPITAL | Age: 49
End: 2019-09-16
Attending: SURGERY
Payer: MEDICAID

## 2019-09-16 VITALS
DIASTOLIC BLOOD PRESSURE: 79 MMHG | SYSTOLIC BLOOD PRESSURE: 118 MMHG | HEART RATE: 66 BPM | HEIGHT: 69 IN | BODY MASS INDEX: 34.01 KG/M2 | WEIGHT: 229.63 LBS | TEMPERATURE: 97 F

## 2019-09-16 DIAGNOSIS — Z98.890 S/P LAPAROTOMY: Primary | ICD-10-CM

## 2019-09-16 PROCEDURE — 99213 OFFICE O/P EST LOW 20 MIN: CPT | Performed by: SURGERY

## 2019-09-16 RX ORDER — CHOLESTYRAMINE 4 G/9G
4 POWDER, FOR SUSPENSION ORAL
Qty: 90 PACKET | Refills: 2 | Status: SHIPPED | OUTPATIENT
Start: 2019-09-16 | End: 2020-09-15

## 2019-09-16 NOTE — PROGRESS NOTES
"NOLANETS:  [facility]  A collaboration between [facility] and [facility]      PATIENT: Mario Castro Sr.  MRN: [id number]  DATE: 9/16/2019    Subjective:      Chief Complaint: Follow-up (x 6 months )  3 weeks back had abdominal pain on right side otherwise doing well  Cramping abdominal pain sudden episode recently    Active   Has issues with back    Vitals: Blood pressure 118/79, pulse 66, temperature 97.1 °F (36.2 °C), temperature source Oral, height 5' 9" (1.753 m), weight 104.2 kg (229 lb 9.8 oz).     ECOG Score: 1 - Symptomatic but completely ambulatory    Diagnosis: No diagnosis found.     Oncologic History:     Interval History:     Past Medical History:  Past Medical History:   Diagnosis Date    Arthritis           to right hip    Diverticulitis of intestine     Hypertension        Past Surgical History:  Past Surgical History:   Procedure Laterality Date    COLECTOMY  05/2018    COLONOSCOPY N/A 3/8/2018    Performed by Juno Curry MD at [facility] END[facility]    CYSTOSCOPY W/ URETERAL STENT PLACEMENT  05/2018    CYSTOSCOPY, WITH DIRECT VISION INTERNAL URETHROTOMY Left 7/24/2018    Performed by Tommie Hernandez MD at [facility] OR    CYSTOSCOPY, WITH URETERAL STENT REMOVAL Left 7/24/2018    Performed by Tommie Hernandez MD at [facility] OR    CYSTOSCOPY/ RETROGRADE PYELOGRAM/ STENT PLACEMENT/STENT EXCHANGE Left 5/22/2018    Performed by Tommie Hernandez MD at [facility] OR    DILATION-URETHRA N/A 5/22/2018    Performed by Tommie Hernandez MD at [facility] OR    RESECTION-COLON-LOW ANTERIOR-LAPAROSCOPIC N/A 5/22/2018    Performed by Annette Mathias MD at [facility] OR       Family History:  Family History   Problem Relation Age of Onset    Hypertension Mother     Heart disease Mother     No Known Problems Father        Allergies:  Patient has no known allergies.    Medications:   Current Outpatient Medications "   Medication Sig    diclofenac (VOLTAREN) 75 MG EC tablet Take 75 mg by mouth 2 (two) times daily.    enalapril (VASOTEC) 10 MG tablet Take 10 mg by mouth once daily.    traMADol (ULTRAM) 50 mg tablet TAKE ONE TABLET BY MOUTH EVERY TWELVE HOURS AS NEEDED FOR PAIN     No current facility-administered medications for this visit.         Review of Systems   Constitutional: Negative for activity change, appetite change, chills, diaphoresis, fatigue, fever and unexpected weight change.   HENT: Negative for facial swelling, hearing loss, nosebleeds, rhinorrhea, sinus pressure, sneezing, sore throat and tinnitus.    Eyes: Negative for pain, discharge, itching and visual disturbance.   Respiratory: Negative for apnea, cough, choking, shortness of breath, wheezing and stridor.    Cardiovascular: Negative for chest pain and palpitations.   Gastrointestinal: Positive for abdominal distention, abdominal pain and diarrhea. Negative for anal bleeding, blood in stool, nausea, rectal pain and vomiting.   Endocrine: Negative.    Genitourinary: Negative.    Musculoskeletal: Negative for arthralgias, back pain, gait problem, joint swelling, myalgias and neck stiffness.   Skin: Negative for color change, pallor, rash and wound.   Neurological: Negative for tremors, seizures, facial asymmetry, speech difficulty and headaches.   Hematological: Negative for adenopathy. Does not bruise/bleed easily.   Psychiatric/Behavioral: Negative for agitation, behavioral problems, confusion and decreased concentration.      Objective:      Physical Exam   Constitutional: He is oriented to person, place, and time. He appears well-developed and well-nourished. No distress.   HENT:   Head: Normocephalic and atraumatic.   Right Ear: External ear normal.   Left Ear: External ear normal.   Eyes: Pupils are equal, round, and reactive to light. EOM are normal.   Neck: Normal range of motion. Neck supple.   Cardiovascular: Normal rate and regular rhythm.    Pulmonary/Chest: Effort normal and breath sounds normal.   Abdominal: Soft. Bowel sounds are normal. He exhibits no mass. There is no tenderness. There is no rebound and no guarding. No hernia.   Musculoskeletal: Normal range of motion.   Neurological: He is alert and oriented to person, place, and time.   Skin: Skin is warm and dry. He is not diaphoretic.   Psychiatric: He has a normal mood and affect. His behavior is normal.      Assessment:       No diagnosis found.    Laboratory Data:   Results for orders placed or performed in visit on 07/11/18   Basic metabolic panel   Result Value Ref Range    Sodium 139 136 - 145 mmol/L    Potassium 4.0 3.5 - 5.1 mmol/L    Chloride 107 95 - 110 mmol/L    CO2 25 23 - 29 mmol/L    Glucose 75 70 - 110 mg/dL    BUN, Bld 19 6 - 20 mg/dL    Creatinine 1.0 0.5 - 1.4 mg/dL    Calcium 9.3 8.7 - 10.5 mg/dL    Anion Gap 7 (L) 8 - 16 mmol/L    eGFR if African American >60 >60 mL/min/1.73 m^2    eGFR if non African American >60 >60 mL/min/1.73 m^2   CBC auto differential   Result Value Ref Range    WBC 5.28 3.90 - 12.70 K/uL    RBC 4.05 (L) 4.60 - 6.20 M/uL    Hemoglobin 11.5 (L) 14.0 - 18.0 g/dL    Hematocrit 34.8 (L) 40.0 - 54.0 %    Mean Corpuscular Volume 86 82 - 98 fL    Mean Corpuscular Hemoglobin 28.4 27.0 - 31.0 pg    Mean Corpuscular Hemoglobin Conc 33.0 32.0 - 36.0 g/dL    RDW 15.1 (H) 11.5 - 14.5 %    Platelets 194 150 - 350 K/uL    MPV 12.1 9.2 - 12.9 fL    Gran # (ANC) 2.6 1.8 - 7.7 K/uL    Lymph # 2.0 1.0 - 4.8 K/uL    Mono # 0.4 0.3 - 1.0 K/uL    Eos # 0.2 0.0 - 0.5 K/uL    Baso # 0.09 0.00 - 0.20 K/uL    Gran% 50.0 38.0 - 73.0 %    Lymph% 37.9 18.0 - 48.0 %    Mono% 7.2 4.0 - 15.0 %    Eosinophil% 3.0 0.0 - 8.0 %    Basophil% 1.7 0.0 - 1.9 %    Differential Method Automated            Plan:       F/u PCP  F/u urology                PRINCE Bryant MD, FACS   Associate Professor of Surgery, [facility]   Neuroendocrine Surgery, Hepatic/Pancreatic & General Surgery   [address]   [place] [address]   ph. [phone]; [phone]   fax. [phone]

## 2020-02-03 ENCOUNTER — TELEPHONE (OUTPATIENT)
Dept: UROLOGY | Facility: CLINIC | Age: 50
End: 2020-02-03

## 2020-02-03 NOTE — TELEPHONE ENCOUNTER
----- Message from Yudith Willis LPN sent at 1/31/2020  4:35 PM CST -----  Contact: [phone]      ----- Message -----  From: Lore Connell MA  Sent: 1/31/2020   4:20 PM CST  To: Jairon NATH Staff    [phone]     pt last seen in 2018 for urethral stricture. he is needing a follow up but has medicaid. unable to book

## 2020-02-20 ENCOUNTER — OFFICE VISIT (OUTPATIENT)
Dept: UROLOGY | Facility: CLINIC | Age: 50
End: 2020-02-20
Payer: MEDICAID

## 2020-02-20 VITALS
SYSTOLIC BLOOD PRESSURE: 126 MMHG | HEIGHT: 69 IN | WEIGHT: 242 LBS | BODY MASS INDEX: 35.84 KG/M2 | DIASTOLIC BLOOD PRESSURE: 86 MMHG | HEART RATE: 86 BPM

## 2020-02-20 DIAGNOSIS — N41.1 PROSTATITIS, CHRONIC: Primary | ICD-10-CM

## 2020-02-20 PROCEDURE — 99999 PR PBB SHADOW E&M-EST. PATIENT-LVL III: CPT | Mod: PBBFAC,,, | Performed by: UROLOGY

## 2020-02-20 PROCEDURE — 99999 PR PBB SHADOW E&M-EST. PATIENT-LVL III: ICD-10-PCS | Mod: PBBFAC,,, | Performed by: UROLOGY

## 2020-02-20 PROCEDURE — 87086 URINE CULTURE/COLONY COUNT: CPT

## 2020-02-20 PROCEDURE — 99213 OFFICE O/P EST LOW 20 MIN: CPT | Mod: PBBFAC | Performed by: UROLOGY

## 2020-02-20 PROCEDURE — 99214 OFFICE O/P EST MOD 30 MIN: CPT | Mod: S$PBB,,, | Performed by: UROLOGY

## 2020-02-20 PROCEDURE — 99214 PR OFFICE/OUTPT VISIT, EST, LEVL IV, 30-39 MIN: ICD-10-PCS | Mod: S$PBB,,, | Performed by: UROLOGY

## 2020-02-20 RX ORDER — MELOXICAM 15 MG/1
TABLET ORAL
COMMUNITY
Start: 2019-12-16

## 2020-02-20 RX ORDER — OMEPRAZOLE 20 MG/1
CAPSULE, DELAYED RELEASE ORAL
COMMUNITY
Start: 2019-12-16

## 2020-02-20 RX ORDER — CIPROFLOXACIN 500 MG/1
500 TABLET ORAL 2 TIMES DAILY
Qty: 60 TABLET | Refills: 1 | Status: SHIPPED | OUTPATIENT
Start: 2020-02-20 | End: 2020-03-21

## 2020-02-20 NOTE — PROGRESS NOTES
CC: pain radiating from the urethra to the lower abdomen since 11/2019    Mario Castro Sr. is a 49 y.o. man who is here for the evaluation of No chief complaint on file.    A new pt referred by his PCP, JANEL Sierra MD   C/o pain radiating from the urethra to the lower abdomen since 11/2019  States have urine comes out good but then also dribbles out sometimes.  Previously Dr. Hernandez did DIVU for bulbar urethral stricture 7/24/18.    Urination symptoms: Positive for frequency, urgency and incomplete emptying.  Denies flank pain, dysuria, hematuria.      Is disable from back and hip problem.  Hx of colon resection for diverticulitis.    Past Medical History:   Diagnosis Date    Arthritis           to right hip    Diverticulitis of intestine     Hypertension      Past Surgical History:   Procedure Laterality Date    COLECTOMY  05/2018    COLONOSCOPY N/A 3/8/2018         CYSTOSCOPY W/ URETERAL STENT PLACEMENT  05/2018    CYSTOSCOPY W/ URETERAL STENT REMOVAL Left 7/24/2018    Procedure: CYSTOSCOPY, WITH URETERAL STENT REMOVAL;  Surgeon: Tommie Hernandez MD;  Location: [facility] OR;  Service: Urology;  Laterality: Left;    CYSTOURETHROSCOPY WITH DIRECT VISION INTERNAL URETHROTOMY Left 7/24/2018    Procedure: CYSTOSCOPY, WITH DIRECT VISION INTERNAL URETHROTOMY;  Surgeon: Tommie Hernandez MD;  Location: [facility] OR;  Service: Urology;  Laterality: Left;     Social History     Tobacco Use    Smoking status: Never Smoker    Smokeless tobacco: Never Used   Substance Use Topics    Alcohol use: No    Drug use: No     Family History   Problem Relation Age of Onset    Hypertension Mother     Heart disease Mother     No Known Problems Father      Allergy:  Review of patient's allergies indicates:  No Known Allergies  Outpatient Encounter Medications as of 2/20/2020   Medication Sig Dispense Refill    diclofenac (VOLTAREN) 75 MG EC tablet Take 75 mg by mouth 2 (two) times daily.  0    enalapril (VASOTEC) 10 MG  tablet Take 10 mg by mouth once daily.      meloxicam (MOBIC) 15 MG tablet       omeprazole (PRILOSEC) 20 MG capsule       traMADol (ULTRAM) 50 mg tablet TAKE ONE TABLET BY MOUTH EVERY TWELVE HOURS AS NEEDED FOR PAIN  0    cholestyramine (QUESTRAN) 4 gram packet Take 1 packet (4 g total) by mouth 3 (three) times daily with meals. (Patient not taking: Reported on 2/20/2020) 90 packet 2    ciprofloxacin HCl (CIPRO) 500 MG tablet Take 1 tablet (500 mg total) by mouth 2 (two) times daily. 60 tablet 1     No facility-administered encounter medications on file as of 2/20/2020.      Review of Systems   ROS  Physical Exam     Vitals:    02/20/20 1519   BP: 126/86   Pulse: 86     Physical Exam   Constitutional: He is oriented to person, place, and time. He appears well-developed and well-nourished. No distress.   HENT:   Head: Normocephalic and atraumatic.   Right Ear: External ear normal.   Left Ear: External ear normal.   Nose: Nose normal.   Mouth/Throat: Oropharynx is clear and moist.   Eyes: Conjunctivae are normal. Pupils are equal, round, and reactive to light.   Neck: Normal range of motion. Neck supple. No JVD present. No tracheal deviation present. No thyromegaly present.   Cardiovascular: Normal rate, regular rhythm, normal heart sounds and intact distal pulses.  Exam reveals no gallop and no friction rub.    No murmur heard.  Pulmonary/Chest: Effort normal and breath sounds normal. No respiratory distress. He has no wheezes. He exhibits no tenderness.   Abdominal: Soft. Bowel sounds are normal. He exhibits no distension and no mass. There is no tenderness. There is no rebound and no guarding.   Genitourinary: Rectum normal and penis normal. No penile tenderness.   Genitourinary Comments: Prostate 20 grams with negative nodule or positive tenderness     Musculoskeletal: Normal range of motion. He exhibits no edema, tenderness or deformity.   Lymphadenopathy:     He has no cervical adenopathy.   Neurological:  He is alert and oriented to person, place, and time.   Skin: Skin is warm and dry. He is not diaphoretic.     Psychiatric: He has a normal mood and affect. His behavior is normal. Thought content normal.     Genitalia:  Scrotum: no rash or lesion  Normal symmetric epididymis without masses  Normal vas palpated  Normal size, symmetric testicles with no masses   Normal urethral meatus with no discharge  Normal circumcised penis with no lesion   Rectal:  Normal perineum and anus upon inspection.  Normal tone, no masses or tenderness;     LABS:  No results found for: PSA, PSADIAG, PSATOTAL, PSAFREE, PSAFREEPCT  No results found for this or any previous visit.  Lab Results   Component Value Date    CREATININE 1.0 07/11/2018    CREATININE 0.7 05/26/2018    CREATININE 0.8 05/25/2018     No results found for this or any previous visit.  No results found for: LABURIN  No results found for: HGBA1C    PVR per bladder scan 12 ml    Assessment and Plan:  Diagnoses and all orders for this visit:    Prostatitis, chronic  -     Urine culture  -     ciprofloxacin HCl (CIPRO) 500 MG tablet; Take 1 tablet (500 mg total) by mouth 2 (two) times daily.    he voided well with minimal PVR.  I have a low suspicion of recurrent urethral stricture, but is concerned about chronic prostate infection.  cipro for 1 month.  Probiotics while on cipro.    If not improved, will plan cysto.  Follow-up:  Follow up in about 4 weeks (around 3/19/2020).

## 2020-02-22 LAB — BACTERIA UR CULT: NO GROWTH

## 2020-05-19 NOTE — PLAN OF CARE
Hospitalist Progress Note    Patient: Fang Watkins MRN: [id number]  CSN: [id number]    YOB: 1972  Age: 41 [facility]) I26.99    Pulmonary hypertension ([facility]) I27.20    Problem: Patient Care Overview  Goal: Plan of Care Review  Patient is AAOx4 and in NAD. He had some moderate pain at the beginning of the shift stating 5/10 to midline insertion of drain. Pt treated with PRN oral medication to good effect. Pt's wife ask if a dressing could be applied to site due to small amount of serous drainage. Area covered with 4x4 gauze and secured with paper tape. Midline drain with 125mls emptied and left side with 50mls. His wife flushing drains periodically, as she was instructed when they were discharged home earlier this week. His wife is very attentive and enjoys assisting in care. Pt's antibiotics and fluids continued per MAR. No other needs voiced. Safety maintained. Call light within reach.       Severe obesity ([facility]) [facility] [address] Mac Boxer, MD

## 2020-05-26 ENCOUNTER — OFFICE VISIT (OUTPATIENT)
Dept: UROLOGY | Facility: CLINIC | Age: 50
End: 2020-05-26
Payer: MEDICAID

## 2020-05-26 VITALS
WEIGHT: 246.94 LBS | DIASTOLIC BLOOD PRESSURE: 75 MMHG | HEIGHT: 70 IN | HEART RATE: 57 BPM | SYSTOLIC BLOOD PRESSURE: 122 MMHG | BODY MASS INDEX: 35.35 KG/M2

## 2020-05-26 DIAGNOSIS — N30.00 ACUTE CYSTITIS WITHOUT HEMATURIA: ICD-10-CM

## 2020-05-26 DIAGNOSIS — N13.1 HYDRONEPHROSIS DUE TO URETERAL STRICTURE: ICD-10-CM

## 2020-05-26 DIAGNOSIS — N99.114 POSTPROCEDURAL MALE URETHRAL STRICTURE: Primary | ICD-10-CM

## 2020-05-26 DIAGNOSIS — R31.21 ASYMPTOMATIC MICROSCOPIC HEMATURIA: ICD-10-CM

## 2020-05-26 PROCEDURE — 99214 OFFICE O/P EST MOD 30 MIN: CPT | Mod: S$PBB,,, | Performed by: UROLOGY

## 2020-05-26 PROCEDURE — 99214 PR OFFICE/OUTPT VISIT, EST, LEVL IV, 30-39 MIN: ICD-10-PCS | Mod: S$PBB,,, | Performed by: UROLOGY

## 2020-05-26 PROCEDURE — 99999 PR PBB SHADOW E&M-EST. PATIENT-LVL III: ICD-10-PCS | Mod: PBBFAC,,, | Performed by: UROLOGY

## 2020-05-26 PROCEDURE — 99213 OFFICE O/P EST LOW 20 MIN: CPT | Mod: PBBFAC | Performed by: UROLOGY

## 2020-05-26 PROCEDURE — 99999 PR PBB SHADOW E&M-EST. PATIENT-LVL III: CPT | Mod: PBBFAC,,, | Performed by: UROLOGY

## 2020-05-26 PROCEDURE — 87086 URINE CULTURE/COLONY COUNT: CPT

## 2020-05-26 RX ORDER — CIPROFLOXACIN 500 MG/1
500 TABLET ORAL 2 TIMES DAILY
Qty: 14 TABLET | Refills: 0 | Status: SHIPPED | OUTPATIENT
Start: 2020-05-26 | End: 2020-06-02

## 2020-05-26 RX ORDER — NYSTATIN 100000 U/G
CREAM TOPICAL
COMMUNITY
Start: 2020-04-18

## 2020-05-26 RX ORDER — FLUCONAZOLE 150 MG/1
TABLET ORAL
Status: ON HOLD | COMMUNITY
Start: 2020-05-23 | End: 2022-09-29 | Stop reason: CLARIF

## 2020-05-26 NOTE — LETTER
May 26, 2020      SURAJ Sierra MD  [address]  [place] [address]           [facility] - Urology 4th Floor  [address]  [place] [address]  Phone: [phone]          Patient: Mario Castro Sr.   MR Number: [id number]   YOB: 1970   Date of Visit: 5/26/2020       Dear Dr. SURAJ Sierra:    Thank you for referring Mario Castro to me for evaluation. Attached you will find relevant portions of my assessment and plan of care.    If you have questions, please do not hesitate to call me. I look forward to following Mario Castro along with you.    Sincerely,    Eduardo De La O MD    Enclosure  CC:  No Recipients    If you would like to receive this communication electronically, please contact [email or web address] or [phone] to request more information on [employer] Link access.    For providers and/or their staff who would like to refer a patient to [facility], please contact us through our one-stop-shop provider referral line, [facility], at [phone].    If you feel you have received this communication in error or would no longer like to receive these types of communications, please e-mail [email or web address]

## 2020-05-26 NOTE — PROGRESS NOTES
CC: pain radiating from the urethra to the lower abdomen since 11/2019    Mario Castro Sr. is a 50 y.o. man who is here for the evaluation of No chief complaint on file.    A new pt referred by his PCP, JANEL Sierra MD   C/o pain radiating from the urethra to the lower abdomen since 11/2019  States have urine comes out good but then also dribbles out sometimes.  Previously Dr. Hernandez did DIVU for bulbar urethral stricture 7/24/18.  He was noted to have urethral stricture disease and retained ureteral stent, status post insertion prior to diverticular surgery  Procedure(s) Performed:   1.  Cystoscopy, optical internal urethrotomy, insertion of Grigsby catheter   2.  Fluoroscopy < 1 hr  3.  Cystoscopy with removal left ureteral stent   Findings:   1.  Urethral stricture in pendulous urethra and retained stent    Urination symptoms: Positive for terminal dribbling.    Denies flank pain, dysuria, hematuria.    He is dong much better after cipro treatment.  However, still experiences significant terminal dribblings.    Is disable from back and hip problem.  Hx of colon resection for diverticulitis.    Past Medical History:   Diagnosis Date    Arthritis           to right hip    Diverticulitis of intestine     Hypertension      Past Surgical History:   Procedure Laterality Date    COLECTOMY  05/2018    COLONOSCOPY N/A 3/8/2018         CYSTOSCOPY W/ URETERAL STENT PLACEMENT  05/2018    CYSTOSCOPY W/ URETERAL STENT REMOVAL Left 7/24/2018    Procedure: CYSTOSCOPY, WITH URETERAL STENT REMOVAL;  Surgeon: Tommie Hernandez MD;  Location: [facility] OR;  Service: Urology;  Laterality: Left;    CYSTOURETHROSCOPY WITH DIRECT VISION INTERNAL URETHROTOMY Left 7/24/2018    Procedure: CYSTOSCOPY, WITH DIRECT VISION INTERNAL URETHROTOMY;  Surgeon: Tommie Hernandez MD;  Location: [facility] OR;  Service: Urology;  Laterality: Left;     Social History     Tobacco Use    Smoking status: Never Smoker    Smokeless tobacco: Never Used    Substance Use Topics    Alcohol use: No    Drug use: No     Family History   Problem Relation Age of Onset    Hypertension Mother     Heart disease Mother     No Known Problems Father      Allergy:  Review of patient's allergies indicates:  No Known Allergies  Outpatient Encounter Medications as of 5/26/2020   Medication Sig Dispense Refill    enalapril (VASOTEC) 10 MG tablet Take 10 mg by mouth once daily.      fluconazole (DIFLUCAN) 150 MG Tab       meloxicam (MOBIC) 15 MG tablet       nystatin (MYCOSTATIN) cream APPLY TO THE AFFECTED AREA(S) EVERY DAY      omeprazole (PRILOSEC) 20 MG capsule       cholestyramine (QUESTRAN) 4 gram packet Take 1 packet (4 g total) by mouth 3 (three) times daily with meals. (Patient not taking: Reported on 2/20/2020) 90 packet 2    ciprofloxacin HCl (CIPRO) 500 MG tablet Take 1 tablet (500 mg total) by mouth 2 (two) times daily. for 14 doses 14 tablet 0    diclofenac (VOLTAREN) 75 MG EC tablet Take 75 mg by mouth 2 (two) times daily.  0    traMADol (ULTRAM) 50 mg tablet TAKE ONE TABLET BY MOUTH EVERY TWELVE HOURS AS NEEDED FOR PAIN  0     No facility-administered encounter medications on file as of 5/26/2020.      Review of Systems   ROS  Physical Exam     Vitals:    05/26/20 1452   BP: 122/75   Pulse: (!) 57     Physical Exam   Constitutional: He is oriented to person, place, and time. He appears well-developed and well-nourished. No distress.   HENT:   Head: Normocephalic and atraumatic.   Right Ear: External ear normal.   Left Ear: External ear normal.   Nose: Nose normal.   Mouth/Throat: Oropharynx is clear and moist.   Eyes: Conjunctivae are normal. Pupils are equal, round, and reactive to light.   Neck: Normal range of motion. Neck supple. No JVD present. No tracheal deviation present. No thyromegaly present.   Cardiovascular: Normal rate, regular rhythm, normal heart sounds and intact distal pulses.  Exam reveals no gallop and no friction rub.    No murmur  heard.  Pulmonary/Chest: Effort normal and breath sounds normal. No respiratory distress. He has no wheezes. He exhibits no tenderness.   Abdominal: Soft. Bowel sounds are normal. He exhibits no distension and no mass. There is no tenderness. There is no rebound and no guarding.   Genitourinary: Rectum normal and penis normal. No penile tenderness.   Genitourinary Comments: Prostate 20 grams with negative nodule or positive tenderness     Musculoskeletal: Normal range of motion. He exhibits no edema, tenderness or deformity.   Lymphadenopathy:     He has no cervical adenopathy.   Neurological: He is alert and oriented to person, place, and time.   Skin: Skin is warm and dry. He is not diaphoretic.     Psychiatric: He has a normal mood and affect. His behavior is normal. Thought content normal.     Genitalia:  Scrotum: no rash or lesion  Normal symmetric epididymis without masses  Normal vas palpated  Normal size, symmetric testicles with no masses   Normal urethral meatus with no discharge  Normal circumcised penis with no lesion   Rectal:  Normal perineum and anus upon inspection.  Normal tone, no masses or tenderness;     LABS:  No results found for: PSA, PSADIAG, PSATOTAL, PSAFREE, PSAFREEPCT  No results found for this or any previous visit.  Lab Results   Component Value Date    CREATININE 1.0 07/11/2018    CREATININE 0.7 05/26/2018    CREATININE 0.8 05/25/2018     No results found for this or any previous visit.  Urine Culture, Routine   Date Value Ref Range Status   02/20/2020 No growth  Final     No results found for: HGBA1C    PVR per bladder scan 12 ml    Assessment and Plan:  Diagnoses and all orders for this visit:    Postprocedural male urethral stricture  -     FL Urethrogram Retrograde (xpd); Future    Hydronephrosis due to ureteral stricture  -     US Retroperitoneal Complete (Kidney and; Future    Acute cystitis without hematuria    Asymptomatic microscopic hematuria  -     Urine culture    Other  orders  -     ciprofloxacin HCl (CIPRO) 500 MG tablet; Take 1 tablet (500 mg total) by mouth 2 (two) times daily. for 14 doses    to evaluate his terminal dribbling, will plan RUG to rule out recurrent urethral stricture.  Discussed possible endoscopic vs. Open urethroplasty treatment for his recurrent urethral stricture.  He has uncircumcised foreskin, which can be used for urethroplasty.  I spent 25 minutes with the patient of which more than half was spent in direct consultation with the patient in regards to our treatment and plan.      Follow-up:  Follow up RUG and renal US.

## 2020-05-28 LAB — BACTERIA UR CULT: NO GROWTH

## 2020-06-09 ENCOUNTER — HOSPITAL ENCOUNTER (OUTPATIENT)
Dept: RADIOLOGY | Facility: HOSPITAL | Age: 50
Discharge: HOME | End: 2020-06-09
Attending: UROLOGY
Payer: MEDICARE

## 2020-06-09 DIAGNOSIS — N99.114 POSTPROCEDURAL MALE URETHRAL STRICTURE: ICD-10-CM

## 2020-06-09 DIAGNOSIS — N13.1 HYDRONEPHROSIS DUE TO URETERAL STRICTURE: ICD-10-CM

## 2020-06-09 PROCEDURE — 76770 US RETROPERITONEAL COMPLETE: ICD-10-PCS | Mod: 26,,, | Performed by: RADIOLOGY

## 2020-06-09 PROCEDURE — 76770 US EXAM ABDO BACK WALL COMP: CPT | Mod: TC

## 2020-06-09 PROCEDURE — 74450 X-RAY URETHRA/BLADDER: CPT | Mod: 26,,, | Performed by: RADIOLOGY

## 2020-06-09 PROCEDURE — 51610 FL URETHROGRAM RETROGRADE: ICD-10-PCS | Mod: ,,, | Performed by: RADIOLOGY

## 2020-06-09 PROCEDURE — 74450 FL URETHROGRAM RETROGRADE: ICD-10-PCS | Mod: 26,,, | Performed by: RADIOLOGY

## 2020-06-09 PROCEDURE — 51610 INJECTION FOR BLADDER X-RAY: CPT | Mod: ,,, | Performed by: RADIOLOGY

## 2020-06-09 PROCEDURE — 25500020 PHARM REV CODE 255: Performed by: UROLOGY

## 2020-06-09 PROCEDURE — 76770 US EXAM ABDO BACK WALL COMP: CPT | Mod: 26,,, | Performed by: RADIOLOGY

## 2020-06-09 PROCEDURE — 25000003 PHARM REV CODE 250: Performed by: UROLOGY

## 2020-06-09 PROCEDURE — 74450 X-RAY URETHRA/BLADDER: CPT | Mod: TC

## 2020-06-09 RX ORDER — LIDOCAINE HYDROCHLORIDE 20 MG/ML
JELLY TOPICAL
Status: DISCONTINUED | OUTPATIENT
Start: 2020-06-09 | End: 2020-06-10 | Stop reason: HOSPADM

## 2020-06-09 RX ADMIN — IOTHALAMATE MEGLUMINE 20 ML: 172 INJECTION URETERAL at 08:06

## 2020-06-09 RX ADMIN — LIDOCAINE HYDROCHLORIDE: 20 JELLY TOPICAL at 08:06

## 2020-06-11 ENCOUNTER — OFFICE VISIT (OUTPATIENT)
Dept: UROLOGY | Facility: CLINIC | Age: 50
End: 2020-06-11
Payer: MEDICAID

## 2020-06-11 VITALS
HEART RATE: 72 BPM | DIASTOLIC BLOOD PRESSURE: 88 MMHG | SYSTOLIC BLOOD PRESSURE: 140 MMHG | BODY MASS INDEX: 35 KG/M2 | HEIGHT: 70 IN | WEIGHT: 244.5 LBS

## 2020-06-11 DIAGNOSIS — N40.1 BENIGN PROSTATIC HYPERPLASIA (BPH) WITH POST-VOID DRIBBLING: ICD-10-CM

## 2020-06-11 DIAGNOSIS — N99.111 POSTPROCEDURAL BULBOUS URETHRAL STRICTURE: Primary | ICD-10-CM

## 2020-06-11 DIAGNOSIS — N39.43 DRIBBLING URINE: ICD-10-CM

## 2020-06-11 DIAGNOSIS — N39.43 BENIGN PROSTATIC HYPERPLASIA (BPH) WITH POST-VOID DRIBBLING: ICD-10-CM

## 2020-06-11 PROCEDURE — 99214 PR OFFICE/OUTPT VISIT, EST, LEVL IV, 30-39 MIN: ICD-10-PCS | Mod: S$PBB,,, | Performed by: UROLOGY

## 2020-06-11 PROCEDURE — 99214 OFFICE O/P EST MOD 30 MIN: CPT | Mod: S$PBB,,, | Performed by: UROLOGY

## 2020-06-11 PROCEDURE — 99999 PR PBB SHADOW E&M-EST. PATIENT-LVL III: CPT | Mod: PBBFAC,,, | Performed by: UROLOGY

## 2020-06-11 PROCEDURE — 99999 PR PBB SHADOW E&M-EST. PATIENT-LVL III: ICD-10-PCS | Mod: PBBFAC,,, | Performed by: UROLOGY

## 2020-06-11 PROCEDURE — 99213 OFFICE O/P EST LOW 20 MIN: CPT | Mod: PBBFAC | Performed by: UROLOGY

## 2020-06-11 RX ORDER — LIDOCAINE HYDROCHLORIDE 20 MG/ML
JELLY TOPICAL ONCE
Status: CANCELLED | OUTPATIENT
Start: 2020-06-11 | End: 2020-06-11

## 2020-06-11 RX ORDER — DOXYCYCLINE HYCLATE 100 MG
100 TABLET ORAL ONCE
Status: CANCELLED | OUTPATIENT
Start: 2020-06-11 | End: 2020-06-11

## 2020-06-11 RX ORDER — ALFUZOSIN HYDROCHLORIDE 10 MG/1
10 TABLET, EXTENDED RELEASE ORAL
Qty: 30 TABLET | Refills: 12 | Status: SHIPPED | OUTPATIENT
Start: 2020-06-11 | End: 2020-06-25 | Stop reason: SDUPTHER

## 2020-06-11 NOTE — PROGRESS NOTES
CC: pain radiating from the urethra to the lower abdomen since 11/2019, terminal dribbling, weak urine flow    Mario Castro Sr. is a 50 y.o. man who is here for the evaluation of Post urethral stricture  hx of chronic lower back pain with sciatica.  Hx of urethral stricture.  C/o terminal dribbling, weak urine flow as well as pain radiating from the urethra to the lower abdomen since 11/2019  He is here today after RUG and renal US>    His PCP, JANEL Sierra MD     Previously Dr. Hernandez did DIVU for bulbar urethral stricture 7/24/18.  He was noted to have urethral stricture disease and retained ureteral stent, status post insertion prior to diverticular surgery  Procedure(s) Performed:   1.  Cystoscopy, optical internal urethrotomy, insertion of Grigsby catheter   2.  Fluoroscopy < 1 hr  3.  Cystoscopy with removal left ureteral stent   Findings:   1.  Urethral stricture in pendulous urethra and retained stent    Urination symptoms: Positive for terminal dribbling.    Denies flank pain, dysuria, hematuria.    He is dong much better after cipro treatment.  However, still experiences significant terminal dribblings.    Is disable from back and hip problem.  Hx of colon resection for diverticulitis.    Past Medical History:   Diagnosis Date    Arthritis           to right hip    Diverticulitis of intestine     Hypertension      Past Surgical History:   Procedure Laterality Date    COLECTOMY  05/2018    COLONOSCOPY N/A 3/8/2018         CYSTOSCOPY W/ URETERAL STENT PLACEMENT  05/2018    CYSTOSCOPY W/ URETERAL STENT REMOVAL Left 7/24/2018    Procedure: CYSTOSCOPY, WITH URETERAL STENT REMOVAL;  Surgeon: Tommie Hernandez MD;  Location: [facility] OR;  Service: Urology;  Laterality: Left;    CYSTOURETHROSCOPY WITH DIRECT VISION INTERNAL URETHROTOMY Left 7/24/2018    Procedure: CYSTOSCOPY, WITH DIRECT VISION INTERNAL URETHROTOMY;  Surgeon: Tommie Hernandez MD;  Location: [facility] OR;  Service: Urology;  Laterality: Left;      Social History     Tobacco Use    Smoking status: Never Smoker    Smokeless tobacco: Never Used   Substance Use Topics    Alcohol use: No    Drug use: No     Family History   Problem Relation Age of Onset    Hypertension Mother     Heart disease Mother     No Known Problems Father      Allergy:  Review of patient's allergies indicates:  No Known Allergies  Outpatient Encounter Medications as of 6/11/2020   Medication Sig Dispense Refill    diclofenac (VOLTAREN) 75 MG EC tablet Take 75 mg by mouth 2 (two) times daily.  0    enalapril (VASOTEC) 10 MG tablet Take 10 mg by mouth once daily.      meloxicam (MOBIC) 15 MG tablet       nystatin (MYCOSTATIN) cream APPLY TO THE AFFECTED AREA(S) EVERY DAY      omeprazole (PRILOSEC) 20 MG capsule       alfuzosin (UROXATRAL) 10 mg Tb24 Take 1 tablet (10 mg total) by mouth daily with breakfast. 30 tablet 12    cholestyramine (QUESTRAN) 4 gram packet Take 1 packet (4 g total) by mouth 3 (three) times daily with meals. (Patient not taking: Reported on 2/20/2020) 90 packet 2    fluconazole (DIFLUCAN) 150 MG Tab       traMADol (ULTRAM) 50 mg tablet TAKE ONE TABLET BY MOUTH EVERY TWELVE HOURS AS NEEDED FOR PAIN  0     No facility-administered encounter medications on file as of 6/11/2020.      Review of Systems   ROS  Physical Exam     Vitals:    06/11/20 1437   BP: (!) 140/88   Pulse: 72     Physical Exam   Constitutional: He is oriented to person, place, and time. He appears well-developed and well-nourished. No distress.   HENT:   Head: Normocephalic and atraumatic.   Right Ear: External ear normal.   Left Ear: External ear normal.   Nose: Nose normal.   Mouth/Throat: Oropharynx is clear and moist.   Eyes: Conjunctivae are normal. Pupils are equal, round, and reactive to light.   Neck: Normal range of motion. Neck supple. No JVD present. No tracheal deviation present. No thyromegaly present.   Cardiovascular: Normal rate, regular rhythm, normal heart sounds and  intact distal pulses.  Exam reveals no gallop and no friction rub.    No murmur heard.  Pulmonary/Chest: Effort normal and breath sounds normal. No respiratory distress. He has no wheezes. He exhibits no tenderness.   Abdominal: Soft. Bowel sounds are normal. He exhibits no distension and no mass. There is no tenderness. There is no rebound and no guarding.   Genitourinary: Rectum normal and penis normal. No penile tenderness.   Genitourinary Comments: Prostate 20 grams with negative nodule or positive tenderness     Musculoskeletal: Normal range of motion. He exhibits no edema, tenderness or deformity.   Lymphadenopathy:     He has no cervical adenopathy.   Neurological: He is alert and oriented to person, place, and time.   Skin: Skin is warm and dry. He is not diaphoretic.     Psychiatric: He has a normal mood and affect. His behavior is normal. Thought content normal.     Genitalia:  Scrotum: no rash or lesion  Normal symmetric epididymis without masses  Normal vas palpated  Normal size, symmetric testicles with no masses   Normal urethral meatus with no discharge  Normal circumcised penis with no lesion   Rectal:  Normal perineum and anus upon inspection.  Normal tone, no masses or tenderness;     LABS:  No results found for: PSA, PSADIAG, PSATOTAL, PSAFREE, PSAFREEPCT  No results found for this or any previous visit.  Lab Results   Component Value Date    CREATININE 1.0 07/11/2018    CREATININE 0.7 05/26/2018    CREATININE 0.8 05/25/2018     No results found for this or any previous visit.  Urine Culture, Routine   Date Value Ref Range Status   05/26/2020 No growth  Final     No results found for: HGBA1C    PVR per bladder scan 12 ml    RUG 6/9/20  Small urethral stricture again seen within the proximal bulbar/distal membranous urethra, similar in location to prior exam.  No abnormal extravasation or ureteral dilatation.    US 6/9/20  Mild left pelviectasis.  No discrete hydronephrosis.    Bilateral simple  renal cysts.    Prostatomegaly.    UA clear today    Assessment and Plan:  Mario was seen today for post urethral stricture.    Diagnoses and all orders for this visit:    Postprocedural bulbous urethral stricture  -     Urine Culture High Risk; Standing  -     Simple Urodynamics w/ Cysto; Future  -     POCT Urinalysis; Standing    Dribbling urine  -     Simple Urodynamics w/ Cysto; Future    Benign prostatic hyperplasia (BPH) with post-void dribbling  -     alfuzosin (UROXATRAL) 10 mg Tb24; Take 1 tablet (10 mg total) by mouth daily with breakfast.  -     Simple Urodynamics w/ Cysto; Future    Other orders  -     lidocaine HCl 2% urojet  -     doxycycline tablet 100 mg    His RUG showed stable and mild urethral stricture.  I am not sure whether his urologic problems are related to urethral stricture, BPH, and / or bladder problems especially related to chronic back pain.    Will start him on Uroxatral and see whether it will improve his LUTS.  Will further evaluate him with SUDS cysto.  I spent 25 minutes with the patient of which more than half was spent in direct consultation with the patient in regards to our treatment and plan.      Follow-up:  Follow up for Urodynamic Study (SUDS) cysto.

## 2020-06-11 NOTE — LETTER
June 11, 2020      SURAJ Sierra MD  [address]  [place] [address]           [facility] - Urology 4th Floor  [address]  [place] [address]  Phone: [phone]          Patient: Mario Castro Sr.   MR Number: [id number]   YOB: 1970   Date of Visit: 6/11/2020       Dear Dr. SURAJ Sierra:    Thank you for referring Mario Castro to me for evaluation. Attached you will find relevant portions of my assessment and plan of care.    If you have questions, please do not hesitate to call me. I look forward to following Mario Castro along with you.    Sincerely,    Eduardo De La O MD    Enclosure  CC:  No Recipients    If you would like to receive this communication electronically, please contact [email or web address] or [phone] to request more information on [email or web address] Link access.    For providers and/or their staff who would like to refer a patient to [facility], please contact us through our one-stop-shop provider referral line, [facility] [place], at [phone].    If you feel you have received this communication in error or would no longer like to receive these types of communications, please e-mail [email or web address]

## 2020-06-25 ENCOUNTER — PROCEDURE VISIT (OUTPATIENT)
Dept: UROLOGY | Facility: CLINIC | Age: 50
End: 2020-06-25
Payer: MEDICAID

## 2020-06-25 VITALS
RESPIRATION RATE: 17 BRPM | SYSTOLIC BLOOD PRESSURE: 133 MMHG | BODY MASS INDEX: 36.37 KG/M2 | TEMPERATURE: 99 F | DIASTOLIC BLOOD PRESSURE: 74 MMHG | HEIGHT: 69 IN | HEART RATE: 71 BPM | WEIGHT: 245.56 LBS

## 2020-06-25 DIAGNOSIS — N39.43 DRIBBLING URINE: ICD-10-CM

## 2020-06-25 DIAGNOSIS — N99.111 POSTPROCEDURAL BULBOUS URETHRAL STRICTURE: ICD-10-CM

## 2020-06-25 DIAGNOSIS — N40.1 BENIGN PROSTATIC HYPERPLASIA (BPH) WITH POST-VOID DRIBBLING: ICD-10-CM

## 2020-06-25 DIAGNOSIS — N32.81 OAB (OVERACTIVE BLADDER): Primary | ICD-10-CM

## 2020-06-25 DIAGNOSIS — N39.43 BENIGN PROSTATIC HYPERPLASIA (BPH) WITH POST-VOID DRIBBLING: ICD-10-CM

## 2020-06-25 LAB
BILIRUB SERPL-MCNC: NEGATIVE MG/DL
BLOOD URINE, POC: 250
COLOR, POC UA: ABNORMAL
GLUCOSE UR QL STRIP: NORMAL
KETONES UR QL STRIP: NEGATIVE
LEUKOCYTE ESTERASE URINE, POC: NEGATIVE
NITRITE, POC UA: NEGATIVE
PH, POC UA: 6
PROTEIN, POC: NEGATIVE
SPECIFIC GRAVITY, POC UA: 1.02
UROBILINOGEN, POC UA: NORMAL

## 2020-06-25 PROCEDURE — 51728 CYSTOMETROGRAM W/VP: CPT | Mod: 26,S$PBB,, | Performed by: UROLOGY

## 2020-06-25 PROCEDURE — 51784 PR ANAL/URINARY MUSCLE STUDY: ICD-10-PCS | Mod: 26,S$PBB,51, | Performed by: UROLOGY

## 2020-06-25 PROCEDURE — 51741 ELECTRO-UROFLOWMETRY FIRST: CPT | Mod: PBBFAC | Performed by: UROLOGY

## 2020-06-25 PROCEDURE — 81001 URINALYSIS AUTO W/SCOPE: CPT | Mod: PBBFAC | Performed by: UROLOGY

## 2020-06-25 PROCEDURE — 51701 INSERT BLADDER CATHETER: CPT | Mod: PBBFAC | Performed by: UROLOGY

## 2020-06-25 PROCEDURE — 51741 PR UROFLOWMETRY, COMPLEX: ICD-10-PCS | Mod: 26,S$PBB,51, | Performed by: UROLOGY

## 2020-06-25 PROCEDURE — 52000 CYSTOURETHROSCOPY: CPT | Mod: PBBFAC | Performed by: UROLOGY

## 2020-06-25 PROCEDURE — 51728 PR COMPLEX CYSTOMETROGRAM VOIDING PRESSURE STUDIES: ICD-10-PCS | Mod: 26,S$PBB,, | Performed by: UROLOGY

## 2020-06-25 PROCEDURE — 51741 ELECTRO-UROFLOWMETRY FIRST: CPT | Mod: 26,S$PBB,51, | Performed by: UROLOGY

## 2020-06-25 PROCEDURE — 51784 ANAL/URINARY MUSCLE STUDY: CPT | Mod: 26,S$PBB,51, | Performed by: UROLOGY

## 2020-06-25 PROCEDURE — 52000 CYSTOURETHROSCOPY: CPT | Mod: S$PBB,,, | Performed by: UROLOGY

## 2020-06-25 PROCEDURE — 51784 ANAL/URINARY MUSCLE STUDY: CPT | Mod: PBBFAC | Performed by: UROLOGY

## 2020-06-25 PROCEDURE — 52000 PR CYSTOURETHROSCOPY: ICD-10-PCS | Mod: S$PBB,,, | Performed by: UROLOGY

## 2020-06-25 PROCEDURE — 51728 CYSTOMETROGRAM W/VP: CPT | Mod: PBBFAC | Performed by: UROLOGY

## 2020-06-25 RX ORDER — DOXYCYCLINE HYCLATE 100 MG
100 TABLET ORAL ONCE
Status: COMPLETED | OUTPATIENT
Start: 2020-06-25 | End: 2020-06-25

## 2020-06-25 RX ORDER — ALFUZOSIN HYDROCHLORIDE 10 MG/1
10 TABLET, EXTENDED RELEASE ORAL
Qty: 30 TABLET | Refills: 12 | Status: SHIPPED | OUTPATIENT
Start: 2020-06-25 | End: 2020-07-25

## 2020-06-25 RX ORDER — LIDOCAINE HYDROCHLORIDE 20 MG/ML
JELLY TOPICAL ONCE
Status: COMPLETED | OUTPATIENT
Start: 2020-06-25 | End: 2020-06-25

## 2020-06-25 RX ORDER — OXYBUTYNIN CHLORIDE 10 MG/1
10 TABLET, EXTENDED RELEASE ORAL DAILY
Qty: 30 TABLET | Refills: 11 | Status: SHIPPED | OUTPATIENT
Start: 2020-06-25 | End: 2021-06-25

## 2020-06-25 RX ADMIN — Medication 100 MG: at 03:06

## 2020-06-25 RX ADMIN — LIDOCAINE HYDROCHLORIDE: 20 JELLY TOPICAL at 02:06

## 2020-06-25 NOTE — PROCEDURES
Simple Urodynamics w/ Cysto    Date/Time: 6/25/2020 2:15 PM  Performed by: Eduardo De La O MD  Authorized by: Eduardo De La O MD   Comments: Procedure Date:  06/25/2020    Procedure:   Diagnostic Cystourethroscopy   Complex Cystometrogram   Voiding / Pressure Study with Intrarectal Balloon   Complex Uroflow   Electromyogram of Anal Sphincter.     Pre-OP Diagnosis:   Dribbling, BPH with obstruction, bulbar urethral stricture, incomplete bladder emptying   Post-OP Diagnosis:   same   Anesthesia:   Anesthesia Administered:   Intraurethral instillation of 10 mL 2% lidocaine (Xylocaine) jelly.   Findings:   --- Bladder ---   CYSTOMETROGRAM ( Filling Phase ):   Cystometric Numeric Data:   - First Desire (Sensation): 92 mL at 28cm of water.   - Normal Desire: 118mL at 27 cm of water.   - Strong Desire: 164 mL at 35 cm of water.   - Urgency (Imminent Void) : 194 mL at 50cm of water.   - Maximum Cystometric Capacity: 195 mL.   Compliance:   - low.   Leak Point Pressure:   - Valsalva ( Abdominal ) Leak Point Pressure: none.   UROFLOW:   - Voided Volume: 142 mL.   - Residual Urine: 51 mL.   - Maximum Flow Rate: 7 mL/sec.   - Flow Pattern: low amplitude ( he pushed out the catheter during voiding.  Inadequate measurement)  VOIDING PRESSURE STUDY ( Voiding Phase ):   Detrusor Pressure:   - Maximum Detrusor Pressure: 100 cm of water.   - Detrusor Pressure at Maximum Flow: 1 cm of water.   - Detrusor Contraction Characteristics: well recorded due to catheter pushed out.   ELECTROMYOGRAM:   - normal.     ---Diagnostic Cystourethroscopy ---   Normal urethra with 16 F stricture at the bulbar urethra, short less than 5 mm in length.  Was able to pass the scope without dilation    Prostate: 3.5 cm moderate obstruction   Width of Bladder Neck Opening: Approximately 18 Fr.   Normal bladder.   Normal ureteral orifices bilaterally.       Description of Procedure:   Informed Consent:   - Risks, benefits and alternatives of procedure discussed  with   patient and informed consent obtained.   Patient Position:   - Supine.   --- Bladder ---   Prep and Drape:   - Patient prepped and draped in usual sterile fashion using povidone   iodine (Betadine).   --- Diagnostic Cystourethroscopy ---   Instruments:   - 16 Fr flexible cystoscope with 0 degree lens.   Procedure Details:   - Cystoscope passed under vision into bladder.   - Bladder and urethra examined in their entirety with findings as   above.   --- Urodynamic Studies ---   Procedure Details:   Cystometrogram:   - Catheter(s) passed into the bladder.   - Rectal balloon inserted.   - Catheter(s) connected to infusion medium and to pressure recording   device.   - Infusion Rate: 30 mL / min.   Electromyogram:   - Perineal electromyogram pad placed and connected to electromyogram   recording device.   Equipment:   - Catheters: Double lumen catheter.   - Medium: Liquid.   - Pressure Recording Device: Calibrated electronic equipment.   Complications:   No immediate complications.    CONCLUSIONS:   1. BPH with obstruction  Continue Uroxatral  2. OAB with decreased bladder capacity  Start Oxybutynin xl 10 mg    RTC 3 months to check PVR  We discussed options of InterStim Therapy to improve his bladder function if medical therapies don't work for him.  May need repeat cysto under anesthesia with urethral dilation.    Post-OP Plan:   Patient was discharged home in a stable condition.  Medications: doxy  Follow up:  3 months

## 2020-06-25 NOTE — PATIENT INSTRUCTIONS
What to Expect After a Cystoscopy  For the next 24-48 hours, you may feel a mild burning when you urinate. This burning is normal and expected. Drink plenty of water to dilute the urine to help relieve the burning sensation. You may also see a small amount of blood in your urine after the procedure.    Unless you are already taking antibiotics, you may be given an antibiotic after the test to prevent infection.    Signs and Symptoms to Report  Call the [facility] at [phone] if you develop any of the following:  · Fever of 101 degrees or higher  · Chills or persistent bleeding  · Inability to urinate

## 2021-04-01 NOTE — PLAN OF CARE
Aura Rangel  [id number]  04/01/21    The [facility] physicians located in the [place] [address], share daily and weekend call coverage.  The following is a list of these providers:    Dr. Isreal Wong     In general, one of the above mentioned physicians covers the whole group for daily and weekend call rotation. In rare circumstances, call coverage may be provided by an [place] [facility] physician outside of this group.    For this reason, please understand that we cannot guarantee which physician will be present at the time of your delivery. You could be delivered by any one of the physicians (male or female) on call.    While in the hospital, your care from other physicians (anesthesia, neonatology, etc.) will be provided by the physician (male or female) that is on call.    I have read and fully understand the above information and agree to the same.      ______________________________________  Aura Rangel    ______________________________________      Patient sitting up in bed, spouse at bedside. Patient aaox3. Vss. Speaks and understands english and does not require an . Patient does not need security for valuables. Oriented the patient to the room and pre/ post procedure protocol. Verbalized understanding.

## 2021-07-01 ENCOUNTER — PATIENT MESSAGE (OUTPATIENT)
Dept: ADMINISTRATIVE | Facility: OTHER | Age: 51
End: 2021-07-01

## 2022-06-29 ENCOUNTER — TELEPHONE (OUTPATIENT)
Dept: PULMONOLOGY | Facility: CLINIC | Age: 52
End: 2022-06-29
Payer: COMMERCIAL

## 2022-06-29 NOTE — TELEPHONE ENCOUNTER
Spoke with patient informing him that I received his message in regards to scheduling an appointment with the pulmonary department. Patient stated that he wanted to be scheduled at the [facility]. I advised patient that I would reach out to someone at [facility] and pass his information along. I also advised patient that he can also call them himself if he didn't want to wait for someone to call. I stated to patient that if he wanted to be scheduled here at [facility] I can get him in August 24, 2022 with Dr. Granger. Patient verbalized that he understands.

## 2022-08-01 ENCOUNTER — TELEPHONE (OUTPATIENT)
Dept: NEUROLOGY | Facility: HOSPITAL | Age: 52
End: 2022-08-01
Payer: COMMERCIAL

## 2022-08-01 NOTE — TELEPHONE ENCOUNTER
----- Message from Makeda Stock sent at 8/1/2022 10:46 AM CDT -----  Contact: pt  Type:  Needs Medical Advice    Who Called:  pt  Symptoms (please be specific):   stomach issues to follow up from Procedure he had done 2018 at [place]        Would the patient rather a call back or a response via [facility]?  Call   Best Call Back Number:  [phone]  Additional Information:

## 2022-08-10 ENCOUNTER — OFFICE VISIT (OUTPATIENT)
Dept: NEUROLOGY | Facility: HOSPITAL | Age: 52
End: 2022-08-10
Attending: INTERNAL MEDICINE
Payer: COMMERCIAL

## 2022-08-10 ENCOUNTER — TELEPHONE (OUTPATIENT)
Dept: NEUROLOGY | Facility: HOSPITAL | Age: 52
End: 2022-08-10
Payer: COMMERCIAL

## 2022-08-10 VITALS
DIASTOLIC BLOOD PRESSURE: 85 MMHG | WEIGHT: 275.88 LBS | SYSTOLIC BLOOD PRESSURE: 125 MMHG | HEART RATE: 60 BPM | HEIGHT: 69 IN | BODY MASS INDEX: 40.86 KG/M2

## 2022-08-10 DIAGNOSIS — R10.9 ABDOMINAL PAIN, UNSPECIFIED ABDOMINAL LOCATION: Primary | ICD-10-CM

## 2022-08-10 DIAGNOSIS — Z12.11 SCREEN FOR COLON CANCER: ICD-10-CM

## 2022-08-10 PROCEDURE — 99214 OFFICE O/P EST MOD 30 MIN: CPT | Performed by: INTERNAL MEDICINE

## 2022-08-10 RX ORDER — ALBUTEROL SULFATE 90 UG/1
8.8 AEROSOL, METERED RESPIRATORY (INHALATION) DAILY
Status: ON HOLD | COMMUNITY
Start: 2022-08-08 | End: 2022-09-29 | Stop reason: HOSPADM

## 2022-08-10 RX ORDER — DICYCLOMINE HYDROCHLORIDE 20 MG/1
20 TABLET ORAL 2 TIMES DAILY
Qty: 60 TABLET | Refills: 0 | Status: SHIPPED | OUTPATIENT
Start: 2022-08-10 | End: 2022-09-09

## 2022-08-10 RX ORDER — SOD SULF/POT CHLORIDE/MAG SULF 1.479 G
12 TABLET ORAL DAILY
Qty: 24 TABLET | Refills: 0 | Status: ON HOLD | OUTPATIENT
Start: 2022-08-10 | End: 2022-09-29 | Stop reason: CLARIF

## 2022-08-10 RX ORDER — ATORVASTATIN CALCIUM 10 MG/1
10 TABLET, FILM COATED ORAL DAILY
COMMUNITY
Start: 2022-06-24

## 2022-08-10 RX ORDER — ALPRAZOLAM 1 MG/1
1 TABLET ORAL DAILY
COMMUNITY
Start: 2022-06-20

## 2022-08-10 RX ORDER — CELECOXIB 200 MG/1
200 CAPSULE ORAL DAILY
COMMUNITY
Start: 2022-08-03

## 2022-08-10 NOTE — TELEPHONE ENCOUNTER
----- Message from Lino Jenkins sent at 8/10/2022 11:05 AM CDT -----  Contact: [phone]  Who Called: PT  Regarding: pt says he have a colonoscopy scheduled for monday and would like to reschedule   Would the patient rather a call back or a response via [facility]? Call back  Best Call Back Number: [phone]  Additional Information: n/a

## 2022-08-10 NOTE — PROGRESS NOTES
"  [facility]    CC: colonoscopy    HPI 52 y.o. male with past medical history of complicated sigmoid diverticulitis associated with a perforation and LLQ abscess requiring IR guided drainage s/p sigmoid resection in 2018. Today only complaints are intermittent mid to left quadrant abdominal pain that  Began approximately 6-8 months ago and last approximately 20 minutes and self resolves without intervention, worse with movement. Denies any association with foods, eating, or improvement after bowel movement. Denies any changes in stools, denies hematochezia/melena. There is no family history of colon cancer. Not on any blood thinners.     Past Medical History  Diverticulitis of sigmoid s/p sigmoidectomy  Arthritis  HTN      Review of Systems  General ROS: negative for chills, fever or weight loss  Cardiovascular ROS: no chest pain or dyspnea on exertion  Gastrointestinal ROS: Intermittent mid to left abominal pain, negative for change in bowel habits, or black/ bloody stools    Physical Examination  /85   Pulse 60   Ht 5' 9" (1.753 m)   Wt 125.1 kg (275 lb 14.5 oz)   BMI 40.74 kg/m²   General appearance: alert, cooperative, no distress  HENT: Normocephalic, atraumatic, neck symmetrical, no nasal discharge   Lungs: clear to auscultation bilaterally, no dullness to percussion bilaterally  Heart: regular rate and rhythm without rub; no displacement of the PMI   Abdomen: soft, non-tender; bowel sounds normoactive; no organomegaly  Extremities: extremities symmetric; no clubbing, cyanosis, or edema  Neurologic: Alert and oriented X 3, normal strength, normal coordination and gait    Labs: previous labs reviewed     Imaging:  Colonoscopy, CT abdomen pelvis    I have personally reviewed and interpreted these images which show gross diverticulitis with abscess which slowly improved over time with drainage and antibiotic therapy then surgery.    Assessment: 52 y.o. male with past medical history of " complicated sigmoid diverticulitis s/p sigmoid resection in 2018 here for follow up for routine colonoscopy screening. Today endorsing intermittent abdominal pain that is worse with movement, likely related to adhesions from previous surgeries versus intestinal cramping.  1. Screening colonoscopy  2. Intermittent abdominal pain as a chronic problem with recent exacerbation    Plan:  Schedule for colonoscopy 8/15  Start trial of Bently 20mg daily for abdominal pain as a trial    Juno Curry MD   [address]   [place] [id number] [phone]

## 2022-08-10 NOTE — TELEPHONE ENCOUNTER
Pt request to reschedule colonoscopy to later date.  Colonoscopy rescheduled to Thursday, September 29, 2022.  Pt instructed to  colon prep within 2 weeks of this date.  Pt acknowledged understanding.

## 2022-08-10 NOTE — PATIENT INSTRUCTIONS
Dicyclomine 20mg, sent to your pharmacy.    SUTAB Instructions    [facility] [address]  Clinic Office [phone]  Endoscopy Lab [phone]    You are scheduled for a Colonoscopy with Dr. Curry on Monday, August 15, 2022 at [facility] in [place].    Check in at the Hospital -1st floor, Information desk. A covid test will be required 3 days before your procedure.  Call [phone] to reschedule.    An adult friend/family member must come with you to drive you home.  You cannot drive, take a taxi, Uber/Lyft or bus to leave the Endoscopy Center alone.  If you do not have someone to drive you home, your test will be cancelled.     Please follow the directions of your doctor if you take any pills that thin your blood. If you take these meds: Aggrenox, Brilinta, Effient, Eliquis, Lovenox, Plavix, Pletal, Pradaxa, Ticilid, Xarelto or Coumadin, let the doctor's office know.    DON'T: On the morning of the test do not take insulin or pills for diabetes.     DO: On the morning of the test, do take any pills for blood pressure, heart, anti-rejection and or seizures with a small sip of water. Bring any inhalers with you.    To have a good prep, you must follow these instructions - please do not use the directions from the pharmacy.    The doctor will send a prescription for the SUTAB.    The Day Before the test:    You can only drink CLEAR LIQUIDS the whole day before your test.  You can't eat any food for the whole day.    You CAN have:  Water, Coffee or decaf coffee (no milk or cream)  Tea  Soft drinks - regular and sugar free  Jell-O (green or yellow)  Apple Juice, grape juice, white cranberry juice  Gatorade, Power Aid, Crystal Light, Navneet Aid  Lemonade and Limeade  Bouillon, clear soup  Snowball, popsicles  YOU CAN'T DRINK ANYTHING RED, PURPLE ORANGE OR BLUE   YOU CAN'T DRINK ALCOHOL  ONLY DRINK WHAT IS ON THE LIST      At 5 pm the night before your test:    Open the bottle of 12  tablets. Fill the provided cup with water up to the line = 16 oz. Swallow each pill with water. Drink the rest of the water in the cup in 20 minutes.    At 6 pm:  Fill the cup with water up to the line = 16 oz. Drink the cup of water in 30 minutes.    At 7 pm:  Fill the cup with water up to the line = 16 oz. Drink the cup of water in 30 minutes.     Continue to drink water or clear liquids until you go to sleep.      The Day of the test - We will call you 2 days before your test to tell you what time to get to the hospital. We will also tell you when to do the next steps.    5 hours before you come to the hospital (this may be in the middle of the night): ___________________= time  Open the bottle of 12 tablets. Fill the cup with water up to the line = 16 oz. Swallow each pill with water. Drink the rest of the water in the cup in 20 minutes.    At 4 hours before you come to the hospital: ______________= time  Fill the cup with water up to the line = 16 oz. Drink the cup of water in 30 minutes.     At 3 hours before you come to the hospital: ______________= time    YOU CAN'T EAT OR DRINK ANYTHING ELSE ONCE YOU FINISH THE WATER AT _____________ = TIME    Leave all valuables and jewelry at home. You will be at the hospital for 2-4 hours.    Call the Endoscopy department at [phone] with any questions about your procedure.          Please give this Coupon Code to your pharmacist.    BIN: [id number]  PCN: [id number]  GROUP: [id number]  MEMBER ID: [id number]

## 2022-09-27 ENCOUNTER — TELEPHONE (OUTPATIENT)
Dept: ENDOSCOPY | Facility: HOSPITAL | Age: 52
End: 2022-09-27
Payer: COMMERCIAL

## 2022-09-27 NOTE — TELEPHONE ENCOUNTER
Spoke with patient about arrival time @ 0800.   Covid test = vacc    Prep instructions reviewed: the day before the procedure, follow a clear liquid diet all day, then start the first 1/2 of prep at 5pm and take 2nd 1/2 of prep @ 0300.  Pt must be completely NPO when prep completed @ 0500.   Sutab instructions sent to [email or web address].            Medications: Do not take Insulin or oral diabetic medications the day of the procedure.  Take as prescribed: heart, seizure and blood pressure medication in the morning with a sip of water (less than an ounce).  Take any breathing medications and bring inhalers to hospital with you Leave all valuables and jewelry at home.     Wear comfortable clothes to procedure to change into hospital gown You cannot drive for 24 hours after your procedure because you will receive sedation for your procedure to make you comfortable.  A ride must be provided at discharge.

## 2022-09-28 NOTE — H&P
[facility]    CC: Colonoscopy     HPI 52 y.o. male with past medical history of complicated sigmoid diverticulitis associated with a perforation and LLQ abscess requiring IR guided drainage s/p sigmoid resection in 2018. Today only complaints are intermittent mid to left quadrant abdominal pain that began approximately 6-8 months ago and last approximately 20 minutes and self resolves without intervention, worse with movement. Denies any association with foods, eating, or improvement after bowel movement. Denies any changes in stools, denies hematochezia/melena. There is no family history of colon cancer. Not on any blood thinners.     Past Medical History:   Diagnosis Date    Arthritis           to right hip    Diverticulitis of intestine     Hypertension        Physical Examination  General appearance: alert, cooperative, no distress  Lungs: clear to auscultation bilaterally, no dullness to percussion bilaterally  Heart: regular rate and rhythm without rub; no displacement of the PMI   Abdomen: soft, non-tender; bowel sounds normoactive; no organomegaly    Assessment:   52 y.o. male with past medical history of complicated sigmoid diverticulitis s/p sigmoid resection in 2018 here for follow up for routine colonoscopy screening. Today endorsing intermittent abdominal pain that is worse with movement, likely related to adhesions from previous surgeries versus intestinal cramping.  1. Screening colonoscopy  2. Intermittent abdominal pain as a chronic problem with recent exacerbation. Pain resolved sop patient did not take Bentyl.     Plan:  - Proceed with colonoscopy today     Juno Curry MD   [address], [address]   [place] [id number] [phone]

## 2022-09-29 ENCOUNTER — ANESTHESIA (OUTPATIENT)
Dept: ENDOSCOPY | Facility: HOSPITAL | Age: 52
End: 2022-09-29
Payer: COMMERCIAL

## 2022-09-29 ENCOUNTER — HOSPITAL ENCOUNTER (OUTPATIENT)
Facility: HOSPITAL | Age: 52
Discharge: HOME | End: 2022-09-29
Attending: INTERNAL MEDICINE | Admitting: INTERNAL MEDICINE
Payer: COMMERCIAL

## 2022-09-29 ENCOUNTER — ANESTHESIA EVENT (OUTPATIENT)
Dept: ENDOSCOPY | Facility: HOSPITAL | Age: 52
End: 2022-09-29
Payer: COMMERCIAL

## 2022-09-29 VITALS
BODY MASS INDEX: 39.25 KG/M2 | HEIGHT: 69 IN | WEIGHT: 265 LBS | OXYGEN SATURATION: 100 % | RESPIRATION RATE: 14 BRPM | HEART RATE: 60 BPM | SYSTOLIC BLOOD PRESSURE: 107 MMHG | TEMPERATURE: 98 F | DIASTOLIC BLOOD PRESSURE: 67 MMHG

## 2022-09-29 DIAGNOSIS — Z12.11 SCREENING FOR COLON CANCER: ICD-10-CM

## 2022-09-29 PROCEDURE — G0121 COLON CA SCRN NOT HI RSK IND: HCPCS | Performed by: INTERNAL MEDICINE

## 2022-09-29 PROCEDURE — 25000003 PHARM REV CODE 250: Performed by: NURSE ANESTHETIST, CERTIFIED REGISTERED

## 2022-09-29 PROCEDURE — 25000003 PHARM REV CODE 250: Performed by: INTERNAL MEDICINE

## 2022-09-29 PROCEDURE — 63600175 PHARM REV CODE 636 W HCPCS: Performed by: NURSE ANESTHETIST, CERTIFIED REGISTERED

## 2022-09-29 PROCEDURE — 37000009 HC ANESTHESIA EA ADD 15 MINS: Performed by: INTERNAL MEDICINE

## 2022-09-29 PROCEDURE — 37000008 HC ANESTHESIA 1ST 15 MINUTES: Performed by: INTERNAL MEDICINE

## 2022-09-29 RX ORDER — DEXTROMETHORPHAN/PSEUDOEPHED 2.5-7.5/.8
DROPS ORAL
Status: DISCONTINUED | OUTPATIENT
Start: 2022-09-29 | End: 2022-09-29 | Stop reason: HOSPADM

## 2022-09-29 RX ORDER — METOPROLOL TARTRATE 100 MG/1
100 TABLET ORAL DAILY
COMMUNITY

## 2022-09-29 RX ORDER — SODIUM CHLORIDE 9 MG/ML
INJECTION, SOLUTION INTRAVENOUS CONTINUOUS
Status: DISCONTINUED | OUTPATIENT
Start: 2022-09-29 | End: 2022-09-29 | Stop reason: HOSPADM

## 2022-09-29 RX ORDER — LIDOCAINE HYDROCHLORIDE 20 MG/ML
INJECTION INTRAVENOUS
Status: DISCONTINUED | OUTPATIENT
Start: 2022-09-29 | End: 2022-09-29

## 2022-09-29 RX ORDER — SODIUM CHLORIDE 0.9 % (FLUSH) 0.9 %
10 SYRINGE (ML) INJECTION
Status: DISCONTINUED | OUTPATIENT
Start: 2022-09-29 | End: 2022-09-29 | Stop reason: HOSPADM

## 2022-09-29 RX ORDER — PROPOFOL 10 MG/ML
VIAL (ML) INTRAVENOUS
Status: DISCONTINUED | OUTPATIENT
Start: 2022-09-29 | End: 2022-09-29

## 2022-09-29 RX ORDER — PROPOFOL 10 MG/ML
VIAL (ML) INTRAVENOUS CONTINUOUS PRN
Status: DISCONTINUED | OUTPATIENT
Start: 2022-09-29 | End: 2022-09-29

## 2022-09-29 RX ORDER — GABAPENTIN 300 MG/1
300 CAPSULE ORAL 3 TIMES DAILY
COMMUNITY

## 2022-09-29 RX ADMIN — PROPOFOL 150 MCG/KG/MIN: 10 INJECTION, EMULSION INTRAVENOUS at 09:09

## 2022-09-29 RX ADMIN — SODIUM CHLORIDE: 0.9 INJECTION, SOLUTION INTRAVENOUS at 09:09

## 2022-09-29 RX ADMIN — PROPOFOL 100 MG: 10 INJECTION, EMULSION INTRAVENOUS at 09:09

## 2022-09-29 RX ADMIN — LIDOCAINE HYDROCHLORIDE 100 MG: 20 INJECTION, SOLUTION INTRAVENOUS at 09:09

## 2022-09-29 NOTE — ANESTHESIA POSTPROCEDURE EVALUATION
Anesthesia Post Evaluation    Patient: Mario Castro     Procedure(s) Performed: Procedure(s) (LRB):  COLONOSCOPY (N/A)    Final Anesthesia Type: MAC      Patient location during evaluation: GI PACU  Patient participation: Yes- Able to Participate  Level of consciousness: awake and alert and oriented  Post-procedure vital signs: reviewed and stable  Pain management: adequate  Airway patency: patent    PONV status at discharge: No PONV  Anesthetic complications: no      Cardiovascular status: blood pressure returned to baseline  Respiratory status: unassisted, spontaneous ventilation and room air  Hydration status: euvolemic  Follow-up not needed.          Vitals Value Taken Time   BP 90/52 09/29/22 1029   Temp 36.5 °C (97.7 °F) 09/29/22 1014   Pulse 75 09/29/22 1029   Resp 15 09/29/22 1029   SpO2 95 % 09/29/22 1029         No case tracking events are documented in the log.      Pain/Edie Score: Edie Score: 10 (9/29/2022 10:29 AM)

## 2022-09-29 NOTE — TRANSFER OF CARE
"Anesthesia Transfer of Care Note    Patient: Mario Castro Sr.    Procedure(s) Performed: Procedure(s) (LRB):  COLONOSCOPY (N/A)    Patient location: GI    Anesthesia Type: MAC    Transport from OR: Transported from OR on room air with adequate spontaneous ventilation    Post pain: adequate analgesia    Post assessment: no apparent anesthetic complications    Level of consciousness: awake, alert and oriented    Nausea/Vomiting: no nausea/vomiting    Complications: none    Transfer of care protocol was followed      Last vitals:   Visit Vitals  /61 (BP Location: Left arm, Patient Position: Lying)   Pulse (!) 57   Temp 36.7 °C (98.1 °F) (Skin)   Resp 18   Ht 5' 9" (1.753 m)   Wt 120.2 kg (265 lb)   SpO2 96%   BMI 39.13 kg/m²     "

## 2022-09-29 NOTE — PR[employer]ATION PATIENT INSTRUCTIONS
Discharge Summary/Instructions after an Endoscopic Procedure  Patient Name: Mario Castro  Patient MRN: [id number]  Patient YOB: 1970  Thursday, September 29, 2022  Juno Curry MD  Dear patient,  As a result of recent federal legislation (The Federal Cures Act), you may   receive lab or pathology results from your procedure in your [facility]   account before your physician is able to contact you. Your physician or   their representative will relay the results to you with their   recommendations at their soonest availability.  Thank you,  Your health is very important to us during the Covid Crisis. Following your   procedure today, you will receive a daily text for 2 weeks asking about   signs or symptoms of Covid 19.  Please respond to this text when you   receive it so we can follow up and keep you as safe as possible.   RESTRICTIONS:  During your procedure today, you received medications for sedation.  These   medications may affect your judgment, balance and coordination.  Therefore,   for 24 hours, you have the following restrictions:   - DO NOT drive a car, operate machinery, make legal/financial decisions,   sign important papers or drink alcohol.    ACTIVITY:  Today: no heavy lifting, straining or running due to procedural   sedation/anesthesia.  The following day: return to full activity including work.  DIET:  Eat and drink normally unless instructed otherwise.     TREATMENT FOR COMMON SIDE EFFECTS:  - Mild abdominal pain, nausea, belching, bloating or excessive gas:  rest,   eat lightly and use a heating pad.  - Sore Throat: treat with throat lozenges and/or gargle with warm salt   water.  - Because air was used during the procedure, expelling large amounts of air   from your rectum or belching is normal.  - If a bowel prep was taken, you may not have a bowel movement for 1-3 days.    This is normal.  SYMPTOMS TO WATCH FOR AND REPORT TO YOUR PHYSICIAN:  1. Abdominal pain or bloating, other  than gas cramps.  2. Chest pain.  3. Back pain.  4. Signs of infection such as: chills or fever occurring within 24 hours   after the procedure.  5. Rectal bleeding, which would show as bright red, maroon, or black stools.   (A tablespoon of blood from the rectum is not serious, especially if   hemorrhoids are present.)  6. Vomiting.  7. Weakness or dizziness.  GO DIRECTLY TO THE NEAREST EMERGENCY ROOM IF YOU HAVE ANY OF THE FOLLOWING:      Difficulty breathing              Chills and/or fever over 101 F   Persistent vomiting and/or vomiting blood   Severe abdominal pain   Severe chest pain   Black, tarry stools   Bleeding- more than one tablespoon   Any other symptom or condition that you feel may need urgent attention  Your doctor recommends these additional instructions:  If any biopsies were taken, your doctors clinic will contact you in 1 to 2   weeks with any results.  - Discharge patient to home.   - Resume previous diet.   - Continue present medications.   - Repeat colonoscopy in 10 years for screening purposes.   - Condition stable   - The signs and symptoms of potential delayed complications were discussed   with the patient. If signs or symptoms of these complications develop, call   the [facility] at [phone].   - Return to normal activities tomorrow.  Written discharge instructions were   provided to the patient.  For questions, problems or results please call your physician - Juno Curry MD.  EMERGENCY PHONE NUMBER: [phone],  LAB RESULTS: [phone]  IF A COMPLICATION OR EMERGENCY SITUATION ARISES AND YOU ARE UNABLE TO REACH   YOUR PHYSICIAN - GO DIRECTLY TO THE EMERGENCY ROOM.  MD Juno Carpenter MD  9/29/2022 12:27:10 PM  This report has been verified and signed electronically.  Dear patient,  As a result of recent federal legislation (The Federal Cures Act), you may   receive lab or pathology results from your procedure in your [facility]   account  before your physician is able to contact you. Your physician or   their representative will relay the results to you with their   recommendations at their soonest availability.  Thank you,  PROVATION

## 2022-09-29 NOTE — ANESTHESIA PREPROCEDURE EVALUATION
09/29/2022  Mario Castro Sr. is a 52 y.o., male for colonoscopy under MAC    Past Medical History:   Diagnosis Date    Arthritis           to right hip    Diverticulitis of intestine     Hypertension      Past Surgical History:   Procedure Laterality Date    COLECTOMY  05/2018    COLONOSCOPY N/A 03/08/2018         CYSTOSCOPY W/ URETERAL STENT PLACEMENT  05/2018    CYSTOSCOPY W/ URETERAL STENT REMOVAL Left 07/24/2018    Procedure: CYSTOSCOPY, WITH URETERAL STENT REMOVAL;  Surgeon: Tommie Hernandez MD;  Location: [facility] OR;  Service: Urology;  Laterality: Left;    CYSTOURETHROSCOPY WITH DIRECT VISION INTERNAL URETHROTOMY Left 07/24/2018    Procedure: CYSTOSCOPY, WITH DIRECT VISION INTERNAL URETHROTOMY;  Surgeon: Tommie Hernandez MD;  Location: [facility] OR;  Service: Urology;  Laterality: Left;    JOINT REPLACEMENT             Pre-op Assessment    I have reviewed the Patient Summary Reports.    I have reviewed the NPO Status.   I have reviewed the Medications.     Review of Systems  Social:  Non-Smoker    Endocrine:  Obesity / BMI > 30      Physical Exam  General: Well nourished    Airway:  Mallampati: II           Anesthesia Plan  Type of Anesthesia, risks & benefits discussed:    Anesthesia Type: MAC  Intra-op Monitoring Plan: Standard ASA Monitors  Informed Consent: Informed consent signed with the Patient and all parties understand the risks and agree with anesthesia plan.  All questions answered.   ASA Score: 2    Ready For Surgery From Anesthesia Perspective.     .

## 2022-10-23 NOTE — TELEPHONE ENCOUNTER
----- Message from Evy Weiss sent at 9/12/2018 11:34 AM CDT -----  Contact: Self/ [phone]  Patient called in requesting to speak with you. Patient prefers to speak with a nurse.     Please call and advise.    
Left message to contact office  
16

## 2022-11-10 NOTE — PROGRESS NOTES
.Pharmacy New Medication Education    Patient accepted medication education.    Pharmacy educated patient on name and purpose of medications and possible side effects, using the teach-back method.     enalapril    Learners of pharmacy medication education included:  Patient    Patient +/- learner response:  Verbalized Understanding, Teachback       Patient presents today for consult for Left groin pain and history of undescended testicle as requested by Beni Aguilar MD    Denies known latex allergies or sensitivities    Allergies: reviewed and updated    Medications: reviewed and updated    PCP:  Beni Aguilar MD     Estimated body mass index is 22.01 kg/m² as calculated from the following:    Height as of this encounter: 5' 9.5\" (1.765 m).    Weight as of this encounter: 68.6 kg (151 lb 3.8 oz).      Tobacco history reviewed      REVIEW OF SYSTEMS:    EYES:  Blurred vision?  no  Double vision?  no    EARS,NOSE,MOUTH:  Ringing in ears?  no  Runny nose?   no    CARDIOVASCULAR:  Chest pain?  no  Swelling in legs or ankles?  no    RESPIRATORY:  Shortness of breath or difficulty breathing?   no  Any coughing?  no  Have you ever coughed up blood?   no    GASTROINTESTINAL:  Any weight loss? no  Heartburn? no  Difficulty swallowing?   no  Any change in bowel habits?  no  Any bleeding from the rectum? no    GENITOURINARY:  Blood in urine?  no  Pain with urination?  no    MUSCULOSKELETAL:  Any complaints of muscular pain?  no  Any complaints of joint pain?   no    SKIN:  Rashes?  no  Hives?  no    NEUROLOGIC:  Headaches?  no  History of seizures?  no    ENDOCRINE:  Intolerance to cold? no  Intolerance to heat?  no  Exopthalmos, bulging eyes?  no    PSYCHOLOGICAL:  Any prolonged periods of sadness?  no  Any prolonged periods of anxiety?  no    HEMATOLOGIC/LYMPHOTIC:  Bruises easily?  no  Enlarged lymph nodes?  no    Have you ever seen Dr. Emerson in the past?  no    Patient's preferred number: mobile: [phone].  Patient reported is okay to leave detailed message if not available.     Patient, provider and nurse wearing masks during office visit today.

## 2024-05-07 ENCOUNTER — CLINICAL SUPPORT (OUTPATIENT)
Dept: REHABILITATION | Facility: HOSPITAL | Age: 54
End: 2024-05-07
Attending: ORTHOPAEDIC SURGERY
Payer: MEDICARE

## 2024-05-07 DIAGNOSIS — M54.50 CHRONIC BILATERAL LOW BACK PAIN WITHOUT SCIATICA: Primary | ICD-10-CM

## 2024-05-07 DIAGNOSIS — G89.29 CHRONIC BILATERAL LOW BACK PAIN WITHOUT SCIATICA: Primary | ICD-10-CM

## 2024-05-07 PROCEDURE — 97162 PT EVAL MOD COMPLEX 30 MIN: CPT | Mod: PO | Performed by: PHYSICAL THERAPIST

## 2024-05-07 PROCEDURE — 97110 THERAPEUTIC EXERCISES: CPT | Mod: PO | Performed by: PHYSICAL THERAPIST

## 2024-05-07 PROCEDURE — 97140 MANUAL THERAPY 1/> REGIONS: CPT | Mod: PO | Performed by: PHYSICAL THERAPIST

## 2024-05-07 NOTE — PLAN OF CARE
[facility]   Physical Therapy Initial Evaluation      Date: 5/7/2024   Name: Mario Castro .  Clinic Number: [id number]    Therapy Diagnosis:    Encounter Diagnosis   Name Primary?    Chronic bilateral low back pain without sciatica Yes      Physician: Poli Barrett MD     Physician Orders: PT Eval and Treat  Medical Diagnosis from Referral:   M54.50 (ICD-10-CM) - Spine pain, lumbar   M47.816 (ICD-10-CM) - Lumbar spondylosis     Evaluation Date: 5/7/2024  Authorization Period Expiration: 5/6/2025  Plan of Care Expiration: 6/21/2024  Progress Note Due: 6/7/2024  Visit # / Visits authorized: 1/1   FOTO: 1/3 (last performed on 5/7/2024)    Precautions: Standard    Time In: 500  Time Out: 600  Total Billable Time (timed & untimed codes): 60 minutes    Subjective     Date of onset: acute on chronic     History of current condition - Mario Castro reports ongoing low back pain. DDD. Flare ups - 1 month straight. Hard to come back after bending down. Unable to cut grass at this time. Unable to sit down for long period of time. АННА (2019). Scar is side to back. Occasional numbness / burn but nothing consistently. Leg doesn't give out recently. Celebrex and flexeril for back pain. Voltaren cream as well. Gabapentin as well. No heat or ice regularly. Hot showers. Trying to sleep on back - usually sleeps on right side if possible. Not working right now, has not since 2018.     Falls: none recent.    Imaging: [] Xray [x] MRI [] CT: Performed on: in 2022:  Showing L4-L5-S1 spondylolisthesis of less than 5 mm each.    Pain:  Current 5/10, worst 10/10, best 4/10   Location: [x] Right   [x] Left:  lumbar spine  Description: aching , burning, deep, dull, throbbing, and tight  Aggravating Factors: bending down, sitting for long periods.   Easing Factors: activity avoidance, rest    Prior Therapy:   [] N/A    [x] Yes:   Social History: Pt lives with their spouse  Occupation: Pt is not  working.  Prior Level of Function: Independent and pain free with all ADL, IADL, community mobility and functional activities.   Current Level of Function: Independent with all ADL, IADL, community mobility and functional activities with reports of increased pain and need for increased time and frequent breaks.      Dominant Extremity:    [x] Right    [] Left    Pts goals: Pt reported goals are to decrease overall pain levels in order to return to prior functional level.     Medical History:   Past Medical History:   Diagnosis Date    Arthritis           to right hip    Diverticulitis of intestine     Hypertension        Surgical History:   Mario Castro Zurdo  has a past surgical history that includes Colonoscopy (N/A, 03/08/2018); Colectomy (05/2018); Cystoscopy w/ ureteral stent placement (05/2018); Cystourethroscopy with direct vision internal urethrotomy (Left, 07/24/2018); Cystoscopy w/ ureteral stent removal (Left, 07/24/2018); Joint replacement; and Colonoscopy (N/A, 9/29/2022).    Medications:   Mario has a current medication list which includes the following prescription(s): alfuzosin, alprazolam, atorvastatin, celecoxib, cholestyramine, diclofenac, enalapril, gabapentin, meloxicam, metoprolol tartrate, nystatin, omeprazole, oxybutynin, and tramadol.    Allergies:   Review of patient's allergies indicates:  No Known Allergies     Objective        RANGE OF MOTION:   Lumbar ROM Right  (spine) Left   Pain/Dysfunction with Movement Goal   Lumbar Flexion (60º) 40 --- Pain on way back up 55   Lumbar Extension (30º) 30 --- Pain No pain   Lumbar Side Bending (25º) 30 30 Pain    Lumbar Rotation WNL WNL       STRENGTH:   L/E MMT Right  (spine) Left Pain/Dysfunction with Movement Goal   Hip Flexion  4+/5 4+/5  4+/5 B   Hip Extension  4+/5 4+/5  4+/5 B   Hip Abduction  4/5 4/5  4+/5 B   Knee Extension 5/5 5/5  5/5 B   Knee Flexion 5/5 5/5  5/5 B   Hip IR 4/5 4/5  4+/5 B   Hip ER 4/5 4/5  4+/5 B   Ankle DF 5/5 5/5  5/5  "B   Ankle PF 5/5 5/5  5/5 B        JOINT MOBILITY:   Joint Motion  Right Mobility  (spine) Left Mobility Goal   Thoracic PA  [x] Hypo     [] Normal     [] Hyper --- Normal   Costotransverse  [] Hypo     [x] Normal     [] Hyper [] Hypo     [x] Normal     [] Hyper Normal    Lumbar PA [] Hypo     [x] Normal     [] Hyper --- Normal   Lumbar Unilat. PA [] Hypo     [x] Normal     [] Hyper [] Hypo     [x] Normal     [] Hyper Normal   Hip:  [] Hypo     [x] Normal     [] Hyper [] Hypo     [x] Normal     [] Hyper Normal   SIJ:  [] Hypo     [x] Normal     [] Hyper [] Hypo     [x] Normal     [] Hyper Normal         SPECIAL TESTS:    Right  (spine) Left  Goal   Lumbar Distraction  [] Positive    [x] Negative --- Negative B    SLUMP [] Positive    [x] Negative [] Positive    [x] Negative Negative B    Straight Leg Raise [] Positive    [x] Negative [] Positive    [x] Negative Negative B           SENSATION  [x] Intact to Light Touch   [] Impaired:      PALPATION: Muscles: Increased tone and tenderness to palpation of: bilateral , paraspinals, quadratus lumborum, piriformis. Structures: Increased tenderness to palpation of: bilateral , LOWER STRUCTURES : PSIS        Function:     Intake Outcome Measure for FOTO Lumbar spine Survey    Therapist reviewed FOTO scores for Mario Castro on 5/7/2024.   FOTO report - see Media section or FOTO account for episode details    Intake Score: 52%         Treatment     Total Treatment time (time-based codes) separate from Evaluation: (25) minutes     Mario Castro received the treatments listed below:      Manual therapy to improve low back pain and stiffness including:  ASTYM to lumbar spine with pillow under stomach.       THERAPEUTIC EXERCISES to develop strength, endurance, ROM, flexibility, posture, and core stabilization for (15) minutes including:    Intervention Performed Today    Nustep x 6 minutes L3   Supine hip ab / ad x 5" hold, 20x   LTR x 10x, 3" hold   bridges x 15x                "          Plan for Next Visit:        Patient Education and Home Exercises     Education provided:   PURPOSE: Patient educated on the impairments noted above and the effects of physical therapy intervention to improve overall condition and QOL.   EXERCISE: Patient was educated on all the above exercise prior/during/after for proper posture, positioning, and execution for safe performance with home exercise program.   STRENGTH: Patient educated on the importance of improved core and extremity strength in order to improve alignment of the spine and extremities with static positions and dynamic movement.   POSTURE: Patient educated on postural awareness to reduce stress and maintain optimal alignment of the spine with static positions and dynamic movement   SLEEPING POSITIONS: Patient educated on the use of pillows to aid in neutral alignment of spine and extremities when sleeping in supine or side lying.  TRANSFERS & TRANSITIONS: Patient educated on proper technique for bed mobility, transitions and transfers to improve body mechanics and decrease risk of injury.     Written Home Exercises Provided: yes.  Exercises were reviewed and Mario Castro was able to demonstrate them prior to the end of the session.  Mario Castro demonstrated good  understanding of the education provided. See EMR under Patient Instructions for exercises provided during therapy sessions.    Assessment     Mario is a 54 y.o. male referred to outpatient Physical Therapy with a medical diagnosis of low back pain. Pt presents with impairments in the following categories: IMPAIRMENTS: ROM, strength, endurance, joint mobility, muscle length, core strength and stability, and functional movement patterns    Pt prognosis is Excellent  Pt will benefit from skilled outpatient Physical Therapy to address the deficits stated above and in the chart below, provide pt/family education, and to maximize pt's level of independence.     Plan of care discussed with  "patient: Yes  Pt's spiritual, cultural and educational needs considered and patient is agreeable to the plan of care and goals as stated below:     Anticipated Barriers for therapy: co-morbidities, sedentary lifestyle, and chronicity of condition    Medical Necessity is demonstrated by the following  History  Co-morbidities and personal factors that may impact the plan of care [] LOW: no personal factors / co-morbidities  [x] MODERATE: 1-2 personal factors / co-morbidities  [] HIGH: 3+ personal factors / co-morbidities    Moderate / High Support Documentation:   Past Medical History:   Diagnosis Date    Arthritis           to right hip    Diverticulitis of intestine     Hypertension         Examination  Body Structures and Functions, activity limitations and participation restrictions that may impact the plan of care [] LOW: addressing 1-2 elements  [x] MODERATE: 3+ elements  [] HIGH: 4+ elements (please support below)    Moderate / High Support Documentation: See above in "Current Level of Function"      Clinical Presentation [] LOW: stable  [x] MODERATE: Evolving  [] HIGH: Unstable     Decision Making/ Complexity Score: moderate         Short Term Goals:  4 weeks Status  Date Met   PAIN: Pt will report worst pain of 2/10 in order to progress toward max functional ability and improve quality of life. [x] Progressing  [] Met  [] Not Met    FUNCTION: Patient will demonstrate improved function as indicated by a score of greater than or equal to 55 out of 100 on FOTO. [x] Progressing  [] Met  [] Not Met    MOBILITY: Patient will improve AROM to 50% of stated goals, listed in objective measures above, in order to progress towards independence with functional activities.  [x] Progressing  [] Met  [] Not Met    STRENGTH: Patient will improve strength to 50% of stated goals, listed in objective measures above, in order to progress towards independence with functional activities. [x] Progressing  [] Met  [] Not Met  "   POSTURE: Patient will correct postural deviations in sitting and standing, to decrease pain and promote long term stability.  [x] Progressing  [] Met  [] Not Met    HEP: Patient will demonstrate independence with HEP in order to progress toward functional independence. [x] Progressing  [] Met  [] Not Met      Long Term Goals:  6 weeks Status Date Met   PAIN: Pt will report worst pain of 0/10 in order to progress toward max functional ability and improve quality of life [x] Progressing  [] Met  [] Not Met    FUNCTION: Patient will demonstrate improved function as indicated by a score of greater than or equal to 75 out of 100 on FOTO. [x] Progressing  [] Met  [] Not Met    MOBILITY: Patient will improve AROM to stated goals, listed in objective measures above, in order to return to maximal functional potential and improve quality of life.  [x] Progressing  [] Met  [] Not Met    STRENGTH: Patient will improve strength to stated goals, listed in objective measures above, in order to improve functional independence and quality of life.  [x] Progressing  [] Met  [] Not Met    Patient will return to normal ADL's, IADL's, community involvement, recreational activities, and work-related activities with less than or equal to 0/10 pain and maximal function.  [x] Progressing  [] Met  [] Not Met      Plan     Plan of care Certification: 5/7/2024 to 6/21/2024.    Outpatient Physical Therapy 2 times weekly for 6 weeks to include any combination of the following interventions: virtual visits, dry needling, modalities, electrical stimulation (IFC, Pre-Mod, Attended with Functional Dry Needling), Cervical/Lumbar Traction, Electrical Stimulation IFC, Gait Training, Manual Therapy, Moist Heat/ Ice, Neuromuscular Re-ed, Patient Education, Self Care, Therapeutic Activities, Therapeutic Exercise, Ultrasound, and Therapeutic Activites     Adolph Boles, PT, DPT

## 2024-05-11 PROBLEM — M54.50 CHRONIC BILATERAL LOW BACK PAIN WITHOUT SCIATICA: Status: ACTIVE | Noted: 2024-05-11

## 2024-05-11 PROBLEM — G89.29 CHRONIC BILATERAL LOW BACK PAIN WITHOUT SCIATICA: Status: ACTIVE | Noted: 2024-05-11

## 2024-05-15 ENCOUNTER — CLINICAL SUPPORT (OUTPATIENT)
Dept: REHABILITATION | Facility: HOSPITAL | Age: 54
End: 2024-05-15
Payer: MEDICARE

## 2024-05-15 DIAGNOSIS — M54.50 CHRONIC BILATERAL LOW BACK PAIN WITHOUT SCIATICA: Primary | ICD-10-CM

## 2024-05-15 DIAGNOSIS — G89.29 CHRONIC BILATERAL LOW BACK PAIN WITHOUT SCIATICA: Primary | ICD-10-CM

## 2024-05-15 PROCEDURE — 97110 THERAPEUTIC EXERCISES: CPT | Mod: PO

## 2024-05-15 PROCEDURE — 97112 NEUROMUSCULAR REEDUCATION: CPT | Mod: PO

## 2024-05-15 NOTE — PROGRESS NOTES
[facility]   Physical Therapy Treatment Note      Name: Mario Castro [facility]  Clinic Number: [id number]    Therapy Diagnosis:   Encounter Diagnosis   Name Primary?    Chronic bilateral low back pain without sciatica Yes     Physician: Poli Barrett MD    Visit Date: 5/15/2024    Physician Orders: PT Eval and Treat  Medical Diagnosis from Referral:   M54.50 (ICD-10-CM) - Spine pain, lumbar   M47.816 (ICD-10-CM) - Lumbar spondylosis      Evaluation Date: 5/7/2024  Authorization Period Expiration: 5/6/2025  Plan of Care Expiration: 6/21/2024  Progress Note Due: 6/7/2024  Visit # / Visits authorized: 1/1, 1/20  FOTO: 1/3 (last performed on 5/7/2024)     Precautions: Standard     Time In: 78961  Time Out: 1040  Total Billable Time (timed & untimed codes): 55 minutes    PTA Visit #: 0/5       Subjective     Patient reports: his low back is tight after long time sitting .    He was compliant with home exercise program.  Response to previous treatment: good  Functional change: no    Pain: 3/10  Location: bilateral low back      Objective      Objective Measures updated at progress report unless specified.     Treatment     Mario Castro received the treatments listed below:      therapeutic exercises to develop strength, endurance, ROM, flexibility, posture, and core stabilization for 30 minutes including:    Nustep 10 min L4.0 10 min   Supine marching: yoga ball 10 x3   Supine bridges: RTB 10 x3   Lower extremity rotation: 10 x3 - yoga ball   Side-lying clam shell: 10 x3 RTB  Standing pull BTB 10 x3       manual therapy techniques: Joint mobilizations, Manual traction, Myofacial release, Manual Lymphatic Drainage, Soft tissue Mobilization, and Friction Massage were applied to the:  for  minutes, including:      neuromuscular re-education activities to improve: Balance, Coordination, Kinesthetic, Sense, Proprioception, and Posture for 25 minutes. The following activities were included:  Lower  extremity rotation: 10 x3 - yoga ball   Pallof: 10 x3 SLB  Hip hinge: 10 x3   Shuttle: leg press 2.5 x30, 2.5 x20   Hamstring stretchin sec x3 azul   Piriformis stretchin sec x3 azul       therapeutic activities to improve functional performance for   minutes, including:    Patient Education and Home Exercises       Education provided:   -     Written Home Exercises Provided: yes. Exercises were reviewed and Mario Castro was able to demonstrate them prior to the end of the session.  Mario Castro demonstrated good  understanding of the education provided. See Electronic Medical Record under Patient Instructions for exercises provided during therapy sessions    Assessment     Pt states bridges causes tightness but improving with exercises goes. He performed all exercises well, presents with reduced spinal joint mobility and tightness of gluteus muscles.     Mario Castro Is progressing well towards his goals.   Patient prognosis is Good.     Patient will continue to benefit from skilled outpatient physical therapy to address the deficits listed in the problem list box on initial evaluation, provide pt/family education and to maximize pt's level of independence in the home and community environment.     Patient's spiritual, cultural and educational needs considered and pt agreeable to plan of care and goals.     Anticipated barriers to physical therapy: no    Short Term Goals:  4 weeks Status  Date Met   PAIN: Pt will report worst pain of 2/10 in order to progress toward max functional ability and improve quality of life. [x] Progressing  [] Met  [] Not Met     FUNCTION: Patient will demonstrate improved function as indicated by a score of greater than or equal to 55 out of 100 on FOTO. [x] Progressing  [] Met  [] Not Met     MOBILITY: Patient will improve AROM to 50% of stated goals, listed in objective measures above, in order to progress towards independence with functional activities.  [x] Progressing  []  Met  [] Not Met     STRENGTH: Patient will improve strength to 50% of stated goals, listed in objective measures above, in order to progress towards independence with functional activities. [x] Progressing  [] Met  [] Not Met     POSTURE: Patient will correct postural deviations in sitting and standing, to decrease pain and promote long term stability.  [x] Progressing  [] Met  [] Not Met     HEP: Patient will demonstrate independence with HEP in order to progress toward functional independence. [x] Progressing  [] Met  [] Not Met        Long Term Goals:  6 weeks Status Date Met   PAIN: Pt will report worst pain of 0/10 in order to progress toward max functional ability and improve quality of life [x] Progressing  [] Met  [] Not Met     FUNCTION: Patient will demonstrate improved function as indicated by a score of greater than or equal to 75 out of 100 on FOTO. [x] Progressing  [] Met  [] Not Met     MOBILITY: Patient will improve AROM to stated goals, listed in objective measures above, in order to return to maximal functional potential and improve quality of life.  [x] Progressing  [] Met  [] Not Met     STRENGTH: Patient will improve strength to stated goals, listed in objective measures above, in order to improve functional independence and quality of life.  [x] Progressing  [] Met  [] Not Met     Patient will return to normal ADL's, IADL's, community involvement, recreational activities, and work-related activities with less than or equal to 0/10 pain and maximal function.  [x] Progressing  [] Met  [] Not Met        Plan      Plan of care Certification: 5/7/2024 to 6/21/2024.     Outpatient Physical Therapy 2 times weekly for 6 weeks to include any combination of the following interventions: virtual visits, dry needling, modalities, electrical stimulation (IFC, Pre-Mod, Attended with Functional Dry Needling), Cervical/Lumbar Traction, Electrical Stimulation IFC, Gait Training, Manual Therapy, Moist Heat/ Ice,  Neuromuscular Re-ed, Patient Education, Self Care, Therapeutic Activities, Therapeutic Exercise, Ultrasound, and Therapeutic Activites      Kj Iraheta, PT, DPT

## 2024-05-20 ENCOUNTER — CLINICAL SUPPORT (OUTPATIENT)
Dept: REHABILITATION | Facility: HOSPITAL | Age: 54
End: 2024-05-20
Attending: ORTHOPAEDIC SURGERY
Payer: MEDICARE

## 2024-05-20 DIAGNOSIS — M54.50 CHRONIC BILATERAL LOW BACK PAIN WITHOUT SCIATICA: Primary | ICD-10-CM

## 2024-05-20 DIAGNOSIS — G89.29 CHRONIC BILATERAL LOW BACK PAIN WITHOUT SCIATICA: Primary | ICD-10-CM

## 2024-05-20 PROCEDURE — 97110 THERAPEUTIC EXERCISES: CPT | Mod: PO,CQ

## 2024-05-20 PROCEDURE — 97112 NEUROMUSCULAR REEDUCATION: CPT | Mod: PO,CQ

## 2024-05-20 NOTE — PROGRESS NOTES
[facility]   Physical Therapy Treatment Note      Name: Mario Castro .  Clinic Number: [id number]    Therapy Diagnosis:   Encounter Diagnosis   Name Primary?    Chronic bilateral low back pain without sciatica Yes     Physician: Poli Barrett MD    Visit Date: 2024    Physician Orders: PT Eval and Treat  Medical Diagnosis from Referral:   M54.50 (ICD-10-CM) - Spine pain, lumbar   M47.816 (ICD-10-CM) - Lumbar spondylosis      Evaluation Date: 2024  Authorization Period Expiration: 2025  Plan of Care Expiration: 2024  Progress Note Due: 2024  Visit # / Visits authorized: ,   FOTO: 1/3 (last performed on 2024)     Precautions: Standard     Time In: 1000 am  Time Out: 1100 am  Total Billable Time (timed & untimed codes): 60 minutes    PTA Visit #:      Subjective     Patient reports: intermittent pain at mid back.    He was compliant with home exercise program.  Response to previous treatment: good  Functional change: no    Pain: 3/10  Location: bilateral low back      Objective      Objective Measures updated at progress report unless specified.     Treatment     Mario Castro received the treatments listed below:      therapeutic exercises to develop strength, endurance, ROM, flexibility, posture, and core stabilization for 30 minutes including:    Nustep 10 min L4.0 10 min   Supine marching: yoga ball 10 x3   Supine bridges: RTB 10 x3   Lower extremity rotation: 10 x3 - yoga ball   Side-lying clam shell: 10 x3 Red TB  Standing pull Blue TB 10 x3     neuromuscular re-education activities to improve: Balance, Coordination, Kinesthetic, Sense, Proprioception, and Posture for 30 minutes. The following activities were included:     Pallof: 10 x3 Silver theraband  Hip hinge: 10 x3 (with PVC pipe as cue)  Shuttle: leg press 2.5 x30, 2.5 x20   Hamstring stretchin sec x3 azul   Piriformis stretchin sec x3 azul     Not performed:  therapeutic  activities to improve functional performance for   minutes, including:    manual therapy techniques: Joint mobilizations, Manual traction, Myofacial release, Manual Lymphatic Drainage, Soft tissue Mobilization, and Friction Massage were applied to the:  for  minutes, including:    Patient Education and Home Exercises       Education provided:   - Mr Martin was educated to continue self stretches to low back, hips, and legs in order to improve flexibility and decrease associated pain. He voiced good understanding.    Written Home Exercises Provided: yes. Exercises were reviewed and Mario Castro was able to demonstrate them prior to the end of the session.  Mario Castro demonstrated good  understanding of the education provided. See Electronic Medical Record under Patient Instructions for exercises provided during therapy sessions    Assessment     Mr Martin presents with low irritability at low back throughout treatment. He is working hard to activate TrA for core/trunk stabilization. He presents with hinge at mid-back during hip hinge for neuromuscular education. One PVC pipe was utilized for posture correction, along with verbal cues and use of placing hips at wall for tactile cue. He responded well but further practice needed for development.    Mario Castro Is progressing well towards his goals.   Patient prognosis is Good.     Patient will continue to benefit from skilled outpatient physical therapy to address the deficits listed in the problem list box on initial evaluation, provide pt/family education and to maximize pt's level of independence in the home and community environment.     Patient's spiritual, cultural and educational needs considered and pt agreeable to plan of care and goals.     Anticipated barriers to physical therapy: no    Short Term Goals:  4 weeks Status  Date Met   PAIN: Pt will report worst pain of 2/10 in order to progress toward max functional ability and improve quality of life. [x]  Progressing  [] Met  [] Not Met     FUNCTION: Patient will demonstrate improved function as indicated by a score of greater than or equal to 55 out of 100 on FOTO. [x] Progressing  [] Met  [] Not Met     MOBILITY: Patient will improve AROM to 50% of stated goals, listed in objective measures above, in order to progress towards independence with functional activities.  [x] Progressing  [] Met  [] Not Met     STRENGTH: Patient will improve strength to 50% of stated goals, listed in objective measures above, in order to progress towards independence with functional activities. [x] Progressing  [] Met  [] Not Met     POSTURE: Patient will correct postural deviations in sitting and standing, to decrease pain and promote long term stability.  [x] Progressing  [] Met  [] Not Met     HEP: Patient will demonstrate independence with HEP in order to progress toward functional independence. [x] Progressing  [] Met  [] Not Met        Long Term Goals:  6 weeks Status Date Met   PAIN: Pt will report worst pain of 0/10 in order to progress toward max functional ability and improve quality of life [x] Progressing  [] Met  [] Not Met     FUNCTION: Patient will demonstrate improved function as indicated by a score of greater than or equal to 75 out of 100 on FOTO. [x] Progressing  [] Met  [] Not Met     MOBILITY: Patient will improve AROM to stated goals, listed in objective measures above, in order to return to maximal functional potential and improve quality of life.  [x] Progressing  [] Met  [] Not Met     STRENGTH: Patient will improve strength to stated goals, listed in objective measures above, in order to improve functional independence and quality of life.  [x] Progressing  [] Met  [] Not Met     Patient will return to normal ADL's, IADL's, community involvement, recreational activities, and work-related activities with less than or equal to 0/10 pain and maximal function.  [x] Progressing  [] Met  [] Not Met        Plan       Plan of care Certification: 5/7/2024 to 6/21/2024.     Outpatient Physical Therapy 2 times weekly for 6 weeks to include any combination of the following interventions: virtual visits, dry needling, modalities, electrical stimulation (IFC, Pre-Mod, Attended with Functional Dry Needling), Cervical/Lumbar Traction, Electrical Stimulation IFC, Gait Training, Manual Therapy, Moist Heat/ Ice, Neuromuscular Re-ed, Patient Education, Self Care, Therapeutic Activities, Therapeutic Exercise, Ultrasound, and Therapeutic Activites      Benito Boles PTA, ATC

## 2024-05-21 NOTE — PROGRESS NOTES
"[facility]   Physical Therapy Treatment Note      Name: Mario Castro .  Clinic Number: [id number]    Therapy Diagnosis:   Encounter Diagnosis   Name Primary?    Chronic bilateral low back pain without sciatica Yes     Physician: Poli Barrett MD    Visit Date: 2024    Physician Orders: PT Eval and Treat  Medical Diagnosis from Referral:   M54.50 (ICD-10-CM) - Spine pain, lumbar   M47.816 (ICD-10-CM) - Lumbar spondylosis      Evaluation Date: 2024  Authorization Period Expiration: 2025  Plan of Care Expiration: 2024  Progress Note Due: 2024  Visit # / Visits authorized:  3/20 (+1 PT eval)  FOTO: 1/3 (last performed on 2024)     Precautions: Standard     Time In: 1000 am  Time Out: 1050 am  Total Billable Time (timed & untimed codes): 50 minutes    PTA Visit #:      Subjective     Patient reports: stretching his hips "help" with back tightness and pain.    He was compliant with home exercise program.  Response to previous treatment: good  Functional change: able to perform log roll technique correctly    Pain: 3/10  Location: bilateral low back      Objective      Objective Measures updated at progress report unless specified.     Treatment     Mario Castro received the treatments listed below:      therapeutic exercises to develop strength, endurance, ROM, flexibility, posture, and core stabilization for 25 minutes including:    Nustep 10 min L4.0 10 min   Supine marching: yoga ball 10 x3   Supine bridges: RTB 10 x3   Lower extremity rotation: 10 x3 - yoga ball   Side-lying clam shell: 10 x3 Red TB  Standing pull Blue TB 10 x3     neuromuscular re-education activities to improve: Balance, Coordination, Kinesthetic, Sense, Proprioception, and Posture for 25 minutes. The following activities were included:     Pallof: 10 x3 Silver theraband  Hip hinge: 10 x3 (with PVC pipe as cue)  Shuttle: leg press 2.5 x30, 2.5 x20   Hamstring stretchin sec x3 " azul   Piriformis stretchin sec x3 azul     Patient Education and Home Exercises       Education provided:   - Mr Martin was educated to continue self stretches to low back, hips, and legs in order to improve flexibility and decrease associated pain. He voiced good understanding.    Written Home Exercises Provided: yes. Exercises were reviewed and Mario Castro was able to demonstrate them prior to the end of the session.  Mario Castro demonstrated good  understanding of the education provided. See Electronic Medical Record under Patient Instructions for exercises provided during therapy sessions    Assessment     Mr Martin presents with low irritability at low back throughout treatment. He displays good carryover with hip hinge training with PVC pipe, allowing for less potential tension at back. Minimal early gains noted with LB flexibility, as noted upon functional transfer.    Mario Castro Is progressing well towards his goals.   Patient prognosis is Good.     Patient will continue to benefit from skilled outpatient physical therapy to address the deficits listed in the problem list box on initial evaluation, provide pt/family education and to maximize pt's level of independence in the home and community environment.     Patient's spiritual, cultural and educational needs considered and pt agreeable to plan of care and goals.     Anticipated barriers to physical therapy: no    Short Term Goals:  4 weeks Status  Date Met   PAIN: Pt will report worst pain of 2/10 in order to progress toward max functional ability and improve quality of life. [x] Progressing  [] Met  [] Not Met     FUNCTION: Patient will demonstrate improved function as indicated by a score of greater than or equal to 55 out of 100 on FOTO. [x] Progressing  [] Met  [] Not Met     MOBILITY: Patient will improve AROM to 50% of stated goals, listed in objective measures above, in order to progress towards independence with functional activities.  [x]  Progressing  [] Met  [] Not Met     STRENGTH: Patient will improve strength to 50% of stated goals, listed in objective measures above, in order to progress towards independence with functional activities. [x] Progressing  [] Met  [] Not Met     POSTURE: Patient will correct postural deviations in sitting and standing, to decrease pain and promote long term stability.  [x] Progressing  [] Met  [] Not Met     HEP: Patient will demonstrate independence with HEP in order to progress toward functional independence. [x] Progressing  [] Met  [] Not Met        Long Term Goals:  6 weeks Status Date Met   PAIN: Pt will report worst pain of 0/10 in order to progress toward max functional ability and improve quality of life [x] Progressing  [] Met  [] Not Met     FUNCTION: Patient will demonstrate improved function as indicated by a score of greater than or equal to 75 out of 100 on FOTO. [x] Progressing  [] Met  [] Not Met     MOBILITY: Patient will improve AROM to stated goals, listed in objective measures above, in order to return to maximal functional potential and improve quality of life.  [x] Progressing  [] Met  [] Not Met     STRENGTH: Patient will improve strength to stated goals, listed in objective measures above, in order to improve functional independence and quality of life.  [x] Progressing  [] Met  [] Not Met     Patient will return to normal ADL's, IADL's, community involvement, recreational activities, and work-related activities with less than or equal to 0/10 pain and maximal function.  [x] Progressing  [] Met  [] Not Met        Plan      Plan of care Certification: 5/7/2024 to 6/21/2024.     Outpatient Physical Therapy 2 times weekly for 6 weeks to include any combination of the following interventions: virtual visits, dry needling, modalities, electrical stimulation (IFC, Pre-Mod, Attended with Functional Dry Needling), Cervical/Lumbar Traction, Electrical Stimulation IFC, Gait Training, Manual Therapy,  Moist Heat/ Ice, Neuromuscular Re-ed, Patient Education, Self Care, Therapeutic Activities, Therapeutic Exercise, Ultrasound, and Therapeutic Activites      Benito Boles, PTA, ATC

## 2024-05-22 ENCOUNTER — CLINICAL SUPPORT (OUTPATIENT)
Dept: REHABILITATION | Facility: HOSPITAL | Age: 54
End: 2024-05-22
Payer: MEDICARE

## 2024-05-22 DIAGNOSIS — G89.29 CHRONIC BILATERAL LOW BACK PAIN WITHOUT SCIATICA: Primary | ICD-10-CM

## 2024-05-22 DIAGNOSIS — M54.50 CHRONIC BILATERAL LOW BACK PAIN WITHOUT SCIATICA: Primary | ICD-10-CM

## 2024-05-22 PROCEDURE — 97110 THERAPEUTIC EXERCISES: CPT | Mod: PO,CQ

## 2024-05-22 PROCEDURE — 97112 NEUROMUSCULAR REEDUCATION: CPT | Mod: PO,CQ

## 2024-05-29 ENCOUNTER — CLINICAL SUPPORT (OUTPATIENT)
Dept: REHABILITATION | Facility: HOSPITAL | Age: 54
End: 2024-05-29
Payer: MEDICARE

## 2024-05-29 DIAGNOSIS — G89.29 CHRONIC BILATERAL LOW BACK PAIN WITHOUT SCIATICA: Primary | ICD-10-CM

## 2024-05-29 DIAGNOSIS — M54.50 CHRONIC BILATERAL LOW BACK PAIN WITHOUT SCIATICA: Primary | ICD-10-CM

## 2024-05-29 PROCEDURE — 97112 NEUROMUSCULAR REEDUCATION: CPT | Mod: PO,CQ

## 2024-05-29 PROCEDURE — 97110 THERAPEUTIC EXERCISES: CPT | Mod: PO,CQ

## 2024-05-29 NOTE — PROGRESS NOTES
"[facility]   Physical Therapy Treatment Note      Name: Mario Castro .  Clinic Number: [id number]    Therapy Diagnosis:   Encounter Diagnosis   Name Primary?    Chronic bilateral low back pain without sciatica Yes     Physician: Poli Barrett MD    Visit Date: 5/29/2024    Physician Orders: PT Eval and Treat  Medical Diagnosis from Referral:   M54.50 (ICD-10-CM) - Spine pain, lumbar   M47.816 (ICD-10-CM) - Lumbar spondylosis      Evaluation Date: 5/7/2024  Authorization Period Expiration: 5/6/2025  Plan of Care Expiration: 6/21/2024  Progress Note Due: 6/7/2024  Visit # / Visits authorized:  4/20 (+1 PT eval)  FOTO: 1/3 (last performed on 5/7/2024)     Precautions: Standard     Time In: 0930 am  Time Out: 1030 am  Total Billable Time (timed & untimed codes): 60 minutes    PTA Visit #: 2/5     Subjective     Patient reports: he experiencing LB spasms "every now and then". He also reports a "shocking pain" down the side of his right leg, extending from right hip to outside right knee.  He was compliant with home exercise program.  Response to previous treatment: good  Functional change: able to perform log roll technique correctly    Pain: 3/10  Location: bilateral low back      Objective      Objective Measures updated at progress report unless specified.     Treatment     Mario Castro received the treatments listed below:      therapeutic exercises to develop strength, endurance, ROM, flexibility, posture, and core stabilization for 30 minutes including:    Nustep 10 min L4.0 10 min   Supine marching: yoga ball 10 x3   Supine bridges: RTB 10 x3   Lower extremity rotation: 10 x3 - yoga ball   Side-lying clam shell: 10 x3 Red TB  Standing pull Blue TB 10 x3   TB pulldown with march: blue theraband x20 ea    neuromuscular re-education activities to improve: Balance, Coordination, Kinesthetic, Sense, Proprioception, and Posture for 30 minutes. The following activities were " included:     Pallof: 10 x3 Silver theraband  Hip hinge: 10 x3 (with PVC pipe as cue)  Shuttle: leg press 2.5 x30, 2.5 x20   Hamstring stretchin sec x3 auzl   Piriformis stretchin sec x3 azul     Patient Education and Home Exercises       Education provided:   - Mr Martin was educated to continue self stretches to low back, hips, and legs in order to improve flexibility and decrease associated pain. He voiced good understanding.    Written Home Exercises Provided: yes. Exercises were reviewed and Mario Castro was able to demonstrate them prior to the end of the session.  Mario Castro demonstrated good  understanding of the education provided. See Electronic Medical Record under Patient Instructions for exercises provided during therapy sessions    Assessment     Mr Martin presents with LB tightness, as noted with functional transfer and low back protected position with walking around clinic. He performed self stretches with some relief and is encouraged to continue self stretches at home for maximizing PT gains. He voiced good understanding.    Mario Castro Is progressing well towards his goals.   Patient prognosis is Good.     Patient will continue to benefit from skilled outpatient physical therapy to address the deficits listed in the problem list box on initial evaluation, provide pt/family education and to maximize pt's level of independence in the home and community environment.     Patient's spiritual, cultural and educational needs considered and pt agreeable to plan of care and goals.     Anticipated barriers to physical therapy: no    Short Term Goals:  4 weeks Status  Date Met   PAIN: Pt will report worst pain of 2/10 in order to progress toward max functional ability and improve quality of life. [x] Progressing  [] Met  [] Not Met     FUNCTION: Patient will demonstrate improved function as indicated by a score of greater than or equal to 55 out of 100 on FOTO. [x] Progressing  [] Met  [] Not Met      MOBILITY: Patient will improve AROM to 50% of stated goals, listed in objective measures above, in order to progress towards independence with functional activities.  [x] Progressing  [] Met  [] Not Met     STRENGTH: Patient will improve strength to 50% of stated goals, listed in objective measures above, in order to progress towards independence with functional activities. [x] Progressing  [] Met  [] Not Met     POSTURE: Patient will correct postural deviations in sitting and standing, to decrease pain and promote long term stability.  [x] Progressing  [] Met  [] Not Met     HEP: Patient will demonstrate independence with HEP in order to progress toward functional independence. [x] Progressing  [] Met  [] Not Met        Long Term Goals:  6 weeks Status Date Met   PAIN: Pt will report worst pain of 0/10 in order to progress toward max functional ability and improve quality of life [x] Progressing  [] Met  [] Not Met     FUNCTION: Patient will demonstrate improved function as indicated by a score of greater than or equal to 75 out of 100 on FOTO. [x] Progressing  [] Met  [] Not Met     MOBILITY: Patient will improve AROM to stated goals, listed in objective measures above, in order to return to maximal functional potential and improve quality of life.  [x] Progressing  [] Met  [] Not Met     STRENGTH: Patient will improve strength to stated goals, listed in objective measures above, in order to improve functional independence and quality of life.  [x] Progressing  [] Met  [] Not Met     Patient will return to normal ADL's, IADL's, community involvement, recreational activities, and work-related activities with less than or equal to 0/10 pain and maximal function.  [x] Progressing  [] Met  [] Not Met        Plan      Plan of care Certification: 5/7/2024 to 6/21/2024.     Outpatient Physical Therapy 2 times weekly for 6 weeks to include any combination of the following interventions: virtual visits, dry needling,  modalities, electrical stimulation (IFC, Pre-Mod, Attended with Functional Dry Needling), Cervical/Lumbar Traction, Electrical Stimulation IFC, Gait Training, Manual Therapy, Moist Heat/ Ice, Neuromuscular Re-ed, Patient Education, Self Care, Therapeutic Activities, Therapeutic Exercise, Ultrasound, and Therapeutic Activites      Benito Boles, ANTONIETA, ATC

## 2024-05-31 NOTE — PROGRESS NOTES
"[facility]   Physical Therapy Treatment Note      Name: Mario Castro .  Clinic Number: [id number]    Therapy Diagnosis:   Encounter Diagnosis   Name Primary?    Chronic bilateral low back pain without sciatica Yes     Physician: Poli Barrett MD    Visit Date: 6/3/2024    Physician Orders: PT Eval and Treat  Medical Diagnosis from Referral:   M54.50 (ICD-10-CM) - Spine pain, lumbar   M47.816 (ICD-10-CM) - Lumbar spondylosis      Evaluation Date: 5/7/2024  Authorization Period Expiration: 5/6/2025  Plan of Care Expiration: 6/21/2024  Progress Note Due: 6/7/2024  Visit # / Visits authorized:  5/20 (+1 PT eval)    MD Ashton follow up: 6/28/24                           FOTO SCORES  DATE % STATUS   INTAKE 48%   6/3/24 38%   Discharge incomplete      Precautions: Standard     Time In: 0930 am  Time Out: 1030 am  Total Billable Time (timed & untimed codes): 60 minutes    PTA Visit #: 3/5     Subjective     Patient reports: limited time sitting is "about 40 minutes" but attests to have to change positions to find comfort.  He was compliant with home exercise program.  Response to previous treatment: good  Functional change: able to perform log roll technique correctly    Pain: 3/10  Location: bilateral low back      Objective      Objective Measures updated at progress report unless specified.     Treatment     Mario Castro received the treatments listed below:      therapeutic exercises to develop strength, endurance, ROM, flexibility, posture, and core stabilization for 30 minutes including:    Nustep 10 min L4.0 10 min   Supine marching: yoga ball 10 x3   Supine bridges: RTB 10 x3   Lower extremity rotation: 10 x3 - yoga ball   Side-lying clam shell: 10 x3 Red TB  Standing pull Blue TB 10 x3   TB pulldown with march: blue theraband x20 ea  Forward step up to 6-inch step with theraband row: 2x10 (Blue)     neuromuscular re-education activities to improve: Balance, Coordination, " Kinesthetic, Sense, Proprioception, and Posture for 30 minutes. The following activities were included:     Pallof: 10 x3 Silver theraband  Hip hinge: 10 x3 (with PVC pipe as cue)  Shuttle: leg press 2.5 x30, 2.5 x20   Hamstring stretchin sec x3 azul   Piriformis stretchin sec x3 azul   Modified bird dog (edge of high table): x10  [place] carry: (2) 10# dumbbell x 3 laps in clinic  Functional box lift (waist to elevated step): x10 with 6# med ball    Patient Education and Home Exercises       Education provided:   - Mr Martin was educated on runner's stretch to increase ankle flexibility. He should perform at home. He displayed good return demo.    Written Home Exercises Provided: yes. Exercises were reviewed and Mario Castro was able to demonstrate them prior to the end of the session.  Mario Castro demonstrated good  understanding of the education provided. See Electronic Medical Record under Patient Instructions for exercises provided during therapy sessions    Assessment     Mr Martin presents with LB tightness, as noted with functional transfer and low back protected position with walking around clinic. He is responding well to self stretches to address issue, however. His core/trunk stabilization is developing well. He responded well to new supervised challenges today to address Dx.    Mario Castro Is progressing well towards his goals.   Patient prognosis is Good.     Patient will continue to benefit from skilled outpatient physical therapy to address the deficits listed in the problem list box on initial evaluation, provide pt/family education and to maximize pt's level of independence in the home and community environment.     Patient's spiritual, cultural and educational needs considered and pt agreeable to plan of care and goals.     Anticipated barriers to physical therapy: no    Short Term Goals:  4 weeks Status  Date Met   PAIN: Pt will report worst pain of 2/10 in order to progress toward max  functional ability and improve quality of life. [x] Progressing  [] Met  [] Not Met     FUNCTION: Patient will demonstrate improved function as indicated by a score of greater than or equal to 55 out of 100 on FOTO. [x] Progressing  [] Met  [] Not Met     MOBILITY: Patient will improve AROM to 50% of stated goals, listed in objective measures above, in order to progress towards independence with functional activities.  [x] Progressing  [] Met  [] Not Met     STRENGTH: Patient will improve strength to 50% of stated goals, listed in objective measures above, in order to progress towards independence with functional activities. [x] Progressing  [] Met  [] Not Met     POSTURE: Patient will correct postural deviations in sitting and standing, to decrease pain and promote long term stability.  [x] Progressing  [] Met  [] Not Met     HEP: Patient will demonstrate independence with HEP in order to progress toward functional independence. [x] Progressing  [] Met  [] Not Met        Long Term Goals:  6 weeks Status Date Met   PAIN: Pt will report worst pain of 0/10 in order to progress toward max functional ability and improve quality of life [x] Progressing  [] Met  [] Not Met     FUNCTION: Patient will demonstrate improved function as indicated by a score of greater than or equal to 75 out of 100 on FOTO. [x] Progressing  [] Met  [] Not Met     MOBILITY: Patient will improve AROM to stated goals, listed in objective measures above, in order to return to maximal functional potential and improve quality of life.  [x] Progressing  [] Met  [] Not Met     STRENGTH: Patient will improve strength to stated goals, listed in objective measures above, in order to improve functional independence and quality of life.  [x] Progressing  [] Met  [] Not Met     Patient will return to normal ADL's, IADL's, community involvement, recreational activities, and work-related activities with less than or equal to 0/10 pain and maximal function.   [x] Progressing  [] Met  [] Not Met        Plan      Plan of care Certification: 5/7/2024 to 6/21/2024.     Outpatient Physical Therapy 2 times weekly for 6 weeks to include any combination of the following interventions: virtual visits, dry needling, modalities, electrical stimulation (IFC, Pre-Mod, Attended with Functional Dry Needling), Cervical/Lumbar Traction, Electrical Stimulation IFC, Gait Training, Manual Therapy, Moist Heat/ Ice, Neuromuscular Re-ed, Patient Education, Self Care, Therapeutic Activities, Therapeutic Exercise, Ultrasound, and Therapeutic Activites      Benito Boles, PTA, ATC

## 2024-06-03 ENCOUNTER — CLINICAL SUPPORT (OUTPATIENT)
Dept: REHABILITATION | Facility: HOSPITAL | Age: 54
End: 2024-06-03
Payer: MEDICARE

## 2024-06-03 DIAGNOSIS — M54.50 CHRONIC BILATERAL LOW BACK PAIN WITHOUT SCIATICA: Primary | ICD-10-CM

## 2024-06-03 DIAGNOSIS — G89.29 CHRONIC BILATERAL LOW BACK PAIN WITHOUT SCIATICA: Primary | ICD-10-CM

## 2024-06-03 PROCEDURE — 97112 NEUROMUSCULAR REEDUCATION: CPT | Mod: PO,CQ

## 2024-06-03 PROCEDURE — 97110 THERAPEUTIC EXERCISES: CPT | Mod: PO,CQ

## 2024-06-04 NOTE — PROGRESS NOTES
[facility]   Physical Therapy Treatment Note      Name: Mario Castro .  Clinic Number: [id number]    Therapy Diagnosis:   Encounter Diagnosis   Name Primary?    Chronic bilateral low back pain without sciatica Yes       Physician: Poli Barrett MD    Visit Date: 6/5/2024    Physician Orders: PT Eval and Treat  Medical Diagnosis from Referral:   M54.50 (ICD-10-CM) - Spine pain, lumbar   M47.816 (ICD-10-CM) - Lumbar spondylosis      Evaluation Date: 5/7/2024  Authorization Period Expiration: 5/6/2025  Plan of Care Expiration: 6/21/2024  Progress Note Due: 6/7/2024  Visit # / Visits authorized:  6/20 (+1 PT eval)    MD Ashton follow up: 6/28/24                           FOTO SCORES  DATE % STATUS   INTAKE 48%   6/3/24 38%   Discharge incomplete      Precautions: Standard     Time In: 0930 am  Time Out: 1030 am  Total Billable Time (timed & untimed codes): 60 minutes    PTA Visit #: 3/5     Subjective     Patient reports: episodes of lightheadedness, which he is looking into with his PCP.  He was compliant with home exercise program.  Response to previous treatment: good  Functional change: able to perform log roll technique correctly    Pain: 3/10  Location: bilateral low back      Objective      Objective Measures updated at progress report unless specified.     Treatment     Mario Castro received the treatments listed below:      therapeutic exercises to develop strength, endurance, ROM, flexibility, posture, and core stabilization for 30 minutes including:    Nustep 10 min L4.0 10 min   Supine marching: yoga ball 10 x3   Supine bridges: RTB 10 x3   Lower extremity rotation: 10 x3 - yoga ball   Side-lying clam shell: 10 x3 Red TB  Standing pull Blue TB 10 x3   TB pulldown with march: blue theraband x20 ea  Forward step up to 6-inch step with theraband row: 2x10 (Blue)     neuromuscular re-education activities to improve: Balance, Coordination, Kinesthetic, Sense,  Proprioception, and Posture for 30 minutes. The following activities were included:     Pallof: 10 x3 Silver theraband  Hip hinge: 10 x3 (with PVC pipe as cue)  Sit to stand 10# kettle 3x10 (cues for increase hip extension)  Shuttle: leg press 2.5 x30, 2.5 x20   Hamstring stretchin sec x3 azul   Piriformis stretchin sec x3 azul   Modified bird dog (edge of high table): x10  [place] carry: (2) 10# dumbbell x 3 laps in clinic  Functional box lift (waist to elevated step): x10 with 6# med ball    Patient Education and Home Exercises       Education provided:   - Mr Martin was educated on increased hip extension during functional sit to stand with good return demo.     Written Home Exercises Provided: yes. Exercises were reviewed and Mario Castro was able to demonstrate them prior to the end of the session.  Mario Castro demonstrated good  understanding of the education provided. See Electronic Medical Record under Patient Instructions for exercises provided during therapy sessions    Assessment     Mr Martin presents with limited trunk rotation but is working hard to improve upon.    Mario Castro Is progressing well towards his goals.   Patient prognosis is Good.     Patient will continue to benefit from skilled outpatient physical therapy to address the deficits listed in the problem list box on initial evaluation, provide pt/family education and to maximize pt's level of independence in the home and community environment.     Patient's spiritual, cultural and educational needs considered and pt agreeable to plan of care and goals.     Anticipated barriers to physical therapy: no    Short Term Goals:  4 weeks Status  Date Met   PAIN: Pt will report worst pain of 2/10 in order to progress toward max functional ability and improve quality of life. [x] Progressing  [] Met  [] Not Met     FUNCTION: Patient will demonstrate improved function as indicated by a score of greater than or equal to 55 out of 100 on FOTO.  [x] Progressing  [] Met  [] Not Met     MOBILITY: Patient will improve AROM to 50% of stated goals, listed in objective measures above, in order to progress towards independence with functional activities.  [x] Progressing  [] Met  [] Not Met     STRENGTH: Patient will improve strength to 50% of stated goals, listed in objective measures above, in order to progress towards independence with functional activities. [x] Progressing  [] Met  [] Not Met     POSTURE: Patient will correct postural deviations in sitting and standing, to decrease pain and promote long term stability.  [x] Progressing  [] Met  [] Not Met     HEP: Patient will demonstrate independence with HEP in order to progress toward functional independence. [x] Progressing  [] Met  [] Not Met        Long Term Goals:  6 weeks Status Date Met   PAIN: Pt will report worst pain of 0/10 in order to progress toward max functional ability and improve quality of life [x] Progressing  [] Met  [] Not Met     FUNCTION: Patient will demonstrate improved function as indicated by a score of greater than or equal to 75 out of 100 on FOTO. [x] Progressing  [] Met  [] Not Met     MOBILITY: Patient will improve AROM to stated goals, listed in objective measures above, in order to return to maximal functional potential and improve quality of life.  [x] Progressing  [] Met  [] Not Met     STRENGTH: Patient will improve strength to stated goals, listed in objective measures above, in order to improve functional independence and quality of life.  [x] Progressing  [] Met  [] Not Met     Patient will return to normal ADL's, IADL's, community involvement, recreational activities, and work-related activities with less than or equal to 0/10 pain and maximal function.  [x] Progressing  [] Met  [] Not Met        Plan      Supervising PT [place] to treat.    Plan of care Certification: 5/7/2024 to 6/21/2024.     Outpatient Physical Therapy 2 times weekly for 6 weeks to include any  combination of the following interventions: virtual visits, dry needling, modalities, electrical stimulation (IFC, Pre-Mod, Attended with Functional Dry Needling), Cervical/Lumbar Traction, Electrical Stimulation IFC, Gait Training, Manual Therapy, Moist Heat/ Ice, Neuromuscular Re-ed, Patient Education, Self Care, Therapeutic Activities, Therapeutic Exercise, Ultrasound, and Therapeutic Activites      Benito Boles, PTA, ATC

## 2024-06-05 ENCOUNTER — CLINICAL SUPPORT (OUTPATIENT)
Dept: REHABILITATION | Facility: HOSPITAL | Age: 54
End: 2024-06-05
Payer: MEDICARE

## 2024-06-05 DIAGNOSIS — M54.50 CHRONIC BILATERAL LOW BACK PAIN WITHOUT SCIATICA: Primary | ICD-10-CM

## 2024-06-05 DIAGNOSIS — G89.29 CHRONIC BILATERAL LOW BACK PAIN WITHOUT SCIATICA: Primary | ICD-10-CM

## 2024-06-05 PROCEDURE — 97112 NEUROMUSCULAR REEDUCATION: CPT | Mod: PO,CQ

## 2024-06-05 PROCEDURE — 97110 THERAPEUTIC EXERCISES: CPT | Mod: PO,CQ

## 2024-06-11 NOTE — PROGRESS NOTES
"[facility]   Physical Therapy Treatment Note      Name: Mario Castro .  Clinic Number: [id number]    Therapy Diagnosis:   Encounter Diagnosis   Name Primary?    Chronic bilateral low back pain without sciatica Yes     Physician: Poli Barrett MD    Visit Date: 6/12/2024    Physician Orders: PT Eval and Treat  Medical Diagnosis from Referral:   M54.50 (ICD-10-CM) - Spine pain, lumbar   M47.816 (ICD-10-CM) - Lumbar spondylosis      Evaluation Date: 5/7/2024  Authorization Period Expiration: 5/6/2025  Plan of Care Expiration: 6/21/2024  Progress Note Due: 6/7/2024  Visit # / Visits authorized:  7/20 (+1 PT eval)    MD Ashton follow up: 6/28/24                           FOTO SCORES  DATE % STATUS   INTAKE 48%   6/3/24 38%   Discharge incomplete      Precautions: Standard     Time In: 0930 am  Time Out: 1030 am  Total Billable Time (timed & untimed codes): 60 minutes    PTA Visit #: 4/5     Subjective     Patient reports: continuation of self stretches prior to working out at local gym. He attests, "It helps".  He was compliant with home exercise program.  Response to previous treatment: good  Functional change: able to perform log roll technique correctly    Pain: 3/10  Location: bilateral low back      Objective      Objective Measures updated at progress report unless specified.     Treatment     Mario Castro received the treatments listed below:      therapeutic exercises to develop strength, endurance, ROM, flexibility, posture, and core stabilization for 30 minutes including:    Nustep 10 min L4.0 10 min   Supine marching: yoga ball 10 x3   Supine bridges: RTB 10 x3   Lower extremity rotation: 10 x3 - yoga ball   Side-lying clam shell: 10 x3 Red TB  Standing pull Blue TB 10 x3   TB pulldown with march: blue theraband x20 ea  Forward step up to 6-inch step with theraband row: 2x10 (Blue)     neuromuscular re-education activities to improve: Balance, Coordination, Kinesthetic, " Sense, Proprioception, and Posture for 30 minutes. The following activities were included:     Pallof: 10 x3 Silver theraband  Hip hinge: 10 x3 (with PVC pipe as cue)  Sit to stand 10# kettle 3x10 (cues for increase hip extension)  Shuttle: leg press 2.5 x30, 2.5 x20   Hamstring stretchin sec x3 azul   Piriformis stretchin sec x3 azul   Modified bird dog (edge of high table): x10  [place] carry: (2) 10# dumbbell x 3 laps in clinic  Functional box lift (waist to elevated step): x10 with 6# med ball    Patient Education and Home Exercises       Education provided:   - Mr Martin was educated on increased hip extension during functional sit to stand with good return demo.     Written Home Exercises Provided: yes. Exercises were reviewed and Mario Castro was able to demonstrate them prior to the end of the session.  Mario Castro demonstrated good  understanding of the education provided. See Electronic Medical Record under Patient Instructions for exercises provided during therapy sessions    Assessment     Mr Martin presents with stiffness at lower back and finds relief from self stretches. His hip hinge has improved significantly over the course of PT, allowing for improved posture with functional sit to stand transfers. Trunk extensors still need development, however, for endurance with standing.    Mario Castro Is progressing well towards his goals.   Patient prognosis is Good.     Patient will continue to benefit from skilled outpatient physical therapy to address the deficits listed in the problem list box on initial evaluation, provide pt/family education and to maximize pt's level of independence in the home and community environment.     Patient's spiritual, cultural and educational needs considered and pt agreeable to plan of care and goals.     Anticipated barriers to physical therapy: no    Short Term Goals:  4 weeks Status  Date Met   PAIN: Pt will report worst pain of 2/10 in order to progress toward  max functional ability and improve quality of life. [x] Progressing  [] Met  [] Not Met     FUNCTION: Patient will demonstrate improved function as indicated by a score of greater than or equal to 55 out of 100 on FOTO. [x] Progressing  [] Met  [] Not Met     MOBILITY: Patient will improve AROM to 50% of stated goals, listed in objective measures above, in order to progress towards independence with functional activities.  [x] Progressing  [] Met  [] Not Met     STRENGTH: Patient will improve strength to 50% of stated goals, listed in objective measures above, in order to progress towards independence with functional activities. [x] Progressing  [] Met  [] Not Met     POSTURE: Patient will correct postural deviations in sitting and standing, to decrease pain and promote long term stability.  [x] Progressing  [] Met  [] Not Met     HEP: Patient will demonstrate independence with HEP in order to progress toward functional independence. [x] Progressing  [] Met  [] Not Met        Long Term Goals:  6 weeks Status Date Met   PAIN: Pt will report worst pain of 0/10 in order to progress toward max functional ability and improve quality of life [x] Progressing  [] Met  [] Not Met     FUNCTION: Patient will demonstrate improved function as indicated by a score of greater than or equal to 75 out of 100 on FOTO. [x] Progressing  [] Met  [] Not Met     MOBILITY: Patient will improve AROM to stated goals, listed in objective measures above, in order to return to maximal functional potential and improve quality of life.  [x] Progressing  [] Met  [] Not Met     STRENGTH: Patient will improve strength to stated goals, listed in objective measures above, in order to improve functional independence and quality of life.  [x] Progressing  [] Met  [] Not Met     Patient will return to normal ADL's, IADL's, community involvement, recreational activities, and work-related activities with less than or equal to 0/10 pain and maximal  function.  [x] Progressing  [] Met  [] Not Met        Plan      Supervising PT [place] to treat.    Plan of care Certification: 5/7/2024 to 6/21/2024.     Outpatient Physical Therapy 2 times weekly for 6 weeks to include any combination of the following interventions: virtual visits, dry needling, modalities, electrical stimulation (IFC, Pre-Mod, Attended with Functional Dry Needling), Cervical/Lumbar Traction, Electrical Stimulation IFC, Gait Training, Manual Therapy, Moist Heat/ Ice, Neuromuscular Re-ed, Patient Education, Self Care, Therapeutic Activities, Therapeutic Exercise, Ultrasound, and Therapeutic Activites      Benito Boles, PTA, ATC

## 2024-06-12 ENCOUNTER — CLINICAL SUPPORT (OUTPATIENT)
Dept: REHABILITATION | Facility: HOSPITAL | Age: 54
End: 2024-06-12
Payer: MEDICARE

## 2024-06-12 DIAGNOSIS — M54.50 CHRONIC BILATERAL LOW BACK PAIN WITHOUT SCIATICA: Primary | ICD-10-CM

## 2024-06-12 DIAGNOSIS — G89.29 CHRONIC BILATERAL LOW BACK PAIN WITHOUT SCIATICA: Primary | ICD-10-CM

## 2024-06-12 PROCEDURE — 97112 NEUROMUSCULAR REEDUCATION: CPT | Mod: PO,CQ

## 2024-06-12 PROCEDURE — 97110 THERAPEUTIC EXERCISES: CPT | Mod: PO,CQ

## 2024-06-17 ENCOUNTER — CLINICAL SUPPORT (OUTPATIENT)
Dept: REHABILITATION | Facility: HOSPITAL | Age: 54
End: 2024-06-17
Payer: MEDICARE

## 2024-06-17 DIAGNOSIS — G89.29 CHRONIC BILATERAL LOW BACK PAIN WITHOUT SCIATICA: Primary | ICD-10-CM

## 2024-06-17 DIAGNOSIS — M54.50 CHRONIC BILATERAL LOW BACK PAIN WITHOUT SCIATICA: Primary | ICD-10-CM

## 2024-06-17 PROCEDURE — 97112 NEUROMUSCULAR REEDUCATION: CPT | Mod: PO

## 2024-06-17 NOTE — PROGRESS NOTES
[facility]   Physical Therapy Treatment Note      Name: Mario Castro .  Clinic Number: [id number]    Therapy Diagnosis:   Encounter Diagnosis   Name Primary?    Chronic bilateral low back pain without sciatica Yes     Physician: Poli Barrett MD    Visit Date: 6/17/2024    Physician Orders: PT Eval and Treat  Medical Diagnosis from Referral:   M54.50 (ICD-10-CM) - Spine pain, lumbar   M47.816 (ICD-10-CM) - Lumbar spondylosis      Evaluation Date: 5/7/2024  Authorization Period Expiration: 5/6/2025  Plan of Care Expiration: 6/21/2024  Progress Note Due: 6/7/2024  Visit # / Visits authorized:  8/20 (+1 PT eval)    MD As[place] follow up: 6/28/24                           FOTO SCORES  DATE % STATUS   INTAKE 48%   6/3/24 38%   Discharge incomplete      Precautions: Standard     Time In: 0930 am  Time Out: 1030 am  Total Billable Time (timed & untimed codes): 60 minutes - 30 min not one on one     PTA Visit #: 0/5     Subjective     Patient reports:His low back is still painful sometimes but is better     Response to previous treatment: good  Functional change: able to perform log roll technique correctly    Pain: 3/10  Location: bilateral low back      Objective      Objective Measures updated at progress report unless specified.     Treatment     Mario Castro received the treatments listed below:      therapeutic exercises to develop strength, endurance, ROM, flexibility, posture, and core stabilization for 30 minutes including:    Nustep 10 min L4.0 10 min   Supine marching: yoga ball 10 x3   Supine bridges: RTB 10 x3   Lower extremity rotation: 10 x3 - yoga ball   Side-lying clam shell: 10 x3 Red TB  Standing pull Blue TB 10 x3   TB pulldown with march: blue theraband x20 ea  Forward step up to 6-inch step with theraband row: 2x10 (Blue)     neuromuscular re-education activities to improve: Balance, Coordination, Kinesthetic, Sense, Proprioception, and Posture for 30 minutes. The  following activities were included:     Pallof: 10 x3 Silver theraband  Hip hinge: 10 x3 (with PVC pipe as cue)  Sit to stand 10# kettle 3x10 (cues for increase hip extension)  Shuttle: leg press 2.5 x30, 2.5 x20   Hamstring stretchin sec x3 azul   Piriformis stretchin sec x3 azul   Modified bird dog (edge of high table): x10  [place] carry: (2) 10# dumbbell x 3 laps in clinic  Functional box lift (waist to elevated step): x10 with 6# med ball    Manual therapy: cupping on bilateral QL muscle     Patient Education and Home Exercises       Education provided:   - Mr Martin was educated on increased hip extension during functional sit to stand with good return demo.     Written Home Exercises Provided: yes. Exercises were reviewed and Mario Castro was able to demonstrate them prior to the end of the session.  Mario Castro demonstrated good  understanding of the education provided. See Electronic Medical Record under Patient Instructions for exercises provided during therapy sessions    Assessment     Mr Martin states improvement with stiffness at lower back by manual therapy and stretches. Point tenderness at bilateral PSIS. Pt demonstrates reduced gluteus medius and core stability.     Mario Castro Is progressing well towards his goals.   Patient prognosis is Good.     Patient will continue to benefit from skilled outpatient physical therapy to address the deficits listed in the problem list box on initial evaluation, provide pt/family education and to maximize pt's level of independence in the home and community environment.     Patient's spiritual, cultural and educational needs considered and pt agreeable to plan of care and goals.     Anticipated barriers to physical therapy: no    Short Term Goals:  4 weeks Status  Date Met   PAIN: Pt will report worst pain of 2/10 in order to progress toward max functional ability and improve quality of life. [x] Progressing  [] Met  [] Not Met     FUNCTION: Patient will  demonstrate improved function as indicated by a score of greater than or equal to 55 out of 100 on FOTO. [x] Progressing  [] Met  [] Not Met     MOBILITY: Patient will improve AROM to 50% of stated goals, listed in objective measures above, in order to progress towards independence with functional activities.  [x] Progressing  [] Met  [] Not Met     STRENGTH: Patient will improve strength to 50% of stated goals, listed in objective measures above, in order to progress towards independence with functional activities. [x] Progressing  [] Met  [] Not Met     POSTURE: Patient will correct postural deviations in sitting and standing, to decrease pain and promote long term stability.  [x] Progressing  [] Met  [] Not Met     HEP: Patient will demonstrate independence with HEP in order to progress toward functional independence. [x] Progressing  [] Met  [] Not Met        Long Term Goals:  6 weeks Status Date Met   PAIN: Pt will report worst pain of 0/10 in order to progress toward max functional ability and improve quality of life [x] Progressing  [] Met  [] Not Met     FUNCTION: Patient will demonstrate improved function as indicated by a score of greater than or equal to 75 out of 100 on FOTO. [x] Progressing  [] Met  [] Not Met     MOBILITY: Patient will improve AROM to stated goals, listed in objective measures above, in order to return to maximal functional potential and improve quality of life.  [x] Progressing  [] Met  [] Not Met     STRENGTH: Patient will improve strength to stated goals, listed in objective measures above, in order to improve functional independence and quality of life.  [x] Progressing  [] Met  [] Not Met     Patient will return to normal ADL's, IADL's, community involvement, recreational activities, and work-related activities with less than or equal to 0/10 pain and maximal function.  [x] Progressing  [] Met  [] Not Met        Plan      Supervising PT [place] to treat.    Plan of care Certification:  5/7/2024 to 6/21/2024.     Outpatient Physical Therapy 2 times weekly for 6 weeks to include any combination of the following interventions: virtual visits, dry needling, modalities, electrical stimulation (IFC, Pre-Mod, Attended with Functional Dry Needling), Cervical/Lumbar Traction, Electrical Stimulation IFC, Gait Training, Manual Therapy, Moist Heat/ Ice, Neuromuscular Re-ed, Patient Education, Self Care, Therapeutic Activities, Therapeutic Exercise, Ultrasound, and Therapeutic Activites      Kj Iraheta, PT, ATC

## 2024-06-24 ENCOUNTER — CLINICAL SUPPORT (OUTPATIENT)
Dept: REHABILITATION | Facility: HOSPITAL | Age: 54
End: 2024-06-24
Payer: MEDICARE

## 2024-06-24 DIAGNOSIS — G89.29 CHRONIC BILATERAL LOW BACK PAIN WITHOUT SCIATICA: Primary | ICD-10-CM

## 2024-06-24 DIAGNOSIS — M54.50 CHRONIC BILATERAL LOW BACK PAIN WITHOUT SCIATICA: Primary | ICD-10-CM

## 2024-06-24 PROCEDURE — 97110 THERAPEUTIC EXERCISES: CPT | Mod: PO

## 2024-06-24 PROCEDURE — 97112 NEUROMUSCULAR REEDUCATION: CPT | Mod: PO

## 2024-06-24 NOTE — PROGRESS NOTES
[facility]   Physical Therapy Treatment Note      Name: Mario Castro .  Clinic Number: [id number]    Therapy Diagnosis:   Encounter Diagnosis   Name Primary?    Chronic bilateral low back pain without sciatica Yes       Physician: Poli Barrett MD    Visit Date: 6/24/2024    Physician Orders: PT Eval and Treat  Medical Diagnosis from Referral:   M54.50 (ICD-10-CM) - Spine pain, lumbar   M47.816 (ICD-10-CM) - Lumbar spondylosis      Evaluation Date: 5/7/2024  Authorization Period Expiration: 5/6/2025  Plan of Care Expiration: 6/21/2024  Progress Note Due: 6/7/2024  Visit # / Visits authorized:  9/20 (+1 PT eval)    MD Ashton follow up: 6/28/24                           FOTO SCORES  DATE % STATUS   INTAKE 48%   6/3/24 38%   Discharge incomplete      Precautions: Standard     Time In: 0955 am  Time Out: 1055 am  Total Billable Time (timed & untimed codes): 60 minutes - 30 min not one on one     PTA Visit #: 0/5     Subjective     Patient reports:He is feeling good today, upper back is bothering him a little.     Response to previous treatment: good  Functional change: able to perform log roll technique correctly    Pain: 3/10  Location: bilateral low back      Objective      Objective Measures updated at progress report unless specified.     Treatment     Mario Castro received the treatments listed below:      therapeutic exercises to develop strength, endurance, ROM, flexibility, posture, and core stabilization for 30 minutes including:    Nustep 10 min L4.0 10 min   Supine marching: yoga ball 10 x3   Supine bridges: RTB 10 x3   Lower extremity rotation: 10 x3 - yoga ball   Side-lying clam shell: 10 x3 Red TB  Standing pull Blue TB 10 x3   TB pulldown with march: blue theraband x20 ea  Forward step up to 6-inch step with theraband row: 2x10 (Blue)     neuromuscular re-education activities to improve: Balance, Coordination, Kinesthetic, Sense, Proprioception, and Posture for 25  minutes. The following activities were included:     Pallof: 10 x3 Silver theraband  Sit to stand 10# kettle 3x10 (cues for increase hip extension)  Shuttle: leg press 2.5 x30, 2.5 x20   Hamstring stretchin sec x3 azul   Piriformis stretchin sec x3 azul   [place] carry: (2) 10# dumbbell x 3 laps in clinic      Patient Education and Home Exercises       Education provided:   - Mr Martin was educated on increased hip extension during functional sit to stand with good return demo.     Written Home Exercises Provided: yes. Exercises were reviewed and Mario Castro was able to demonstrate them prior to the end of the session.  Mario Castro demonstrated good  understanding of the education provided. See Electronic Medical Record under Patient Instructions for exercises provided during therapy sessions    Assessment     Pt tolerated session well with no complaints of pain. Exercises performed properly with verbal and tactile cues. Will continue to progress exercises and monitor symptoms as pt approaches RTC surgery.     Mario Castro Is progressing well towards his goals.   Patient prognosis is Good.     Patient will continue to benefit from skilled outpatient physical therapy to address the deficits listed in the problem list box on initial evaluation, provide pt/family education and to maximize pt's level of independence in the home and community environment.     Patient's spiritual, cultural and educational needs considered and pt agreeable to plan of care and goals.     Anticipated barriers to physical therapy: no    Short Term Goals:  4 weeks Status  Date Met   PAIN: Pt will report worst pain of 2/10 in order to progress toward max functional ability and improve quality of life. [x] Progressing  [] Met  [] Not Met     FUNCTION: Patient will demonstrate improved function as indicated by a score of greater than or equal to 55 out of 100 on FOTO. [x] Progressing  [] Met  [] Not Met     MOBILITY: Patient will improve  AROM to 50% of stated goals, listed in objective measures above, in order to progress towards independence with functional activities.  [x] Progressing  [] Met  [] Not Met     STRENGTH: Patient will improve strength to 50% of stated goals, listed in objective measures above, in order to progress towards independence with functional activities. [x] Progressing  [] Met  [] Not Met     POSTURE: Patient will correct postural deviations in sitting and standing, to decrease pain and promote long term stability.  [x] Progressing  [] Met  [] Not Met     HEP: Patient will demonstrate independence with HEP in order to progress toward functional independence. [x] Progressing  [] Met  [] Not Met        Long Term Goals:  6 weeks Status Date Met   PAIN: Pt will report worst pain of 0/10 in order to progress toward max functional ability and improve quality of life [x] Progressing  [] Met  [] Not Met     FUNCTION: Patient will demonstrate improved function as indicated by a score of greater than or equal to 75 out of 100 on FOTO. [x] Progressing  [] Met  [] Not Met     MOBILITY: Patient will improve AROM to stated goals, listed in objective measures above, in order to return to maximal functional potential and improve quality of life.  [x] Progressing  [] Met  [] Not Met     STRENGTH: Patient will improve strength to stated goals, listed in objective measures above, in order to improve functional independence and quality of life.  [x] Progressing  [] Met  [] Not Met     Patient will return to normal ADL's, IADL's, community involvement, recreational activities, and work-related activities with less than or equal to 0/10 pain and maximal function.  [x] Progressing  [] Met  [] Not Met        Plan      Supervising PT [place] to treat.    Plan of care Certification: 5/7/2024 to 6/21/2024.     Outpatient Physical Therapy 2 times weekly for 6 weeks to include any combination of the following interventions: virtual visits, dry needling,  modalities, electrical stimulation (IFC, Pre-Mod, Attended with Functional Dry Needling), Cervical/Lumbar Traction, Electrical Stimulation IFC, Gait Training, Manual Therapy, Moist Heat/ Ice, Neuromuscular Re-ed, Patient Education, Self Care, Therapeutic Activities, Therapeutic Exercise, Ultrasound, and Therapeutic Activites      Virgilio Sanchez, PT, DPT

## 2024-12-22 ENCOUNTER — HOSPITAL ENCOUNTER (EMERGENCY)
Facility: HOSPITAL | Age: 54
Discharge: HOME | End: 2024-12-22
Attending: STUDENT IN AN ORGANIZED HEALTH CARE EDUCATION/TRAINING PROGRAM
Payer: MEDICARE

## 2024-12-22 VITALS
RESPIRATION RATE: 15 BRPM | BODY MASS INDEX: 31.99 KG/M2 | HEIGHT: 69 IN | HEART RATE: 96 BPM | DIASTOLIC BLOOD PRESSURE: 78 MMHG | WEIGHT: 216 LBS | SYSTOLIC BLOOD PRESSURE: 139 MMHG | TEMPERATURE: 98 F | OXYGEN SATURATION: 99 %

## 2024-12-22 DIAGNOSIS — M48.061 NEUROFORAMINAL STENOSIS OF LUMBAR SPINE: ICD-10-CM

## 2024-12-22 DIAGNOSIS — M54.41 ACUTE RIGHT-SIDED LOW BACK PAIN WITH RIGHT-SIDED SCIATICA: Primary | ICD-10-CM

## 2024-12-22 LAB
BILIRUB UR QL STRIP: NEGATIVE
CLARITY UR REFRACT.AUTO: CLEAR
COLOR UR AUTO: YELLOW
GLUCOSE UR QL STRIP: NEGATIVE
HGB UR QL STRIP: NEGATIVE
KETONES UR QL STRIP: NEGATIVE
LEUKOCYTE ESTERASE UR QL STRIP: NEGATIVE
NITRITE UR QL STRIP: NEGATIVE
PH UR STRIP: 6 [PH] (ref 5–8)
PROT UR QL STRIP: NEGATIVE
SP GR UR STRIP: 1.02 (ref 1–1.03)
URN SPEC COLLECT METH UR: NORMAL
UROBILINOGEN UR STRIP-ACNC: NEGATIVE EU/DL

## 2024-12-22 PROCEDURE — 99285 EMERGENCY DEPT VISIT HI MDM: CPT | Mod: 25,ER

## 2024-12-22 PROCEDURE — 51798 US URINE CAPACITY MEASURE: CPT | Mod: ER

## 2024-12-22 PROCEDURE — 96372 THER/PROPH/DIAG INJ SC/IM: CPT

## 2024-12-22 PROCEDURE — 63600175 PHARM REV CODE 636 W HCPCS: Mod: ER

## 2024-12-22 PROCEDURE — 25000003 PHARM REV CODE 250: Mod: ER

## 2024-12-22 PROCEDURE — 81003 URINALYSIS AUTO W/O SCOPE: CPT | Mod: ER

## 2024-12-22 RX ORDER — CYCLOBENZAPRINE HCL 10 MG
10 TABLET ORAL 3 TIMES DAILY PRN
Qty: 45 TABLET | Refills: 0 | Status: SHIPPED | OUTPATIENT
Start: 2024-12-22 | End: 2025-01-06

## 2024-12-22 RX ORDER — LIDOCAINE 50 MG/G
1 PATCH TOPICAL
Status: DISCONTINUED | OUTPATIENT
Start: 2024-12-22 | End: 2024-12-22 | Stop reason: HOSPADM

## 2024-12-22 RX ORDER — HYDROCODONE BITARTRATE AND ACETAMINOPHEN 5; 325 MG/1; MG/1
1 TABLET ORAL EVERY 8 HOURS PRN
Qty: 10 TABLET | Refills: 0 | Status: SHIPPED | OUTPATIENT
Start: 2024-12-22

## 2024-12-22 RX ORDER — LIDOCAINE 50 MG/G
1 PATCH TOPICAL DAILY
Qty: 7 PATCH | Refills: 0 | Status: SHIPPED | OUTPATIENT
Start: 2024-12-22

## 2024-12-22 RX ORDER — METHOCARBAMOL 500 MG/1
500 TABLET, FILM COATED ORAL
Status: COMPLETED | OUTPATIENT
Start: 2024-12-22 | End: 2024-12-22

## 2024-12-22 RX ORDER — DEXAMETHASONE SODIUM PHOSPHATE 4 MG/ML
8 INJECTION, SOLUTION INTRA-ARTICULAR; INTRALESIONAL; INTRAMUSCULAR; INTRAVENOUS; SOFT TISSUE
Status: COMPLETED | OUTPATIENT
Start: 2024-12-22 | End: 2024-12-22

## 2024-12-22 RX ORDER — MELOXICAM 15 MG/1
15 TABLET ORAL DAILY
Qty: 30 TABLET | Refills: 0 | Status: SHIPPED | OUTPATIENT
Start: 2024-12-22

## 2024-12-22 RX ORDER — KETOROLAC TROMETHAMINE 10 MG/1
10 TABLET, FILM COATED ORAL
Status: COMPLETED | OUTPATIENT
Start: 2024-12-22 | End: 2024-12-22

## 2024-12-22 RX ADMIN — METHOCARBAMOL 500 MG: 500 TABLET ORAL at 10:12

## 2024-12-22 RX ADMIN — DEXAMETHASONE SODIUM PHOSPHATE 8 MG: 4 INJECTION, SOLUTION INTRA-ARTICULAR; INTRALESIONAL; INTRAMUSCULAR; INTRAVENOUS; SOFT TISSUE at 10:12

## 2024-12-22 RX ADMIN — KETOROLAC TROMETHAMINE 10 MG: 10 TABLET, FILM COATED ORAL at 10:12

## 2024-12-22 RX ADMIN — LIDOCAINE 1 PATCH: 50 PATCH CUTANEOUS at 10:12

## 2024-12-22 NOTE — ED PROVIDER NOTES
Encounter Date: 2024       History     Chief Complaint   Patient presents with    Back Pain    Leg Pain     Pt arrived to ED via POV c/o insidious back pain x 5 days, secondary to digging a hole to bury a pet. Pt reports a hx of degenerative disc disease and states the pain has radiated down the posterior right leg to the anterior knee and foot. Pt endorses minor pain when urinating and reports a slow flow. Pt has utilized ice/heat therapy at home with minimal relief of symptoms.     Mario Castro Sr. is a 54 y.o. male who has a past medical history of Arthritis, Diverticulitis of intestine, and Hypertension. presenting to the Emergency Department for back pain.  Patient reports he was taking a grave for a  pet 5 days ago.  No exact injury, but he reports persistent right-sided lower back pain since. The pain radiates from the right sided lower back to the buttock, posterior thigh and anterolateral shin. He feels a tingling sensation that is persistent. Reports he had a couple episodes where it was difficult to initiate urine stream, but had normal flow this morning. Denies any bowel bladder incontinence/retention, saddle anesthesia, fever. He is ambulatory. Difficulty with sitting or standing. Hx of degenerative disease in the back.         The history is provided by the patient.     Review of patient's allergies indicates:  No Known Allergies  Past Medical History:   Diagnosis Date    Arthritis           to right hip    Diverticulitis of intestine     Hypertension      Past Surgical History:   Procedure Laterality Date    COLECTOMY  2018    COLONOSCOPY N/A 2018         COLONOSCOPY N/A 2022    Procedure: COLONOSCOPY;  Surgeon: Juno Curry MD;  Location: [facility];  Service: Endoscopy;  Laterality: N/A;    CYSTOSCOPY W/ URETERAL STENT PLACEMENT  2018    CYSTOSCOPY W/ URETERAL STENT REMOVAL Left 2018    Procedure: CYSTOSCOPY, WITH URETERAL STENT REMOVAL;  Surgeon: Tommie  JOANNA Hernandez MD;  Location: [facility] OR;  Service: Urology;  Laterality: Left;    CYSTOURETHROSCOPY WITH DIRECT VISION INTERNAL URETHROTOMY Left 07/24/2018    Procedure: CYSTOSCOPY, WITH DIRECT VISION INTERNAL URETHROTOMY;  Surgeon: Tommie Hernandez MD;  Location: [facility] OR;  Service: Urology;  Laterality: Left;    JOINT REPLACEMENT       Family History   Problem Relation Name Age of Onset    Hypertension Mother      Heart disease Mother      No Known Problems Father       Social History     Tobacco Use    Smoking status: Never    Smokeless tobacco: Never   Substance Use Topics    Alcohol use: No    Drug use: No     Review of Systems   Constitutional:  Negative for chills and fever.   Gastrointestinal:  Negative for nausea and vomiting.   Genitourinary:  Positive for difficulty urinating. Negative for dysuria.   Musculoskeletal:  Positive for arthralgias and back pain. Negative for gait problem.   Skin:  Negative for color change.   Neurological:  Positive for numbness (tingling). Negative for speech difficulty and weakness.   Psychiatric/Behavioral:  Negative for agitation and confusion.        Physical Exam     Initial Vitals [12/22/24 0929]   BP Pulse Resp Temp SpO2   (!) 142/82 104 18 97.9 °F (36.6 °C) 100 %      MAP       --         Physical Exam    Nursing note and vitals reviewed.  Constitutional: He appears well-developed and well-nourished. He is not diaphoretic.  Non-toxic appearance. No distress.   HENT:   Head: Normocephalic and atraumatic.   Right Ear: External ear normal.   Left Ear: External ear normal.   Eyes: EOM are normal.   Neck: Neck supple.   Normal range of motion.  Cardiovascular:  Normal rate.           Pulmonary/Chest: No respiratory distress.   Abdominal: He exhibits no distension.   Musculoskeletal:        Arms:       Cervical back: Normal range of motion and neck supple.      Comments: No midline thoracic or lumbar tenderness. R sided lumbar paraspinal and gluteal tenderness. 5/5 knee  flexion extension, EHL, FHL bilaterally. 2+ DP pulses. Sensation intact throughout, though patient reports mild decrease in sensation to right anterolateral tibia. Ambulatory with stable gait, walks slowly.      Neurological: He is alert and oriented to person, place, and time. GCS score is 15. GCS eye subscore is 4. GCS verbal subscore is 5. GCS motor subscore is 6.   Skin: Skin is dry.   Psychiatric: He has a normal mood and affect. His behavior is normal. Judgment and thought content normal.         ED Course   Procedures  Labs Reviewed   URINALYSIS, REFLEX TO URINE CULTURE       Result Value    Specimen UA Urine, Clean Catch      Color, UA Yellow      Appearance, UA Clear      pH, UA 6.0      Specific Gravity, UA 1.020      Protein, UA Negative      Glucose, UA Negative      Ketones, UA Negative      Bilirubin (UA) Negative      Occult Blood UA Negative      Nitrite, UA Negative      Urobilinogen, UA Negative      Leukocytes, UA Negative      Narrative:     Preferred Collection Type->Urine, Clean Catch  Specimen Source->Urine          Imaging Results              CT Lumbar Spine Without Contrast (Final result)  Result time 12/22/24 11:41:49      Final result by Darrell Carroll III, MD (12/22/24 11:41:49)                   Impression:      Negative for compression fracture.    Advanced multilevel degenerative changes with severe central canal and lateral recess stenosis L3-4.  Severe right neural foraminal stenosis.    Moderate central canal and severe lateral recess stenosis L4-5.  Severe bilateral neural foraminal stenosis.    Severe right neural foraminal stenosis L5-S1.      Electronically signed by: Darrell Carroll  Date:    12/22/2024  Time:    11:41               Narrative:    EXAMINATION:  CT LUMBAR SPINE WITHOUT CONTRAST    CLINICAL HISTORY:  Lower back pain.    TECHNIQUE:  Low dose axial images, sagittal and coronal reformations were performed though the cervical spine.  Contrast was not  administered.    COMPARISON:  None    FINDINGS:  Minimal retrolisthesis L2-3.  All lumbar vertebral bodies are normal in height.  Negative for acute fracture.  Negative for suspicious osseous lesion.    L1-2: Mild asymmetric to the right disc bulging and osteophytic ridging with moderate left and severe right facet arthropathy.  Severe right neural foraminal stenosis.  Left neural foramen and central canal patent.    L2-3: Minimal retrolisthesis.  Normal posterior disc margin.  Moderate facet arthropathy.  Central canal patent.  Mild bilateral neural foraminal stenosis.    L3-L4: Generalized disc bulging, severe facet arthropathy and ligamentum flavum hypertrophy.  Severe central canal and lateral recess stenosis.  Severe right and mild left neural foraminal stenosis.    L4-5: Mild disc height loss with vacuum disc phenomenon.  Prominent, generalized disc bulge with calcification the posterior disc margin.  Severe facet arthropathy and ligamentum flavum hypertrophy.  Lateral recess stenosis, worse on the left with moderate central canal stenosis.  Severe bilateral neural foraminal stenosis.    L5-S1: Mild disc bulging with severe facet arthropathy.  Central canal patent.  Severe right neural foraminal stenosis.  Left neural foramen patent.    Paravertebral soft tissues and musculature are normal.  Right renal cyst noted.  Colonic anastomosis distal colon.  Incompletely visualized right hip arthroplasty.                                       Medications   LIDOcaine 5 % patch 1 patch (1 patch Transdermal Patch Applied 12/22/24 1043)   dexAMETHasone injection 8 mg (8 mg Intramuscular Given 12/22/24 1043)   ketorolac tablet 10 mg (10 mg Oral Given 12/22/24 1042)   methocarbamoL tablet 500 mg (500 mg Oral Given 12/22/24 1042)     Medical Decision Making  Degenerative disc disease, facet syndrome, muscle spasms, herniated disc, sciatica, ligament injury, vertebral fracture, spinal stenosis, spondylolisthesis, piriformis  syndrome, osteomyelitis, epidural abscess, cancer, cauda equina syndrome, AAA    Patient presents with right sided lower back pain with radiculopathy after digging a large hole 5 days ago. No midline tenderness. He does have some decrease in sensation to R anterolateral tibia though sensation is intact throughout. Low suspicion for cauda equina given no bilateral symptoms, saddle anesthesia. UA unremarkable. Bladder scan <100 mls. CT lumbar shows severe neural foraminal stenosis at multiple levels. Patient does not have any weakness. I have low suspicion for a more emergent cause of back pain including cord compression, cauda equina, acute fracture, epidural abscess, osteomyelitis, pyelonephritis, or AAA. Patient was treated with decadron, toradol, robaxin, lidoderm. He will be discharged with similar medications. Short course of opioids for breakthrough pain. Referral to neurosurgery for continued management. Close follow up with PCP advised. ED return precautions discussed for neurological signs and symptoms, intractable pain, any new concerning sign or symptom.          Problems Addressed:  Acute right-sided low back pain with right-sided sciatica: acute illness or injury  Neuroforaminal stenosis of lumbar spine: acute illness or injury    Amount and/or Complexity of Data Reviewed  External Data Reviewed: radiology.     Details: MRI 2022  [facility] Med Visit Nov. HTN, HLD, decreased urinary stream  Labs:  Decision-making details documented in ED Course.  Radiology: ordered. Decision-making details documented in ED Course.  Discussion of management or test interpretation with external provider(s): none    Risk  Prescription drug management.  Risk Details: Comorbidities taken into consideration during the patient's evaluation and treatment include arthritis, HTN, diverticulitis  Social determinants of health taken into consideration during development of our treatment plan include difficulty in obtaining follow-up and  obtaining medications; health literacy                 ED Course as of 12/22/24 1211   Sun Dec 22, 2024   1028 MRI LUMBAR SPINE WITHOUT CONTRAST (5/26/22 1349)  Hx of neuralforaminal narrowing T12-L1, L2-L4. Spondylolisthesis L4-L5. Disc desiccation [CS]   1153 Urinalysis, Reflex to Urine Culture Urine, Clean Catch  unremarkable [CS]   1153 CT Lumbar Spine Without Contrast  Negative for compression fracture.     Advanced multilevel degenerative changes with severe central canal and lateral recess stenosis L3-4.  Severe right neural foraminal stenosis.     Moderate central canal and severe lateral recess stenosis L4-5.  Severe bilateral neural foraminal stenosis.     Severe right neural foraminal stenosis L5-S1.   [CS]   1156 Bladder scan <100 mls [CS]   1210 All results discussed with patient and wife. He has had mild improvement with medications here. Neurosurgery follow up. Patient is agreeable to plan.  All questions have been answered. [CS]      ED Course User Index  [CS] Wilda Limon PA-C                           Clinical Impression:  Final diagnoses:  [M54.41] Acute right-sided low back pain with right-sided sciatica (Primary)  [M48.061] Neuroforaminal stenosis of lumbar spine          ED Disposition Condition    Discharge Stable          ED Prescriptions       Medication Sig Dispense Start Date End Date Auth. Provider    meloxicam (MOBIC) 15 MG tablet Take 1 tablet (15 mg total) by mouth once daily. 30 tablet 12/22/2024 -- Wilda Limon PA-C    LIDOcaine (LIDODERM) 5 % Place 1 patch onto the skin once daily. Remove & Discard patch within 12 hours or as directed by MD Jiang patch 12/22/2024 -- Wilda Limon PA-C    HYDROcodone-acetaminophen (NORCO) 5-325 mg per tablet Take 1 tablet by mouth every 8 (eight) hours as needed for Pain. 10 tablet 12/22/2024 -- Wilda Limon PA-C    cyclobenzaprine (FLEXERIL) 10 MG tablet Take 1 tablet (10 mg total) by mouth 3 (three) times daily as  needed for Muscle spasms. 45 tablet 12/22/2024 1/6/2025 Wilda Limon PA-C          Follow-up Information       Follow up With Specialties Details Why Contact Info    Juno Bar MD Neurosurgery Schedule an appointment as soon as possible for a visit   [address] [facility]  [address]  [facility] [address]  [phone]               Wilda Limon PA-C  12/22/24 1213

## 2024-12-22 NOTE — DISCHARGE INSTRUCTIONS
It was nice meeting you, and I hope you feel better soon. You may return to the ER at any time for any new or concerning symptoms, worsening condition, or failure to improve.    Our goal in the ER is to always give you outstanding care and exceptional service. You may receive a survey by mail or email in the next week about your experience in our ED. We would greatly appreciate you completing and returning the survey. Your feedback provides us with a way to recognize our staff who give very good care and it helps us learn how to improve when your experience was below our aspiration of excellence.     Sincerely,     Wilda Limon PA-C  Emergency Room Physician Assistant  [facility]

## 2025-01-08 ENCOUNTER — TELEPHONE (OUTPATIENT)
Dept: NEUROSURGERY | Facility: CLINIC | Age: 55
End: 2025-01-08
Payer: MEDICARE

## 2025-01-08 DIAGNOSIS — M54.9 BACK PAIN, UNSPECIFIED BACK LOCATION, UNSPECIFIED BACK PAIN LATERALITY, UNSPECIFIED CHRONICITY: Primary | ICD-10-CM

## 2025-01-09 ENCOUNTER — HOSPITAL ENCOUNTER (OUTPATIENT)
Dept: RADIOLOGY | Facility: HOSPITAL | Age: 55
Discharge: HOME | End: 2025-01-09
Attending: PHYSICIAN ASSISTANT
Payer: MEDICARE

## 2025-01-09 ENCOUNTER — OFFICE VISIT (OUTPATIENT)
Dept: NEUROSURGERY | Facility: CLINIC | Age: 55
End: 2025-01-09
Payer: MEDICARE

## 2025-01-09 ENCOUNTER — TELEPHONE (OUTPATIENT)
Dept: PAIN MEDICINE | Facility: CLINIC | Age: 55
End: 2025-01-09
Payer: MEDICARE

## 2025-01-09 VITALS
HEART RATE: 68 BPM | BODY MASS INDEX: 32 KG/M2 | SYSTOLIC BLOOD PRESSURE: 113 MMHG | HEIGHT: 69 IN | DIASTOLIC BLOOD PRESSURE: 78 MMHG | WEIGHT: 216.06 LBS

## 2025-01-09 DIAGNOSIS — M43.16 SPONDYLOLISTHESIS, LUMBAR REGION: ICD-10-CM

## 2025-01-09 DIAGNOSIS — M54.16 LUMBAR RADICULOPATHY: ICD-10-CM

## 2025-01-09 DIAGNOSIS — M48.061 NEUROFORAMINAL STENOSIS OF LUMBAR SPINE: ICD-10-CM

## 2025-01-09 DIAGNOSIS — M48.062 SPINAL STENOSIS, LUMBAR REGION, WITH NEUROGENIC CLAUDICATION: ICD-10-CM

## 2025-01-09 DIAGNOSIS — M47.26 OTHER SPONDYLOSIS WITH RADICULOPATHY, LUMBAR REGION: ICD-10-CM

## 2025-01-09 DIAGNOSIS — M54.42 CHRONIC BILATERAL LOW BACK PAIN WITH BILATERAL SCIATICA: Primary | ICD-10-CM

## 2025-01-09 DIAGNOSIS — G89.29 CHRONIC BILATERAL LOW BACK PAIN WITH BILATERAL SCIATICA: ICD-10-CM

## 2025-01-09 DIAGNOSIS — M54.42 CHRONIC BILATERAL LOW BACK PAIN WITH BILATERAL SCIATICA: ICD-10-CM

## 2025-01-09 DIAGNOSIS — G89.29 CHRONIC BILATERAL LOW BACK PAIN WITH BILATERAL SCIATICA: Primary | ICD-10-CM

## 2025-01-09 DIAGNOSIS — M48.02 CERVICAL SPINAL STENOSIS: ICD-10-CM

## 2025-01-09 DIAGNOSIS — M54.9 BACK PAIN, UNSPECIFIED BACK LOCATION, UNSPECIFIED BACK PAIN LATERALITY, UNSPECIFIED CHRONICITY: ICD-10-CM

## 2025-01-09 DIAGNOSIS — M54.41 CHRONIC BILATERAL LOW BACK PAIN WITH BILATERAL SCIATICA: ICD-10-CM

## 2025-01-09 DIAGNOSIS — M48.062 SPINAL STENOSIS, LUMBAR REGION WITH NEUROGENIC CLAUDICATION: ICD-10-CM

## 2025-01-09 DIAGNOSIS — M47.816 SPONDYLOSIS OF LUMBAR REGION WITHOUT MYELOPATHY OR RADICULOPATHY: Primary | ICD-10-CM

## 2025-01-09 DIAGNOSIS — M54.41 CHRONIC BILATERAL LOW BACK PAIN WITH BILATERAL SCIATICA: Primary | ICD-10-CM

## 2025-01-09 DIAGNOSIS — M51.362 DEGENERATION OF INTERVERTEBRAL DISC OF LUMBAR REGION WITH DISCOGENIC BACK PAIN AND LOWER EXTREMITY PAIN: ICD-10-CM

## 2025-01-09 PROCEDURE — 72110 X-RAY EXAM L-2 SPINE 4/>VWS: CPT | Mod: TC,FY

## 2025-01-09 PROCEDURE — 72110 X-RAY EXAM L-2 SPINE 4/>VWS: CPT | Mod: 26,,, | Performed by: STUDENT IN AN ORGANIZED HEALTH CARE EDUCATION/TRAINING PROGRAM

## 2025-01-09 PROCEDURE — 99999 PR PBB SHADOW E&M-EST. PATIENT-LVL IV: CPT | Mod: PBBFAC,,, | Performed by: PHYSICIAN ASSISTANT

## 2025-01-09 NOTE — TELEPHONE ENCOUNTER
----- Message from Carlene Shelton PA-C sent at 2025 10:19 AM CST -----  Regarding: Order for DENICE STOREY Zurdo    Patient Name: DENICE STOREY SR.([id number])  Sex: Male  : 1970      PCP: RAVINDRA ROUSE JR    Center: [facility]     Types of orders made on 2025: Outpatient Referral, Procedure Request    Order Date:2025  Ordering User:CARLENE SHELTON [[id number]]  Encounter Provider:Carlene Shelton PA-C [[id number]]  Authorizing Provider: Carlene Shelton PA-C [[id number]]  Supervising Provider:OLIVA ARITA [[id number]]  Type of Supervision:Supervision Required  Department:[facility] NEUROSURGERY[[id number]]    Common Order Information  Procedure -> Epidural Injection (specify level) Cmt: L5-S1 IL BLAISE    Order Specific Information  Order: Procedure Request Order for Pain Management [Custom: [id number]]  Order #:          [id number]: 1 FUTURE    Priority: Routine  Class: Clinic Performed    Future Order Information      Expires on:2026            Expected by:2025                   Associated Diagnoses      M54.42, M54.41, G89.29 Chronic bilateral low back pain with bilateral       sciatica      M47.26 Other spondylosis with radiculopathy, lumbar region      M51.362 Degeneration of intervertebral disc of lumbar region with       discogenic back pain and lower extremity pain      M54.16 Lumbar radiculopathy      M48.062 Spinal stenosis, lumbar region with neurogenic claudication      M43.16 Spondylolisthesis, lumbar region      Physician -> Gelter         Facility Name: -> [facility]           Priority: Routine  Class: Clinic Performed    Future Order Information      Expires on:2026            Expected by:2025                   Associated Diagnoses      M54.42, M54.41, G89.29 Chronic bilateral low back pain with bilateral       sciatica      M47.26 Other spondylosis with radiculopathy, lumbar region      M51.362 Degeneration of intervertebral disc of lumbar region  with       discogenic back pain and lower extremity pain      M54.16 Lumbar radiculopathy      M48.062 Spinal stenosis, lumbar region with neurogenic claudication      M43.16 Spondylolisthesis, lumbar region      Procedure -> Epidural Injection (specify level) Cmt: L5-S1 IL BLAISE        Physician -> Gelter         Facility Name: -> [facility]

## 2025-01-09 NOTE — PROGRESS NOTES
Subjective:     Patient ID:  Mario Castro Sr. is a 54 y.o. male.    Y[place]any    Chief Complaint:  Back pain and bilateral leg pain.  Bilateral hand numbness    HPI    Mario Castro Sr. is a 54 y.o. male who presents with the above CC.    Patient states he has had back and leg pain starting 2016 on and off over the years.  Patient states about a few weeks ago he was burying his dog and started having severe pain in the back and right leg and eventually went to the emergency room.  The pain has been worse than his typical pain.  His pain is in the low back into the bilateral posterior legs into the posterior and anterior lower legs.  Pain is worse when walking and standing better with sitting.    The leg pain is worse than the back pain.      The right leg is worse than the left leg.    He has bilateral hand numbness that comes and goes.  Occasionally drops things and difficulty with opening jars.  No difficulty with other fine movements skills.    He does stumble sometimes with his right leg and foot.  Denies frequent falls.      He had physical therapy multiple times with the last time 1 year ago that helped some.  He has had 1 epidural steroid injection in the low back with some relief.  No spine surgery.    He takes Mobic and Flexeril.     Patient denies any recent accidents or trauma, no saddle anesthesias, and no bowel or bladder incontinence.    Review of Systems:    Review of Systems   Constitutional:  Negative for chills, diaphoresis, fever, malaise/fatigue and weight loss.   HENT:  Negative for congestion, ear discharge, ear pain, hearing loss, nosebleeds, sinus pain, sore throat and tinnitus.    Eyes:  Negative for blurred vision, double vision, photophobia, pain, discharge and redness.   Respiratory:  Negative for cough, hemoptysis, sputum production, shortness of breath, wheezing and stridor.    Cardiovascular:  Negative for chest pain, palpitations, orthopnea, leg swelling and PND.   Gastrointestinal:   Negative for abdominal pain, blood in stool, constipation, diarrhea, heartburn, melena, nausea and vomiting.   Genitourinary:  Negative for dysuria, flank pain, frequency, hematuria and urgency.   Musculoskeletal:  Positive for back pain and joint pain. Negative for falls, myalgias and neck pain.   Skin:  Negative for itching and rash.   Neurological:  Positive for tingling and headaches. Negative for dizziness, tremors, sensory change, speech change, seizures, loss of consciousness and weakness.   Endo/Heme/Allergies:  Negative for environmental allergies and polydipsia. Does not bruise/bleed easily.   Psychiatric/Behavioral:  Negative for depression, hallucinations, memory loss and substance abuse. The patient is not nervous/anxious and does not have insomnia.          Past Medical History:   Diagnosis Date    Arthritis           to right hip    Diverticulitis of intestine     Hypertension      Past Surgical History:   Procedure Laterality Date    COLECTOMY  05/2018    COLONOSCOPY N/A 03/08/2018         COLONOSCOPY N/A 9/29/2022    Procedure: COLONOSCOPY;  Surgeon: Juno Curry MD;  Location: [facility];  Service: Endoscopy;  Laterality: N/A;    CYSTOSCOPY W/ URETERAL STENT PLACEMENT  05/2018    CYSTOSCOPY W/ URETERAL STENT REMOVAL Left 07/24/2018    Procedure: CYSTOSCOPY, WITH URETERAL STENT REMOVAL;  Surgeon: Tommie Hernandez MD;  Location: [facility];  Service: Urology;  Laterality: Left;    CYSTOURETHROSCOPY WITH DIRECT VISION INTERNAL URETHROTOMY Left 07/24/2018    Procedure: CYSTOSCOPY, WITH DIRECT VISION INTERNAL URETHROTOMY;  Surgeon: Tommie Hernandez MD;  Location: [facility] OR;  Service: Urology;  Laterality: Left;    JOINT REPLACEMENT       Current Outpatient Medications on File Prior to Visit   Medication Sig Dispense Refill    atorvastatin (LIPITOR) 10 MG tablet Take 10 mg by mouth once daily at 6am.      enalapril (VASOTEC) 10 MG tablet Take 10 mg by mouth once daily.      gabapentin (NEURONTIN)  300 MG capsule Take 300 mg by mouth 3 (three) times daily.      HYDROcodone-acetaminophen (NORCO) 5-325 mg per tablet Take 1 tablet by mouth every 8 (eight) hours as needed for Pain. 10 tablet 0    LIDOcaine (LIDODERM) 5 % Place 1 patch onto the skin once daily. Remove & Discard patch within 12 hours or as directed by MD Jiang patch 0    meloxicam (MOBIC) 15 MG tablet Take 1 tablet (15 mg total) by mouth once daily. 30 tablet 0    metoprolol tartrate (LOPRESSOR) 100 MG tablet Take 100 mg by mouth once daily.      nystatin (MYCOSTATIN) cream APPLY TO THE AFFECTED AREA(S) EVERY DAY      omeprazole (PRILOSEC) 20 MG capsule       alfuzosin (UROXATRAL) 10 mg Tb24 Take 1 tablet (10 mg total) by mouth daily with breakfast. 30 tablet 12    ALPRAZolam (XANAX) 1 MG tablet Take 1 mg by mouth once daily at 6am.      cholestyramine (QUESTRAN) 4 gram packet Take 1 packet (4 g total) by mouth 3 (three) times daily with meals. 90 packet 2    oxybutynin (DITROPAN-XL) 10 MG 24 hr tablet Take 1 tablet (10 mg total) by mouth once daily. 30 tablet 11     No current facility-administered medications on file prior to visit.     Review of patient's allergies indicates:  No Known Allergies  Social History     Socioeconomic History    Marital status:    Tobacco Use    Smoking status: Never    Smokeless tobacco: Never   Substance and Sexual Activity    Alcohol use: No    Drug use: No    Sexual activity: Yes     Partners: Female     Social Drivers of Health     Financial Resource Strain: High Risk (6/28/2024)    Received from [facility]    Overall Financial Resource Strain (CARDIA)     Difficulty of Paying Living Expenses: Hard   Food Insecurity: Food Insecurity Present (6/28/2024)    Received from [facility]    Hunger Vital Sign     Worried About Running Out of Food in the Last Year: Sometimes true     Ran Out of Food in the Last Year: Patient declined   Transportation Needs: No Transportation Needs (6/28/2024)    [facility]    PRAPARE - Transportation     Lack of Transportation (Medical): No     Lack of Transportation (Non-Medical): No   Physical Activity: Sufficiently Active (6/28/2024)    [facility]    Exercise Vital Sign     Days of Exercise per Week: 5 days     Minutes of Exercise per Session: 30 min   Stress: Stress Concern Present (6/28/2024)    Received from [facility]    [employer] [employer] - Occupational Stress Questionnaire     Feeling of Stress : To some extent   Housing Stability: High Risk (6/28/2024)    Received from [facility]    Housing Stability Vital Sign     Unable to Pay for Housing in the Last Year: Yes     Number of Places Lived in the Last Year: 1     Unstable Housing in the Last Year: No     Family History   Problem Relation Name Age of Onset    Hypertension Mother      Heart disease Mother      No Known Problems Father         Objective:      Vitals:    01/09/25 0927   BP: 113/78   Pulse: 68   Weight: 98 kg (216 lb 0.8 oz)   Height: 5' 9" (1.753 m)         Physical Exam:      General:  Mario Castro Sr. is well-developed, well-nourished, appears stated age, in no acute distress, alert and oriented to person, place, and time.    Pulmonary/Chest:  Respiratory effort normal  Abdominal: Exhibits no distension  Psychiatric:  Normal mood and affect.  Behavior is normal.  Judgement and thought content normal      Musculoskeletal:    Patient is able to walk heel to toe without difficulty.      Cervical ROM:   No pain in cervical spine flexion, extension, left rotation, and right rotation, left lateral bending, and right lateral bending.    Cervical Spine Palpation:  No tenderness to cervical spine palpation.    Cervical Spine Inspection:  Normal with no surgical scars and no visible rashes.    Lumbar ROM:   Mild pain in lumbar flexion, extension, left lateral bending, and right lateral bending.    Lumbar Spine Inspection:  Normal with no surgical scars and no visible " rashes.    Lumbar Spine Palpation:  No tenderness to low back palpation.    SI Joint Palpation:  No tenderness to SI Joint palpation.    Straight Leg Raise:  Negative right and left SLR.      Neurological:      Muscle strength against resistance:     Right Left   Deltoid  5 / 5 5 / 5   Biceps 5 / 5 5 / 5   Triceps 5 / 5 5 / 5   Wrist flexion  5 / 5 5 / 5   Wrist extension 5 / 5 5 / 5   Finger abduction 5 / 5 5 / 5   Thumb opposition 5 / 5 5 / 5   Handgrip 5 / 5 5 / 5   Hip flexion  4 / 5 5 / 5   Hip extension 5 / 5 5 / 5   Hip abduction 5 / 5 5 / 5   Hip adduction 5/ 5 5 / 5   Knee extension  5 / 5 5 / 5   Knee flexion  5 / 5 5 / 5   Dorsiflexion  5 / 5 5 / 5   EHL  5 / 5 5 / 5   Plantar flexion  5 / 5 5 / 5   Inversion of the feet 5 / 5 5 / 5   Eversion of the feet 5 / 5 5 / 5       Reflexes:     Right Left   Triceps 2+ 2+   Biceps 2+ 2+   Brachioradialis 2+ 2+   Patellar 2+ 2+   Achilles 2+ 2+     Clonus:  Negative bilaterally  Brandt: Negative bilaterally    On gross examination of the bilateral upper and lower extremities, patient has no signs of clubbing, cyanosis, or edema.       XRAY Interpretation:     MRI and lumbar spine and reports were reviewed from 2022 which shows both cervical stenosis and lumbar stenosis.    Lumbar spine xrays were personally reviewed today.  No fractures.  No movement on flexion and extension.  Slight grade 1 spondylolisthesis L4-5.  Multilevel degenerative disc disease and spondylosis worse at L4-5.      CT lumbar spine p    Impression:     Negative for compression fracture.     Advanced multilevel degenerative changes with severe central canal and lateral recess stenosis L3-4.  Severe right neural foraminal stenosis.     Moderate central canal and severe lateral recess stenosis L4-5.  Severe bilateral neural foraminal stenosis.     Severe right neural foraminal stenosis L5-S1.        Electronically signed by:Darrell Carroll  Date:                                             12/22/2024  Time:                                           11:41    Assessment:          1. Spondylosis of lumbar region without myelopathy or radiculopathy    2. Neuroforaminal stenosis of lumbar spine    3. Chronic bilateral low back pain with bilateral sciatica    4. Other spondylosis with radiculopathy, lumbar region    5. Degeneration of intervertebral disc of lumbar region with discogenic back pain and lower extremity pain    6. Lumbar radiculopathy    7. Spinal stenosis, lumbar region with neurogenic claudication    8. Spondylolisthesis, lumbar region    9. Cervical spinal stenosis            Plan:          Orders Placed This Encounter    Ambulatory Referral/Consult to Physical Therapy/Occupational Therapy    Procedure Request Order for Pain Management     L3-4 and L4-5 central stenosis.  Right L5-S1 neural foraminal stenosis     -physical therapy through [facility]   -start taking gabapentin 300 mg twice a day   -continue Mobic and Flexeril   -L5-S1 interlaminar epidural steroid injection with pain management  -follow up in 3 months    Consider updating MRI cervical and lumbar spine in the future      Follow-Up:  Follow up in about 3 months (around 4/9/2025). If there are any questions prior to this, the patient was instructed to contact the office.       Carlene Torres [facility], PA-C  Neurosurgery  [facility]  01/09/2025

## 2025-01-29 ENCOUNTER — TELEPHONE (OUTPATIENT)
Dept: PAIN MEDICINE | Facility: CLINIC | Age: 55
End: 2025-01-29
Payer: MEDICARE

## 2025-02-03 NOTE — PRE-PROCEDURE INSTRUCTIONS
Unable to reach pt via phone.  Left voicemail with arrival time also informing pt of need for responsible  accompaniment and instructing pt to follow pre-procedure instructions provided via [facility] portal.  The following message was sent to pt's portal.      Dear Mario Castro,     Please read over the following pre-procedure instructions in it's entirety as there is helpful information here to get you well prepared for your upcoming procedure.     You are scheduled for a procedure with Dr. Petty on 2/5/25.     [facility] is located at the corner of [facility] and [facility]. [facility] to [facility]. The address is: [address] [place] [address]. Take the elevator to the 2nd floor.      Registration check in time: 1:15 pm  Scheduled procedure time: 2:15 pm     You are scheduled to receive:____X___Oral sedation                                                                 _______IV Sedation                                                                 _______No Sedation                                                                                           If you are receiving any sedation, you CANNOT drive yourself and must have a responsible friend or family member (no rideshare) to drive you home.     You should take any medications that you routinely take for blood pressure, heart medications, thyroid, cholesterol, etc.      *The fasting restrictions are dependent on whether or not you are receiving sedation. Sedation is not available for all procedures.      Your fasting instructions for sedation patients are as follow:  IV sedation. Nothing to eat after midnight the night prior to procedure. Patients are encouraged to consume clear liquids up to 2 hours prior to scheduled arrival time. -Clear liquids include Gatorade, water, soda, black coffee or tea (no milk or creamer), and clear juices. - Clear liquids do NOT include anything with pulp or food  particles (chicken broth, ice cream, yogurt, Jello, etc.) You CANNOT drive yourself and must have a .            If you are on blood thinners, you need to follow the anticoagulation instructions that had been discussed previously. You should only stop the blood thinners if it was approved by your primary care physician or your cardiologist. In the event that you are not able to stop your blood thinners, a blood thinner was not listed on your medication list, or we were not able to get clearance from your cardiologist, then the procedure may have to be postponed/canceled.      IF you were told to stop your blood thinners, this is how long you should generally hold some of the more common ones. Remember that stopping blood thinners is only necessary for certain procedures. If you are unsure of your instructions, please call us.   Aspirin - 5 days  Plavix/Clopidogrel - 7 days  Warfarin / Coumadin - 5 days  Eliquis - 3 days  Pradaxa/Dabigatran - 4 days  Xarelto/Rivaroxaban - 3 days        HOLD all non-insulin injections (shots) until after surgery (Semaglutide, Tirzepatide, Ozempic, Mounjaro, Trulicity, Victoza, Byetta, Wegovy and Adlyxin) (Total of 7 days prior)        If you are a diabetic, do not take your medication if you will be fasting, but bring it with you. Please plan on being here for roughly 2-3 hours. Please note that most procedures will not be performed if you blood sugar is >200.     Please call us if you have been sick (running fever, having any flu-like symptoms) or have been taking ANTIBIOTICS in the past 2 weeks or had any outpatient procedures other than with us (colonoscopy, endoscopy, OBGYN, dental, etc.).      If you have been previously COVID positive, you will need to hold off on your procedure until you are symptom free for 10 days. If you did not have any symptoms, you can have your procedure 10 days from your positive test result.         On the morning of your procedure:  *HOLD ALL  VITAMINS, MINERALS, HERBS (INCLUDING HERBAL TEAS) AND SUPPLEMENTS  *SHOWER WITH ANTIBACTERIAL SOAP (EX. DIAL) NIGHT BEFORE AND MORNING OF PROCEDURE  *DO NOT APPLY ANY LOTIONS, OILS, POWDERS, PERFUME/COLOGNE, OINTMENTS, GELS, CREAMS, MAKEUP OR DEODORANT TO YOUR SKIN MORNING OF PROCEDURE  *LEAVE JEWELRY AND ANY VALUABLES AT HOME  *WEAR LOOSE COMFORTABLE CLOTHING       In the event that you are running late or need to reschedule on the day of your procedure, please contact the pre-op desk at [phone].       Please reply to this portal message as receipt of delivery.     Thank you,  [facility] &  Lisa, LPN [facility]  Pre-Admit

## 2025-02-05 ENCOUNTER — HOSPITAL ENCOUNTER (OUTPATIENT)
Facility: HOSPITAL | Age: 55
Discharge: HOME | End: 2025-02-05
Attending: STUDENT IN AN ORGANIZED HEALTH CARE EDUCATION/TRAINING PROGRAM | Admitting: STUDENT IN AN ORGANIZED HEALTH CARE EDUCATION/TRAINING PROGRAM
Payer: MEDICARE

## 2025-02-05 VITALS
TEMPERATURE: 98 F | BODY MASS INDEX: 30.36 KG/M2 | HEIGHT: 69 IN | WEIGHT: 205 LBS | HEART RATE: 69 BPM | SYSTOLIC BLOOD PRESSURE: 110 MMHG | OXYGEN SATURATION: 100 % | DIASTOLIC BLOOD PRESSURE: 70 MMHG | RESPIRATION RATE: 14 BRPM

## 2025-02-05 DIAGNOSIS — G89.29 CHRONIC PAIN: ICD-10-CM

## 2025-02-05 DIAGNOSIS — M54.16 LUMBAR RADICULOPATHY: Primary | ICD-10-CM

## 2025-02-05 PROCEDURE — 25000003 PHARM REV CODE 250: Performed by: STUDENT IN AN ORGANIZED HEALTH CARE EDUCATION/TRAINING PROGRAM

## 2025-02-05 PROCEDURE — 63600175 PHARM REV CODE 636 W HCPCS: Performed by: STUDENT IN AN ORGANIZED HEALTH CARE EDUCATION/TRAINING PROGRAM

## 2025-02-05 PROCEDURE — 62323 NJX INTERLAMINAR LMBR/SAC: CPT | Performed by: STUDENT IN AN ORGANIZED HEALTH CARE EDUCATION/TRAINING PROGRAM

## 2025-02-05 PROCEDURE — 25500020 PHARM REV CODE 255: Performed by: STUDENT IN AN ORGANIZED HEALTH CARE EDUCATION/TRAINING PROGRAM

## 2025-02-05 PROCEDURE — 62323 NJX INTERLAMINAR LMBR/SAC: CPT | Mod: ,,, | Performed by: STUDENT IN AN ORGANIZED HEALTH CARE EDUCATION/TRAINING PROGRAM

## 2025-02-05 RX ORDER — LIDOCAINE HYDROCHLORIDE 20 MG/ML
INJECTION, SOLUTION EPIDURAL; INFILTRATION; INTRACAUDAL; PERINEURAL
Status: DISCONTINUED | OUTPATIENT
Start: 2025-02-05 | End: 2025-02-05 | Stop reason: HOSPADM

## 2025-02-05 RX ORDER — DEXAMETHASONE SODIUM PHOSPHATE 10 MG/ML
INJECTION, SOLUTION INTRA-ARTICULAR; INTRALESIONAL; INTRAMUSCULAR; INTRAVENOUS; SOFT TISSUE
Status: DISCONTINUED | OUTPATIENT
Start: 2025-02-05 | End: 2025-02-05 | Stop reason: HOSPADM

## 2025-02-05 RX ORDER — ALPRAZOLAM 0.5 MG/1
0.5 TABLET, ORALLY DISINTEGRATING ORAL ONCE AS NEEDED
Status: COMPLETED | OUTPATIENT
Start: 2025-02-05 | End: 2025-02-05

## 2025-02-05 RX ORDER — LIDOCAINE HYDROCHLORIDE 10 MG/ML
INJECTION, SOLUTION EPIDURAL; INFILTRATION; INTRACAUDAL; PERINEURAL
Status: DISCONTINUED | OUTPATIENT
Start: 2025-02-05 | End: 2025-02-05 | Stop reason: HOSPADM

## 2025-02-05 RX ADMIN — ALPRAZOLAM 0.5 MG: 0.5 TABLET, ORALLY DISINTEGRATING ORAL at 01:02

## 2025-02-05 NOTE — DISCHARGE SUMMARY
Discharge Note  Short Stay      SUMMARY     Admit Date: 2/5/2025    Attending Physician: Verna Petty      Discharge Physician: Verna Petty      Discharge Date: 2/5/2025 1:57 PM    Procedure(s) (LRB):  L5-S1 IL BLAISE (N/A)    Final Diagnosis: Chronic bilateral low back pain with bilateral sciatica [M54.42, M54.41, G89.29]  Other spondylosis with radiculopathy, lumbar region [M47.26]  Degeneration of intervertebral disc of lumbar region with discogenic back pain and lower extremity pain [M51.362]  Lumbar radiculopathy [M54.16]  Spinal stenosis, lumbar region, with neurogenic claudication [M48.062]  Spondylolisthesis, lumbar region [M43.16]    Disposition: Home or self care    Patient Instructions:   Current Discharge Medication List        CONTINUE these medications which have NOT CHANGED    Details   atorvastatin (LIPITOR) 10 MG tablet Take 10 mg by mouth once daily at 6am.      enalapril (VASOTEC) 10 MG tablet Take 10 mg by mouth once daily.      gabapentin (NEURONTIN) 300 MG capsule Take 300 mg by mouth 3 (three) times daily.      meloxicam (MOBIC) 15 MG tablet Take 1 tablet (15 mg total) by mouth once daily.  Qty: 30 tablet, Refills: 0      metoprolol tartrate (LOPRESSOR) 100 MG tablet Take 100 mg by mouth once daily.      nystatin (MYCOSTATIN) cream APPLY TO THE AFFECTED AREA(S) EVERY DAY      alfuzosin (UROXATRAL) 10 mg Tb24 Take 1 tablet (10 mg total) by mouth daily with breakfast.  Qty: 30 tablet, Refills: 12    Associated Diagnoses: Benign prostatic hyperplasia (BPH) with post-void dribbling      ALPRAZolam (XANAX) 1 MG tablet Take 1 mg by mouth once daily at 6am.      cholestyramine (QUESTRAN) 4 gram packet Take 1 packet (4 g total) by mouth 3 (three) times daily with meals.  Qty: 90 packet, Refills: 2      HYDROcodone-acetaminophen (NORCO) 5-325 mg per tablet Take 1 tablet by mouth every 8 (eight) hours as needed for Pain.  Qty: 10 tablet, Refills: 0    Comments: Quantity prescribed more than 7  day supply? No  Associated Diagnoses: Acute right-sided low back pain with right-sided sciatica; Neuroforaminal stenosis of lumbar spine      LIDOcaine (LIDODERM) 5 % Place 1 patch onto the skin once daily. Remove & Discard patch within 12 hours or as directed by MD  Qty: 7 patch, Refills: 0      omeprazole (PRILOSEC) 20 MG capsule       oxybutynin (DITROPAN-XL) 10 MG 24 hr tablet Take 1 tablet (10 mg total) by mouth once daily.  Qty: 30 tablet, Refills: 11    Associated Diagnoses: OAB (overactive bladder)                 Discharge Diagnosis: Chronic bilateral low back pain with bilateral sciatica [M54.42, M54.41, G89.29]  Other spondylosis with radiculopathy, lumbar region [M47.26]  Degeneration of intervertebral disc of lumbar region with discogenic back pain and lower extremity pain [M51.362]  Lumbar radiculopathy [M54.16]  Spinal stenosis, lumbar region, with neurogenic claudication [M48.062]  Spondylolisthesis, lumbar region [M43.16]  Condition on Discharge: Stable with no complications to procedure   Diet on Discharge: Same as before.  Activity: as per instruction sheet.  Discharge to: Home with a responsible adult.  Follow up: 2-4 weeks       Please call my office or pager at [phone] if experienced any weakness or loss of sensation, fever > 101.5, pain uncontrolled with oral medications, persistent nausea/vomiting/or diarrhea, redness or drainage from the incisions, or any other worrisome concerns. If physician on call was not reached or could not communicate with our office for any reason please go to the nearest emergency department

## 2025-02-05 NOTE — OP NOTE
Lumbar Interlaminar Epidural Steroid Injection under Fluoroscopic Guidance    The procedure, risks, benefits, and options were discussed with the patient. There are no contraindications to the procedure. The patent expressed understanding and agreed to the procedure. Informed written consent was obtained prior to the start of the procedure and can be found in the patient's chart.    PATIENT NAME: Mario Castro Sr.   MRN: [id number]     DATE OF PROCEDURE: 02/05/2025    PROCEDURE: Lumbar Interlaminar Epidural Steroid Injection L5/S1 under Fluoroscopic Guidance    PRE-OP DIAGNOSIS: Chronic bilateral low back pain with bilateral sciatica [M54.42, M54.41, G89.29]  Other spondylosis with radiculopathy, lumbar region [M47.26]  Degeneration of intervertebral disc of lumbar region with discogenic back pain and lower extremity pain [M51.362]  Lumbar radiculopathy [M54.16]  Spinal stenosis, lumbar region, with neurogenic claudication [M48.062]  Spondylolisthesis, lumbar region [M43.16] Lumbar radiculopathy [M54.16]      POST-OP DIAGNOSIS: Same    PHYSICIAN: Verna Petty DO    ASSISTANTS: None     MEDICATIONS INJECTED: Preservative-free Decadron 10mg with 4cc of Lidocaine 1% MPF and preservative free normal saline    LOCAL ANESTHETIC INJECTED: Xylocaine 2%     SEDATION: None    ESTIMATED BLOOD LOSS: None    COMPLICATIONS: None    TECHNIQUE: Time-out was performed to identify the patient and procedure to be performed. With the patient laying in a prone position, the surgical area was prepped and draped in the usual sterile fashion using ChloraPrep and a fenestrated drape. The level was determined under fluoroscopy guidance. Skin anesthesia was achieved by injecting Lidocaine 2% over the injection site. The interlaminar space was then approached with a 20 gauge,  3.5 inch Tuohy needle that was introduced under fluoroscopic guidance in the AP, lateral and/or contralateral oblique imaging. Once the Ligamentum flavum was  encountered loss of resistance to saline was used to enter the epidural space. With positive loss of resistance and negative aspiration for CSF or Blood, contrast dye Omnipaque (300mg/mL) was injected to confirm placement and there was no vascular runoff. 5 mL of the medication mixture listed above was injected slowly. Displacement of the radio opaque contrast after injection of the medication confirmed that the medication went into the area of the epidural space. The needles were removed and bleeding was nil. A sterile dressing was applied. No specimens collected. The patient tolerated the procedure well.       The patient was monitored after the procedure in the recovery area. They were given post-procedure and discharge instructions to follow at home. The patient was discharged in a stable condition.    Verna Petty DO

## 2025-02-05 NOTE — DISCHARGE INSTRUCTIONS
[facility] [place]  Dr. Verna Petty  Messaging service # [phone]    POST-PROCEDURE INSTRUCTIONS:    Today you had an injection that included a steroid medications.  The steroid may or may not have been mixed with a local anesthetic when it was injected.   If the injection was in the neck, you may feel some pressure, numbness, or slight weakness in the arm after the procedure for a short period of time (this is a normal response), if this persists for longer than 1 day please contact our office or go to the emergency room.  If the injection was in the low back, you may feel some pressure, numbness, or slight weakness in the leg after the procedure for a short period of time (this is a normal response), if this persists for longer than 1 day please contact our office or go to the emergency room.  You may get side effects from the steroid.  This is not uncommon.  Symptoms include: elevated blood sugar, elevated blood pressure, headache, flushing, nausea, insomnia.  These symptoms are transient and will resolve within 1-3 days.  If symptoms last longer than this please contact our office or head to the emergency room.  Steroid medications can take anywhere from 3-14 days to take effect (rarely longer).  You may notice that your pain worsens for a short period of time after the injection, this would not be unusual due to the pressure and trauma from the needle.    If you do not have a follow up appointment scheduled, please contact my office (or the office of the physician who referred you for the procedure) to get a post-procedure follow up scheduled 2-4 weeks after the procedure.  This can be done as a virtual visit if that is more convenient for you.      What you need to do:    Keep a record of your response to the injection you had today.    How much relief did you get?   When did the relief start and how long did it last?  Were you able to decrease the use of any of your pain  medications?  Were you able to increase your level of activity?  How long did the relief last?    What to watch out for:    If you experience any of the following symptoms after your procedure, please notify the messaging service immediately (see above for contact information):   fever (increased oral temperature)   bleeding or swelling at the injection site,    drainage, rash or redness at the injection site    possible signs of infection    increased pain at the injection site   worsening of your usual pain   severe headache   new or worsening numbness    new arm and/or leg weakness, or    changes in bowel and/or bladder function: urinating or defecating on yourself and not knowing that you did it.    PLEASE FOLLOW ALL INSTRUCTIONS CAREFULLY     Do not engage in strenuous activity (e.g., lifting or pushing heavy objects or repeated bending) for 24 hours.     Do not take a bath, swim or use Jacuzzi for 24 hours after procedure. (A shower is fine).   Remove any Band-Aids when you get home.    Use cold/ice, as needed for comfort.  We recommend the use of cold therapy alternating on for 20 minutes, off for 20 minutes.    Do not apply direct heat (heating pad or heat packs) to the injection site for 24 hours.     Resume your usual medications, unless instructed otherwise by your Pain Physician.     If you are on warfarin (Coumadin) or other blood thinner, resume this medication as instructed by your prescribing Physician.    IF AT ANY POINT YOU ARE VERY CONCERNED ABOUT YOUR SYMPTOMS, PLEASE GO TO THE EMERGENCY ROOM.    If you develop worsening pain, weakness, numbness, lose bowel or bladder control (i.e., having an accident where you did not even know you had to go to the bathroom and suddenly noticed you soiled yourself), saddle anesthesia (a loss of sensation restricted to the area of the buttocks, anus and between the legs -- i.e., those parts of your body that would touch a saddle if you were sitting on one) you  need to go immediately to the emergency department for evaluation and treatment.    ----------------------------------------------------------------------------------------------------------------------------------------------------------------  If you received Sedation please read the following instructions:  POST SEDATION INSTRUCTIONS    Today you received intravenous medication (also known as sedation) that was used to help you relax and/or decrease discomfort during your procedure. This medication will be acting in your body for the next 24 hours, so you might feel a little tired or sleepy. This feeling will slowly wear off.   Common side effects associated with these medications include: drowsiness, dizziness, sleepiness, confusion, feeling excited, difficulty remembering things, lack of steadiness with walking or balance, loss of fine muscle control, slowed reflexes, difficulty focusing, and blurred vision.  Some over-the-counter and prescription medications (e.g., muscle relaxants, opioids, mood-altering medications, sedatives/hypnotics, antihistamines) can interact with the intravenous medication you received and cause an increased risk of the side effects listed above in addition to other potentially life threatening side effects. Use extreme caution if you are taking such medications, and consult with your Pain Physician or prescribing physician if you have any questions.  For the next 12-24 hours:    DO NOT--Drive a car, operate machinery or power tools   DO NOT--Drink any alcoholic beverages (not even beer), they may dangerously increase the risk of side effects.    DO NOT--Make any important legal or business decisions or sign important documents.  We advise you to have someone to assist you at home. Move slowly and carefully. Do not make sudden changes in position. Be aware of dizziness or light-headedness and move accordingly.   If you seek medical treatment within 24 hours, let the nurse or doctor  caring for you know that you have received the above medications. If you have any questions or concerns related to your sedation or treatment today please contact us.

## 2025-02-05 NOTE — H&P
HPI  Patient presenting for Procedure(s) (LRB):  L5-S1 IL BLAISE (N/A)     Patient on Anti-coagulation No    No health changes since previous encounter    Past Medical History:   Diagnosis Date    Arthritis           to right hip    Diverticulitis of intestine     Hypertension      Past Surgical History:   Procedure Laterality Date    COLECTOMY  05/2018    COLONOSCOPY N/A 03/08/2018         COLONOSCOPY N/A 9/29/2022    Procedure: COLONOSCOPY;  Surgeon: Juno Curry MD;  Location: [facility] ENDO;  Service: Endoscopy;  Laterality: N/A;    CYSTOSCOPY W/ URETERAL STENT PLACEMENT  05/2018    CYSTOSCOPY W/ URETERAL STENT REMOVAL Left 07/24/2018    Procedure: CYSTOSCOPY, WITH URETERAL STENT REMOVAL;  Surgeon: Tommie Hernandez MD;  Location: [facility] OR;  Service: Urology;  Laterality: Left;    CYSTOURETHROSCOPY WITH DIRECT VISION INTERNAL URETHROTOMY Left 07/24/2018    Procedure: CYSTOSCOPY, WITH DIRECT VISION INTERNAL URETHROTOMY;  Surgeon: Tommie Hernandez MD;  Location: [facility] OR;  Service: Urology;  Laterality: Left;    JOINT REPLACEMENT       Review of patient's allergies indicates:  No Known Allergies   No current facility-administered medications for this encounter.       PMHx, PSHx, Allergies, Medications reviewed in epic    ROS negative except pain complaints in HPI    OBJECTIVE:    There were no vitals taken for this visit.    PHYSICAL EXAMINATION:    GENERAL: Well appearing, in no acute distress, alert and oriented x3.  PSYCH:  Mood and affect appropriate.  SKIN: Skin color, texture, turgor normal, no rashes or lesions which will impact the procedure.  CV: RRR with palpation of the radial artery.  PULM: No evidence of respiratory difficulty, symmetric chest rise. Clear to auscultation.  NEURO: Cranial nerves grossly intact.    Plan:    Proceed with procedure as planned Procedure(s) (LRB):  L5-S1 IL BLAISE (N/A)    Zion Rivera  02/05/2025

## 2025-02-07 ENCOUNTER — TELEPHONE (OUTPATIENT)
Dept: PAIN MEDICINE | Facility: CLINIC | Age: 55
End: 2025-02-07
Payer: MEDICARE

## 2025-02-20 ENCOUNTER — OFFICE VISIT (OUTPATIENT)
Dept: PAIN MEDICINE | Facility: CLINIC | Age: 55
End: 2025-02-20
Payer: MEDICARE

## 2025-02-20 VITALS
BODY MASS INDEX: 31.74 KG/M2 | DIASTOLIC BLOOD PRESSURE: 71 MMHG | HEART RATE: 73 BPM | HEIGHT: 69 IN | SYSTOLIC BLOOD PRESSURE: 106 MMHG | WEIGHT: 214.31 LBS

## 2025-02-20 DIAGNOSIS — M54.41 CHRONIC BILATERAL LOW BACK PAIN WITH BILATERAL SCIATICA: ICD-10-CM

## 2025-02-20 DIAGNOSIS — M54.16 LUMBAR RADICULOPATHY: Primary | ICD-10-CM

## 2025-02-20 DIAGNOSIS — G89.29 CHRONIC BILATERAL LOW BACK PAIN WITH BILATERAL SCIATICA: ICD-10-CM

## 2025-02-20 DIAGNOSIS — M47.26 OTHER SPONDYLOSIS WITH RADICULOPATHY, LUMBAR REGION: ICD-10-CM

## 2025-02-20 DIAGNOSIS — M54.42 CHRONIC BILATERAL LOW BACK PAIN WITH BILATERAL SCIATICA: ICD-10-CM

## 2025-02-20 NOTE — PROGRESS NOTES
[facility] - New Patient Evaluation    Referred by: Aaareflori Self   Reason for referral: Lumbar radiculopathy     CC:   Chief Complaint   Patient presents with    Follow-up    Low-back Pain         2/20/2025     8:11 AM   Last 3 PDI Scores   Pain Disability Index (PDI) 29       Subjective 02/20/2025:   Mario Castro Sr. is a 54 y.o. male who presents today in clinic for 2wk postprocedure follow up.  Patient received 100% of relief following interlaminar L5/S1 epidural steroid injection on 2/05/2025.  The injection was ordered by Neurosurgery.  Patient is doing well and reports no pain.  He continues with physical therapy and home exercise.  He takes gabapentin 300 mg b.i.d.    Initial Pain Assessment:  Location: lower back    Onset: over 5 years  Current Pain Score: 0/10  Daily Pain of Range: 0-0/10  Quality: Aching, Burning, Throbbing, and Tingling  Radiation: right leg   Worsened by: lifting, sitting, standing for more than several minutes, and daily activity.  Improved by: laying down, medications, physical therapy, and rest     Patient denies night fever/night sweats, urinary incontinence, bowel incontinence, significant weight loss, significant motor weakness, and loss of sensations.      Previous Interventions:  -02/05/2025-L5/S1 interlaminar epidural steroid injection-100% relief of his pain.    Previous Therapies:  PT/OT: yes   Chiropractor:   HEP:  Yes  Relevant Surgery:     Previous Medications:   - Tylenol or NSAIDS: Mobic   - Muscle Relaxants:   - TCAs:   - SNRIs:   - Topicals:   - Anticonvulsants: Gabapentin    - Opioids:   - Adjuvants:     Current Pain Medications:  Gabapentin 300 mg b.i.d.    Anticoagulation:     Review of Systems:  ROS    GENERAL:  No weight loss, malaise or fevers.  HEENT:   No recent changes in vision or hearing  NECK:  No difficulty with swallowing. No stridor.   RESPIRATORY:  Negative for cough, wheezing or shortness of breath, patient denies any  "recent URI.  CARDIOVASCULAR:  Negative for chest pain, leg swelling or palpitations.  GI:  Negative for abdominal discomfort, blood in stools or black stools or change in bowel habits.  MUSCULOSKELETAL:  See HPI.  SKIN:  Negative for lesions, rash, and itching.  PSYCH:  No mood disorder or recent psychosocial stressors.    HEMATOLOGY/LYMPHOLOGY:  Negative for prolonged bleeding, bruising easily or swollen nodes.  Patient is not currently taking any anti-coagulants  NEURO:   No history of headaches, syncope, paralysis, seizures or tremors.  All other reviewed and negative other than HPI.    History:  Current medications, allergies, medical history, surgical history,   family history, and social history were reviewed in the chart as marked.    Full Medication List:  Current Medications[1]     Allergies:  Patient has no known allergies.     Medical History:   has a past medical history of Arthritis, Diverticulitis of intestine, and Hypertension.    Surgical History:   has a past surgical history that includes Colonoscopy (N/A, 03/08/2018); Colectomy (05/2018); Cystoscopy w/ ureteral stent placement (05/2018); Cystourethroscopy with direct vision internal urethrotomy (Left, 07/24/2018); Cystoscopy w/ ureteral stent removal (Left, 07/24/2018); Joint replacement; Colonoscopy (N/A, 9/29/2022); and Epidural steroid injection into lumbar spine (N/A, 2/5/2025).    Family History:  family history includes Heart disease in his mother; Hypertension in his mother; No Known Problems in his father.    Social History:   reports that he has never smoked. He has never used smokeless tobacco. He reports that he does not drink alcohol and does not use drugs.    Physical Exam:  Vitals:    02/20/25 0827   BP: 106/71   Pulse: 73   Weight: 97.2 kg (214 lb 4.6 oz)   Height: 5' 9" (1.753 m)   PainSc: 0-No pain   PainLoc: Back       GENERAL: Well appearing, in no acute distress, alert and oriented x3.  PSYCH:  Mood and affect " appropriate.  SKIN: Skin color, texture, turgor normal, no rashes or lesions.  HEAD/FACE:  Normocephalic, atraumatic. Cranial nerves grossly intact.  NECK: Normal ROM. Supple.   CV: RRR with palpation of the radial artery.  PULM: No evidence of respiratory difficulty, symmetric chest rise.  GI:  Soft and non-distended.  MSK: Straight leg raising is  negative to radicular pain. No pain to palpation over the facet joints of the lumbar spine. No pain with lumbar facet loading. No pain over the SI joints.  No pain over the GTB bilaterally.  Normal range of motion of the lumbar spine without pain reproduction.  Peripheral joint ROM is full and pain free without obvious instability or laxity in all four extremities. No obvious deformities, edema, or skin discoloration.  No atrophy or tone abnormalities are noted.   NEURO: Bilateral upper and lower extremity coordination and strength is symmetric.  No loss of sensation is noted. No clonus. Brandt negative.  MENTAL STATUS: A x O x 3, good concentration, speech is fluent and goal directed  MOTOR: 5/5 in all muscle groups  GAIT: Normal. Ambulates unassisted.    Imaging 01/09/25:  X-Ray Lumbar Spine Ap Lateral w/Flex Ext  Order: [id number]   Status: Final result       Next appt: 04/09/2025 at 02:30 PM in Neurosurgery (Carlene Torres PA-C)       Dx: Back pain, unspecified back location,...    Test Result Released: Yes (seen)    0 Result Notes  Details    Reading Physician Reading Date Result Priority   Julio Martinez IV, MD  [phone]  1/9/2025 Routine     Narrative & Impression  EXAMINATION:  XR LUMBAR SPINE AP AND LAT WITH FLEX/EXT     CLINICAL HISTORY:  Dorsalgia, unspecified     TECHNIQUE:  Five views of the lumbar spine including flexion extension views were performed.     COMPARISON:  CT 12/22/2024     FINDINGS:  Alignment: Alignment is maintained.  No dynamic instability.     Vertebrae: Vertebral body heights are maintained.  No suspicious appearing lytic  or blastic lesions.     Discs and facets: Multiple the level disc space narrowing, most prominent L4-L5 with vacuum disc phenomenon.  Multilevel endplate spurring.  Facet arthropathy, most prominent than lower lumbar spine.     Miscellaneous: No additional findings.     Impression:     As above.        Electronically signed by:Julio Martinez  Date:                                            01/09/2025  Time:                                           13:13        Exam Ended: 01/09/25 09:19 CST Last Resulted: 01/09/25 13:13 CST     CT LUMBAR SPINE WITHOUT CONTRAST     CLINICAL HISTORY:  Lower back pain.     TECHNIQUE:  Low dose axial images, sagittal and coronal reformations were performed though the cervical spine.  Contrast was not administered.     COMPARISON:  None     FINDINGS:  Minimal retrolisthesis L2-3.  All lumbar vertebral bodies are normal in height.  Negative for acute fracture.  Negative for suspicious osseous lesion.     L1-2: Mild asymmetric to the right disc bulging and osteophytic ridging with moderate left and severe right facet arthropathy.  Severe right neural foraminal stenosis.  Left neural foramen and central canal patent.     L2-3: Minimal retrolisthesis.  Normal posterior disc margin.  Moderate facet arthropathy.  Central canal patent.  Mild bilateral neural foraminal stenosis.     L3-L4: Generalized disc bulging, severe facet arthropathy and ligamentum flavum hypertrophy.  Severe central canal and lateral recess stenosis.  Severe right and mild left neural foraminal stenosis.     L4-5: Mild disc height loss with vacuum disc phenomenon.  Prominent, generalized disc bulge with calcification the posterior disc margin.  Severe facet arthropathy and ligamentum flavum hypertrophy.  Lateral recess stenosis, worse on the left with moderate central canal stenosis.  Severe bilateral neural foraminal stenosis.     L5-S1: Mild disc bulging with severe facet arthropathy.  Central canal patent.  Severe  right neural foraminal stenosis.  Left neural foramen patent.     Paravertebral soft tissues and musculature are normal.  Right renal cyst noted.  Colonic anastomosis distal colon.  Incompletely visualized right hip arthroplasty.     Impression:     Negative for compression fracture.     Advanced multilevel degenerative changes with severe central canal and lateral recess stenosis L3-4.  Severe right neural foraminal stenosis.     Moderate central canal and severe lateral recess stenosis L4-5.  Severe bilateral neural foraminal stenosis.     Severe right neural foraminal stenosis L5-S1.        Electronically signed by:Darrell Carroll  Date:                                            12/22/2024  Labs:  BMP  Lab Results   Component Value Date     07/09/2024    K 3.9 07/09/2024     07/11/2018    CO2 31 07/09/2024    BUN 23.0 07/09/2024    CREATININE 1.04 07/09/2024    CALCIUM 8.4 07/09/2024    ANIONGAP 7 (L) 07/11/2018    EGFRNORACEVR 86 (L) 07/09/2024     Lab Results   Component Value Date    ALT 8 (L) 05/26/2018    AST 10 05/26/2018    ALKPHOS 47 (L) 05/26/2018    BILITOT 0.6 05/26/2018     Lab Results   Component Value Date    WBC 4.9 10/08/2024    HGB 13.2 10/08/2024    HCT 40.7 10/08/2024    MCV 86 07/11/2018     (L) 10/08/2024           Assessment:  Problem List Items Addressed This Visit    None  Visit Diagnoses         Lumbar radiculopathy    -  Primary      Other spondylosis with radiculopathy, lumbar region          Chronic bilateral low back pain with bilateral sciatica                02/20/2025 - Mario Castro Zurdo is a 54 y.o. male who  has a past medical history of Arthritis, Diverticulitis of intestine, and Hypertension.  By history and examination this patient has chronic low back pain with radiculopathy.  The underlying cause is facet arthritis, degenerative disc disease, foraminal stenosis, and central canal stenosis.  Pathology is confirmed by imaging.  We discussed the  underlying diagnoses and multiple treatment options including interventional procedures, physical therapy, and home exercise.  He is status post interlaminar epidural steroid injection at L5/S1 with 100% resolution of his pain.  He continues with PT and home exercise.        Treatment Plan:   Procedures:  None at this time.  Consider repeating L5/S1 interlaminar epidural steroid injection in the future if needed.  PT/OT/HEP: I have stressed the importance of physical activity and a home exercise plan to help with pain and improve health.  Continue with PT and home exercise as tolerated.  Medications:    -continue with gabapentin 300 mg b.i.d.   -  Reviewed and consistent with medication use as prescribed.  Imaging:  Lumbar MRI, lumbar CT and lumbar x-rays were reviewed.  Follow Up:  Lilo Petty DO   Interventional Pain Medicine / Anesthesiology    Disclaimer: This note was partly generated using dictation software which may occasionally result in transcription errors.         [1]   Current Outpatient Medications:     atorvastatin (LIPITOR) 10 MG tablet, Take 10 mg by mouth once daily at 6am., Disp: , Rfl:     enalapril (VASOTEC) 10 MG tablet, Take 10 mg by mouth once daily., Disp: , Rfl:     gabapentin (NEURONTIN) 300 MG capsule, Take 300 mg by mouth 3 (three) times daily., Disp: , Rfl:     metoprolol tartrate (LOPRESSOR) 100 MG tablet, Take 100 mg by mouth once daily., Disp: , Rfl:     nystatin (MYCOSTATIN) cream, APPLY TO THE AFFECTED AREA(S) EVERY DAY, Disp: , Rfl:     omeprazole (PRILOSEC) 20 MG capsule, , Disp: , Rfl:     alfuzosin (UROXATRAL) 10 mg Tb24, Take 1 tablet (10 mg total) by mouth daily with breakfast., Disp: 30 tablet, Rfl: 12    ALPRAZolam (XANAX) 1 MG tablet, Take 1 mg by mouth once daily at 6am., Disp: , Rfl:     cholestyramine (QUESTRAN) 4 gram packet, Take 1 packet (4 g total) by mouth 3 (three) times daily with meals., Disp: 90 packet, Rfl: 2    HYDROcodone-acetaminophen  (NORCO) 5-325 mg per tablet, Take 1 tablet by mouth every 8 (eight) hours as needed for Pain., Disp: 10 tablet, Rfl: 0    LIDOcaine (LIDODERM) 5 %, Place 1 patch onto the skin once daily. Remove & Discard patch within 12 hours or as directed by MD, Disp: 7 patch, Rfl: 0    meloxicam (MOBIC) 15 MG tablet, Take 1 tablet (15 mg total) by mouth once daily. (Patient not taking: Reported on 2/20/2025), Disp: 30 tablet, Rfl: 0    oxybutynin (DITROPAN-XL) 10 MG 24 hr tablet, Take 1 tablet (10 mg total) by mouth once daily., Disp: 30 tablet, Rfl: 11

## 2025-04-09 ENCOUNTER — OFFICE VISIT (OUTPATIENT)
Dept: NEUROSURGERY | Facility: CLINIC | Age: 55
End: 2025-04-09
Payer: MEDICARE

## 2025-04-09 VITALS
DIASTOLIC BLOOD PRESSURE: 84 MMHG | BODY MASS INDEX: 31.74 KG/M2 | HEIGHT: 69 IN | WEIGHT: 214.31 LBS | HEART RATE: 59 BPM | SYSTOLIC BLOOD PRESSURE: 127 MMHG

## 2025-04-09 DIAGNOSIS — M48.062 SPINAL STENOSIS, LUMBAR REGION WITH NEUROGENIC CLAUDICATION: ICD-10-CM

## 2025-04-09 DIAGNOSIS — M54.2 NECK PAIN: ICD-10-CM

## 2025-04-09 DIAGNOSIS — M54.9 DORSALGIA, UNSPECIFIED: Primary | ICD-10-CM

## 2025-04-09 DIAGNOSIS — R20.0 LEFT ARM NUMBNESS: ICD-10-CM

## 2025-04-09 PROCEDURE — 3079F DIAST BP 80-89 MM HG: CPT | Mod: CPTII,S$GLB,, | Performed by: PHYSICIAN ASSISTANT

## 2025-04-09 PROCEDURE — 3074F SYST BP LT 130 MM HG: CPT | Mod: CPTII,S$GLB,, | Performed by: PHYSICIAN ASSISTANT

## 2025-04-09 PROCEDURE — 1160F RVW MEDS BY RX/DR IN RCRD: CPT | Mod: CPTII,S$GLB,, | Performed by: PHYSICIAN ASSISTANT

## 2025-04-09 PROCEDURE — 99214 OFFICE O/P EST MOD 30 MIN: CPT | Mod: S$GLB,,, | Performed by: PHYSICIAN ASSISTANT

## 2025-04-09 PROCEDURE — 3008F BODY MASS INDEX DOCD: CPT | Mod: CPTII,S$GLB,, | Performed by: PHYSICIAN ASSISTANT

## 2025-04-09 PROCEDURE — 1159F MED LIST DOCD IN RCRD: CPT | Mod: CPTII,S$GLB,, | Performed by: PHYSICIAN ASSISTANT

## 2025-04-09 PROCEDURE — 99999 PR PBB SHADOW E&M-EST. PATIENT-LVL IV: CPT | Mod: PBBFAC,,, | Performed by: PHYSICIAN ASSISTANT

## 2025-04-09 RX ORDER — DIAZEPAM 5 MG/1
TABLET ORAL
Qty: 5 TABLET | Refills: 0 | Status: SHIPPED | OUTPATIENT
Start: 2025-04-09

## 2025-04-10 ENCOUNTER — TELEPHONE (OUTPATIENT)
Dept: NEUROSURGERY | Facility: CLINIC | Age: 55
End: 2025-04-10
Payer: MEDICARE

## 2025-04-10 NOTE — TELEPHONE ENCOUNTER
Patient called and stated that he want to cancel the mri he had scheduled. He want to try the injections. I stated to him that I canceled the mri's and will send Carlene a message letting her know he will like to get the injections. Patient voiced understanding.

## 2025-04-11 ENCOUNTER — TELEPHONE (OUTPATIENT)
Dept: PAIN MEDICINE | Facility: CLINIC | Age: 55
End: 2025-04-11
Payer: MEDICARE

## 2025-04-11 ENCOUNTER — TELEPHONE (OUTPATIENT)
Dept: NEUROSURGERY | Facility: CLINIC | Age: 55
End: 2025-04-11
Payer: MEDICARE

## 2025-04-11 DIAGNOSIS — M48.062 SPINAL STENOSIS, LUMBAR REGION WITH NEUROGENIC CLAUDICATION: Primary | ICD-10-CM

## 2025-04-11 NOTE — TELEPHONE ENCOUNTER
BLAISE ordered with Dr. Petty.    Let patient know.    Carlene Torres, [facility], PA-C  Neurosurgery  [facility]  04/11/2025

## 2025-04-11 NOTE — TELEPHONE ENCOUNTER
----- Message from Med Assistant Garcia sent at 4/10/2025  9:08 AM CDT -----  Patient called he want to do the injections.

## 2025-04-11 NOTE — H&P (VIEW-ONLY)
Subjective:     Patient ID:  Mario Castro Sr. is a 55 y.o. male.    Yosvany    Chief Complaint:  Back pain and bilateral leg pain.  Bilateral hand numbness    HPI    Mario Castro Sr. is a 55 y.o. male who presents with the above CC.    Patient states he has had back and leg pain starting 2016 on and off over the years.  Patient states about a few weeks ago he was burying his dog and started having severe pain in the back and right leg and eventually went to the emergency room.  The pain has been worse than his typical pain.  His pain is in the low back into the bilateral posterior legs into the posterior and anterior lower legs.  Pain is worse when walking and standing better with sitting.    The leg pain is worse than the back pain.      The right leg is worse than the left leg.    He has bilateral hand numbness that comes and goes.  Occasionally drops things and difficulty with opening jars.  No difficulty with other fine movements skills.    He does stumble sometimes with his right leg and foot.  Denies frequent falls.      He had physical therapy multiple times with the last time 1 year ago that helped some.  He has had 1 epidural steroid injection in the low back with some relief.  No spine surgery.    He takes Mobic and Flexeril.     Patient denies any recent accidents or trauma, no saddle anesthesias, and no bowel or bladder incontinence.    Interval History:   04/09/2025    Patient had L5-S1 IL BLAISE and received 100% relief of his back and leg pain for about 6 weeks and then the pain has gradually come back.  He has back pain that comes and go and some mild right leg pain.  He started to take gabapentin 300mg BID again a few days ago which has helped.      He did PT and has been doing exercises on his own at home.     He has some pain in the bl arms, left greater than right.    Review of Systems:    Review of Systems   Constitutional:  Negative for chills, diaphoresis, fever, malaise/fatigue and weight  loss.   HENT:  Negative for congestion, ear discharge, ear pain, hearing loss, nosebleeds, sinus pain, sore throat and tinnitus.    Eyes:  Negative for blurred vision, double vision, photophobia, pain, discharge and redness.   Respiratory:  Negative for cough, hemoptysis, sputum production, shortness of breath, wheezing and stridor.    Cardiovascular:  Negative for chest pain, palpitations, orthopnea, leg swelling and PND.   Gastrointestinal:  Negative for abdominal pain, blood in stool, constipation, diarrhea, heartburn, melena, nausea and vomiting.   Genitourinary:  Negative for dysuria, flank pain, frequency, hematuria and urgency.   Musculoskeletal:  Positive for back pain and joint pain. Negative for falls, myalgias and neck pain.   Skin:  Negative for itching and rash.   Neurological:  Positive for tingling and headaches. Negative for dizziness, tremors, sensory change, speech change, seizures, loss of consciousness and weakness.   Endo/Heme/Allergies:  Negative for environmental allergies and polydipsia. Does not bruise/bleed easily.   Psychiatric/Behavioral:  Negative for depression, hallucinations, memory loss and substance abuse. The patient is not nervous/anxious and does not have insomnia.          Past Medical History:   Diagnosis Date    Arthritis           to right hip    Diverticulitis of intestine     Hypertension      Past Surgical History:   Procedure Laterality Date    COLECTOMY  05/2018    COLONOSCOPY N/A 03/08/2018         COLONOSCOPY N/A 9/29/2022    Procedure: COLONOSCOPY;  Surgeon: Juno Curry MD;  Location: [facility] ENDO;  Service: Endoscopy;  Laterality: N/A;    CYSTOSCOPY W/ URETERAL STENT PLACEMENT  05/2018    CYSTOSCOPY W/ URETERAL STENT REMOVAL Left 07/24/2018    Procedure: CYSTOSCOPY, WITH URETERAL STENT REMOVAL;  Surgeon: Tommie Hernandez MD;  Location: [facility] OR;  Service: Urology;  Laterality: Left;    CYSTOURETHROSCOPY WITH DIRECT VISION INTERNAL URETHROTOMY Left 07/24/2018     Procedure: CYSTOSCOPY, WITH DIRECT VISION INTERNAL URETHROTOMY;  Surgeon: Tommie Hernandez MD;  Location: [facility] OR;  Service: Urology;  Laterality: Left;    EPIDURAL STEROID INJECTION INTO LUMBAR SPINE N/A 2/5/2025    Procedure: L5-S1 IL BLAISE;  Surgeon: Verna Petty DO;  Location: [facility] PAIN MANAGEMENT;  Service: Pain Management;  Laterality: N/A;  oral sed-no ac    JOINT REPLACEMENT       Current Outpatient Medications on File Prior to Visit   Medication Sig Dispense Refill    atorvastatin (LIPITOR) 10 MG tablet Take 10 mg by mouth once daily at 6am.      enalapril (VASOTEC) 10 MG tablet Take 10 mg by mouth once daily.      gabapentin (NEURONTIN) 300 MG capsule Take 300 mg by mouth 3 (three) times daily.      metoprolol tartrate (LOPRESSOR) 100 MG tablet Take 100 mg by mouth once daily.      nystatin (MYCOSTATIN) cream APPLY TO THE AFFECTED AREA(S) EVERY DAY      omeprazole (PRILOSEC) 20 MG capsule       alfuzosin (UROXATRAL) 10 mg Tb24 Take 1 tablet (10 mg total) by mouth daily with breakfast. 30 tablet 12    ALPRAZolam (XANAX) 1 MG tablet Take 1 mg by mouth once daily at 6am.      cholestyramine (QUESTRAN) 4 gram packet Take 1 packet (4 g total) by mouth 3 (three) times daily with meals. 90 packet 2    HYDROcodone-acetaminophen (NORCO) 5-325 mg per tablet Take 1 tablet by mouth every 8 (eight) hours as needed for Pain. 10 tablet 0    LIDOcaine (LIDODERM) 5 % Place 1 patch onto the skin once daily. Remove & Discard patch within 12 hours or as directed by MD 7 patch 0    meloxicam (MOBIC) 15 MG tablet Take 1 tablet (15 mg total) by mouth once daily. (Patient not taking: Reported on 4/9/2025) 30 tablet 0    oxybutynin (DITROPAN-XL) 10 MG 24 hr tablet Take 1 tablet (10 mg total) by mouth once daily. 30 tablet 11     No current facility-administered medications on file prior to visit.     Review of patient's allergies indicates:  No Known Allergies  Social History     Socioeconomic History    Marital status:  "   Tobacco Use    Smoking status: Never    Smokeless tobacco: Never   Substance and Sexual Activity    Alcohol use: No    Drug use: No    Sexual activity: Yes     Partners: Female     Social Drivers of Health     Financial Resource Strain: High Risk (6/28/2024)    Received from [facility]    Overall Financial Resource Strain (CARDIA)     Difficulty of Paying Living Expenses: Hard   Food Insecurity: Food Insecurity Present (6/28/2024)    Received from [facility]    Hunger Vital Sign     Worried About Running Out of Food in the Last Year: Sometimes true     Ran Out of Food in the Last Year: Patient declined   Transportation Needs: No Transportation Needs (6/28/2024)    Received from [facility]    PRAPARE - Transportation     Lack of Transportation (Medical): No     Lack of Transportation (Non-Medical): No   Physical Activity: Sufficiently Active (6/28/2024)    Received from [facility]    Exercise Vital Sign     Days of Exercise per Week: 5 days     Minutes of Exercise per Session: 30 min   Stress: Stress Concern Present (6/28/2024)    Received from [facility]    [employer] [place] [employer] - Occupational Stress Questionnaire     Feeling of Stress : To some extent   Housing Stability: High Risk (6/28/2024)    Received from [facility]    Housing Stability Vital Sign     Unable to Pay for Housing in the Last Year: Yes     Number of Places Lived in the Last Year: 1     Unstable Housing in the Last Year: No     Family History   Problem Relation Name Age of Onset    Hypertension Mother      Heart disease Mother      No Known Problems Father         Objective:      Vitals:    04/09/25 1430   BP: 127/84   Pulse: (!) 59   Weight: 97.2 kg (214 lb 4.6 oz)   Height: 5' 9" (1.753 m)   PainSc:   3   PainLoc: Back       XRAY Interpretation:     MRI and lumbar spine and reports were reviewed from 2022 which shows both cervical stenosis and lumbar stenosis.    Lumbar spine xrays were personally reviewed " today.  No fractures.  No movement on flexion and extension.  Slight grade 1 spondylolisthesis L4-5.  Multilevel degenerative disc disease and spondylosis worse at L4-5.      CT lumbar spine p    Impression:     Negative for compression fracture.     Advanced multilevel degenerative changes with severe central canal and lateral recess stenosis L3-4.  Severe right neural foraminal stenosis.     Moderate central canal and severe lateral recess stenosis L4-5.  Severe bilateral neural foraminal stenosis.     Severe right neural foraminal stenosis L5-S1.        Electronically signed by:Darrell Carroll  Date:                                            12/22/2024  Time:                                           11:41    Assessment:          1. Dorsalgia, unspecified    2. Left arm numbness    3. Spinal stenosis, lumbar region with neurogenic claudication    4. Neck pain            Plan:          Orders Placed This Encounter    MRI Cervical Spine Without Contrast    MRI Lumbar Spine Without Contrast    diazePAM (VALIUM) 5 MG tablet    Procedure Request Order for Pain Management     L3-4 and L4-5 central stenosis.  Right L5-S1 neural foraminal stenosis     -Continue gabapentin  -MRI c and lspine and valium for MRI  -Repeat L5-S1 interlaminar epidural steroid injection with pain management  -follow up in 2 months        Follow-Up:  Follow up in about 2 months (around 6/9/2025). If there are any questions prior to this, the patient was instructed to contact the office.       Carlene Torres, [facility], PA-C  Neurosurgery  [facility]  04/15/2025

## 2025-04-11 NOTE — TELEPHONE ENCOUNTER
Stated to the patient that Carlene ordered a BLAISE with Dr. Petty someone will give him a call to get it scheduled. Patient voiced understanding.

## 2025-04-11 NOTE — PROGRESS NOTES
Subjective:     Patient ID:  Mario Castro Sr. is a 55 y.o. male.    Yosvany    Chief Complaint:  Back pain and bilateral leg pain.  Bilateral hand numbness    HPI    Mario Castro Sr. is a 55 y.o. male who presents with the above CC.    Patient states he has had back and leg pain starting 2016 on and off over the years.  Patient states about a few weeks ago he was burying his dog and started having severe pain in the back and right leg and eventually went to the emergency room.  The pain has been worse than his typical pain.  His pain is in the low back into the bilateral posterior legs into the posterior and anterior lower legs.  Pain is worse when walking and standing better with sitting.    The leg pain is worse than the back pain.      The right leg is worse than the left leg.    He has bilateral hand numbness that comes and goes.  Occasionally drops things and difficulty with opening jars.  No difficulty with other fine movements skills.    He does stumble sometimes with his right leg and foot.  Denies frequent falls.      He had physical therapy multiple times with the last time 1 year ago that helped some.  He has had 1 epidural steroid injection in the low back with some relief.  No spine surgery.    He takes Mobic and Flexeril.     Patient denies any recent accidents or trauma, no saddle anesthesias, and no bowel or bladder incontinence.    Interval History:   04/09/2025    Patient had L5-S1 IL BLAISE and received 100% relief of his back and leg pain for about 6 weeks and then the pain has gradually come back.  He has back pain that comes and go and some mild right leg pain.  He started to take gabapentin again a few days ago which has helped.      He did PT and has been doing exercises on his own at home.     Review of Systems:    Review of Systems   Constitutional:  Negative for chills, diaphoresis, fever, malaise/fatigue and weight loss.   HENT:  Negative for congestion, ear discharge, ear pain, hearing  loss, nosebleeds, sinus pain, sore throat and tinnitus.    Eyes:  Negative for blurred vision, double vision, photophobia, pain, discharge and redness.   Respiratory:  Negative for cough, hemoptysis, sputum production, shortness of breath, wheezing and stridor.    Cardiovascular:  Negative for chest pain, palpitations, orthopnea, leg swelling and PND.   Gastrointestinal:  Negative for abdominal pain, blood in stool, constipation, diarrhea, heartburn, melena, nausea and vomiting.   Genitourinary:  Negative for dysuria, flank pain, frequency, hematuria and urgency.   Musculoskeletal:  Positive for back pain and joint pain. Negative for falls, myalgias and neck pain.   Skin:  Negative for itching and rash.   Neurological:  Positive for tingling and headaches. Negative for dizziness, tremors, sensory change, speech change, seizures, loss of consciousness and weakness.   Endo/Heme/Allergies:  Negative for environmental allergies and polydipsia. Does not bruise/bleed easily.   Psychiatric/Behavioral:  Negative for depression, hallucinations, memory loss and substance abuse. The patient is not nervous/anxious and does not have insomnia.          Past Medical History:   Diagnosis Date    Arthritis           to right hip    Diverticulitis of intestine     Hypertension      Past Surgical History:   Procedure Laterality Date    COLECTOMY  05/2018    COLONOSCOPY N/A 03/08/2018         COLONOSCOPY N/A 9/29/2022    Procedure: COLONOSCOPY;  Surgeon: Juno Curry MD;  Location: [facility] ENDO;  Service: Endoscopy;  Laterality: N/A;    CYSTOSCOPY W/ URETERAL STENT PLACEMENT  05/2018    CYSTOSCOPY W/ URETERAL STENT REMOVAL Left 07/24/2018    Procedure: CYSTOSCOPY, WITH URETERAL STENT REMOVAL;  Surgeon: Tommie Hernandez MD;  Location: [facility] OR;  Service: Urology;  Laterality: Left;    CYSTOURETHROSCOPY WITH DIRECT VISION INTERNAL URETHROTOMY Left 07/24/2018    Procedure: CYSTOSCOPY, WITH DIRECT VISION INTERNAL URETHROTOMY;   Surgeon: Tommie Hernandez MD;  Location: [facility] OR;  Service: Urology;  Laterality: Left;    EPIDURAL STEROID INJECTION INTO LUMBAR SPINE N/A 2/5/2025    Procedure: L5-S1 IL BLAISE;  Surgeon: Verna Petty DO;  Location: [facility] PAIN MANAGEMENT;  Service: Pain Management;  Laterality: N/A;  oral sed-no ac    JOINT REPLACEMENT       Current Outpatient Medications on File Prior to Visit   Medication Sig Dispense Refill    atorvastatin (LIPITOR) 10 MG tablet Take 10 mg by mouth once daily at 6am.      enalapril (VASOTEC) 10 MG tablet Take 10 mg by mouth once daily.      gabapentin (NEURONTIN) 300 MG capsule Take 300 mg by mouth 3 (three) times daily.      metoprolol tartrate (LOPRESSOR) 100 MG tablet Take 100 mg by mouth once daily.      nystatin (MYCOSTATIN) cream APPLY TO THE AFFECTED AREA(S) EVERY DAY      omeprazole (PRILOSEC) 20 MG capsule       alfuzosin (UROXATRAL) 10 mg Tb24 Take 1 tablet (10 mg total) by mouth daily with breakfast. 30 tablet 12    ALPRAZolam (XANAX) 1 MG tablet Take 1 mg by mouth once daily at 6am.      cholestyramine (QUESTRAN) 4 gram packet Take 1 packet (4 g total) by mouth 3 (three) times daily with meals. 90 packet 2    HYDROcodone-acetaminophen (NORCO) 5-325 mg per tablet Take 1 tablet by mouth every 8 (eight) hours as needed for Pain. 10 tablet 0    LIDOcaine (LIDODERM) 5 % Place 1 patch onto the skin once daily. Remove & Discard patch within 12 hours or as directed by MD 7 patch 0    meloxicam (MOBIC) 15 MG tablet Take 1 tablet (15 mg total) by mouth once daily. (Patient not taking: Reported on 4/9/2025) 30 tablet 0    oxybutynin (DITROPAN-XL) 10 MG 24 hr tablet Take 1 tablet (10 mg total) by mouth once daily. 30 tablet 11     No current facility-administered medications on file prior to visit.     Review of patient's allergies indicates:  No Known Allergies  Social History     Socioeconomic History    Marital status:    Tobacco Use    Smoking status: Never    Smokeless  "tobacco: Never   Substance and Sexual Activity    Alcohol use: No    Drug use: No    Sexual activity: Yes     Partners: Female     Social Drivers of Health     Financial Resource Strain: High Risk (6/28/2024)    Received from [facility]    Overall Financial Resource Strain (CARDIA)     Difficulty of Paying Living Expenses: Hard   Food Insecurity: Food Insecurity Present (6/28/2024)    Received from [facility]    Hunger Vital Sign     Worried About Running Out of Food in the Last Year: Sometimes true     Ran Out of Food in the Last Year: Patient declined   Transportation Needs: No Transportation Needs (6/28/2024)    Received from [facility]    PRAPARE - Transportation     Lack of Transportation (Medical): No     Lack of Transportation (Non-Medical): No   Physical Activity: Sufficiently Active (6/28/2024)    Received from [facility]    Exercise Vital Sign     Days of Exercise per Week: 5 days     Minutes of Exercise per Session: 30 min   Stress: Stress Concern Present (6/28/2024)    Received from [facility]    [employer] [employer] - Occupational Stress Questionnaire     Feeling of Stress : To some extent   Housing Stability: High Risk (6/28/2024)    Received from [facility]    Housing Stability Vital Sign     Unable to Pay for Housing in the Last Year: Yes     Number of Places Lived in the Last Year: 1     Unstable Housing in the Last Year: No     Family History   Problem Relation Name Age of Onset    Hypertension Mother      Heart disease Mother      No Known Problems Father         Objective:      Vitals:    04/09/25 1430   BP: 127/84   Pulse: (!) 59   Weight: 97.2 kg (214 lb 4.6 oz)   Height: 5' 9" (1.753 m)   PainSc:   3   PainLoc: Back       XRAY Interpretation:     MRI and lumbar spine and reports were reviewed from 2022 which shows both cervical stenosis and lumbar stenosis.    Lumbar spine xrays were personally reviewed today.  No fractures.  No movement on flexion and extension.  " Slight grade 1 spondylolisthesis L4-5.  Multilevel degenerative disc disease and spondylosis worse at L4-5.      CT lumbar spine p    Impression:     Negative for compression fracture.     Advanced multilevel degenerative changes with severe central canal and lateral recess stenosis L3-4.  Severe right neural foraminal stenosis.     Moderate central canal and severe lateral recess stenosis L4-5.  Severe bilateral neural foraminal stenosis.     Severe right neural foraminal stenosis L5-S1.        Electronically signed by:Darrell Carroll  Date:                                            12/22/2024  Time:                                           11:41    Assessment:          1. Dorsalgia, unspecified    2. Left arm numbness    3. Spinal stenosis, lumbar region with neurogenic claudication    4. Neck pain            Plan:          Orders Placed This Encounter    MRI Cervical Spine Without Contrast    MRI Lumbar Spine Without Contrast    diazePAM (VALIUM) 5 MG tablet    Procedure Request Order for Pain Management     L3-4 and L4-5 central stenosis.  Right L5-S1 neural foraminal stenosis     -Continue gabapentin  -MRI c and lspine and valium for MRI  -Repeat L5-S1 interlaminar epidural steroid injection with pain management  -follow up in 2 months        Follow-Up:  Follow up in about 2 months (around 6/9/2025). If there are any questions prior to this, the patient was instructed to contact the office.       Carlene Torres [facility], PA-C  Neurosurgery  [facility]  04/11/2025

## 2025-04-11 NOTE — TELEPHONE ENCOUNTER
----- Message from Carlene Shelton PA-C sent at 2025  2:08 PM CDT -----  Regarding: Order for DENICE STOREY GURVINDER SOLIS    Patient Name: DENICE STOREY SR.([id number])  Sex: Male  : 1970      PCP: RAVINDRA ROUSE JR    Center: \[facility]\""     Types of orders made on 2025: Procedure Request    Order Date:2025  Ordering User:CARLENE SHELTON [[id number]]  Encounter Provider:Carlene Shelton PA-C [[id number]]  Authorizing Provider: Carlene Shelton PA-C [[id number]]  Supervising Provider:OLIVA ARITA [[id number]]  Type of Supervision:Supervision Required  Department:[facility] NEUROSURGERY[[id number]]    Common Order Information  Procedure -> Epidural Injection (specify level) Cmt: L5-S1 IL BLAISE    Order Specific Information  Order: Procedure Request Order for Pain Management [Custom: [id number]]  Order #:          [id number]: 1 FUTURE    Priority: Routine  Class: Clinic Performed    Future Order Information      Expires on:2026            Expected by:2025                   Associated Diagnoses      M48.062 Spinal stenosis, lumbar region with neurogenic claudication      Physician -> Gelter         Facility Name: -> [facility]           Priority: Routine  Class: Clinic Performed    Future Order Information      Expires on:2026            Expected by:2025                   Associated Diagnoses      M48.062 Spinal stenosis, lumbar region with neurogenic claudication      Procedure -> Epidural Injection (specify level) Cmt: L5-S1 IL BLAISE        Physician -> Gelter         Facility Name: -> [facility]

## 2025-04-21 NOTE — PROGRESS NOTES
S/p surgery celio thornton    Doing well    Eating    abd soft  Wound loks good      F/u 2 months   [FreeTextEntry1] : # Prior diagnosis of PMR -Elevated ESR/CRP noted on 4/1/23 (with neuro), in the setting of new onset of bilateral arm/shoulder pain -Resolution of symptoms with prednisone 10 mg daily -Prednisone taper completed, off prednisone since April 2024 -Now with recurrence over the past 2 months of pain in the shoulders (asymmetric), hips (bilateral, a week ago, + stiffness), endorses new headache frontal, occasional transient blurry vision, no jaw claudication ---discussed with patient a length, clinically concerning for PMR relapse  although she attributes the HA to the recent use of trazodone (HA reported in 10-20%) Normal ESR and CRP at last visit --will obtain inflammatory markers today --reviewed the risk of irreversible visual loss if untreated GCA --will obtain US of the TA for now, but discussed that a biopsy is the gold standard --neuro ophthalmology eval --if recurrence, discussed steroid sparing agent that needs to be discussed with hepatology given liver related adverse effects (MTX vs tocilizumab)  #Bilateral hand pain, -prior evaluation history and exam without signs of inflammatory arthritis -RF and CCP negative - x-rays of bilateral hands December 2022 normal -ultrasound of bilateral hands: No synovitis ---now with reported swelling/redness/warmth/ prolonged stiffness and synovitis on exam potentially related to flare as above --X-rays normal, US pending    #DAMIAN positive (2020) subset serology negative --repeat serology and urine studies unrevealing   #elevated Kappa/lambda ratio, s.p heme eval thrombocytopenia- to fup again with hematology  # liver cirrhosis, follows with hepatology to schedule for next month   #Bone health DEXA completed in July 2023, osteopenia due July 2025  will call with results

## 2025-04-28 ENCOUNTER — TELEPHONE (OUTPATIENT)
Dept: PAIN MEDICINE | Facility: CLINIC | Age: 55
End: 2025-04-28
Payer: MEDICARE

## 2025-04-30 ENCOUNTER — TELEPHONE (OUTPATIENT)
Dept: PAIN MEDICINE | Facility: HOSPITAL | Age: 55
End: 2025-04-30
Payer: MEDICARE

## 2025-05-05 ENCOUNTER — TELEPHONE (OUTPATIENT)
Dept: PAIN MEDICINE | Facility: CLINIC | Age: 55
End: 2025-05-05
Payer: MEDICARE

## 2025-05-05 ENCOUNTER — HOSPITAL ENCOUNTER (OUTPATIENT)
Facility: HOSPITAL | Age: 55
Discharge: HOME | End: 2025-05-05
Attending: STUDENT IN AN ORGANIZED HEALTH CARE EDUCATION/TRAINING PROGRAM | Admitting: STUDENT IN AN ORGANIZED HEALTH CARE EDUCATION/TRAINING PROGRAM
Payer: MEDICARE

## 2025-05-05 VITALS
SYSTOLIC BLOOD PRESSURE: 135 MMHG | DIASTOLIC BLOOD PRESSURE: 79 MMHG | TEMPERATURE: 98 F | OXYGEN SATURATION: 100 % | RESPIRATION RATE: 16 BRPM | HEART RATE: 61 BPM

## 2025-05-05 DIAGNOSIS — G89.29 CHRONIC PAIN: ICD-10-CM

## 2025-05-05 DIAGNOSIS — M54.16 LUMBAR RADICULOPATHY: Primary | ICD-10-CM

## 2025-05-05 PROCEDURE — 62323 NJX INTERLAMINAR LMBR/SAC: CPT | Mod: ,,, | Performed by: STUDENT IN AN ORGANIZED HEALTH CARE EDUCATION/TRAINING PROGRAM

## 2025-05-05 PROCEDURE — 63600175 PHARM REV CODE 636 W HCPCS: Performed by: STUDENT IN AN ORGANIZED HEALTH CARE EDUCATION/TRAINING PROGRAM

## 2025-05-05 PROCEDURE — 62323 NJX INTERLAMINAR LMBR/SAC: CPT | Performed by: STUDENT IN AN ORGANIZED HEALTH CARE EDUCATION/TRAINING PROGRAM

## 2025-05-05 PROCEDURE — 25500020 PHARM REV CODE 255: Performed by: STUDENT IN AN ORGANIZED HEALTH CARE EDUCATION/TRAINING PROGRAM

## 2025-05-05 RX ORDER — ALPRAZOLAM 0.5 MG/1
0.5 TABLET, ORALLY DISINTEGRATING ORAL ONCE AS NEEDED
Status: DISCONTINUED | OUTPATIENT
Start: 2025-05-05 | End: 2025-05-05 | Stop reason: HOSPADM

## 2025-05-05 RX ORDER — LIDOCAINE HYDROCHLORIDE 20 MG/ML
INJECTION, SOLUTION EPIDURAL; INFILTRATION; INTRACAUDAL; PERINEURAL
Status: DISCONTINUED | OUTPATIENT
Start: 2025-05-05 | End: 2025-05-05 | Stop reason: HOSPADM

## 2025-05-05 RX ORDER — DEXAMETHASONE SODIUM PHOSPHATE 10 MG/ML
INJECTION, SOLUTION INTRA-ARTICULAR; INTRALESIONAL; INTRAMUSCULAR; INTRAVENOUS; SOFT TISSUE
Status: DISCONTINUED | OUTPATIENT
Start: 2025-05-05 | End: 2025-05-05 | Stop reason: HOSPADM

## 2025-05-05 NOTE — PLAN OF CARE
Mario Castro has met all discharge criteria from Phase II. Vital Signs are stable, ambulating  without difficulty. Discharge instructions given, patient verbalized understanding. Discharged from facility via ambulation in stable condition. Patient states, '' I have all my belongings.

## 2025-05-05 NOTE — DISCHARGE SUMMARY
Discharge Note  Short Stay      SUMMARY     Admit Date: 5/5/2025    Attending Physician: Verna Petty      Discharge Physician: Verna Petty      Discharge Date: 5/5/2025 1:31 PM    Procedure(s) (LRB):  BLAISE L5-S1 (N/A)    Final Diagnosis: Spinal stenosis, lumbar region with neurogenic claudication [M48.062]    Disposition: Home or self care    Patient Instructions:   Current Discharge Medication List        CONTINUE these medications which have NOT CHANGED    Details   atorvastatin (LIPITOR) 10 MG tablet Take 10 mg by mouth once daily at 6am.      enalapril (VASOTEC) 10 MG tablet Take 10 mg by mouth once daily.      gabapentin (NEURONTIN) 300 MG capsule Take 300 mg by mouth 3 (three) times daily.      metoprolol tartrate (LOPRESSOR) 100 MG tablet Take 100 mg by mouth once daily.      alfuzosin (UROXATRAL) 10 mg Tb24 Take 1 tablet (10 mg total) by mouth daily with breakfast.  Qty: 30 tablet, Refills: 12    Associated Diagnoses: Benign prostatic hyperplasia (BPH) with post-void dribbling      ALPRAZolam (XANAX) 1 MG tablet Take 1 mg by mouth once daily at 6am.      cholestyramine (QUESTRAN) 4 gram packet Take 1 packet (4 g total) by mouth 3 (three) times daily with meals.  Qty: 90 packet, Refills: 2      diazePAM (VALIUM) 5 MG tablet Take 1-2 tabs PO 30 minutes before MRI  Qty: 5 tablet, Refills: 0    Associated Diagnoses: Dorsalgia, unspecified; Left arm numbness; Spinal stenosis, lumbar region with neurogenic claudication; Neck pain      HYDROcodone-acetaminophen (NORCO) 5-325 mg per tablet Take 1 tablet by mouth every 8 (eight) hours as needed for Pain.  Qty: 10 tablet, Refills: 0    Comments: Quantity prescribed more than 7 day supply? No  Associated Diagnoses: Acute right-sided low back pain with right-sided sciatica; Neuroforaminal stenosis of lumbar spine      LIDOcaine (LIDODERM) 5 % Place 1 patch onto the skin once daily. Remove & Discard patch within 12 hours or as directed by MD  Qty: 7 patch,  Refills: 0      meloxicam (MOBIC) 15 MG tablet Take 1 tablet (15 mg total) by mouth once daily.  Qty: 30 tablet, Refills: 0      nystatin (MYCOSTATIN) cream APPLY TO THE AFFECTED AREA(S) EVERY DAY      omeprazole (PRILOSEC) 20 MG capsule       oxybutynin (DITROPAN-XL) 10 MG 24 hr tablet Take 1 tablet (10 mg total) by mouth once daily.  Qty: 30 tablet, Refills: 11    Associated Diagnoses: OAB (overactive bladder)                 Discharge Diagnosis: Spinal stenosis, lumbar region with neurogenic claudication [M48.062]  Condition on Discharge: Stable with no complications to procedure   Diet on Discharge: Same as before.  Activity: as per instruction sheet.  Discharge to: Home with a responsible adult.  Follow up: 2-4 weeks       Please call my office or pager at [phone] if experienced any weakness or loss of sensation, fever > 101.5, pain uncontrolled with oral medications, persistent nausea/vomiting/or diarrhea, redness or drainage from the incisions, or any other worrisome concerns. If physician on call was not reached or could not communicate with our office for any reason please go to the nearest emergency department

## 2025-05-05 NOTE — DISCHARGE INSTRUCTIONS
[facility] [place]  Dr. Verna Petty  Messaging service # [phone]    POST-PROCEDURE INSTRUCTIONS:    Today you had an injection that included a steroid medications.  The steroid may or may not have been mixed with a local anesthetic when it was injected.   If the injection was in the neck, you may feel some pressure, numbness, or slight weakness in the arm after the procedure for a short period of time (this is a normal response), if this persists for longer than 1 day please contact our office or go to the emergency room.  If the injection was in the low back, you may feel some pressure, numbness, or slight weakness in the leg after the procedure for a short period of time (this is a normal response), if this persists for longer than 1 day please contact our office or go to the emergency room.  You may get side effects from the steroid.  This is not uncommon.  Symptoms include: elevated blood sugar, elevated blood pressure, headache, flushing, nausea, insomnia.  These symptoms are transient and will resolve within 1-3 days.  If symptoms last longer than this please contact our office or head to the emergency room.  Steroid medications can take anywhere from 3-14 days to take effect (rarely longer).  You may notice that your pain worsens for a short period of time after the injection, this would not be unusual due to the pressure and trauma from the needle.    If you do not have a follow up appointment scheduled, please contact my office (or the office of the physician who referred you for the procedure) to get a post-procedure follow up scheduled 2-4 weeks after the procedure.  This can be done as a virtual visit if that is more convenient for you.      What you need to do:    Keep a record of your response to the injection you had today.    How much relief did you get?   When did the relief start and how long did it last?  Were you able to decrease the use of any of your pain  medications?  Were you able to increase your level of activity?  How long did the relief last?    What to watch out for:    If you experience any of the following symptoms after your procedure, please notify the messaging service immediately (see above for contact information):   fever (increased oral temperature)   bleeding or swelling at the injection site,    drainage, rash or redness at the injection site    possible signs of infection    increased pain at the injection site   worsening of your usual pain   severe headache   new or worsening numbness    new arm and/or leg weakness, or    changes in bowel and/or bladder function: urinating or defecating on yourself and not knowing that you did it.    PLEASE FOLLOW ALL INSTRUCTIONS CAREFULLY     Do not engage in strenuous activity (e.g., lifting or pushing heavy objects or repeated bending) for 24 hours.     Do not take a bath, swim or use Jacuzzi for 24 hours after procedure. (A shower is fine).   Remove any Band-Aids when you get home.    Use cold/ice, as needed for comfort.  We recommend the use of cold therapy alternating on for 20 minutes, off for 20 minutes.    Do not apply direct heat (heating pad or heat packs) to the injection site for 24 hours.     Resume your usual medications, unless instructed otherwise by your Pain Physician.     If you are on warfarin (Coumadin) or other blood thinner, resume this medication as instructed by your prescribing Physician.    IF AT ANY POINT YOU ARE VERY CONCERNED ABOUT YOUR SYMPTOMS, PLEASE GO TO THE EMERGENCY ROOM.    If you develop worsening pain, weakness, numbness, lose bowel or bladder control (i.e., having an accident where you did not even know you had to go to the bathroom and suddenly noticed you soiled yourself), saddle anesthesia (a loss of sensation restricted to the area of the buttocks, anus and between the legs -- i.e., those parts of your body that would touch a saddle if you were sitting on one) you  need to go immediately to the emergency department for evaluation and treatment.    ----------------------------------------------------------------------------------------------------------------------------------------------------------------  If you received Sedation please read the following instructions:  POST SEDATION INSTRUCTIONS    Today you received intravenous medication (also known as sedation) that was used to help you relax and/or decrease discomfort during your procedure. This medication will be acting in your body for the next 24 hours, so you might feel a little tired or sleepy. This feeling will slowly wear off.   Common side effects associated with these medications include: drowsiness, dizziness, sleepiness, confusion, feeling excited, difficulty remembering things, lack of steadiness with walking or balance, loss of fine muscle control, slowed reflexes, difficulty focusing, and blurred vision.  Some over-the-counter and prescription medications (e.g., muscle relaxants, opioids, mood-altering medications, sedatives/hypnotics, antihistamines) can interact with the intravenous medication you received and cause an increased risk of the side effects listed above in addition to other potentially life threatening side effects. Use extreme caution if you are taking such medications, and consult with your Pain Physician or prescribing physician if you have any questions.  For the next 12-24 hours:    DO NOT--Drive a car, operate machinery or power tools   DO NOT--Drink any alcoholic beverages (not even beer), they may dangerously increase the risk of side effects.    DO NOT--Make any important legal or business decisions or sign important documents.  We advise you to have someone to assist you at home. Move slowly and carefully. Do not make sudden changes in position. Be aware of dizziness or light-headedness and move accordingly.   If you seek medical treatment within 24 hours, let the nurse or doctor  caring for you know that you have received the above medications. If you have any questions or concerns related to your sedation or treatment today please contact us.

## 2025-05-05 NOTE — OP NOTE
Lumbar Interlaminar Epidural Steroid Injection under Fluoroscopic Guidance    The procedure, risks, benefits, and options were discussed with the patient. There are no contraindications to the procedure. The patent expressed understanding and agreed to the procedure. Informed written consent was obtained prior to the start of the procedure and can be found in the patient's chart.    PATIENT NAME: Mario Castro Sr.   MRN: [id number]     DATE OF PROCEDURE: 05/05/2025    PROCEDURE: Lumbar Interlaminar Epidural Steroid Injection L5/S1 under Fluoroscopic Guidance    PRE-OP DIAGNOSIS: Spinal stenosis, lumbar region with neurogenic claudication [M48.062] Lumbar radiculopathy [M54.16]      POST-OP DIAGNOSIS: Same    PHYSICIAN: Verna Petty DO    ASSISTANTS: None     MEDICATIONS INJECTED: Preservative-free Decadron 10mg with 4 cc of preservative free normal saline    LOCAL ANESTHETIC INJECTED: Xylocaine 2%     SEDATION: None    ESTIMATED BLOOD LOSS: None    COMPLICATIONS: None    TECHNIQUE: Time-out was performed to identify the patient and procedure to be performed. With the patient laying in a prone position, the surgical area was prepped and draped in the usual sterile fashion using ChloraPrep and a fenestrated drape. The level was determined under fluoroscopy guidance. Skin anesthesia was achieved by injecting Lidocaine 2% over the injection site. The interlaminar space was then approached with a 20 gauge,  3.5 inch Tuohy needle that was introduced under fluoroscopic guidance in the AP, lateral and/or contralateral oblique imaging. Once the Ligamentum flavum was encountered loss of resistance to saline was used to enter the epidural space. With positive loss of resistance and negative aspiration for CSF or Blood, contrast dye Omnipaque (300mg/mL) was injected to confirm placement and there was no vascular runoff. 5 mL of the medication mixture listed above was injected slowly. Displacement of the radio opaque  contrast after injection of the medication confirmed that the medication went into the area of the epidural space. The needles were removed and bleeding was nil. A sterile dressing was applied. No specimens collected. The patient tolerated the procedure well.       The patient was monitored after the procedure in the recovery area. They were given post-procedure and discharge instructions to follow at home. The patient was discharged in a stable condition.    Verna Petty DO

## 2025-05-06 ENCOUNTER — TELEPHONE (OUTPATIENT)
Dept: NEUROSURGERY | Facility: CLINIC | Age: 55
End: 2025-05-06
Payer: MEDICARE

## 2025-05-06 NOTE — TELEPHONE ENCOUNTER
Called the patient to let him know we can cancel his upcoming appointment with Carlene and reschedule it after he have his mri. Patient voiced understanding.

## 2025-05-09 RX ORDER — GABAPENTIN 300 MG/1
300 CAPSULE ORAL 3 TIMES DAILY
Qty: 90 CAPSULE | Refills: 2 | Status: SHIPPED | OUTPATIENT
Start: 2025-05-09

## 2025-07-31 ENCOUNTER — OFFICE VISIT (OUTPATIENT)
Dept: PAIN MEDICINE | Facility: CLINIC | Age: 55
End: 2025-07-31
Payer: MEDICARE

## 2025-07-31 ENCOUNTER — TELEPHONE (OUTPATIENT)
Dept: PAIN MEDICINE | Facility: CLINIC | Age: 55
End: 2025-07-31

## 2025-07-31 DIAGNOSIS — M48.062 SPINAL STENOSIS, LUMBAR REGION WITH NEUROGENIC CLAUDICATION: ICD-10-CM

## 2025-07-31 DIAGNOSIS — M54.16 LUMBAR RADICULOPATHY: Primary | ICD-10-CM

## 2025-07-31 DIAGNOSIS — M47.26 OTHER SPONDYLOSIS WITH RADICULOPATHY, LUMBAR REGION: ICD-10-CM

## 2025-07-31 DIAGNOSIS — M51.362 DEGENERATION OF INTERVERTEBRAL DISC OF LUMBAR REGION WITH DISCOGENIC BACK PAIN AND LOWER EXTREMITY PAIN: ICD-10-CM

## 2025-07-31 PROCEDURE — 98006 SYNCH AUDIO-VIDEO EST MOD 30: CPT | Mod: 95,,, | Performed by: STUDENT IN AN ORGANIZED HEALTH CARE EDUCATION/TRAINING PROGRAM

## 2025-07-31 NOTE — TELEPHONE ENCOUNTER
----- Message from Juan Petty sent at 2025 11:27 AM CDT -----  Regarding: Order for DENICE STOREY GURVIDNER SOLIS    Patient Name: DENICE STOREY SR.([id number])  Sex: Male  : 1970      PCP: RAVINDRA ROUSE JR    Center: [facility]     Types of orders made on 2025: Procedure Request    Order Date:2025  Ordering User:JUAN PETTY [[id number]]  Encounter Provider:Juan Petty DO [[id number]]  Authorizing Provider: Juan Petty DO [[id number]]  Department:[facility][[id number]]    Common Order Information  Procedure -> Epidural Injection (specify level) Cmt: L5/S1    Pre-op Diagnosis -> Lumbar radiculopathy, chronic     Order Specific Information  Order: Procedure Request Order for Pain Management [Custom: [id number]]  Order #:          [id number]: 1 FUTURE    Priority: Routine  Class: Clinic Performed    Future Order Information      Expires on:2026            Expected by:2025                   Comment:Patient requests late afternoon appointment if possible so his wife             can bring him    Associated Diagnoses      M54.16 Lumbar radiculopathy      Physician -> Jovanny         Facility Name: -> [facility]         Follow-up: -> 2 weeks Cmt: Darion Luke          Priority: Routine  Class: Clinic Performed    Future Order Information      Expires on:2026            Expected by:2025                   Comment:Patient requests late afternoon appointment if possible so his wife             can bring him    Associated Diagnoses      M54.16 Lumbar radiculopathy      Procedure -> Epidural Injection (specify level) Cmt: L5/S1        Physician -> Jovanny         Pre-op Diagnosis -> Lumbar radiculopathy, chronic         Facility Name: -> [facility]         Follow-up: -> 2 weeks Cmt: Darion Luke

## 2025-07-31 NOTE — PROGRESS NOTES
The patient location is: [place]  The chief complaint leading to consultation is:  Lumbar radiculopathy    Visit type: audiovisual    Telemedicine Encounter    Telemedicine Bundle:  The patient location is: patient's home  The chief complaint leading to consultation is:  Lumbar radiculopathy  Visit type: Virtual visit with synchronous audio and video  Each patient to whom he or she provides medical services by telemedicine is:    (1) informed of the relationship between the physician and patient and the respective role of any other health care provider with respect to management of the patient  (2) notified that he or she may decline to receive medical services by telemedicine and may withdraw from such care at any time.      [facility] - Established Patient Evaluation    Referred by: No ref. provider found   Reason for referral: Lumbar radiculopathy     CC:   No chief complaint on file.        2/20/2025     8:11 AM   Last 3 PDI Scores   Pain Disability Index (PDI) 29     Interval History 7/31/2025:     Mario Castro Sr. is a 55 y.o. male presents virtually for follow up a bilateral lumbar radiculopathy.  He has previously undergone 2 L5/S1 interlaminar epidural steroid injections.  First injection provided 100% relief, 2nd injection provided greater than 70% relief for 3 months.  He would like to schedule repeat injection.  Denies any new or worsening symptoms.  He prescribed gabapentin per Neurosurgery.    Subjective 02/20/2025:   Mario Castro Sr. is a 55 y.o. male who presents today in clinic for 2wk postprocedure follow up.  Patient received 100% of relief following interlaminar L5/S1 epidural steroid injection on 2/05/2025.  The injection was ordered by Neurosurgery.  Patient is doing well and reports no pain.  He continues with physical therapy and home exercise.  He takes gabapentin 300 mg b.i.d.    Initial Pain Assessment:  Location: lower back    Onset: over 5 years  Current  Pain Score: 0/10  Daily Pain of Range: 0-0/10  Quality: Aching, Burning, Throbbing, and Tingling  Radiation: right leg   Worsened by: lifting, sitting, standing for more than several minutes, and daily activity.  Improved by: laying down, medications, physical therapy, and rest     Patient denies night fever/night sweats, urinary incontinence, bowel incontinence, significant weight loss, significant motor weakness, and loss of sensations.      Previous Interventions:  -02/05/2025-L5/S1 interlaminar epidural steroid injection-100% relief of his pain.  -05/05/2025-L5/S1 interlaminar epidural steroid injection-> 70% relief    Previous Therapies:  PT/OT: yes   Chiropractor:   HEP:  Yes  Relevant Surgery:     Previous Medications:   - Tylenol or NSAIDS: Mobic   - Muscle Relaxants:   - TCAs:   - SNRIs:   - Topicals:   - Anticonvulsants: Gabapentin    - Opioids:   - Adjuvants:     Current Pain Medications:  Gabapentin 300 mg b.i.d.    Anticoagulation:     Review of Systems:  ROS    GENERAL:  No weight loss, malaise or fevers.  HEENT:   No recent changes in vision or hearing  NECK:  No difficulty with swallowing. No stridor.   RESPIRATORY:  Negative for cough, wheezing or shortness of breath, patient denies any recent URI.  CARDIOVASCULAR:  Negative for chest pain, leg swelling or palpitations.  GI:  Negative for abdominal discomfort, blood in stools or black stools or change in bowel habits.  MUSCULOSKELETAL:  See HPI.  SKIN:  Negative for lesions, rash, and itching.  PSYCH:  No mood disorder or recent psychosocial stressors.    HEMATOLOGY/LYMPHOLOGY:  Negative for prolonged bleeding, bruising easily or swollen nodes.  Patient is not currently taking any anti-coagulants  NEURO:   No history of headaches, syncope, paralysis, seizures or tremors.  All other reviewed and negative other than HPI.    History:  Current medications, allergies, medical history, surgical history,   family history, and social history were reviewed  in the chart as marked.    Full Medication List:  Current Medications[1]     Allergies:  Patient has no known allergies.     Medical History:   has a past medical history of Arthritis, Diverticulitis of intestine, and Hypertension.    Surgical History:   has a past surgical history that includes Colonoscopy (N/A, 03/08/2018); Colectomy (05/2018); Cystoscopy w/ ureteral stent placement (05/2018); Cystourethroscopy with direct vision internal urethrotomy (Left, 07/24/2018); Cystoscopy w/ ureteral stent removal (Left, 07/24/2018); Joint replacement; Colonoscopy (N/A, 9/29/2022); Epidural steroid injection into lumbar spine (N/A, 2/5/2025); and Epidural steroid injection into lumbar spine (N/A, 5/5/2025).    Family History:  family history includes Heart disease in his mother; Hypertension in his mother; No Known Problems in his father.    Social History:   reports that he has never smoked. He has never used smokeless tobacco. He reports that he does not drink alcohol and does not use drugs.    Physical Exam:  There were no vitals filed for this visit.    Previous physical exam  GENERAL: Well appearing, in no acute distress, alert and oriented x3.  PSYCH:  Mood and affect appropriate.  SKIN: Skin color, texture, turgor normal, no rashes or lesions.  HEAD/FACE:  Normocephalic, atraumatic. Cranial nerves grossly intact.  NECK: Normal ROM. Supple.   CV: RRR with palpation of the radial artery.  PULM: No evidence of respiratory difficulty, symmetric chest rise.  GI:  Soft and non-distended.  MSK: Straight leg raising is  negative to radicular pain. No pain to palpation over the facet joints of the lumbar spine. No pain with lumbar facet loading. No pain over the SI joints.  No pain over the GTB bilaterally.  Normal range of motion of the lumbar spine without pain reproduction.  Peripheral joint ROM is full and pain free without obvious instability or laxity in all four extremities. No obvious deformities, edema, or skin  discoloration.  No atrophy or tone abnormalities are noted.   NEURO: Bilateral upper and lower extremity coordination and strength is symmetric.  No loss of sensation is noted. No clonus. Brandt negative.  MENTAL STATUS: A x O x 3, good concentration, speech is fluent and goal directed  MOTOR: 5/5 in all muscle groups  GAIT: Normal. Ambulates unassisted.    Virtual Visit PE 07/31/2025  GEN: No acute distress. Calm, comfortable  HENT: Normocephalic, atraumatic, moist mucous membranes  EYE: Anicteric sclera, non-injected  CV: Non-diaphoretic.  CHEST: Breathing comfortably. Chest expansion symmetric  EXT: No clubbing, cyanosis.   Psych: Mood and affect are appropriate  GAIT:  Not assessed.      Imaging 01/09/25:  X-Ray Lumbar Spine Ap Lateral w/Flex Ext  Order: [id number]   Status: Final result       Next appt: 04/09/2025 at 02:30 PM in Neurosurgery (Carlene Torres PA-C)       Dx: Back pain, unspecified back location,...    Test Result Released: Yes (seen)    0 Result Notes  Details    Reading Physician Reading Date Result Priority   Julio Martinez IV, MD  [phone]  1/9/2025 Routine     Narrative & Impression  EXAMINATION:  XR LUMBAR SPINE AP AND LAT WITH FLEX/EXT     CLINICAL HISTORY:  Dorsalgia, unspecified     TECHNIQUE:  Five views of the lumbar spine including flexion extension views were performed.     COMPARISON:  CT 12/22/2024     FINDINGS:  Alignment: Alignment is maintained.  No dynamic instability.     Vertebrae: Vertebral body heights are maintained.  No suspicious appearing lytic or blastic lesions.     Discs and facets: Multiple the level disc space narrowing, most prominent L4-L5 with vacuum disc phenomenon.  Multilevel endplate spurring.  Facet arthropathy, most prominent than lower lumbar spine.     Miscellaneous: No additional findings.     Impression:     As above.        Electronically signed by:Julio Martinez  Date:                                            01/09/2025  Time:                                            13:13        Exam Ended: 01/09/25 09:19 CST Last Resulted: 01/09/25 13:13 CST     CT LUMBAR SPINE WITHOUT CONTRAST     CLINICAL HISTORY:  Lower back pain.     TECHNIQUE:  Low dose axial images, sagittal and coronal reformations were performed though the cervical spine.  Contrast was not administered.     COMPARISON:  None     FINDINGS:  Minimal retrolisthesis L2-3.  All lumbar vertebral bodies are normal in height.  Negative for acute fracture.  Negative for suspicious osseous lesion.     L1-2: Mild asymmetric to the right disc bulging and osteophytic ridging with moderate left and severe right facet arthropathy.  Severe right neural foraminal stenosis.  Left neural foramen and central canal patent.     L2-3: Minimal retrolisthesis.  Normal posterior disc margin.  Moderate facet arthropathy.  Central canal patent.  Mild bilateral neural foraminal stenosis.     L3-L4: Generalized disc bulging, severe facet arthropathy and ligamentum flavum hypertrophy.  Severe central canal and lateral recess stenosis.  Severe right and mild left neural foraminal stenosis.     L4-5: Mild disc height loss with vacuum disc phenomenon.  Prominent, generalized disc bulge with calcification the posterior disc margin.  Severe facet arthropathy and ligamentum flavum hypertrophy.  Lateral recess stenosis, worse on the left with moderate central canal stenosis.  Severe bilateral neural foraminal stenosis.     L5-S1: Mild disc bulging with severe facet arthropathy.  Central canal patent.  Severe right neural foraminal stenosis.  Left neural foramen patent.     Paravertebral soft tissues and musculature are normal.  Right renal cyst noted.  Colonic anastomosis distal colon.  Incompletely visualized right hip arthroplasty.     Impression:     Negative for compression fracture.     Advanced multilevel degenerative changes with severe central canal and lateral recess stenosis L3-4.  Severe right neural  foraminal stenosis.     Moderate central canal and severe lateral recess stenosis L4-5.  Severe bilateral neural foraminal stenosis.     Severe right neural foraminal stenosis L5-S1.        Electronically signed by:Darrell Carroll  Date:                                            12/22/2024  Labs:  BMP  Lab Results   Component Value Date     07/09/2024    K 3.9 07/09/2024     07/11/2018    CO2 31 07/09/2024    BUN 20 07/22/2025    CREATININE 1.04 07/09/2024    CALCIUM 8.4 07/09/2024    ANIONGAP 7 (L) 07/11/2018    EGFRNORACEVR 86 (L) 07/09/2024     Lab Results   Component Value Date    ALT 8 (L) 05/26/2018    AST 10 05/26/2018    ALKPHOS 47 (L) 05/26/2018    BILITOT 0.6 05/26/2018     Lab Results   Component Value Date    WBC 3.9 06/09/2025    HGB 13.5 06/09/2025    HCT 42.5 06/09/2025    MCV 86 07/11/2018     (L) 06/09/2025           Assessment:  Problem List Items Addressed This Visit    None  Visit Diagnoses         Lumbar radiculopathy    -  Primary    Relevant Orders    Procedure Request Order for Pain Management      Spinal stenosis, lumbar region with neurogenic claudication          Degeneration of intervertebral disc of lumbar region with discogenic back pain and lower extremity pain          Other spondylosis with radiculopathy, lumbar region                  02/20/2025 - Mario HOLLINS Matthew Gutierrez. is a 55 y.o. male who  has a past medical history of Arthritis, Diverticulitis of intestine, and Hypertension.  By history and examination this patient has chronic low back pain with radiculopathy.  The underlying cause is facet arthritis, degenerative disc disease, foraminal stenosis, and central canal stenosis.  Pathology is confirmed by imaging.  We discussed the underlying diagnoses and multiple treatment options including interventional procedures, physical therapy, and home exercise.  He is status post interlaminar epidural steroid injection at L5/S1 with 100% resolution of his pain.  He  continues with PT and home exercise.        07/31/2025 - patient presents virtually for follow up of bilateral lumbar radicular symptoms.  He would like to repeat previous L5/S1 epidural steroid injection as this has provided him between 70 and 100% relief of his pain previously.  Orders placed.    Treatment Plan:   Procedures:    Schedule patient for L5/S1 interlaminar epidural steroid injection  PT/OT/HEP: I have stressed the importance of physical activity and a home exercise plan to help with pain and improve health.  Continue with PT and home exercise as tolerated.  Medications:    -continue with gabapentin 300 mg b.i.d. per Neurosurgery   -  Reviewed and consistent with medication use as prescribed.  Imaging:  Lumbar MRI, lumbar CT and lumbar x-rays were reviewed.  Follow Up:  2 weeks postprocedure to assess response to injection.    Verna Petty DO   Interventional Pain Medicine / Anesthesiology    Disclaimer: This note was partly generated using dictation software which may occasionally result in transcription errors.                         [1]   Current Outpatient Medications:     alfuzosin (UROXATRAL) 10 mg Tb24, Take 1 tablet (10 mg total) by mouth daily with breakfast., Disp: 30 tablet, Rfl: 12    ALPRAZolam (XANAX) 1 MG tablet, Take 1 mg by mouth once daily at 6am., Disp: , Rfl:     atorvastatin (LIPITOR) 10 MG tablet, Take 10 mg by mouth once daily at 6am., Disp: , Rfl:     cholestyramine (QUESTRAN) 4 gram packet, Take 1 packet (4 g total) by mouth 3 (three) times daily with meals., Disp: 90 packet, Rfl: 2    diazePAM (VALIUM) 5 MG tablet, Take 1-2 tabs PO 30 minutes before MRI, Disp: 5 tablet, Rfl: 0    enalapril (VASOTEC) 10 MG tablet, Take 10 mg by mouth once daily., Disp: , Rfl:     gabapentin (NEURONTIN) 300 MG capsule, Take 1 capsule (300 mg total) by mouth 3 (three) times daily., Disp: 90 capsule, Rfl: 2    HYDROcodone-acetaminophen (NORCO) 5-325 mg per tablet, Take 1 tablet by mouth  every 8 (eight) hours as needed for Pain., Disp: 10 tablet, Rfl: 0    LIDOcaine (LIDODERM) 5 %, Place 1 patch onto the skin once daily. Remove & Discard patch within 12 hours or as directed by MD, Disp: 7 patch, Rfl: 0    meloxicam (MOBIC) 15 MG tablet, Take 1 tablet (15 mg total) by mouth once daily. (Patient not taking: Reported on 2/20/2025), Disp: 30 tablet, Rfl: 0    metoprolol tartrate (LOPRESSOR) 100 MG tablet, Take 100 mg by mouth once daily., Disp: , Rfl:     nystatin (MYCOSTATIN) cream, APPLY TO THE AFFECTED AREA(S) EVERY DAY, Disp: , Rfl:     omeprazole (PRILOSEC) 20 MG capsule, , Disp: , Rfl:     oxybutynin (DITROPAN-XL) 10 MG 24 hr tablet, Take 1 tablet (10 mg total) by mouth once daily., Disp: 30 tablet, Rfl: 11

## 2025-08-05 ENCOUNTER — TELEPHONE (OUTPATIENT)
Dept: PAIN MEDICINE | Facility: CLINIC | Age: 55
End: 2025-08-05
Payer: MEDICARE

## 2025-08-13 ENCOUNTER — TELEPHONE (OUTPATIENT)
Dept: PAIN MEDICINE | Facility: CLINIC | Age: 55
End: 2025-08-13
Payer: MEDICARE

## 2025-08-27 ENCOUNTER — TELEPHONE (OUTPATIENT)
Dept: NEUROSURGERY | Facility: CLINIC | Age: 55
End: 2025-08-27
Payer: MEDICARE

## 2025-08-27 DIAGNOSIS — M54.9 DORSALGIA, UNSPECIFIED: Primary | ICD-10-CM

## 2025-08-27 DIAGNOSIS — M54.2 NECK PAIN: ICD-10-CM

## 2025-08-27 RX ORDER — DIAZEPAM 5 MG/1
TABLET ORAL
Qty: 5 TABLET | Refills: 0 | Status: SHIPPED | OUTPATIENT
Start: 2025-08-27

## (undated) DEVICE — SUPPORT ULNA NERVE PROTECTOR

## (undated) DEVICE — BAG LINGEMAN DRAIN UROLOGY

## (undated) DEVICE — SEE L#120831

## (undated) DEVICE — GAUZE SPONGE 4X4 12PLY

## (undated) DEVICE — SYR 10CC LUER LOCK

## (undated) DEVICE — SEE MEDLINE ITEM 152622

## (undated) DEVICE — SEE MEDLINE ITEM 152537

## (undated) DEVICE — SET IRR URLGY 2LINE UNIV SPIKE

## (undated) DEVICE — WIRE GD LUB STD 3CM .035 150CM

## (undated) DEVICE — SEE MEDLINE ITEM 146347

## (undated) DEVICE — CATH ALL PUR URTHL 20FR

## (undated) DEVICE — DRAPE STERI INSTRUMENT 1018

## (undated) DEVICE — Device

## (undated) DEVICE — RELOAD ECHELON FLEX WHT 60MM

## (undated) DEVICE — SET DILATOR URETHRAL 8-24FRX37

## (undated) DEVICE — SUT PROLENE 3-0 30SH

## (undated) DEVICE — GLOVE SURG BIOGEL LATEX SZ 7.5

## (undated) DEVICE — SLEEVE SCD EXPRESS CALF LARGE

## (undated) DEVICE — PAD PREP 50/CA

## (undated) DEVICE — GELPOINT MINI KIT ENDOSCOPIC

## (undated) DEVICE — SET BASIN 48X48IN 6000ML RING

## (undated) DEVICE — JELLY LUBRICANT STERILE 4 OZ

## (undated) DEVICE — TUBING INSUFFLATION 10

## (undated) DEVICE — GUIDEWIRE NITINOL HYBRID 150CM

## (undated) DEVICE — SUT PRLENE 1CT 1 BL MONO 30

## (undated) DEVICE — BAG DRAIN URINARY CONT IRRG

## (undated) DEVICE — SUT SILK 0 STRANDS 30IN BLK

## (undated) DEVICE — CATH URTRL OPEN END STR TIP 5F

## (undated) DEVICE — MANIFOLD 4 PORT

## (undated) DEVICE — SUT VICRYL 2-0 SH VCP317H

## (undated) DEVICE — KIT POWDER ABSORBABLE GELATIN

## (undated) DEVICE — SUT 0 VICRYL / UR6 (J603)

## (undated) DEVICE — CATH IV 14G X 2 IN AUTOGARD

## (undated) DEVICE — SEE MEDLINE ITEM 157117

## (undated) DEVICE — HEMOSTAT SURGICEL 4X8IN

## (undated) DEVICE — STAPLES RELOAD ECHELON 60

## (undated) DEVICE — TROCAR KII FIOS 12MM X 100MM

## (undated) DEVICE — UNDERGLOVES BIOGEL PI SIZE 7.5

## (undated) DEVICE — STAPLER ECHELON FLEX GST 60MM

## (undated) DEVICE — DRAPE ABDOMINAL TIBURON 14X11

## (undated) DEVICE — TROCAR KII FIOS 5MM X 100MM

## (undated) DEVICE — SUT 1 48IN PDS II VIO MONO

## (undated) DEVICE — SEE MEDLINE ITEM 146372

## (undated) DEVICE — RELOAD PROXIMATE CUT BLUE 75MM

## (undated) DEVICE — APPLICATOR CHLORAPREP ORN 26ML

## (undated) DEVICE — SEE MEDLINE ITEM 156955

## (undated) DEVICE — SYS CLSR WND ENDOSCP XL

## (undated) DEVICE — CART STAPLE RELD 45MM WHT

## (undated) DEVICE — SYR ONLY LUER LOCK 20CC

## (undated) DEVICE — TROCAR KII FIOS 11MM X 100MM

## (undated) DEVICE — SOL IRR NACL .9% 3000ML

## (undated) DEVICE — SUT MONOCYRL 4-0 PS2 UND

## (undated) DEVICE — BAG TISS RETRV MONARCH 10MM

## (undated) DEVICE — KNIFE DISPOSABLE COLD ANGLED

## (undated) DEVICE — DRAPE INCISE IOBAN 2 23X23IN

## (undated) DEVICE — DEVICE PURSE STRING 10 LONG

## (undated) DEVICE — COVER OVERHEAD SURG LT BLUE

## (undated) DEVICE — ELECTRODE KNIFE 24F 12/30 DG

## (undated) DEVICE — SET DILATOR URTH 8-20FR 37CM

## (undated) DEVICE — SEE MEDLINE ITEM 157125

## (undated) DEVICE — SEE MEDLINE ITEM 157185

## (undated) DEVICE — STAPLER INT LINEAR ARTC 3.5-45

## (undated) DEVICE — NDL HYPDRM 23X15

## (undated) DEVICE — CATH URETERAL RUTNER 5 FR

## (undated) DEVICE — SOL IRR WATER STRL 3000 ML

## (undated) DEVICE — KIT GELPOINT TRNSANL ACC 5.5CM

## (undated) DEVICE — ELECTRODE REM PLYHSV RETURN 9

## (undated) DEVICE — DRESSING AQUACEL SACRAL 9 X 9

## (undated) DEVICE — KIT GELPOINT ADV ACC PLATFORM

## (undated) DEVICE — ADHESIVE DERMABOND ADVANCED

## (undated) DEVICE — CUTTER PROXIMATE BLUE 75MM

## (undated) DEVICE — SPONGE LAP 18X18 PREWASHED

## (undated) DEVICE — IRRIGATOR ENDOSCOPY DISP.

## (undated) DEVICE — SEE MEDLINE ITEM 154981

## (undated) DEVICE — WIRE GUIDE LUBRCTD ANGL 3CM

## (undated) DEVICE — UNDERGLOVES BIOGEL PI SIZE 8

## (undated) DEVICE — PACK DRAPE UNIVERSAL CONVERTOR

## (undated) DEVICE — GOWN SURGICAL XX LARGE X LONG

## (undated) DEVICE — URINARY DRAINAGE BAG